# Patient Record
Sex: FEMALE | URBAN - METROPOLITAN AREA
[De-identification: names, ages, dates, MRNs, and addresses within clinical notes are randomized per-mention and may not be internally consistent; named-entity substitution may affect disease eponyms.]

---

## 2017-01-13 ENCOUNTER — INPATIENT (INPATIENT)
Facility: HOSPITAL | Age: 57
LOS: 5 days | Discharge: HOME | DRG: 191 | End: 2017-01-19
Attending: INTERNAL MEDICINE | Admitting: INTERNAL MEDICINE
Payer: COMMERCIAL

## 2017-01-13 VITALS
HEART RATE: 103 BPM | OXYGEN SATURATION: 100 % | WEIGHT: 225.09 LBS | SYSTOLIC BLOOD PRESSURE: 135 MMHG | TEMPERATURE: 99 F | DIASTOLIC BLOOD PRESSURE: 74 MMHG | HEIGHT: 68 IN | RESPIRATION RATE: 18 BRPM

## 2017-01-13 DIAGNOSIS — E11.9 TYPE 2 DIABETES MELLITUS WITHOUT COMPLICATIONS: ICD-10-CM

## 2017-01-13 DIAGNOSIS — J44.1 CHRONIC OBSTRUCTIVE PULMONARY DISEASE WITH (ACUTE) EXACERBATION: ICD-10-CM

## 2017-01-13 DIAGNOSIS — Z98.891 HISTORY OF UTERINE SCAR FROM PREVIOUS SURGERY: Chronic | ICD-10-CM

## 2017-01-13 DIAGNOSIS — Z29.9 ENCOUNTER FOR PROPHYLACTIC MEASURES, UNSPECIFIED: ICD-10-CM

## 2017-01-13 DIAGNOSIS — Z90.49 ACQUIRED ABSENCE OF OTHER SPECIFIED PARTS OF DIGESTIVE TRACT: Chronic | ICD-10-CM

## 2017-01-13 LAB
ANION GAP SERPL CALC-SCNC: 6 MMOL/L — SIGNIFICANT CHANGE UP (ref 5–17)
ANISOCYTOSIS BLD QL: SLIGHT — SIGNIFICANT CHANGE UP
BASOPHILS # BLD AUTO: 0.1 K/UL — SIGNIFICANT CHANGE UP (ref 0–0.2)
BASOPHILS NFR BLD AUTO: 0.3 % — SIGNIFICANT CHANGE UP (ref 0–2)
BASOPHILS NFR BLD AUTO: 1 % — SIGNIFICANT CHANGE UP (ref 0–2)
BUN SERPL-MCNC: 11 MG/DL — SIGNIFICANT CHANGE UP (ref 7–18)
CALCIUM SERPL-MCNC: 9 MG/DL — SIGNIFICANT CHANGE UP (ref 8.4–10.5)
CHLORIDE SERPL-SCNC: 104 MMOL/L — SIGNIFICANT CHANGE UP (ref 96–108)
CO2 SERPL-SCNC: 33 MMOL/L — HIGH (ref 22–31)
CREAT SERPL-MCNC: 0.61 MG/DL — SIGNIFICANT CHANGE UP (ref 0.5–1.3)
EOSINOPHIL # BLD AUTO: 0 K/UL — SIGNIFICANT CHANGE UP (ref 0–0.5)
EOSINOPHIL NFR BLD AUTO: 0 % — SIGNIFICANT CHANGE UP (ref 0–6)
GIANT PLATELETS BLD QL SMEAR: PRESENT — SIGNIFICANT CHANGE UP
GLUCOSE SERPL-MCNC: 200 MG/DL — HIGH (ref 70–99)
HCT VFR BLD CALC: 30.5 % — LOW (ref 34.5–45)
HCT VFR BLD CALC: 32.7 % — LOW (ref 34.5–45)
HGB BLD-MCNC: 9.4 G/DL — LOW (ref 11.5–15.5)
HGB BLD-MCNC: 9.4 G/DL — LOW (ref 11.5–15.5)
LACTATE SERPL-SCNC: 2.9 MMOL/L — HIGH (ref 0.7–2)
LYMPHOCYTES # BLD AUTO: 0.8 K/UL — LOW (ref 1–3.3)
LYMPHOCYTES # BLD AUTO: 4 % — LOW (ref 13–44)
LYMPHOCYTES # BLD AUTO: 8 % — LOW (ref 13–44)
MAGNESIUM SERPL-MCNC: 1.9 MG/DL — SIGNIFICANT CHANGE UP (ref 1.8–2.4)
MANUAL DIF COMMENT BLD-IMP: SIGNIFICANT CHANGE UP
MANUAL DIF COMMENT BLD-IMP: SIGNIFICANT CHANGE UP
MCHC RBC-ENTMCNC: 22 PG — LOW (ref 27–34)
MCHC RBC-ENTMCNC: 22.9 PG — LOW (ref 27–34)
MCHC RBC-ENTMCNC: 28.8 GM/DL — LOW (ref 32–36)
MCHC RBC-ENTMCNC: 30.8 GM/DL — LOW (ref 32–36)
MCV RBC AUTO: 74.4 FL — LOW (ref 80–100)
MCV RBC AUTO: 76.3 FL — LOW (ref 80–100)
METAMYELOCYTES # FLD: 1 % — HIGH (ref 0–0)
MICROCYTES BLD QL: SLIGHT — SIGNIFICANT CHANGE UP
MONOCYTES # BLD AUTO: 0.1 K/UL — SIGNIFICANT CHANGE UP (ref 0–0.9)
MONOCYTES NFR BLD AUTO: 0.4 % — LOW (ref 2–14)
MONOCYTES NFR BLD AUTO: 1 % — LOW (ref 2–14)
NEUTROPHILS # BLD AUTO: 18.4 K/UL — HIGH (ref 1.8–7.4)
NEUTROPHILS NFR BLD AUTO: 89 % — HIGH (ref 43–77)
NEUTROPHILS NFR BLD AUTO: 95.3 % — HIGH (ref 43–77)
PHOSPHATE SERPL-MCNC: 2.3 MG/DL — LOW (ref 2.5–4.5)
PLAT MORPH BLD: NORMAL — SIGNIFICANT CHANGE UP
PLATELET # BLD AUTO: 490 K/UL — HIGH (ref 150–400)
PLATELET # BLD AUTO: 522 K/UL — HIGH (ref 150–400)
POTASSIUM SERPL-MCNC: 4.2 MMOL/L — SIGNIFICANT CHANGE UP (ref 3.5–5.3)
POTASSIUM SERPL-SCNC: 4.2 MMOL/L — SIGNIFICANT CHANGE UP (ref 3.5–5.3)
RBC # BLD: 4.1 M/UL — SIGNIFICANT CHANGE UP (ref 3.8–5.2)
RBC # BLD: 4.29 M/UL — SIGNIFICANT CHANGE UP (ref 3.8–5.2)
RBC # FLD: 19.8 % — HIGH (ref 10.3–14.5)
RBC # FLD: 20.7 % — HIGH (ref 10.3–14.5)
RBC BLD AUTO: ABNORMAL
SODIUM SERPL-SCNC: 143 MMOL/L — SIGNIFICANT CHANGE UP (ref 135–145)
SPHEROCYTES BLD QL SMEAR: SLIGHT — SIGNIFICANT CHANGE UP
WBC # BLD: 18.7 K/UL — HIGH (ref 3.8–10.5)
WBC # BLD: 19.3 K/UL — HIGH (ref 3.8–10.5)
WBC # FLD AUTO: 18.7 K/UL — HIGH (ref 3.8–10.5)
WBC # FLD AUTO: 19.3 K/UL — HIGH (ref 3.8–10.5)

## 2017-01-13 PROCEDURE — 71010: CPT | Mod: 26

## 2017-01-13 PROCEDURE — 99285 EMERGENCY DEPT VISIT HI MDM: CPT | Mod: 25

## 2017-01-13 PROCEDURE — 93970 EXTREMITY STUDY: CPT | Mod: 26

## 2017-01-13 PROCEDURE — 99053 MED SERV 10PM-8AM 24 HR FAC: CPT

## 2017-01-13 RX ORDER — DEXTROSE 50 % IN WATER 50 %
25 SYRINGE (ML) INTRAVENOUS ONCE
Qty: 0 | Refills: 0 | Status: DISCONTINUED | OUTPATIENT
Start: 2017-01-13 | End: 2017-01-19

## 2017-01-13 RX ORDER — SODIUM CHLORIDE 9 MG/ML
1000 INJECTION INTRAMUSCULAR; INTRAVENOUS; SUBCUTANEOUS
Qty: 0 | Refills: 0 | Status: COMPLETED | OUTPATIENT
Start: 2017-01-13 | End: 2017-01-14

## 2017-01-13 RX ORDER — DOCUSATE SODIUM 100 MG
100 CAPSULE ORAL
Qty: 0 | Refills: 0 | Status: DISCONTINUED | OUTPATIENT
Start: 2017-01-13 | End: 2017-01-19

## 2017-01-13 RX ORDER — FUROSEMIDE 40 MG
40 TABLET ORAL DAILY
Qty: 0 | Refills: 0 | Status: DISCONTINUED | OUTPATIENT
Start: 2017-01-13 | End: 2017-01-16

## 2017-01-13 RX ORDER — IPRATROPIUM/ALBUTEROL SULFATE 18-103MCG
3 AEROSOL WITH ADAPTER (GRAM) INHALATION EVERY 6 HOURS
Qty: 0 | Refills: 0 | Status: DISCONTINUED | OUTPATIENT
Start: 2017-01-13 | End: 2017-01-13

## 2017-01-13 RX ORDER — ENOXAPARIN SODIUM 100 MG/ML
40 INJECTION SUBCUTANEOUS DAILY
Qty: 0 | Refills: 0 | Status: DISCONTINUED | OUTPATIENT
Start: 2017-01-13 | End: 2017-01-19

## 2017-01-13 RX ORDER — IPRATROPIUM/ALBUTEROL SULFATE 18-103MCG
3 AEROSOL WITH ADAPTER (GRAM) INHALATION EVERY 4 HOURS
Qty: 0 | Refills: 0 | Status: DISCONTINUED | OUTPATIENT
Start: 2017-01-13 | End: 2017-01-15

## 2017-01-13 RX ORDER — INSULIN LISPRO 100/ML
VIAL (ML) SUBCUTANEOUS
Qty: 0 | Refills: 0 | Status: DISCONTINUED | OUTPATIENT
Start: 2017-01-13 | End: 2017-01-19

## 2017-01-13 RX ORDER — ACETAMINOPHEN 500 MG
650 TABLET ORAL EVERY 8 HOURS
Qty: 0 | Refills: 0 | Status: COMPLETED | OUTPATIENT
Start: 2017-01-13 | End: 2017-01-18

## 2017-01-13 RX ORDER — POLYETHYLENE GLYCOL 3350 17 G/17G
17 POWDER, FOR SOLUTION ORAL DAILY
Qty: 0 | Refills: 0 | Status: COMPLETED | OUTPATIENT
Start: 2017-01-14 | End: 2017-01-16

## 2017-01-13 RX ORDER — ALBUTEROL 90 UG/1
2.5 AEROSOL, METERED ORAL
Qty: 0 | Refills: 0 | Status: COMPLETED | OUTPATIENT
Start: 2017-01-13 | End: 2017-01-13

## 2017-01-13 RX ORDER — IPRATROPIUM BROMIDE 0.2 MG/ML
500 SOLUTION, NON-ORAL INHALATION
Qty: 0 | Refills: 0 | Status: COMPLETED | OUTPATIENT
Start: 2017-01-13 | End: 2017-01-13

## 2017-01-13 RX ORDER — GLUCAGON INJECTION, SOLUTION 0.5 MG/.1ML
1 INJECTION, SOLUTION SUBCUTANEOUS ONCE
Qty: 0 | Refills: 0 | Status: DISCONTINUED | OUTPATIENT
Start: 2017-01-13 | End: 2017-01-19

## 2017-01-13 RX ORDER — SENNA PLUS 8.6 MG/1
2 TABLET ORAL AT BEDTIME
Qty: 0 | Refills: 0 | Status: DISCONTINUED | OUTPATIENT
Start: 2017-01-13 | End: 2017-01-19

## 2017-01-13 RX ORDER — DEXTROSE 50 % IN WATER 50 %
12.5 SYRINGE (ML) INTRAVENOUS ONCE
Qty: 0 | Refills: 0 | Status: DISCONTINUED | OUTPATIENT
Start: 2017-01-13 | End: 2017-01-19

## 2017-01-13 RX ORDER — SODIUM CHLORIDE 9 MG/ML
1000 INJECTION, SOLUTION INTRAVENOUS
Qty: 0 | Refills: 0 | Status: DISCONTINUED | OUTPATIENT
Start: 2017-01-13 | End: 2017-01-19

## 2017-01-13 RX ORDER — ACETAMINOPHEN 500 MG
650 TABLET ORAL ONCE
Qty: 0 | Refills: 0 | Status: DISCONTINUED | OUTPATIENT
Start: 2017-01-13 | End: 2017-01-13

## 2017-01-13 RX ORDER — DEXTROSE 50 % IN WATER 50 %
1 SYRINGE (ML) INTRAVENOUS ONCE
Qty: 0 | Refills: 0 | Status: DISCONTINUED | OUTPATIENT
Start: 2017-01-13 | End: 2017-01-19

## 2017-01-13 RX ADMIN — Medication 500 MICROGRAM(S): at 06:25

## 2017-01-13 RX ADMIN — Medication 500 MICROGRAM(S): at 07:25

## 2017-01-13 RX ADMIN — ALBUTEROL 2.5 MILLIGRAM(S): 90 AEROSOL, METERED ORAL at 07:28

## 2017-01-13 RX ADMIN — Medication 3 MILLILITER(S): at 22:46

## 2017-01-13 RX ADMIN — SODIUM CHLORIDE 100 MILLILITER(S): 9 INJECTION INTRAMUSCULAR; INTRAVENOUS; SUBCUTANEOUS at 19:45

## 2017-01-13 RX ADMIN — Medication 40 MILLIGRAM(S): at 15:21

## 2017-01-13 RX ADMIN — Medication 500 MICROGRAM(S): at 07:23

## 2017-01-13 RX ADMIN — ENOXAPARIN SODIUM 40 MILLIGRAM(S): 100 INJECTION SUBCUTANEOUS at 15:21

## 2017-01-13 RX ADMIN — Medication 100 MILLIGRAM(S): at 17:48

## 2017-01-13 RX ADMIN — Medication 3 MILLILITER(S): at 17:48

## 2017-01-13 RX ADMIN — Medication 125 MILLIGRAM(S): at 07:23

## 2017-01-13 RX ADMIN — ALBUTEROL 2.5 MILLIGRAM(S): 90 AEROSOL, METERED ORAL at 07:23

## 2017-01-13 RX ADMIN — Medication: at 17:48

## 2017-01-13 RX ADMIN — ALBUTEROL 2.5 MILLIGRAM(S): 90 AEROSOL, METERED ORAL at 07:24

## 2017-01-13 RX ADMIN — Medication 650 MILLIGRAM(S): at 23:51

## 2017-01-13 RX ADMIN — Medication 40 MILLIGRAM(S): at 22:45

## 2017-01-13 NOTE — H&P ADULT. - ATTENDING COMMENTS
Patient seen and examined. Patient's history, vitals, labs, imaging studies reviewed. Discussed with resident, agree with note, with edits. In addition, + constipation - give bowel regimen, tap water enema, monitor for bowel movement. Plan of care discussed with patient, and agrees, all questions answered

## 2017-01-13 NOTE — H&P ADULT. - PROBLEM SELECTOR PLAN 1
- secondary to bronchitis   - continue duonebs and steriods  - continue levaquin   - will give humidified oxygen   - patient has ECHO in oct, 2016 with normal EF; no indication for repeat   - f/u rapid viral and flu panel  - iv hydration for lactic acidosis  - will get US duplex for lower extremity swelling

## 2017-01-13 NOTE — ED PROVIDER NOTE - PMH
Asthma    Diabetes    Emphysema    Hemorrhoids    Morbid obesity    Overactive bladder    Urinary tract infection without hematuria, site unspecified

## 2017-01-13 NOTE — ED PROVIDER NOTE - OBJECTIVE STATEMENT
58 y/o F pt w/ PMHx of COPD, DM and asthma presents to ED c/o SOB since yesterday night. Pt states that she takes Lasix and is on at home oxygen for COPD. Pt denies fever, cough, chest pain, or any other complaints. Pt is allergic to multiple drugs as listed.

## 2017-01-13 NOTE — ED PROVIDER NOTE - CONSTITUTIONAL, MLM
normal... Well appearing, obese, awake, alert, oriented to person, place, time/situation and in no apparent distress.

## 2017-01-13 NOTE — H&P ADULT. - RS GEN PE MLT RESP DETAILS PC
good air movement/normal/clear to auscultation bilaterally/breath sounds equal/airway patent/respirations non-labored

## 2017-01-13 NOTE — H&P ADULT. - HISTORY OF PRESENT ILLNESS
Patient is a 56 F from home, lives with  ambulates with walker PMHx of COPD on home oxygen 24 hours a day(never intubated) , PUSHPA (not on CPAP), DM, overactive bladder with recurrent UTI, multiple frequent readmissions for COPD exacerbation came in to the ED with worsening SOB due to severe right leg pain and swelling associated with inability to ambulate.      Patient is always short of breath at baseline but symptoms are worsening. Dyspnea is constant, worsens with ambulation and exertion. She also complains of worsening right leg swelling despite being compliant with her diet and Lasix. No chest pain, fevers , chills or dysuria. No cough, fever, chills, weight loss, diarrhea, nausea/vomiting or abdominal pain. No recent travel or sick contacts. Due the leg pain, she remains in bed most of the bed. Also reports anxiety and frustation. Patient was recently discharged from [facility] after treatment for COPD exacerbation(30 DEC 2016 to Jan 1)  during which time she was discharged on steriod taper and flonase. She was also treated for MDR klebsiella with 3 days of ertapenem.     Socia: lives with . No HHA (hasn't taken shower in 2 years!). Smoked 0.5 PPD for 30 years. Quit 20 years ago. Denies alcohol or illict drugs abuse.   Family: Both parents had a stroke. No major lung disease. Patient is a 56 F from home, lives with  ambulates with walker PMHx of COPD on home oxygen 24 hours a day(never intubated) , PUSHPA (not on CPAP), DM, overactive bladder with recurrent UTI, multiple frequent readmissions for COPD exacerbation came in to the ED with worsening SOB and severe right leg pain and swelling associated with inability to ambulate.      Patient is always short of breath at baseline but symptoms are worsening. Dyspnea is constant, worsens with ambulation and exertion. She also complains of worsening right leg swelling despite reporting compliant with her diet and Lasix. No chest pain, fevers , chills or dysuria. No cough, fever, chills, weight loss, diarrhea, nausea/vomiting or abdominal pain. No recent travel or sick contacts. Due the leg pain, she remains in bed most of the bed. Also reports anxiety and frustation. Patient was recently discharged from [facility] after treatment for COPD exacerbation(30 DEC 2016 to Jan 1)  during which time she was discharged on steriod taper and flonase. She was also treated for MDR klebsiella with 3 days of ertapenem.     Socia: lives with . No HHA (hasn't taken shower in 2 years!). Smoked 0.5 PPD for 30 years. Quit 20 years ago. Denies alcohol or illict drugs abuse.   Family: Both parents had a stroke. No major lung disease. Patient is a 57 F from home, lives with  ambulates with walker PMHx of COPD on home oxygen 24 hours a day(never intubated) , PUSHPA (not on CPAP), DM, overactive bladder with recurrent UTI, multiple frequent readmissions for COPD exacerbation came in to the ED with worsening SOB and severe right leg pain and swelling associated with inability to ambulate.      Patient is always short of breath at baseline but symptoms are worsening. Dyspnea is constant, worsens with ambulation and exertion. She also complains of worsening right leg swelling despite reporting compliant with her diet and Lasix. No chest pain, fevers , chills or dysuria. No cough, fever, chills, weight loss, diarrhea, nausea/vomiting or abdominal pain. No recent travel or sick contacts. Due the leg pain, she remains in bed most of the bed. Also reports anxiety and frustation. Patient was recently discharged from [facility] after treatment for COPD exacerbation(30 DEC 2016 to Jan 1)  during which time she was discharged on steriod taper and flonase. She was also treated for MDR klebsiella with 3 days of ertapenem.     Socia: lives with . No HHA (hasn't taken shower in 2 years!). Smoked 0.5 PPD for 30 years. Quit 20 years ago. Denies alcohol or illict drugs abuse.   Family: Both parents had a stroke. No major lung disease.

## 2017-01-13 NOTE — ED ADULT NURSE NOTE - OBJECTIVE STATEMENT
Pt. is aox3 came to er [facility] with sob/janelle-lower ext pain. pt has difficulty moving/standing. pt is obese. denies cp/nausea. eats gummies constantly. pt was seen and evaluated by md. labs done sent . neb treatment given

## 2017-01-13 NOTE — ED PROVIDER NOTE - MEDICAL DECISION MAKING DETAILS
58 y/o F pt w/ recurring sob since yesterday. Likely COPD exacerbation. Will get labs, consider admission. 58 y/o F pt w/ recurring sob since yesterday. Likely COPD exacerbation. Will get labs, treat consider admission.

## 2017-01-13 NOTE — H&P ADULT. - FAMILY HISTORY
Father  Still living? Unknown  Family history of stroke, Age at diagnosis: Age Unknown     Mother  Still living? Unknown  Family history of stroke, Age at diagnosis: Age Unknown

## 2017-01-13 NOTE — ED PROVIDER NOTE - NS ED MD SCRIBE ATTENDING SCRIBE SECTIONS
VITAL SIGNS( Pullset)/HISTORY OF PRESENT ILLNESS/REVIEW OF SYSTEMS/PAST MEDICAL/SURGICAL/SOCIAL HISTORY/PHYSICAL EXAM/DISPOSITION/HIV

## 2017-01-14 LAB
ANION GAP SERPL CALC-SCNC: 6 MMOL/L — SIGNIFICANT CHANGE UP (ref 5–17)
BASOPHILS # BLD AUTO: 0.2 K/UL — SIGNIFICANT CHANGE UP (ref 0–0.2)
BASOPHILS NFR BLD AUTO: 0.8 % — SIGNIFICANT CHANGE UP (ref 0–2)
BUN SERPL-MCNC: 10 MG/DL — SIGNIFICANT CHANGE UP (ref 7–18)
CALCIUM SERPL-MCNC: 9 MG/DL — SIGNIFICANT CHANGE UP (ref 8.4–10.5)
CHLORIDE SERPL-SCNC: 105 MMOL/L — SIGNIFICANT CHANGE UP (ref 96–108)
CO2 SERPL-SCNC: 34 MMOL/L — HIGH (ref 22–31)
CREAT SERPL-MCNC: 0.55 MG/DL — SIGNIFICANT CHANGE UP (ref 0.5–1.3)
EOSINOPHIL # BLD AUTO: 0 K/UL — SIGNIFICANT CHANGE UP (ref 0–0.5)
EOSINOPHIL NFR BLD AUTO: 0 % — SIGNIFICANT CHANGE UP (ref 0–6)
FLUAV SPEC QL CULT: NEGATIVE — SIGNIFICANT CHANGE UP
FLUBV AG SPEC QL IA: NEGATIVE — SIGNIFICANT CHANGE UP
GLUCOSE SERPL-MCNC: 160 MG/DL — HIGH (ref 70–99)
HCT VFR BLD CALC: 30.7 % — LOW (ref 34.5–45)
HGB BLD-MCNC: 9 G/DL — LOW (ref 11.5–15.5)
LACTATE SERPL-SCNC: 1.7 MMOL/L — SIGNIFICANT CHANGE UP (ref 0.7–2)
LYMPHOCYTES # BLD AUTO: 1.1 K/UL — SIGNIFICANT CHANGE UP (ref 1–3.3)
LYMPHOCYTES # BLD AUTO: 5.5 % — LOW (ref 13–44)
MAGNESIUM SERPL-MCNC: 2 MG/DL — SIGNIFICANT CHANGE UP (ref 1.8–2.4)
MCHC RBC-ENTMCNC: 22.7 PG — LOW (ref 27–34)
MCHC RBC-ENTMCNC: 29.5 GM/DL — LOW (ref 32–36)
MCV RBC AUTO: 77.1 FL — LOW (ref 80–100)
MONOCYTES # BLD AUTO: 0.6 K/UL — SIGNIFICANT CHANGE UP (ref 0–0.9)
MONOCYTES NFR BLD AUTO: 3 % — SIGNIFICANT CHANGE UP (ref 2–14)
NEUTROPHILS # BLD AUTO: 18.8 K/UL — HIGH (ref 1.8–7.4)
NEUTROPHILS NFR BLD AUTO: 90.7 % — HIGH (ref 43–77)
PHOSPHATE SERPL-MCNC: 2.9 MG/DL — SIGNIFICANT CHANGE UP (ref 2.5–4.5)
PLATELET # BLD AUTO: 466 K/UL — HIGH (ref 150–400)
POTASSIUM SERPL-MCNC: 4.6 MMOL/L — SIGNIFICANT CHANGE UP (ref 3.5–5.3)
POTASSIUM SERPL-SCNC: 4.6 MMOL/L — SIGNIFICANT CHANGE UP (ref 3.5–5.3)
RBC # BLD: 3.98 M/UL — SIGNIFICANT CHANGE UP (ref 3.8–5.2)
RBC # FLD: 20.8 % — HIGH (ref 10.3–14.5)
SODIUM SERPL-SCNC: 145 MMOL/L — SIGNIFICANT CHANGE UP (ref 135–145)
WBC # BLD: 20.8 K/UL — HIGH (ref 3.8–10.5)
WBC # FLD AUTO: 20.8 K/UL — HIGH (ref 3.8–10.5)

## 2017-01-14 RX ORDER — FLUTICASONE PROPIONATE 50 MCG
1 SPRAY, SUSPENSION NASAL
Qty: 0 | Refills: 0 | Status: DISCONTINUED | OUTPATIENT
Start: 2017-01-14 | End: 2017-01-15

## 2017-01-14 RX ORDER — OXYMETAZOLINE HYDROCHLORIDE 0.5 MG/ML
2 SPRAY NASAL THREE TIMES A DAY
Qty: 0 | Refills: 0 | Status: COMPLETED | OUTPATIENT
Start: 2017-01-14 | End: 2017-01-15

## 2017-01-14 RX ORDER — PETROLATUM,WHITE
1 JELLY (GRAM) TOPICAL DAILY
Qty: 0 | Refills: 0 | Status: DISCONTINUED | OUTPATIENT
Start: 2017-01-14 | End: 2017-01-19

## 2017-01-14 RX ORDER — IPRATROPIUM/ALBUTEROL SULFATE 18-103MCG
3 AEROSOL WITH ADAPTER (GRAM) INHALATION ONCE
Qty: 0 | Refills: 0 | Status: COMPLETED | OUTPATIENT
Start: 2017-01-14 | End: 2017-01-14

## 2017-01-14 RX ADMIN — Medication 650 MILLIGRAM(S): at 07:06

## 2017-01-14 RX ADMIN — Medication 3 MILLILITER(S): at 03:54

## 2017-01-14 RX ADMIN — Medication 40 MILLIGRAM(S): at 22:39

## 2017-01-14 RX ADMIN — SODIUM CHLORIDE 100 MILLILITER(S): 9 INJECTION INTRAMUSCULAR; INTRAVENOUS; SUBCUTANEOUS at 22:50

## 2017-01-14 RX ADMIN — Medication 40 MILLIGRAM(S): at 07:07

## 2017-01-14 RX ADMIN — Medication 40 MILLIGRAM(S): at 07:20

## 2017-01-14 RX ADMIN — Medication 650 MILLIGRAM(S): at 09:06

## 2017-01-14 RX ADMIN — Medication 3 MILLILITER(S): at 14:18

## 2017-01-14 RX ADMIN — Medication 100 MILLIGRAM(S): at 17:10

## 2017-01-14 RX ADMIN — Medication 650 MILLIGRAM(S): at 15:49

## 2017-01-14 RX ADMIN — Medication 3 MILLILITER(S): at 07:07

## 2017-01-14 RX ADMIN — Medication 3 MILLILITER(S): at 18:38

## 2017-01-14 RX ADMIN — Medication 4: at 12:30

## 2017-01-14 RX ADMIN — Medication 3 MILLILITER(S): at 09:23

## 2017-01-14 RX ADMIN — SENNA PLUS 2 TABLET(S): 8.6 TABLET ORAL at 22:38

## 2017-01-14 RX ADMIN — Medication 650 MILLIGRAM(S): at 22:38

## 2017-01-14 RX ADMIN — ENOXAPARIN SODIUM 40 MILLIGRAM(S): 100 INJECTION SUBCUTANEOUS at 12:03

## 2017-01-14 RX ADMIN — Medication 3 MILLILITER(S): at 19:22

## 2017-01-14 RX ADMIN — Medication 650 MILLIGRAM(S): at 14:18

## 2017-01-14 RX ADMIN — Medication 40 MILLIGRAM(S): at 14:18

## 2017-01-14 RX ADMIN — Medication 2: at 17:10

## 2017-01-14 RX ADMIN — Medication 650 MILLIGRAM(S): at 02:00

## 2017-01-14 RX ADMIN — Medication 3 MILLILITER(S): at 21:12

## 2017-01-14 RX ADMIN — Medication 2: at 09:22

## 2017-01-15 RX ORDER — MORPHINE SULFATE 50 MG/1
2 CAPSULE, EXTENDED RELEASE ORAL ONCE
Qty: 0 | Refills: 0 | Status: DISCONTINUED | OUTPATIENT
Start: 2017-01-15 | End: 2017-01-16

## 2017-01-15 RX ORDER — LACTULOSE 10 G/15ML
15 SOLUTION ORAL ONCE
Qty: 0 | Refills: 0 | Status: COMPLETED | OUTPATIENT
Start: 2017-01-15 | End: 2017-01-15

## 2017-01-15 RX ORDER — IPRATROPIUM/ALBUTEROL SULFATE 18-103MCG
3 AEROSOL WITH ADAPTER (GRAM) INHALATION EVERY 6 HOURS
Qty: 0 | Refills: 0 | Status: DISCONTINUED | OUTPATIENT
Start: 2017-01-15 | End: 2017-01-19

## 2017-01-15 RX ORDER — FLUTICASONE PROPIONATE 50 MCG
1 SPRAY, SUSPENSION NASAL
Qty: 0 | Refills: 0 | Status: DISCONTINUED | OUTPATIENT
Start: 2017-01-15 | End: 2017-01-19

## 2017-01-15 RX ADMIN — Medication 40 MILLIGRAM(S): at 16:32

## 2017-01-15 RX ADMIN — OXYMETAZOLINE HYDROCHLORIDE 2 SPRAY(S): 0.5 SPRAY NASAL at 03:34

## 2017-01-15 RX ADMIN — Medication 650 MILLIGRAM(S): at 11:23

## 2017-01-15 RX ADMIN — Medication 650 MILLIGRAM(S): at 16:45

## 2017-01-15 RX ADMIN — SENNA PLUS 2 TABLET(S): 8.6 TABLET ORAL at 22:42

## 2017-01-15 RX ADMIN — Medication 3 MILLILITER(S): at 06:33

## 2017-01-15 RX ADMIN — Medication 1 APPLICATION(S): at 11:26

## 2017-01-15 RX ADMIN — Medication 40 MILLIGRAM(S): at 06:59

## 2017-01-15 RX ADMIN — Medication 40 MILLIGRAM(S): at 22:42

## 2017-01-15 RX ADMIN — ENOXAPARIN SODIUM 40 MILLIGRAM(S): 100 INJECTION SUBCUTANEOUS at 11:25

## 2017-01-15 RX ADMIN — Medication 100 MILLIGRAM(S): at 18:53

## 2017-01-15 RX ADMIN — POLYETHYLENE GLYCOL 3350 17 GRAM(S): 17 POWDER, FOR SOLUTION ORAL at 11:26

## 2017-01-15 RX ADMIN — Medication 650 MILLIGRAM(S): at 06:58

## 2017-01-15 RX ADMIN — Medication 3 MILLILITER(S): at 02:25

## 2017-01-15 RX ADMIN — Medication 2: at 18:52

## 2017-01-15 RX ADMIN — Medication 1 SPRAY(S): at 06:58

## 2017-01-15 RX ADMIN — Medication 3 MILLILITER(S): at 09:11

## 2017-01-15 RX ADMIN — Medication 1: at 11:25

## 2017-01-15 RX ADMIN — Medication 650 MILLIGRAM(S): at 08:00

## 2017-01-15 RX ADMIN — Medication 3 MILLILITER(S): at 14:51

## 2017-01-15 RX ADMIN — Medication 650 MILLIGRAM(S): at 23:40

## 2017-01-15 RX ADMIN — Medication 1: at 08:48

## 2017-01-15 RX ADMIN — Medication 1 SPRAY(S): at 18:54

## 2017-01-15 RX ADMIN — Medication 3 MILLILITER(S): at 20:11

## 2017-01-15 RX ADMIN — Medication 650 MILLIGRAM(S): at 22:43

## 2017-01-15 RX ADMIN — Medication 100 MILLIGRAM(S): at 06:58

## 2017-01-16 LAB
ALBUMIN SERPL ELPH-MCNC: 2.5 G/DL — LOW (ref 3.5–5)
ALP SERPL-CCNC: 80 U/L — SIGNIFICANT CHANGE UP (ref 40–120)
ALT FLD-CCNC: 67 U/L DA — HIGH (ref 10–60)
ANION GAP SERPL CALC-SCNC: 4 MMOL/L — LOW (ref 5–17)
AST SERPL-CCNC: 10 U/L — SIGNIFICANT CHANGE UP (ref 10–40)
BASOPHILS # BLD AUTO: 0.5 K/UL — HIGH (ref 0–0.2)
BASOPHILS NFR BLD AUTO: 2.6 % — HIGH (ref 0–2)
BILIRUB SERPL-MCNC: 0.1 MG/DL — LOW (ref 0.2–1.2)
BUN SERPL-MCNC: 21 MG/DL — HIGH (ref 7–18)
CALCIUM SERPL-MCNC: 9.3 MG/DL — SIGNIFICANT CHANGE UP (ref 8.4–10.5)
CHLORIDE SERPL-SCNC: 102 MMOL/L — SIGNIFICANT CHANGE UP (ref 96–108)
CO2 SERPL-SCNC: 37 MMOL/L — HIGH (ref 22–31)
CREAT SERPL-MCNC: 0.67 MG/DL — SIGNIFICANT CHANGE UP (ref 0.5–1.3)
EOSINOPHIL # BLD AUTO: 0 K/UL — SIGNIFICANT CHANGE UP (ref 0–0.5)
EOSINOPHIL NFR BLD AUTO: 0.2 % — SIGNIFICANT CHANGE UP (ref 0–6)
GLUCOSE SERPL-MCNC: 168 MG/DL — HIGH (ref 70–99)
HCT VFR BLD CALC: 31.4 % — LOW (ref 34.5–45)
HGB BLD-MCNC: 9.1 G/DL — LOW (ref 11.5–15.5)
LYMPHOCYTES # BLD AUTO: 1.3 K/UL — SIGNIFICANT CHANGE UP (ref 1–3.3)
LYMPHOCYTES # BLD AUTO: 6.5 % — LOW (ref 13–44)
MCHC RBC-ENTMCNC: 22.4 PG — LOW (ref 27–34)
MCHC RBC-ENTMCNC: 29 GM/DL — LOW (ref 32–36)
MCV RBC AUTO: 77.5 FL — LOW (ref 80–100)
MONOCYTES # BLD AUTO: 1.2 K/UL — HIGH (ref 0–0.9)
MONOCYTES NFR BLD AUTO: 6 % — SIGNIFICANT CHANGE UP (ref 2–14)
NEUTROPHILS # BLD AUTO: 16.3 K/UL — HIGH (ref 1.8–7.4)
NEUTROPHILS NFR BLD AUTO: 84.6 % — HIGH (ref 43–77)
PLATELET # BLD AUTO: 490 K/UL — HIGH (ref 150–400)
POTASSIUM SERPL-MCNC: 5.3 MMOL/L — SIGNIFICANT CHANGE UP (ref 3.5–5.3)
POTASSIUM SERPL-SCNC: 5.3 MMOL/L — SIGNIFICANT CHANGE UP (ref 3.5–5.3)
PROT SERPL-MCNC: 6.8 G/DL — SIGNIFICANT CHANGE UP (ref 6–8.3)
RBC # BLD: 4.05 M/UL — SIGNIFICANT CHANGE UP (ref 3.8–5.2)
RBC # FLD: 19.9 % — HIGH (ref 10.3–14.5)
SODIUM SERPL-SCNC: 143 MMOL/L — SIGNIFICANT CHANGE UP (ref 135–145)
WBC # BLD: 19.3 K/UL — HIGH (ref 3.8–10.5)
WBC # FLD AUTO: 19.3 K/UL — HIGH (ref 3.8–10.5)

## 2017-01-16 RX ORDER — MONTELUKAST 4 MG/1
10 TABLET, CHEWABLE ORAL AT BEDTIME
Qty: 0 | Refills: 0 | Status: DISCONTINUED | OUTPATIENT
Start: 2017-01-16 | End: 2017-01-19

## 2017-01-16 RX ORDER — FUROSEMIDE 40 MG
40 TABLET ORAL DAILY
Qty: 0 | Refills: 0 | Status: DISCONTINUED | OUTPATIENT
Start: 2017-01-16 | End: 2017-01-18

## 2017-01-16 RX ORDER — TIOTROPIUM BROMIDE 18 UG/1
1 CAPSULE ORAL; RESPIRATORY (INHALATION) DAILY
Qty: 0 | Refills: 0 | Status: DISCONTINUED | OUTPATIENT
Start: 2017-01-16 | End: 2017-01-19

## 2017-01-16 RX ADMIN — Medication 100 MILLIGRAM(S): at 18:11

## 2017-01-16 RX ADMIN — Medication 1: at 18:10

## 2017-01-16 RX ADMIN — Medication 3 MILLILITER(S): at 14:14

## 2017-01-16 RX ADMIN — Medication 650 MILLIGRAM(S): at 21:44

## 2017-01-16 RX ADMIN — Medication 650 MILLIGRAM(S): at 12:22

## 2017-01-16 RX ADMIN — Medication 1 SPRAY(S): at 18:02

## 2017-01-16 RX ADMIN — ENOXAPARIN SODIUM 40 MILLIGRAM(S): 100 INJECTION SUBCUTANEOUS at 12:19

## 2017-01-16 RX ADMIN — Medication 100 MILLIGRAM(S): at 05:58

## 2017-01-16 RX ADMIN — Medication 40 MILLIGRAM(S): at 21:15

## 2017-01-16 RX ADMIN — MORPHINE SULFATE 2 MILLIGRAM(S): 50 CAPSULE, EXTENDED RELEASE ORAL at 06:15

## 2017-01-16 RX ADMIN — Medication 1 APPLICATION(S): at 12:22

## 2017-01-16 RX ADMIN — MORPHINE SULFATE 2 MILLIGRAM(S): 50 CAPSULE, EXTENDED RELEASE ORAL at 05:56

## 2017-01-16 RX ADMIN — Medication 1: at 12:20

## 2017-01-16 RX ADMIN — Medication 3 MILLILITER(S): at 20:28

## 2017-01-16 RX ADMIN — SENNA PLUS 2 TABLET(S): 8.6 TABLET ORAL at 21:14

## 2017-01-16 RX ADMIN — Medication 3 MILLILITER(S): at 09:06

## 2017-01-16 RX ADMIN — Medication 40 MILLIGRAM(S): at 12:20

## 2017-01-16 RX ADMIN — Medication 650 MILLIGRAM(S): at 21:14

## 2017-01-16 RX ADMIN — Medication 40 MILLIGRAM(S): at 07:16

## 2017-01-16 RX ADMIN — Medication 3 MILLILITER(S): at 02:52

## 2017-01-16 RX ADMIN — Medication 40 MILLIGRAM(S): at 18:10

## 2017-01-16 RX ADMIN — Medication 650 MILLIGRAM(S): at 13:00

## 2017-01-16 RX ADMIN — Medication 1 SPRAY(S): at 08:28

## 2017-01-16 RX ADMIN — MONTELUKAST 10 MILLIGRAM(S): 4 TABLET, CHEWABLE ORAL at 21:14

## 2017-01-16 RX ADMIN — TIOTROPIUM BROMIDE 1 CAPSULE(S): 18 CAPSULE ORAL; RESPIRATORY (INHALATION) at 21:19

## 2017-01-16 RX ADMIN — POLYETHYLENE GLYCOL 3350 17 GRAM(S): 17 POWDER, FOR SOLUTION ORAL at 12:19

## 2017-01-16 NOTE — PHYSICAL THERAPY INITIAL EVALUATION ADULT - ACTIVE RANGE OF MOTION EXAMINATION, REHAB EVAL
bilateral  lower extremity Active ROM was WFL (within functional limits)/large abdomen makes moving LE to full range difficult/bilateral upper extremity Active ROM was WFL (within functional limits)

## 2017-01-17 LAB
BASE EXCESS BLDA CALC-SCNC: 9.1 MMOL/L — HIGH (ref -2–2)
BLOOD GAS COMMENTS ARTERIAL: SIGNIFICANT CHANGE UP
HCO3 BLDA-SCNC: 35 MMOL/L — HIGH (ref 23–27)
HOROWITZ INDEX BLDA+IHG-RTO: SIGNIFICANT CHANGE UP
PCO2 BLDA: 55 MMHG — HIGH (ref 32–46)
PH BLDA: 7.42 — SIGNIFICANT CHANGE UP (ref 7.35–7.45)
PO2 BLDA: 64 MMHG — LOW (ref 74–108)
SAO2 % BLDA: 91 % — LOW (ref 92–96)

## 2017-01-17 RX ORDER — BUDESONIDE AND FORMOTEROL FUMARATE DIHYDRATE 160; 4.5 UG/1; UG/1
1 AEROSOL RESPIRATORY (INHALATION)
Qty: 0 | Refills: 0 | Status: DISCONTINUED | OUTPATIENT
Start: 2017-01-17 | End: 2017-01-19

## 2017-01-17 RX ORDER — SODIUM CHLORIDE 0.65 %
1 AEROSOL, SPRAY (ML) NASAL
Qty: 0 | Refills: 0 | Status: DISCONTINUED | OUTPATIENT
Start: 2017-01-17 | End: 2017-01-19

## 2017-01-17 RX ADMIN — Medication 650 MILLIGRAM(S): at 07:54

## 2017-01-17 RX ADMIN — Medication 1 ENEMA: at 15:39

## 2017-01-17 RX ADMIN — Medication 100 MILLIGRAM(S): at 17:52

## 2017-01-17 RX ADMIN — Medication 100 MILLIGRAM(S): at 06:16

## 2017-01-17 RX ADMIN — Medication 1: at 12:15

## 2017-01-17 RX ADMIN — Medication 3 MILLILITER(S): at 02:28

## 2017-01-17 RX ADMIN — Medication 40 MILLIGRAM(S): at 08:02

## 2017-01-17 RX ADMIN — Medication 650 MILLIGRAM(S): at 15:16

## 2017-01-17 RX ADMIN — Medication 1 SPRAY(S): at 17:53

## 2017-01-17 RX ADMIN — Medication 650 MILLIGRAM(S): at 06:16

## 2017-01-17 RX ADMIN — Medication 3 MILLILITER(S): at 14:15

## 2017-01-17 RX ADMIN — Medication 40 MILLIGRAM(S): at 06:16

## 2017-01-17 RX ADMIN — ENOXAPARIN SODIUM 40 MILLIGRAM(S): 100 INJECTION SUBCUTANEOUS at 12:16

## 2017-01-17 RX ADMIN — Medication 3 MILLILITER(S): at 08:53

## 2017-01-17 RX ADMIN — Medication 40 MILLIGRAM(S): at 17:52

## 2017-01-17 RX ADMIN — Medication 1 APPLICATION(S): at 17:57

## 2017-01-17 RX ADMIN — MONTELUKAST 10 MILLIGRAM(S): 4 TABLET, CHEWABLE ORAL at 21:37

## 2017-01-17 RX ADMIN — Medication 1 SPRAY(S): at 06:16

## 2017-01-17 RX ADMIN — SENNA PLUS 2 TABLET(S): 8.6 TABLET ORAL at 21:37

## 2017-01-17 RX ADMIN — Medication 650 MILLIGRAM(S): at 14:25

## 2017-01-17 RX ADMIN — Medication 3 MILLILITER(S): at 20:37

## 2017-01-17 RX ADMIN — TIOTROPIUM BROMIDE 1 CAPSULE(S): 18 CAPSULE ORAL; RESPIRATORY (INHALATION) at 12:15

## 2017-01-17 RX ADMIN — BUDESONIDE AND FORMOTEROL FUMARATE DIHYDRATE 1 PUFF(S): 160; 4.5 AEROSOL RESPIRATORY (INHALATION) at 21:36

## 2017-01-17 RX ADMIN — Medication 1 SPRAY(S): at 17:54

## 2017-01-18 ENCOUNTER — TRANSCRIPTION ENCOUNTER (OUTPATIENT)
Age: 57
End: 2017-01-18

## 2017-01-18 LAB
ANION GAP SERPL CALC-SCNC: 5 MMOL/L — SIGNIFICANT CHANGE UP (ref 5–17)
BUN SERPL-MCNC: 24 MG/DL — HIGH (ref 7–18)
CALCIUM SERPL-MCNC: 9.5 MG/DL — SIGNIFICANT CHANGE UP (ref 8.4–10.5)
CHLORIDE SERPL-SCNC: 100 MMOL/L — SIGNIFICANT CHANGE UP (ref 96–108)
CO2 SERPL-SCNC: 38 MMOL/L — HIGH (ref 22–31)
CREAT SERPL-MCNC: 0.76 MG/DL — SIGNIFICANT CHANGE UP (ref 0.5–1.3)
CULTURE RESULTS: SIGNIFICANT CHANGE UP
CULTURE RESULTS: SIGNIFICANT CHANGE UP
GLUCOSE SERPL-MCNC: 157 MG/DL — HIGH (ref 70–99)
HCT VFR BLD CALC: 39.8 % — SIGNIFICANT CHANGE UP (ref 34.5–45)
HGB BLD-MCNC: 11.3 G/DL — LOW (ref 11.5–15.5)
IGE SERPL-ACNC: 27 IU/ML — SIGNIFICANT CHANGE UP (ref 0–100)
MCHC RBC-ENTMCNC: 21.9 PG — LOW (ref 27–34)
MCHC RBC-ENTMCNC: 28.3 GM/DL — LOW (ref 32–36)
MCV RBC AUTO: 77.3 FL — LOW (ref 80–100)
PLATELET # BLD AUTO: 641 K/UL — HIGH (ref 150–400)
POTASSIUM SERPL-MCNC: 5.5 MMOL/L — HIGH (ref 3.5–5.3)
POTASSIUM SERPL-SCNC: 5.5 MMOL/L — HIGH (ref 3.5–5.3)
RBC # BLD: 5.14 M/UL — SIGNIFICANT CHANGE UP (ref 3.8–5.2)
RBC # FLD: 19.8 % — HIGH (ref 10.3–14.5)
SODIUM SERPL-SCNC: 143 MMOL/L — SIGNIFICANT CHANGE UP (ref 135–145)
SPECIMEN SOURCE: SIGNIFICANT CHANGE UP
SPECIMEN SOURCE: SIGNIFICANT CHANGE UP
WBC # BLD: 19.9 K/UL — HIGH (ref 3.8–10.5)
WBC # FLD AUTO: 19.9 K/UL — HIGH (ref 3.8–10.5)

## 2017-01-18 RX ORDER — SODIUM POLYSTYRENE SULFONATE 4.1 MEQ/G
30 POWDER, FOR SUSPENSION ORAL ONCE
Qty: 0 | Refills: 0 | Status: DISCONTINUED | OUTPATIENT
Start: 2017-01-18 | End: 2017-01-18

## 2017-01-18 RX ORDER — SODIUM POLYSTYRENE SULFONATE 4.1 MEQ/G
30 POWDER, FOR SUSPENSION ORAL ONCE
Qty: 0 | Refills: 0 | Status: COMPLETED | OUTPATIENT
Start: 2017-01-18 | End: 2017-01-18

## 2017-01-18 RX ORDER — LACTULOSE 10 G/15ML
20 SOLUTION ORAL ONCE
Qty: 0 | Refills: 0 | Status: COMPLETED | OUTPATIENT
Start: 2017-01-18 | End: 2017-01-18

## 2017-01-18 RX ORDER — INSULIN LISPRO 100/ML
3 VIAL (ML) SUBCUTANEOUS ONCE
Qty: 0 | Refills: 0 | Status: COMPLETED | OUTPATIENT
Start: 2017-01-18 | End: 2017-01-18

## 2017-01-18 RX ADMIN — Medication 3 MILLILITER(S): at 09:55

## 2017-01-18 RX ADMIN — Medication 650 MILLIGRAM(S): at 14:39

## 2017-01-18 RX ADMIN — MONTELUKAST 10 MILLIGRAM(S): 4 TABLET, CHEWABLE ORAL at 22:35

## 2017-01-18 RX ADMIN — BUDESONIDE AND FORMOTEROL FUMARATE DIHYDRATE 1 PUFF(S): 160; 4.5 AEROSOL RESPIRATORY (INHALATION) at 14:39

## 2017-01-18 RX ADMIN — Medication 1 SPRAY(S): at 17:42

## 2017-01-18 RX ADMIN — SENNA PLUS 2 TABLET(S): 8.6 TABLET ORAL at 22:35

## 2017-01-18 RX ADMIN — Medication 40 MILLIGRAM(S): at 06:15

## 2017-01-18 RX ADMIN — TIOTROPIUM BROMIDE 1 CAPSULE(S): 18 CAPSULE ORAL; RESPIRATORY (INHALATION) at 11:45

## 2017-01-18 RX ADMIN — OXYMETAZOLINE HYDROCHLORIDE 2 SPRAY(S): 0.5 SPRAY NASAL at 11:56

## 2017-01-18 RX ADMIN — Medication 1 SPRAY(S): at 17:43

## 2017-01-18 RX ADMIN — Medication 1 SPRAY(S): at 06:13

## 2017-01-18 RX ADMIN — Medication 650 MILLIGRAM(S): at 01:00

## 2017-01-18 RX ADMIN — Medication 650 MILLIGRAM(S): at 17:18

## 2017-01-18 RX ADMIN — BUDESONIDE AND FORMOTEROL FUMARATE DIHYDRATE 1 PUFF(S): 160; 4.5 AEROSOL RESPIRATORY (INHALATION) at 22:36

## 2017-01-18 RX ADMIN — Medication 650 MILLIGRAM(S): at 23:30

## 2017-01-18 RX ADMIN — Medication 1: at 14:41

## 2017-01-18 RX ADMIN — SODIUM POLYSTYRENE SULFONATE 30 GRAM(S): 4.1 POWDER, FOR SUSPENSION ORAL at 11:44

## 2017-01-18 RX ADMIN — Medication 3 MILLILITER(S): at 15:56

## 2017-01-18 RX ADMIN — LACTULOSE 20 GRAM(S): 10 SOLUTION ORAL at 06:13

## 2017-01-18 RX ADMIN — Medication 1 SPRAY(S): at 06:14

## 2017-01-18 RX ADMIN — Medication 650 MILLIGRAM(S): at 07:28

## 2017-01-18 RX ADMIN — Medication 3 UNIT(S): at 00:53

## 2017-01-18 RX ADMIN — Medication 100 MILLIGRAM(S): at 17:43

## 2017-01-18 RX ADMIN — Medication 1 APPLICATION(S): at 14:41

## 2017-01-18 RX ADMIN — Medication 3 MILLILITER(S): at 03:44

## 2017-01-18 RX ADMIN — Medication 650 MILLIGRAM(S): at 06:15

## 2017-01-18 RX ADMIN — Medication 3 MILLILITER(S): at 21:58

## 2017-01-18 RX ADMIN — Medication 650 MILLIGRAM(S): at 00:55

## 2017-01-18 RX ADMIN — Medication 100 MILLIGRAM(S): at 06:14

## 2017-01-18 RX ADMIN — ENOXAPARIN SODIUM 40 MILLIGRAM(S): 100 INJECTION SUBCUTANEOUS at 11:44

## 2017-01-18 RX ADMIN — Medication 650 MILLIGRAM(S): at 22:35

## 2017-01-18 NOTE — DISCHARGE NOTE ADULT - CARE PROVIDERS DIRECT ADDRESSES
[email or web address],DirectAddress_[email or web address],DirectAddress_Unknown

## 2017-01-18 NOTE — DISCHARGE NOTE ADULT - CARE PLAN
Principal Discharge DX:	COPD exacerbation  Goal:	resolution  Instructions for follow-up, activity and diet:	Please followup with PCP  Secondary Diagnosis:	Diabetes  Goal:	HgbA1C<7  Instructions for follow-up, activity and diet:	Followup with PCP  Secondary Diagnosis:	Morbid obesity  Goal:	weight loss  Instructions for follow-up, activity and diet:	Please followup with PCP Principal Discharge DX:	COPD exacerbation  Goal:	resolution  Instructions for follow-up, activity and diet:	Please followup with Dr. Felicia Cole, PMD, as well as pulmonologist within five days.  Secondary Diagnosis:	Diabetes  Goal:	HgbA1C<7  Instructions for follow-up, activity and diet:	Followup with PCP  Secondary Diagnosis:	Morbid obesity  Goal:	weight loss  Instructions for follow-up, activity and diet:	Please followup with PCP  Secondary Diagnosis:	PUSHPA (obstructive sleep apnea)  Goal:	appropriate management  Instructions for follow-up, activity and diet:	Please followup with outpatient sleep study.

## 2017-01-18 NOTE — DISCHARGE NOTE ADULT - PATIENT PORTAL LINK FT
“You can access the FollowHealth Patient Portal, offered by [facility], by registering with the following website: [email or web address]”

## 2017-01-18 NOTE — DISCHARGE NOTE ADULT - HOSPITAL COURSE
Patient is a 56 F from home, lives with  ambulates with walker PMHx of COPD on home oxygen 24 hours a day (never intubated) , PUSHPA (not on CPAP), DM, overactive bladder with recurrent UTI, multiple frequent readmissions for COPD exacerbation came in to the ED with worsening SOB due to severe right leg pain and swelling associated with inability to ambulate.      Pt admitted with COPD exacerbation.      COPD exacerbation, secondary to bronchitis   - continued mira Monet, pulmonary consulted.    - continued levaquin   - Oxygen   - patient has ECHO in oct, 2016 with normal EF; no indication for repeat   - f/u rapid viral and flu panel  negative.    Leukocytosis    secondary to steroids       -Lactic Acidosis    Lactate 2.9 and given IV hydration.  Lactate returned to 1.7.    Hyperkalemia    Patient given Kayexelate for K level of 5.5.  Followup BMP am.      r/o DVT    US duplex for lower extremity swelling negative    Diabetes.      Pt started on HSS   and home medication of metformin held.     Prophylactic measure.     sc lovenox.     PT eval recommended home with assistance and PT. Patient is a 56 F from home, lives with  ambulates with walker PMHx of COPD on home oxygen 24 hours a day (never intubated) , PUSHPA (not on CPAP), DM, overactive bladder with recurrent UTI, multiple frequent readmissions for COPD exacerbation came in to the ED with worsening SOB   Pt admitted with COPD exacerbation.      COPD exacerbation,   - Treated with inhaled bronchodilator, IV steroid during the hospitalization .   - Completed course of antibiotics with levaquine  - Dr. Monet, pulmonary consulted.   - Recurrent hospitalization due to poor compliance with medication and f/u. Pt need outpatient sleep study and pulmonology f/u which she was advised on multiple times in previous hospitalization and during this hospitalization . But continues to refuse sleep study.      Leukocytosis  secondary to steroids      -Lactic Acidosis  Lactate 2.9 and given IV hydration. Lactate returned to 1.7.     -r/o DVT  US duplex for lower extremity swelling negative    Patient is a 57 F from home, lives with  ambulates with walker PMHx of COPD on home oxygen 24 hours a day (never intubated) , PUSHPA (not on CPAP), DM, overactive bladder with recurrent UTI, multiple frequent readmissions for COPD exacerbation came in to the ED with worsening SOB   Pt admitted with COPD exacerbation.      COPD exacerbation,   - Treated with inhaled bronchodilator, IV steroid during the hospitalization .   - Completed course of antibiotics with levaquine  - Dr. Monet, pulmonary consulted.   - Recurrent hospitalization due to poor compliance with medication and f/u. Pt need outpatient sleep study and pulmonology f/u which she was advised on multiple times in previous hospitalization and during this hospitalization . But continues to refuse sleep study.      Leukocytosis  secondary to steroids      -Lactic Acidosis  Lactate 2.9 and given IV hydration. Lactate returned to 1.7.     -r/o DVT  US duplex for lower extremity swelling negative

## 2017-01-18 NOTE — DISCHARGE NOTE ADULT - MEDICATION SUMMARY - MEDICATIONS TO TAKE
I will START or STAY ON the medications listed below when I get home from the hospital:    predniSONE 10 mg oral tablet  -- Take 4 tabs daily X 3 days; 3 tabs daily X 3 days; 2 tabs daily X 3 days, 1 tab daily X 3 days, and then stop.  -- It is very important that you take or use this exactly as directed.  Do not skip doses or discontinue unless directed by your doctor.  Obtain medical advice before taking any non-prescription drugs as some may affect the action of this medication.  Take with food or milk.    -- Indication: For COPD exacerbation    aspirin 81 mg oral tablet, chewable  -- 1 tab(s) by mouth once a day  -- Indication: For CAD    metFORMIN 500 mg oral tablet  -- 1 tab(s) by mouth 2 times a day (with meals)  -- Indication: For DM    insulin lispro 100 units/mL subcutaneous solution  -- 4 unit(s) subcutaneous 3 times a day (before meals)  -- Indication: For DM    albuterol CFC free 90 mcg/inh inhalation aerosol  -- 2 puff(s) inhaled every 6 hours, As needed, Respiratory Distress  -- Indication: For COPD exacerbation    tiotropium 18 mcg inhalation capsule  -- 1 cap(s) inhaled once a day  -- Indication: For COPD exacerbation    Symbicort 160 mcg-4.5 mcg/inh inhalation aerosol  -- 2 puff(s) inhaled 2 times a day  -- Indication: For COPD exacerbation    furosemide 20 mg oral tablet  -- 1 tab(s) by mouth once a day  -- Indication: For Diuretic    bisacodyl 5 mg oral delayed release tablet  -- 1 tab(s) by mouth every 12 hours, As needed, Constipation  -- Indication: For bowel regimen    docusate sodium 100 mg oral capsule  -- 1 cap(s) by mouth 2 times a day  -- Indication: For bowel regimen    Flonase 50 mcg/inh nasal spray  -- 1 spray(s) into nose 2 times a day  -- Indication: For allergies

## 2017-01-18 NOTE — DISCHARGE NOTE ADULT - ADDITIONAL INSTRUCTIONS
Please followup with PCP in five days Please followup with BALAJI Allan within five days.    Please followup with Pulmonologist in five days.  Please have outpatient sleep study in five days.

## 2017-01-18 NOTE — DISCHARGE NOTE ADULT - PLAN OF CARE
resolution Please followup with PCP HgbA1C<7 Followup with PCP weight loss appropriate management Please followup with outpatient sleep study. Please followup with Dr. Felicia Cole, PMALTON, as well as pulmonologist within five days.

## 2017-01-18 NOTE — DISCHARGE NOTE ADULT - CARE PROVIDER_API CALL
Mitul Barajas (MARGA), Internal Medicine  [address] [address] [place] [address]  Phone: [phone]  Fax: [phone] Mitul Barajas (MARGA), Internal Medicine  [address] [address] [place] [address]  Phone: [phone]  Fax: [phone]    Felicia Cole), Family Medicine  [address] [place] [address]  Phone: [phone]  Fax: [phone]

## 2017-01-19 VITALS
OXYGEN SATURATION: 97 % | SYSTOLIC BLOOD PRESSURE: 164 MMHG | HEART RATE: 109 BPM | TEMPERATURE: 98 F | RESPIRATION RATE: 22 BRPM | DIASTOLIC BLOOD PRESSURE: 99 MMHG

## 2017-01-19 LAB
ANION GAP SERPL CALC-SCNC: 8 MMOL/L — SIGNIFICANT CHANGE UP (ref 5–17)
BASOPHILS # BLD AUTO: 0.2 K/UL — SIGNIFICANT CHANGE UP (ref 0–0.2)
BASOPHILS NFR BLD AUTO: 1.2 % — SIGNIFICANT CHANGE UP (ref 0–2)
BUN SERPL-MCNC: 21 MG/DL — HIGH (ref 7–18)
CALCIUM SERPL-MCNC: 9 MG/DL — SIGNIFICANT CHANGE UP (ref 8.4–10.5)
CHLORIDE SERPL-SCNC: 100 MMOL/L — SIGNIFICANT CHANGE UP (ref 96–108)
CO2 SERPL-SCNC: 36 MMOL/L — HIGH (ref 22–31)
CREAT SERPL-MCNC: 0.61 MG/DL — SIGNIFICANT CHANGE UP (ref 0.5–1.3)
EOSINOPHIL # BLD AUTO: 0.3 K/UL — SIGNIFICANT CHANGE UP (ref 0–0.5)
EOSINOPHIL NFR BLD AUTO: 1.6 % — SIGNIFICANT CHANGE UP (ref 0–6)
GLUCOSE SERPL-MCNC: 143 MG/DL — HIGH (ref 70–99)
HCT VFR BLD CALC: 36.4 % — SIGNIFICANT CHANGE UP (ref 34.5–45)
HGB BLD-MCNC: 10.9 G/DL — LOW (ref 11.5–15.5)
LYMPHOCYTES # BLD AUTO: 23.4 % — SIGNIFICANT CHANGE UP (ref 13–44)
LYMPHOCYTES # BLD AUTO: 3.9 K/UL — HIGH (ref 1–3.3)
MCHC RBC-ENTMCNC: 23.2 PG — LOW (ref 27–34)
MCHC RBC-ENTMCNC: 29.9 GM/DL — LOW (ref 32–36)
MCV RBC AUTO: 77.8 FL — LOW (ref 80–100)
MONOCYTES # BLD AUTO: 0.7 K/UL — SIGNIFICANT CHANGE UP (ref 0–0.9)
MONOCYTES NFR BLD AUTO: 4 % — SIGNIFICANT CHANGE UP (ref 2–14)
NEUTROPHILS # BLD AUTO: 11.6 K/UL — HIGH (ref 1.8–7.4)
NEUTROPHILS NFR BLD AUTO: 69.8 % — SIGNIFICANT CHANGE UP (ref 43–77)
PLATELET # BLD AUTO: 489 K/UL — HIGH (ref 150–400)
POTASSIUM SERPL-MCNC: 3.3 MMOL/L — LOW (ref 3.5–5.3)
POTASSIUM SERPL-SCNC: 3.3 MMOL/L — LOW (ref 3.5–5.3)
RBC # BLD: 4.68 M/UL — SIGNIFICANT CHANGE UP (ref 3.8–5.2)
RBC # FLD: 20 % — HIGH (ref 10.3–14.5)
SODIUM SERPL-SCNC: 144 MMOL/L — SIGNIFICANT CHANGE UP (ref 135–145)
WBC # BLD: 16.7 K/UL — HIGH (ref 3.8–10.5)
WBC # FLD AUTO: 16.7 K/UL — HIGH (ref 3.8–10.5)

## 2017-01-19 PROCEDURE — 99285 EMERGENCY DEPT VISIT HI MDM: CPT | Mod: 25

## 2017-01-19 PROCEDURE — 85027 COMPLETE CBC AUTOMATED: CPT

## 2017-01-19 PROCEDURE — 84100 ASSAY OF PHOSPHORUS: CPT

## 2017-01-19 PROCEDURE — 96374 THER/PROPH/DIAG INJ IV PUSH: CPT

## 2017-01-19 PROCEDURE — 87633 RESP VIRUS 12-25 TARGETS: CPT

## 2017-01-19 PROCEDURE — 71045 X-RAY EXAM CHEST 1 VIEW: CPT

## 2017-01-19 PROCEDURE — 87400 INFLUENZA A/B EACH AG IA: CPT

## 2017-01-19 PROCEDURE — 87040 BLOOD CULTURE FOR BACTERIA: CPT

## 2017-01-19 PROCEDURE — 93005 ELECTROCARDIOGRAM TRACING: CPT

## 2017-01-19 PROCEDURE — 87486 CHLMYD PNEUM DNA AMP PROBE: CPT

## 2017-01-19 PROCEDURE — 82785 ASSAY OF IGE: CPT

## 2017-01-19 PROCEDURE — 83735 ASSAY OF MAGNESIUM: CPT

## 2017-01-19 PROCEDURE — 97162 PT EVAL MOD COMPLEX 30 MIN: CPT

## 2017-01-19 PROCEDURE — 87798 DETECT AGENT NOS DNA AMP: CPT

## 2017-01-19 PROCEDURE — 94760 N-INVAS EAR/PLS OXIMETRY 1: CPT

## 2017-01-19 PROCEDURE — 80053 COMPREHEN METABOLIC PANEL: CPT

## 2017-01-19 PROCEDURE — 80048 BASIC METABOLIC PNL TOTAL CA: CPT

## 2017-01-19 PROCEDURE — 94640 AIRWAY INHALATION TREATMENT: CPT

## 2017-01-19 PROCEDURE — 87581 M.PNEUMON DNA AMP PROBE: CPT

## 2017-01-19 PROCEDURE — 93970 EXTREMITY STUDY: CPT

## 2017-01-19 PROCEDURE — 83605 ASSAY OF LACTIC ACID: CPT

## 2017-01-19 PROCEDURE — 82803 BLOOD GASES ANY COMBINATION: CPT

## 2017-01-19 RX ORDER — POTASSIUM CHLORIDE 20 MEQ
40 PACKET (EA) ORAL ONCE
Qty: 0 | Refills: 0 | Status: COMPLETED | OUTPATIENT
Start: 2017-01-19 | End: 2017-01-19

## 2017-01-19 RX ORDER — TIOTROPIUM BROMIDE 18 UG/1
1 CAPSULE ORAL; RESPIRATORY (INHALATION)
Qty: 1 | Refills: 0 | OUTPATIENT
Start: 2017-01-19 | End: 2017-02-18

## 2017-01-19 RX ORDER — BUDESONIDE AND FORMOTEROL FUMARATE DIHYDRATE 160; 4.5 UG/1; UG/1
2 AEROSOL RESPIRATORY (INHALATION)
Qty: 1 | Refills: 0 | OUTPATIENT
Start: 2017-01-19 | End: 2017-02-18

## 2017-01-19 RX ADMIN — Medication 1 SPRAY(S): at 06:44

## 2017-01-19 RX ADMIN — Medication 1 SPRAY(S): at 17:19

## 2017-01-19 RX ADMIN — Medication 1: at 16:50

## 2017-01-19 RX ADMIN — Medication 1 SPRAY(S): at 06:45

## 2017-01-19 RX ADMIN — Medication 100 MILLIGRAM(S): at 17:20

## 2017-01-19 RX ADMIN — ENOXAPARIN SODIUM 40 MILLIGRAM(S): 100 INJECTION SUBCUTANEOUS at 11:07

## 2017-01-19 RX ADMIN — Medication 40 MILLIGRAM(S): at 06:46

## 2017-01-19 RX ADMIN — BUDESONIDE AND FORMOTEROL FUMARATE DIHYDRATE 1 PUFF(S): 160; 4.5 AEROSOL RESPIRATORY (INHALATION) at 12:19

## 2017-01-19 RX ADMIN — Medication 3 MILLILITER(S): at 15:03

## 2017-01-19 RX ADMIN — Medication 1 APPLICATION(S): at 11:10

## 2017-01-19 RX ADMIN — TIOTROPIUM BROMIDE 1 CAPSULE(S): 18 CAPSULE ORAL; RESPIRATORY (INHALATION) at 11:10

## 2017-01-19 RX ADMIN — Medication 40 MILLIEQUIVALENT(S): at 11:07

## 2017-01-19 RX ADMIN — Medication 100 MILLIGRAM(S): at 06:45

## 2017-01-19 RX ADMIN — Medication 3 MILLILITER(S): at 09:22

## 2017-01-19 RX ADMIN — Medication 3 MILLILITER(S): at 04:26

## 2017-01-19 RX ADMIN — Medication 1: at 12:19

## 2017-01-24 DIAGNOSIS — Z99.81 DEPENDENCE ON SUPPLEMENTAL OXYGEN: ICD-10-CM

## 2017-01-24 DIAGNOSIS — E66.9 OBESITY, UNSPECIFIED: ICD-10-CM

## 2017-01-24 DIAGNOSIS — J44.0 CHRONIC OBSTRUCTIVE PULMONARY DISEASE WITH ACUTE LOWER RESPIRATORY INFECTION: ICD-10-CM

## 2017-01-24 DIAGNOSIS — J44.1 CHRONIC OBSTRUCTIVE PULMONARY DISEASE WITH (ACUTE) EXACERBATION: ICD-10-CM

## 2017-01-24 DIAGNOSIS — Z90.49 ACQUIRED ABSENCE OF OTHER SPECIFIED PARTS OF DIGESTIVE TRACT: ICD-10-CM

## 2017-01-24 DIAGNOSIS — G47.33 OBSTRUCTIVE SLEEP APNEA (ADULT) (PEDIATRIC): ICD-10-CM

## 2017-01-24 DIAGNOSIS — Z91.018 ALLERGY TO OTHER FOODS: ICD-10-CM

## 2017-01-24 DIAGNOSIS — Z88.0 ALLERGY STATUS TO PENICILLIN: ICD-10-CM

## 2017-01-24 DIAGNOSIS — Z91.013 ALLERGY TO SEAFOOD: ICD-10-CM

## 2017-01-24 DIAGNOSIS — R60.9 EDEMA, UNSPECIFIED: ICD-10-CM

## 2017-01-24 DIAGNOSIS — Z87.440 PERSONAL HISTORY OF URINARY (TRACT) INFECTIONS: ICD-10-CM

## 2017-01-24 DIAGNOSIS — D64.9 ANEMIA, UNSPECIFIED: ICD-10-CM

## 2017-01-24 DIAGNOSIS — E87.2 ACIDOSIS: ICD-10-CM

## 2017-01-24 DIAGNOSIS — E11.9 TYPE 2 DIABETES MELLITUS WITHOUT COMPLICATIONS: ICD-10-CM

## 2017-01-24 DIAGNOSIS — E87.5 HYPERKALEMIA: ICD-10-CM

## 2017-01-24 DIAGNOSIS — D72.829 ELEVATED WHITE BLOOD CELL COUNT, UNSPECIFIED: ICD-10-CM

## 2017-01-24 DIAGNOSIS — N32.81 OVERACTIVE BLADDER: ICD-10-CM

## 2017-01-24 DIAGNOSIS — J20.9 ACUTE BRONCHITIS, UNSPECIFIED: ICD-10-CM

## 2017-01-24 DIAGNOSIS — K59.00 CONSTIPATION, UNSPECIFIED: ICD-10-CM

## 2017-01-24 DIAGNOSIS — Z91.010 ALLERGY TO PEANUTS: ICD-10-CM

## 2017-01-24 DIAGNOSIS — Z87.891 PERSONAL HISTORY OF NICOTINE DEPENDENCE: ICD-10-CM

## 2017-01-24 DIAGNOSIS — J45.909 UNSPECIFIED ASTHMA, UNCOMPLICATED: ICD-10-CM

## 2017-05-11 ENCOUNTER — INPATIENT (INPATIENT)
Facility: HOSPITAL | Age: 57
LOS: 12 days | Discharge: NURSING, REHAB OR LONG-TERM CARE | DRG: 871 | End: 2017-05-24
Attending: HOSPITALIST | Admitting: HOSPITALIST
Payer: COMMERCIAL

## 2017-05-11 VITALS
WEIGHT: 240.08 LBS | HEIGHT: 62 IN | RESPIRATION RATE: 22 BRPM | TEMPERATURE: 103 F | OXYGEN SATURATION: 97 % | DIASTOLIC BLOOD PRESSURE: 92 MMHG | SYSTOLIC BLOOD PRESSURE: 160 MMHG | HEART RATE: 112 BPM

## 2017-05-11 DIAGNOSIS — Z98.891 HISTORY OF UTERINE SCAR FROM PREVIOUS SURGERY: Chronic | ICD-10-CM

## 2017-05-11 DIAGNOSIS — Z90.49 ACQUIRED ABSENCE OF OTHER SPECIFIED PARTS OF DIGESTIVE TRACT: Chronic | ICD-10-CM

## 2017-05-11 LAB
ALBUMIN SERPL ELPH-MCNC: 1.4 G/DL — LOW (ref 3.5–5)
ALP SERPL-CCNC: 88 U/L — SIGNIFICANT CHANGE UP (ref 40–120)
ALT FLD-CCNC: 14 U/L DA — SIGNIFICANT CHANGE UP (ref 10–60)
ANION GAP SERPL CALC-SCNC: 8 MMOL/L — SIGNIFICANT CHANGE UP (ref 5–17)
ANISOCYTOSIS BLD QL: SIGNIFICANT CHANGE UP
APTT BLD: 43.4 SEC — HIGH (ref 27.5–37.4)
AST SERPL-CCNC: 16 U/L — SIGNIFICANT CHANGE UP (ref 10–40)
BASOPHILS # BLD AUTO: 0.3 K/UL — HIGH (ref 0–0.2)
BASOPHILS NFR BLD AUTO: 1.7 % — SIGNIFICANT CHANGE UP (ref 0–2)
BILIRUB SERPL-MCNC: 0.1 MG/DL — LOW (ref 0.2–1.2)
BUN SERPL-MCNC: 29 MG/DL — HIGH (ref 7–18)
CALCIUM SERPL-MCNC: 8.6 MG/DL — SIGNIFICANT CHANGE UP (ref 8.4–10.5)
CHLORIDE SERPL-SCNC: 106 MMOL/L — SIGNIFICANT CHANGE UP (ref 96–108)
CO2 SERPL-SCNC: 30 MMOL/L — SIGNIFICANT CHANGE UP (ref 22–31)
CREAT SERPL-MCNC: 1.37 MG/DL — HIGH (ref 0.5–1.3)
EOSINOPHIL # BLD AUTO: 0.5 K/UL — SIGNIFICANT CHANGE UP (ref 0–0.5)
EOSINOPHIL NFR BLD AUTO: 3 % — SIGNIFICANT CHANGE UP (ref 0–6)
GLUCOSE SERPL-MCNC: 130 MG/DL — HIGH (ref 70–99)
HCT VFR BLD CALC: 26.6 % — LOW (ref 34.5–45)
HGB BLD-MCNC: 7.9 G/DL — LOW (ref 11.5–15.5)
HYPOCHROMIA BLD QL: SLIGHT — SIGNIFICANT CHANGE UP
INR BLD: 2.02 RATIO — HIGH (ref 0.88–1.16)
LACTATE SERPL-SCNC: 1.6 MMOL/L — SIGNIFICANT CHANGE UP (ref 0.7–2)
LYMPHOCYTES # BLD AUTO: 1.6 K/UL — SIGNIFICANT CHANGE UP (ref 1–3.3)
LYMPHOCYTES # BLD AUTO: 9.8 % — LOW (ref 13–44)
MCHC RBC-ENTMCNC: 22.8 PG — LOW (ref 27–34)
MCHC RBC-ENTMCNC: 29.7 GM/DL — LOW (ref 32–36)
MCV RBC AUTO: 76.9 FL — LOW (ref 80–100)
MICROCYTES BLD QL: SLIGHT — SIGNIFICANT CHANGE UP
MONOCYTES # BLD AUTO: 0.6 K/UL — SIGNIFICANT CHANGE UP (ref 0–0.9)
MONOCYTES NFR BLD AUTO: 4 % — SIGNIFICANT CHANGE UP (ref 2–14)
NEUTROPHILS # BLD AUTO: 12.9 K/UL — HIGH (ref 1.8–7.4)
NEUTROPHILS NFR BLD AUTO: 81.4 % — HIGH (ref 43–77)
PLAT MORPH BLD: NORMAL — SIGNIFICANT CHANGE UP
PLATELET # BLD AUTO: 701 K/UL — HIGH (ref 150–400)
POIKILOCYTOSIS BLD QL AUTO: SLIGHT — SIGNIFICANT CHANGE UP
POLYCHROMASIA BLD QL SMEAR: SLIGHT — SIGNIFICANT CHANGE UP
POTASSIUM SERPL-MCNC: 4.5 MMOL/L — SIGNIFICANT CHANGE UP (ref 3.5–5.3)
POTASSIUM SERPL-SCNC: 4.5 MMOL/L — SIGNIFICANT CHANGE UP (ref 3.5–5.3)
PROT SERPL-MCNC: 7.2 G/DL — SIGNIFICANT CHANGE UP (ref 6–8.3)
PROTHROM AB SERPL-ACNC: 22.3 SEC — HIGH (ref 9.8–12.7)
RBC # BLD: 3.46 M/UL — LOW (ref 3.8–5.2)
RBC # FLD: 22.1 % — HIGH (ref 10.3–14.5)
RBC BLD AUTO: ABNORMAL
SODIUM SERPL-SCNC: 144 MMOL/L — SIGNIFICANT CHANGE UP (ref 135–145)
SPHEROCYTES BLD QL SMEAR: SLIGHT — SIGNIFICANT CHANGE UP
WBC # BLD: 15.9 K/UL — HIGH (ref 3.8–10.5)
WBC # FLD AUTO: 15.9 K/UL — HIGH (ref 3.8–10.5)

## 2017-05-11 PROCEDURE — 71010: CPT | Mod: 26

## 2017-05-11 RX ORDER — SODIUM CHLORIDE 9 MG/ML
500 INJECTION INTRAMUSCULAR; INTRAVENOUS; SUBCUTANEOUS
Qty: 0 | Refills: 0 | Status: COMPLETED | OUTPATIENT
Start: 2017-05-11 | End: 2017-05-12

## 2017-05-11 RX ORDER — ACETAMINOPHEN 500 MG
975 TABLET ORAL ONCE
Qty: 0 | Refills: 0 | Status: COMPLETED | OUTPATIENT
Start: 2017-05-11 | End: 2017-05-11

## 2017-05-11 RX ORDER — SODIUM CHLORIDE 9 MG/ML
3 INJECTION INTRAMUSCULAR; INTRAVENOUS; SUBCUTANEOUS ONCE
Qty: 0 | Refills: 0 | Status: COMPLETED | OUTPATIENT
Start: 2017-05-11 | End: 2017-05-11

## 2017-05-11 RX ADMIN — SODIUM CHLORIDE 2000 MILLILITER(S): 9 INJECTION INTRAMUSCULAR; INTRAVENOUS; SUBCUTANEOUS at 23:45

## 2017-05-11 RX ADMIN — SODIUM CHLORIDE 2000 MILLILITER(S): 9 INJECTION INTRAMUSCULAR; INTRAVENOUS; SUBCUTANEOUS at 23:25

## 2017-05-11 RX ADMIN — SODIUM CHLORIDE 2000 MILLILITER(S): 9 INJECTION INTRAMUSCULAR; INTRAVENOUS; SUBCUTANEOUS at 23:10

## 2017-05-11 RX ADMIN — SODIUM CHLORIDE 2000 MILLILITER(S): 9 INJECTION INTRAMUSCULAR; INTRAVENOUS; SUBCUTANEOUS at 23:55

## 2017-05-11 RX ADMIN — SODIUM CHLORIDE 3 MILLILITER(S): 9 INJECTION INTRAMUSCULAR; INTRAVENOUS; SUBCUTANEOUS at 23:43

## 2017-05-11 RX ADMIN — SODIUM CHLORIDE 2000 MILLILITER(S): 9 INJECTION INTRAMUSCULAR; INTRAVENOUS; SUBCUTANEOUS at 23:43

## 2017-05-11 NOTE — ED PROVIDER NOTE - OBJECTIVE STATEMENT
56 y/o F pt w/ PMHx of Morbid Obesity, DM, Asthma, and Emphysema presents to ED c/o subjective fever, dizziness and weakness since yesterday. Pt also notes a productive cough (white sputum) since yesterday and diarrhea x4 days; pt has had abd pain for the past month as well. Pt denies congestion, chest pain, palpitations, SOB, nausea, vomiting, or any other complaints. Pt is allergic to multiple drugs as listed.

## 2017-05-11 NOTE — ED PROVIDER NOTE - CARE PLAN
Principal Discharge DX:	Fever, unspecified fever cause Principal Discharge DX:	Fever, unspecified fever cause  Secondary Diagnosis:	Diarrhea, unspecified type

## 2017-05-11 NOTE — ED PROVIDER NOTE - MEDICAL DECISION MAKING DETAILS
58 y/o F pt w/ fever, tachycardia and diffuse abd tenderness. Possible PNA/URI versus abd pathology. Will do CXR, labs, CT abd, reassess.

## 2017-05-11 NOTE — ED PROVIDER NOTE - NS ED MD SCRIBE ATTENDING SCRIBE SECTIONS
HIV/DISPOSITION/VITAL SIGNS( Pullset)/PAST MEDICAL/SURGICAL/SOCIAL HISTORY/REVIEW OF SYSTEMS/PHYSICAL EXAM/HISTORY OF PRESENT ILLNESS

## 2017-05-12 DIAGNOSIS — Z29.9 ENCOUNTER FOR PROPHYLACTIC MEASURES, UNSPECIFIED: ICD-10-CM

## 2017-05-12 DIAGNOSIS — B35.9 DERMATOPHYTOSIS, UNSPECIFIED: ICD-10-CM

## 2017-05-12 DIAGNOSIS — I26.99 OTHER PULMONARY EMBOLISM WITHOUT ACUTE COR PULMONALE: ICD-10-CM

## 2017-05-12 DIAGNOSIS — I50.9 HEART FAILURE, UNSPECIFIED: ICD-10-CM

## 2017-05-12 DIAGNOSIS — D64.9 ANEMIA, UNSPECIFIED: ICD-10-CM

## 2017-05-12 DIAGNOSIS — R33.9 RETENTION OF URINE, UNSPECIFIED: ICD-10-CM

## 2017-05-12 DIAGNOSIS — J44.9 CHRONIC OBSTRUCTIVE PULMONARY DISEASE, UNSPECIFIED: ICD-10-CM

## 2017-05-12 DIAGNOSIS — R50.9 FEVER, UNSPECIFIED: ICD-10-CM

## 2017-05-12 DIAGNOSIS — N13.30 UNSPECIFIED HYDRONEPHROSIS: ICD-10-CM

## 2017-05-12 DIAGNOSIS — R19.7 DIARRHEA, UNSPECIFIED: ICD-10-CM

## 2017-05-12 DIAGNOSIS — G47.33 OBSTRUCTIVE SLEEP APNEA (ADULT) (PEDIATRIC): ICD-10-CM

## 2017-05-12 DIAGNOSIS — E11.9 TYPE 2 DIABETES MELLITUS WITHOUT COMPLICATIONS: ICD-10-CM

## 2017-05-12 DIAGNOSIS — N17.9 ACUTE KIDNEY FAILURE, UNSPECIFIED: ICD-10-CM

## 2017-05-12 DIAGNOSIS — N13.4 HYDROURETER: ICD-10-CM

## 2017-05-12 DIAGNOSIS — A41.9 SEPSIS, UNSPECIFIED ORGANISM: ICD-10-CM

## 2017-05-12 DIAGNOSIS — M79.669 PAIN IN UNSPECIFIED LOWER LEG: ICD-10-CM

## 2017-05-12 DIAGNOSIS — N39.0 URINARY TRACT INFECTION, SITE NOT SPECIFIED: ICD-10-CM

## 2017-05-12 LAB
24R-OH-CALCIDIOL SERPL-MCNC: 28.1 NG/ML — LOW (ref 30–100)
ALBUMIN SERPL ELPH-MCNC: 1.5 G/DL — LOW (ref 3.5–5)
ALP SERPL-CCNC: 99 U/L — SIGNIFICANT CHANGE UP (ref 40–120)
ALT FLD-CCNC: 15 U/L DA — SIGNIFICANT CHANGE UP (ref 10–60)
ANION GAP SERPL CALC-SCNC: 6 MMOL/L — SIGNIFICANT CHANGE UP (ref 5–17)
APPEARANCE UR: CLEAR — SIGNIFICANT CHANGE UP
AST SERPL-CCNC: 17 U/L — SIGNIFICANT CHANGE UP (ref 10–40)
BACTERIA # UR AUTO: ABNORMAL /HPF
BASOPHILS # BLD AUTO: 0.2 K/UL — SIGNIFICANT CHANGE UP (ref 0–0.2)
BASOPHILS NFR BLD AUTO: 1.2 % — SIGNIFICANT CHANGE UP (ref 0–2)
BILIRUB SERPL-MCNC: 0.2 MG/DL — SIGNIFICANT CHANGE UP (ref 0.2–1.2)
BILIRUB UR-MCNC: NEGATIVE — SIGNIFICANT CHANGE UP
BUN SERPL-MCNC: 25 MG/DL — HIGH (ref 7–18)
CALCIUM SERPL-MCNC: 9.2 MG/DL — SIGNIFICANT CHANGE UP (ref 8.4–10.5)
CHLORIDE SERPL-SCNC: 106 MMOL/L — SIGNIFICANT CHANGE UP (ref 96–108)
CHOLEST SERPL-MCNC: 98 MG/DL — SIGNIFICANT CHANGE UP (ref 10–199)
CO2 SERPL-SCNC: 32 MMOL/L — HIGH (ref 22–31)
COLOR SPEC: YELLOW — SIGNIFICANT CHANGE UP
CREAT ?TM UR-MCNC: 18 MG/DL — SIGNIFICANT CHANGE UP
CREAT SERPL-MCNC: 1.23 MG/DL — SIGNIFICANT CHANGE UP (ref 0.5–1.3)
DIFF PNL FLD: ABNORMAL
EOSINOPHIL # BLD AUTO: 0.7 K/UL — HIGH (ref 0–0.5)
EOSINOPHIL NFR BLD AUTO: 4.5 % — SIGNIFICANT CHANGE UP (ref 0–6)
EPI CELLS # UR: ABNORMAL (ref 0–10)
FERRITIN SERPL-MCNC: 312 NG/ML — HIGH (ref 15–150)
FOLATE SERPL-MCNC: 10.4 NG/ML — SIGNIFICANT CHANGE UP (ref 4.8–24.2)
GLUCOSE SERPL-MCNC: 141 MG/DL — HIGH (ref 70–99)
GLUCOSE UR QL: NEGATIVE — SIGNIFICANT CHANGE UP
HBA1C BLD-MCNC: 5.9 % — HIGH (ref 4–5.6)
HCT VFR BLD CALC: 26.4 % — LOW (ref 34.5–45)
HDLC SERPL-MCNC: 31 MG/DL — LOW (ref 40–125)
HGB BLD-MCNC: 7.7 G/DL — LOW (ref 11.5–15.5)
INR BLD: 1.53 RATIO — HIGH (ref 0.88–1.16)
IRON SATN MFR SERPL: 10 % — LOW (ref 15–50)
IRON SATN MFR SERPL: 14 UG/DL — LOW (ref 40–150)
KETONES UR-MCNC: NEGATIVE — SIGNIFICANT CHANGE UP
LEGIONELLA AG UR QL: NEGATIVE — SIGNIFICANT CHANGE UP
LEUKOCYTE ESTERASE UR-ACNC: ABNORMAL
LIPID PNL WITH DIRECT LDL SERPL: 43 MG/DL — SIGNIFICANT CHANGE UP
LYMPHOCYTES # BLD AUTO: 16.4 % — SIGNIFICANT CHANGE UP (ref 13–44)
LYMPHOCYTES # BLD AUTO: 2.6 K/UL — SIGNIFICANT CHANGE UP (ref 1–3.3)
MAGNESIUM SERPL-MCNC: 2 MG/DL — SIGNIFICANT CHANGE UP (ref 1.6–2.6)
MCHC RBC-ENTMCNC: 23.1 PG — LOW (ref 27–34)
MCHC RBC-ENTMCNC: 29.4 GM/DL — LOW (ref 32–36)
MCV RBC AUTO: 78.5 FL — LOW (ref 80–100)
MONOCYTES # BLD AUTO: 0.6 K/UL — SIGNIFICANT CHANGE UP (ref 0–0.9)
MONOCYTES NFR BLD AUTO: 3.8 % — SIGNIFICANT CHANGE UP (ref 2–14)
NEUTROPHILS # BLD AUTO: 11.9 K/UL — HIGH (ref 1.8–7.4)
NEUTROPHILS NFR BLD AUTO: 74.2 % — SIGNIFICANT CHANGE UP (ref 43–77)
NITRITE UR-MCNC: NEGATIVE — SIGNIFICANT CHANGE UP
PH UR: 6.5 — SIGNIFICANT CHANGE UP (ref 5–8)
PHOSPHATE SERPL-MCNC: 3.4 MG/DL — SIGNIFICANT CHANGE UP (ref 2.5–4.5)
PLATELET # BLD AUTO: 691 K/UL — HIGH (ref 150–400)
POTASSIUM SERPL-MCNC: 4.2 MMOL/L — SIGNIFICANT CHANGE UP (ref 3.5–5.3)
POTASSIUM SERPL-SCNC: 4.2 MMOL/L — SIGNIFICANT CHANGE UP (ref 3.5–5.3)
PROT SERPL-MCNC: 7.7 G/DL — SIGNIFICANT CHANGE UP (ref 6–8.3)
PROT UR-MCNC: 15
PROTHROM AB SERPL-ACNC: 16.8 SEC — HIGH (ref 9.8–12.7)
RAPID RVP RESULT: SIGNIFICANT CHANGE UP
RBC # BLD: 3.36 M/UL — LOW (ref 3.8–5.2)
RBC # FLD: 22.2 % — HIGH (ref 10.3–14.5)
RBC CASTS # UR COMP ASSIST: ABNORMAL /HPF (ref 0–2)
SODIUM SERPL-SCNC: 144 MMOL/L — SIGNIFICANT CHANGE UP (ref 135–145)
SODIUM UR-SCNC: 20 MMOL/L — LOW (ref 40–220)
SP GR SPEC: 1 — LOW (ref 1.01–1.02)
TIBC SERPL-MCNC: 135 UG/DL — LOW (ref 250–450)
TOTAL CHOLESTEROL/HDL RATIO MEASUREMENT: 3.2 RATIO — LOW (ref 3.3–7.1)
TRIGL SERPL-MCNC: 122 MG/DL — SIGNIFICANT CHANGE UP (ref 10–149)
TSH SERPL-MCNC: 0.86 UU/ML — SIGNIFICANT CHANGE UP (ref 0.34–4.82)
UIBC SERPL-MCNC: 121 UG/DL — SIGNIFICANT CHANGE UP (ref 110–370)
UROBILINOGEN FLD QL: NEGATIVE — SIGNIFICANT CHANGE UP
VIT B12 SERPL-MCNC: 431 PG/ML — SIGNIFICANT CHANGE UP (ref 243–894)
WBC # BLD: 16 K/UL — HIGH (ref 3.8–10.5)
WBC # FLD AUTO: 16 K/UL — HIGH (ref 3.8–10.5)
WBC UR QL: ABNORMAL /HPF (ref 0–5)

## 2017-05-12 PROCEDURE — 74176 CT ABD & PELVIS W/O CONTRAST: CPT | Mod: 26

## 2017-05-12 PROCEDURE — 99285 EMERGENCY DEPT VISIT HI MDM: CPT | Mod: 25

## 2017-05-12 PROCEDURE — 99223 1ST HOSP IP/OBS HIGH 75: CPT | Mod: GC

## 2017-05-12 RX ORDER — RIVAROXABAN 15 MG-20MG
15 KIT ORAL
Qty: 0 | Refills: 0 | Status: DISCONTINUED | OUTPATIENT
Start: 2017-05-12 | End: 2017-05-12

## 2017-05-12 RX ORDER — SODIUM CHLORIDE 9 MG/ML
1000 INJECTION INTRAMUSCULAR; INTRAVENOUS; SUBCUTANEOUS
Qty: 0 | Refills: 0 | Status: DISCONTINUED | OUTPATIENT
Start: 2017-05-12 | End: 2017-05-14

## 2017-05-12 RX ORDER — TAMSULOSIN HYDROCHLORIDE 0.4 MG/1
0.4 CAPSULE ORAL AT BEDTIME
Qty: 0 | Refills: 0 | Status: DISCONTINUED | OUTPATIENT
Start: 2017-05-12 | End: 2017-05-12

## 2017-05-12 RX ORDER — VANCOMYCIN HCL 1 G
1000 VIAL (EA) INTRAVENOUS EVERY 12 HOURS
Qty: 0 | Refills: 0 | Status: DISCONTINUED | OUTPATIENT
Start: 2017-05-12 | End: 2017-05-14

## 2017-05-12 RX ORDER — RIVAROXABAN 15 MG-20MG
15 KIT ORAL
Qty: 0 | Refills: 0 | Status: DISCONTINUED | OUTPATIENT
Start: 2017-05-12 | End: 2017-05-24

## 2017-05-12 RX ORDER — DEXTROSE 50 % IN WATER 50 %
12.5 SYRINGE (ML) INTRAVENOUS ONCE
Qty: 0 | Refills: 0 | Status: DISCONTINUED | OUTPATIENT
Start: 2017-05-12 | End: 2017-05-24

## 2017-05-12 RX ORDER — IPRATROPIUM/ALBUTEROL SULFATE 18-103MCG
3 AEROSOL WITH ADAPTER (GRAM) INHALATION EVERY 6 HOURS
Qty: 0 | Refills: 0 | Status: DISCONTINUED | OUTPATIENT
Start: 2017-05-12 | End: 2017-05-24

## 2017-05-12 RX ORDER — PANTOPRAZOLE SODIUM 20 MG/1
40 TABLET, DELAYED RELEASE ORAL
Qty: 0 | Refills: 0 | Status: DISCONTINUED | OUTPATIENT
Start: 2017-05-12 | End: 2017-05-24

## 2017-05-12 RX ORDER — ASPIRIN/CALCIUM CARB/MAGNESIUM 324 MG
81 TABLET ORAL DAILY
Qty: 0 | Refills: 0 | Status: DISCONTINUED | OUTPATIENT
Start: 2017-05-12 | End: 2017-05-14

## 2017-05-12 RX ORDER — GABAPENTIN 400 MG/1
100 CAPSULE ORAL THREE TIMES A DAY
Qty: 0 | Refills: 0 | Status: DISCONTINUED | OUTPATIENT
Start: 2017-05-12 | End: 2017-05-24

## 2017-05-12 RX ORDER — SODIUM CHLORIDE 9 MG/ML
1000 INJECTION, SOLUTION INTRAVENOUS
Qty: 0 | Refills: 0 | Status: DISCONTINUED | OUTPATIENT
Start: 2017-05-12 | End: 2017-05-24

## 2017-05-12 RX ORDER — SIMVASTATIN 20 MG/1
40 TABLET, FILM COATED ORAL AT BEDTIME
Qty: 0 | Refills: 0 | Status: DISCONTINUED | OUTPATIENT
Start: 2017-05-12 | End: 2017-05-24

## 2017-05-12 RX ORDER — DEXTROSE 50 % IN WATER 50 %
25 SYRINGE (ML) INTRAVENOUS ONCE
Qty: 0 | Refills: 0 | Status: DISCONTINUED | OUTPATIENT
Start: 2017-05-12 | End: 2017-05-24

## 2017-05-12 RX ORDER — METOPROLOL TARTRATE 50 MG
25 TABLET ORAL
Qty: 0 | Refills: 0 | Status: DISCONTINUED | OUTPATIENT
Start: 2017-05-12 | End: 2017-05-24

## 2017-05-12 RX ORDER — NYSTATIN CREAM 100000 [USP'U]/G
1 CREAM TOPICAL
Qty: 0 | Refills: 0 | Status: COMPLETED | OUTPATIENT
Start: 2017-05-12 | End: 2017-05-14

## 2017-05-12 RX ORDER — INSULIN GLARGINE 100 [IU]/ML
25 INJECTION, SOLUTION SUBCUTANEOUS AT BEDTIME
Qty: 0 | Refills: 0 | Status: DISCONTINUED | OUTPATIENT
Start: 2017-05-12 | End: 2017-05-12

## 2017-05-12 RX ORDER — ACETAMINOPHEN 500 MG
650 TABLET ORAL EVERY 6 HOURS
Qty: 0 | Refills: 0 | Status: DISCONTINUED | OUTPATIENT
Start: 2017-05-12 | End: 2017-05-24

## 2017-05-12 RX ORDER — LANOLIN ALCOHOL/MO/W.PET/CERES
3 CREAM (GRAM) TOPICAL AT BEDTIME
Qty: 0 | Refills: 0 | Status: COMPLETED | OUTPATIENT
Start: 2017-05-12 | End: 2017-05-15

## 2017-05-12 RX ORDER — GLUCAGON INJECTION, SOLUTION 0.5 MG/.1ML
1 INJECTION, SOLUTION SUBCUTANEOUS ONCE
Qty: 0 | Refills: 0 | Status: DISCONTINUED | OUTPATIENT
Start: 2017-05-12 | End: 2017-05-24

## 2017-05-12 RX ORDER — DEXTROSE 50 % IN WATER 50 %
1 SYRINGE (ML) INTRAVENOUS ONCE
Qty: 0 | Refills: 0 | Status: DISCONTINUED | OUTPATIENT
Start: 2017-05-12 | End: 2017-05-24

## 2017-05-12 RX ORDER — INSULIN LISPRO 100/ML
VIAL (ML) SUBCUTANEOUS
Qty: 0 | Refills: 0 | Status: DISCONTINUED | OUTPATIENT
Start: 2017-05-12 | End: 2017-05-24

## 2017-05-12 RX ORDER — INSULIN GLARGINE 100 [IU]/ML
10 INJECTION, SOLUTION SUBCUTANEOUS AT BEDTIME
Qty: 0 | Refills: 0 | Status: DISCONTINUED | OUTPATIENT
Start: 2017-05-12 | End: 2017-05-24

## 2017-05-12 RX ORDER — VANCOMYCIN HCL 1 G
1000 VIAL (EA) INTRAVENOUS ONCE
Qty: 0 | Refills: 0 | Status: COMPLETED | OUTPATIENT
Start: 2017-05-12 | End: 2017-05-12

## 2017-05-12 RX ORDER — VANCOMYCIN HCL 1 G
VIAL (EA) INTRAVENOUS
Qty: 0 | Refills: 0 | Status: DISCONTINUED | OUTPATIENT
Start: 2017-05-12 | End: 2017-05-12

## 2017-05-12 RX ORDER — MEROPENEM 1 G/30ML
1000 INJECTION INTRAVENOUS EVERY 8 HOURS
Qty: 0 | Refills: 0 | Status: DISCONTINUED | OUTPATIENT
Start: 2017-05-12 | End: 2017-05-23

## 2017-05-12 RX ADMIN — SIMVASTATIN 40 MILLIGRAM(S): 20 TABLET, FILM COATED ORAL at 22:02

## 2017-05-12 RX ADMIN — Medication 3 MILLILITER(S): at 15:40

## 2017-05-12 RX ADMIN — MEROPENEM 200 MILLIGRAM(S): 1 INJECTION INTRAVENOUS at 07:04

## 2017-05-12 RX ADMIN — NYSTATIN CREAM 1 APPLICATION(S): 100000 CREAM TOPICAL at 23:11

## 2017-05-12 RX ADMIN — GABAPENTIN 100 MILLIGRAM(S): 400 CAPSULE ORAL at 12:49

## 2017-05-12 RX ADMIN — Medication 1 APPLICATION(S): at 12:44

## 2017-05-12 RX ADMIN — Medication 3 MILLILITER(S): at 10:08

## 2017-05-12 RX ADMIN — GABAPENTIN 100 MILLIGRAM(S): 400 CAPSULE ORAL at 07:03

## 2017-05-12 RX ADMIN — Medication 650 MILLIGRAM(S): at 23:50

## 2017-05-12 RX ADMIN — GABAPENTIN 100 MILLIGRAM(S): 400 CAPSULE ORAL at 22:01

## 2017-05-12 RX ADMIN — SODIUM CHLORIDE 100 MILLILITER(S): 9 INJECTION INTRAMUSCULAR; INTRAVENOUS; SUBCUTANEOUS at 22:02

## 2017-05-12 RX ADMIN — PANTOPRAZOLE SODIUM 40 MILLIGRAM(S): 20 TABLET, DELAYED RELEASE ORAL at 07:03

## 2017-05-12 RX ADMIN — RIVAROXABAN 15 MILLIGRAM(S): KIT at 10:00

## 2017-05-12 RX ADMIN — Medication 25 MILLIGRAM(S): at 07:03

## 2017-05-12 RX ADMIN — Medication 250 MILLIGRAM(S): at 18:35

## 2017-05-12 RX ADMIN — Medication 400 MILLIGRAM(S): at 21:40

## 2017-05-12 RX ADMIN — RIVAROXABAN 15 MILLIGRAM(S): KIT at 18:35

## 2017-05-12 RX ADMIN — Medication 25 MILLIGRAM(S): at 18:35

## 2017-05-12 RX ADMIN — INSULIN GLARGINE 10 UNIT(S): 100 INJECTION, SOLUTION SUBCUTANEOUS at 22:02

## 2017-05-12 RX ADMIN — MEROPENEM 200 MILLIGRAM(S): 1 INJECTION INTRAVENOUS at 12:49

## 2017-05-12 RX ADMIN — Medication 650 MILLIGRAM(S): at 23:10

## 2017-05-12 RX ADMIN — MEROPENEM 200 MILLIGRAM(S): 1 INJECTION INTRAVENOUS at 22:02

## 2017-05-12 RX ADMIN — Medication 1: at 12:45

## 2017-05-12 RX ADMIN — Medication 3 MILLILITER(S): at 20:50

## 2017-05-12 RX ADMIN — Medication 975 MILLIGRAM(S): at 01:18

## 2017-05-12 RX ADMIN — Medication 975 MILLIGRAM(S): at 02:02

## 2017-05-12 RX ADMIN — Medication 81 MILLIGRAM(S): at 12:44

## 2017-05-12 RX ADMIN — Medication 3 MILLIGRAM(S): at 22:02

## 2017-05-12 RX ADMIN — Medication 1 APPLICATORFUL: at 12:47

## 2017-05-12 RX ADMIN — Medication 250 MILLIGRAM(S): at 09:23

## 2017-05-12 RX ADMIN — SODIUM CHLORIDE 2000 MILLILITER(S): 9 INJECTION INTRAMUSCULAR; INTRAVENOUS; SUBCUTANEOUS at 01:18

## 2017-05-12 NOTE — H&P ADULT - PROBLEM SELECTOR PLAN 2
Not in acute exacerbation , no wheezing   Continue with bronchodilators   O2 supplementation as needed

## 2017-05-12 NOTE — H&P ADULT - NSHPPHYSICALEXAM_GEN_ALL_CORE
GENERAL: NAD, well-groomed, well-developed  HEAD:  Atraumatic, Normocephalic  EYES: EOMI, PERRLA, conjunctiva and sclera clear  ENMT: No tonsillar erythema, exudates, or enlargement; Moist mucous membranes, Good dentition, No lesions  NECK: Supple, No JVD, Normal thyroid  NERVOUS SYSTEM:  Alert & Oriented X3, Good concentration; Motor Strength 5/5 B/L upper and lower extremities; DTRs 2+ intact and symmetric  CHEST/LUNG: Clear to percussion bilaterally; No rales, rhonchi, wheezing, or rubs  HEART: Regular rate and rhythm; No murmurs, rubs, or gallops  ABDOMEN: Soft, Nontender, Nondistended; Bowel sounds present  EXTREMITIES:  2+ Peripheral Pulses, No clubbing, cyanosis, or edema  LYMPH: No lymphadenopathy noted  SKIN: No rashes or lesions GENERAL: NAD, morbidly obeses , well-groomed, well-developed  HEAD:  Atraumatic, Normocephalic  EYES: EOMI, PERRLA, conjunctiva and sclera clear  ENMT: No tonsillar erythema, exudates, or enlargement; Moist mucous membranes, Good dentition, No lesions  NECK: Supple, No JVD, Normal thyroid, neck has scar at midline   NERVOUS SYSTEM:  Alert & Oriented X3, Good concentration; Motor Strength 5/5 B/L upper and lower extremities; DTRs 2+ intact and symmetric  CHEST/LUNG: Clear to percussion bilaterally; No rales, rhonchi, wheezing, or rubs  HEART: Regular rate and rhythm; No murmurs, rubs, or gallops  ABDOMEN: Soft, tender in lower abdomen, , Nondistended; Bowel sounds present  EXTREMITIES:  2+ Peripheral Pulses, No clubbing, cyanosis, or edema, chronic vascular changes   LYMPH: No lymphadenopathy noted  SKIN: No rashes or lesions  BACK: Sacral, buttock unstageable ulcer GENERAL: NAD, morbidly obese , well-groomed, well-developed  HEAD:  Atraumatic, Normocephalic  EYES: EOMI, PERRLA, conjunctiva and sclera clear  ENMT: No tonsillar erythema, exudates, or enlargement; Moist mucous membranes, Good dentition, No lesions  NECK: Supple, No JVD, Normal thyroid, neck has scar for tracheostomy   NERVOUS SYSTEM:  Alert & Oriented X3, Good concentration; Motor Strength 5/5 B/L upper and lower extremities; DTRs 2+ intact and symmetric  CHEST/LUNG: Unable to appreciate breath sounds due to body habitus; No rales, rhonchi, wheezing, or rubs  HEART: Regular rate and rhythm; No murmurs, rubs, or gallops  ABDOMEN: Soft, tender in lower abdomen, , Nondistended; Bowel sounds present  EXTREMITIES:  2+ Peripheral Pulses, No clubbing, cyanosis, or edema, chronic vascular changes   LYMPH: No lymphadenopathy noted  SKIN: No rashes or lesions  BACK: Sacral, buttock unstageable ulcer

## 2017-05-12 NOTE — H&P ADULT - PROBLEM SELECTOR PLAN 7
Iron deficiency anemia , guaiac negative in April 2017 , given 1 U PRBC   Patient refused guaiac , said she will do it later   F/up on anemia panel , on feso4 at home

## 2017-05-12 NOTE — H&P ADULT - PROBLEM SELECTOR PLAN 4
Provoked PE due to prolonged bedridden > 4 months ( NO DVT)   Diagnosed at [facility] in April , Right lower lung ( reports in Chart)   Last ECHO in April 2017 shows normal EF 55%, moderate biatrial enlargement, mild Mitral and tricuspid regurgitation, mild calcific degenerative valve disease.  Initially on heparin bridged with coumadin , then dc on Xarelto  Currently on 15 mg BID to continue till 5/25/17 , then 20 mg till Nov ( to complete total 6 months) Continue with home dose of Lantus  10 Units , HSS  F/up on HBA1c

## 2017-05-12 NOTE — CONSULT NOTE ADULT - PROBLEM SELECTOR RECOMMENDATION 9
1) f/u urine culture  2)f/u renal sono  3)continue antibiotics per ID  4)Case and plan discussed with Dr. Bowser and agrees  5)urology will follow

## 2017-05-12 NOTE — H&P ADULT - PROBLEM SELECTOR PROBLEM 2
Urinary tract infection without hematuria, site unspecified COPD (chronic obstructive pulmonary disease)

## 2017-05-12 NOTE — H&P ADULT - NSHPLABSRESULTS_GEN_ALL_CORE
7.9    15.9  )-----------( 701      ( 11 May 2017 23:08 )             26.6   05-11    144  |  106  |  29<H>  ----------------------------<  130<H>  4.5   |  30  |  1.37<H>    Ca    8.6      11 May 2017 23:08    TPro  7.2  /  Alb  1.4<L>  /  TBili  0.1<L>  /  DBili  x   /  AST  16  /  ALT  14  /  AlkPhos  88  05-11     Urinalysis Basic - ( 12 May 2017 02:03 )    Color: Yellow / Appearance: Clear / S.005 / pH: x  Gluc: x / Ketone: Negative  / Bili: Negative / Urobili: Negative   Blood: x / Protein: 15 / Nitrite: Negative   Leuk Esterase: Moderate / RBC: 2-5 /HPF / WBC 6-10 /HPF   Sq Epi: x / Non Sq Epi: Few / Bacteria: Few /HPF

## 2017-05-12 NOTE — H&P ADULT - PROBLEM SELECTOR PLAN 8
Likely due to neuropathy   Chronic ischemic vascular changes , pulses barely palpable  KAUSHAL right 1.42,  Left 1.18 ( Normal KAUSHAL)   Continue with gabapentin at home dose of 100 mg Q8

## 2017-05-12 NOTE — H&P ADULT - ASSESSMENT
58 Y/O F, with multiple prior admissions due to COPD exacerbation and UTI, with PMHx of Morbid Obesity, DM ( Insulin), COPD ( home O2 3 L, intubated in Jan 2017), PUSHPA ( not on CPAP, refused sleep study in past), Right kidney staghorn calculous, bilateral hydroureter  and hydronephrosis Right lower lung provoked PE ( xarelto) , UTI ( on meropenum currently) , sacral and right buttock decub ulcer ( unstageable), presents to ED c/o subjective fever, dizziness, diarrhea and weakness since yesterday, sent from NH due to that.     In ED initial vitas T max of 102.9, tachycardiac 112, /92, RR 22, SPO2 97, given 1 L bolus, tylenol, blood and urine cultures sent. labs shows WBC of 15.9, Hb of 7.9 , HCT 26.6, Cr 1.37, INR 2.02, Lactate 1.6, UA 6-10 WBC ( positive), RVP negative. Stool strudies were ordered but according to patient she last had BM on Sunday ( no blood in it) 56 Y/O F, with multiple prior admissions due to COPD exacerbation and UTI, with PMHx of Morbid Obesity, DM ( Insulin), COPD ( home O2 3 L, intubated in Jan 2017), PUSHPA ( not on CPAP, refused sleep study in past), Right kidney staghorn calculous, bilateral hydroureter  and hydronephrosis Right lower lung provoked PE ( xarelto) , UTI ( on meropenum currently) , sacral and right buttock redness ( unstageable), presents to ED c/o subjective fever, dizziness, diarrhea and weakness since yesterday, sent from NH due to that.     In ED initial vitas T max of 102.9, tachycardiac 112, /92, RR 22, SPO2 97, given 1 L bolus, tylenol, blood and urine cultures sent. labs shows WBC of 15.9, Hb of 7.9 , HCT 26.6, Cr 1.37, INR 2.02, Lactate 1.6, UA 6-10 WBC ( positive), RVP negative. Stool strudies were ordered but according to patient she last had BM on Sunday ( no blood in it)

## 2017-05-12 NOTE — H&P ADULT - PROBLEM SELECTOR PLAN 10
Improved VTE score is 6 with 3 month risk of VTE is > 7.2, patient already is on anticoagulation with Xarelto   Continue with Protonix for GI prophylaxis

## 2017-05-12 NOTE — H&P ADULT - PROBLEM SELECTOR PLAN 3
Provoked PE due to prolonged bedridden > 4 months ( NO DVT)   Diagnosed at [facility] in April , Right lower lung ( reports in Chart)   Last ECHO in April 2017 shows normal EF 55%, moderate biatrial enlargement, mild Mitral and tricuspid regurgitation, mild calcific degenerative valve disease.  Initially on heparin bridged with coumadin , then dc on Xarelto  Currently on 15 mg BID to continue till 5/25/17 , then 20 mg till Nov ( to complete total 6 months)

## 2017-05-12 NOTE — H&P ADULT - ATTENDING COMMENTS
patient seen and examined at bedside, agree with above delineated plan of care.  ID consult,  f.u cultures till final report  add vancomycin  PT evaluation  urology evaluation

## 2017-05-12 NOTE — H&P ADULT - PROBLEM SELECTOR PLAN 6
Cr 1.37, BUN/Cr ratio is > 20   Could be due to urinary retention, dehydration   F/up on urine electrolytes

## 2017-05-12 NOTE — H&P ADULT - PROBLEM SELECTOR PLAN 5
patient was on Flomax , was discharged on it, but not listed in NH papers   Monitor the urine output

## 2017-05-12 NOTE — CONSULT NOTE ADULT - PROBLEM SELECTOR RECOMMENDATION 9
1- Blood C&S.  2- UA&CS.  3-Meropenem 1 gm IVPB q 8 hours.  4- Continue vancomycin 1 gm IVPB q 12 hours.  5- Follow Vancomycin peak & trough after third dose.

## 2017-05-12 NOTE — H&P ADULT - PROBLEM SELECTOR PLAN 1
T max of 102, associated with cough, abdominal pain, SOB, diarrhea about 4-5 days ago   Physical exam tachypneic, no wheezing, no ronchi/rales, diffuse abdominal tenderness   Meets SIRs criteria , persistent fever despite the use of antibiotics   Could be due to underlying UTI ( currently on meropenum start 5/8/ 17  to continue for 7 days )   Abdominal pain , diarrhea history , current use of antibiotic, could be due to C diff   Stool studies ordered, but unable to collect as patient said she is not having diarrhea  Will get CT abdomen w/ oral contrast   CXR clear ( official results to follow) , RVP negative   History of sacral decub ulcer ( not infected)   Continue with meropenum for now   F/up on urine and blood cultures   ID evaluation: ------------------  Patient can start the regular diet after having the CT scan T max of 102, associated with cough, abdominal pain, SOB, diarrhea about 4-5 days ago   Physical exam tachypneic, no wheezing, no ronchi/rales, diffuse abdominal tenderness   Meets SIRs criteria , persistent fever despite the use of antibiotics   WBC count of 15, Lactate normal  Could be due to underlying UTI ( currently on meropenum start 5/8/ 17  to continue for 7 days )   Abdominal pain , diarrhea history , current use of antibiotic, could be due to C diff   Stool studies ordered, but unable to collect as patient said she is not having diarrhea  Will get CT abdomen w/ oral contrast   CXR clear ( official results to follow) , RVP negative   History of sacral decub ulcer ( not infected)   Continue with meropenum for now   F/up on urine and blood cultures   ID evaluation: ------------------ T max of 102, associated with cough, abdominal pain, SOB, diarrhea about 4-5 days ago   Physical exam tachypneic, no wheezing, no ronchi/rales, diffuse abdominal tenderness   Meets SIRs criteria , persistent fever despite the use of antibiotics   WBC count of 15, Lactate normal  Could be due to underlying UTI ( currently on meropenum start 5/8/ 17  to continue for 7 days )   Abdominal pain , diarrhea history , current use of antibiotic, could be due to C diff   Stool studies ordered, but unable to collect as patient said she is not having diarrhea  Will get CT abdomen w/ oral contrast   CXR clear ( official results to follow) , RVP negative   History of sacral decub ulcer ( not infected)   Continue with meropenum , will give vancomycin also   F/up on urine and blood cultures   ID evaluation: Dr Wright.   Will give maintenance fluid

## 2017-05-12 NOTE — CONSULT NOTE ADULT - SUBJECTIVE AND OBJECTIVE BOX
HPI:  56 y/o F, from [facility], does not walk as per patient since last ,  PMHx of Morbid Obesity, DM ( Insulin), COPD ( home O2 3 L, intubated in 2017), PUSHPA ( not on CPAP, refused sleep study in past), Right kidney staghorn calculous, bilateral hydroureter  and hydronephrosis Right lower lung provoked PE ( Xarelto , UTI ( on meropenum currently) , sacral and right buttock  redness ( unstageable) presents to ED c/o subjective fever, dizziness and weakness since yesterday. Patient is poor historian. History obtained from NH papers. Acc to patient she has cough with whitish sputum production since months. She also endorses that she has diarrhea about 4 days ago, associated with abdominal pain, that is diffuse , especially in lower abdomen. She does not have diarrhea anymore. She also has SOB.  Denies chest pain, headache, nausea vomiting.  As per NH papers she is being transferred for CHF exacerbation ( patient has no CHF) .. She is currently being treated for Klebsiella UTI with meropenum( on 17 to continue for 7 days, today is D 5)  .    Patient has multiple past admissions to [facility] for COPD exacerbation ( non-compliance , UTIs. She was in past ( ) admitted to [facility] which was complicated , requiring the intubation and then Tracheostomy. also she has Hsu at that time for urinary retention ( flomax started), she voided so hsu removed. . Then she admitted to [facility] on 17 with severe abdominal pain , found to have staghorn calculous in lower right kidney, bladder distention with bilateral hydroureter and hydronephrosis, UTI with E coli ( on flagyll and Meropenum), then she had severe constipation so was given laxatives, then she had diarrhea, initially thought that it was due to C diff, stool sent but cancelled as it was formed stool so likely cause was due to laxative. She also has right lower lung PE , provoked due to prolonged bedridden, , she was discharged with Xarelto ( currently on 15 mg BID to continue till , then total till 6 months. (12 May 2017 04:51)      PAST MEDICAL & SURGICAL HISTORY:  Morbid obesity  Heart failure with preserved ejection fraction  CHF (congestive heart failure)  Diabetes  Overactive bladder  Urinary tract infection without hematuria, site unspecified  Congestive heart failure, unspecified congestive heart failure chronicity, unspecified congestive heart failure type  Asthma  Hemorrhoids  Emphysema  S/P   History of appendectomy  Appendicitis  S/P cholecystectomy      Cheese (Pruritus)  oxycodone (Other (Moderate))  peanuts (Unknown)  penicillin (Rash)  seafood (Hives)  strawberry (Pruritus)  Tomatoes (Other)      ALBUTerol/ipratropium for Nebulization 3milliLiter(s) Nebulizer every 6 hours  insulin lispro (HumaLOG) corrective regimen sliding scale  SubCutaneous three times a day before meals  dextrose 5%. 1000milliLiter(s) IV Continuous <Continuous>  dextrose Gel 1Dose(s) Oral once PRN  dextrose 50% Injectable 12.5Gram(s) IV Push once  dextrose 50% Injectable 25Gram(s) IV Push once  dextrose 50% Injectable 25Gram(s) IV Push once  glucagon  Injectable 1milliGRAM(s) IntraMuscular once PRN  pantoprazole    Tablet 40milliGRAM(s) Oral before breakfast  aspirin  chewable 81milliGRAM(s) Oral daily  gabapentin 100milliGRAM(s) Oral three times a day  simvastatin 40milliGRAM(s) Oral at bedtime  silver sulfADIAZINE 1% Cream 1Application(s) Topical daily  metoprolol 25milliGRAM(s) Oral two times a day  acetaminophen   Tablet 650milliGRAM(s) Oral every 6 hours PRN  guaiFENesin   Syrup  (Sugar-Free) 100milliGRAM(s) Oral every 6 hours PRN  clotrimazole 2% Vaginal Cream 1Applicatorful Vaginal daily  meropenem IVPB 1000milliGRAM(s) IV Intermittent every 8 hours  insulin glargine Injectable (LANTUS) 10Unit(s) SubCutaneous at bedtime  vancomycin  IVPB  IV Intermittent   sodium chloride 0.9%. 1000milliLiter(s) IV Continuous <Continuous>  rivaroxaban 15milliGRAM(s) Oral two times a day      Social Hx:    FAMILY HISTORY:  Family history of stroke  Family history of hypertension  Family history of diabetes mellitus: -        ROS  [  ] UNABLE TO ELICIT    General:  [  ] Fever   [ x ] Malaise    Skin: sacral ulcer    HEENT:  [  ] Sore Throat  [  ] Photophobia  [x  ] None    Chest: [x  ] SOB  [ x ] Cough  [ ] None    Cardiovascular: [  ] CP	 [x ] None    Gastrointestinal: [ x ] Abd pain   [  ] N    [  ] V   [  ] Diarrhea	 [ ] None    Genitourinary: [  ] Polyuria   [x  ] Urgency   [x  ] Dysuria    [  ]  Hematuria   [ ] None	    Musculoskeletal:  [  ] Back Pain	 [x ] General weakness.    Neurological: [  ]Dizziness  [  ]Visual Disturbance  [  ]Headaches   [ x] General weakness.      PHYSICAL EXAM:    Vital Signs Last 24 Hrs  T(C): 36.4, Max: 39.4 ( @ 22:28)  T(F): 97.6, Max: 102.9 ( @ 22:28)  HR: 88 (88 - 119)  BP: 126/63 (122/68 - 160/92)  BP(mean): --  RR: 17 (17 - 24)  SpO2: 100% (97% - 100%)    Constitutional:    HEENT:    Neck:  [ x ] Supple  [  ]Lymphadenopathy  [  ] JVD   [ x ]No JVD  [  ] Masses   [ x ] fugal skin infection on neck crease anteriorly.    CHEST/Respiratory:  [ x ] Rales      [  ] Rhonchi      [  ] Wheezing       [  ] Chest Tenderness  [  ]Clear to auscultation    Cardiovascular:  [ x ]S1 S2  [ x ] Reg  [  ] Irreg   [  ] Murmurs  [  ]Systolic [  ]Diastolic  [x  ]No Murmur    Abdomen:  [ x ]  Bowel Sounds   [  ] Soft           [  ] ABD Distention  [ x ] Tenderness  [  ] Organomegaly                        [  ] Guarding Rigidity  [ x ] No Guarding Rigidity  [  ] Rebound Tenderness [ x ] No Rebound Tenderness    Extremities: [ x ] Edema  [  ] No edema  [  ] Clubbing   [  ] Cyanosis  [ x ] Palpable peripheral pulses                        [ x ] No Tender Calf muscles  [  ] Tender Calf muscles    Neurological:  [ x Alert  [ x ] Awake  [x  ] Oriented  x3                              [  ] Confused    Skin:  [  ] Thrombophlebitis  [x  ] Rashes  neck anteriorly. [  ] Dry  [ x ] Ulcers sacral and buttock.    Ortho:  [  ] Joint Swelling  [  ] Joint erythema [ x ] DJD [  ] Increased temp. to touch      LABS/DIAGNOSTIC TESTS                          7.7    16.0  )-----------( 691      ( 12 May 2017 10:42 )             26.4     WBC Count: 16.0 K/uL ( @ 10:42)  WBC Count: 15.9 K/uL ( @ 23:08)          144  |  106  |  25<H>  ----------------------------<  141<H>  4.2   |  32<H>  |  1.23    Ca    9.2      12 May 2017 10:42  Phos  3.4       Mg     2.0         TPro  7.7  /  Alb  1.5<L>  /  TBili  0.2  /  DBili  x   /  AST  17  /  ALT  15  /  AlkPhos  99        Urinalysis Basic - ( 12 May 2017 02:03 )    Color: Yellow / Appearance: Clear / S.005 / pH: x  Gluc: x / Ketone: Negative  / Bili: Negative / Urobili: Negative   Blood: x / Protein: 15 / Nitrite: Negative   Leuk Esterase: Moderate / RBC: 2-5 /HPF / WBC 6-10 /HPF   Sq Epi: x / Non Sq Epi: Few / Bacteria: Few /HPF        LIVER FUNCTIONS - ( 12 May 2017 10:42 )  Alb: 1.5 g/dL / Pro: 7.7 g/dL / ALK PHOS: 99 U/L / ALT: 15 U/L DA / AST: 17 U/L / GGT: x             PT/INR - ( 12 May 2017 10:42 )   PT: 16.8 sec;   INR: 1.53 ratio         PTT - ( 11 May 2017 23:08 )  PTT:43.4 sec    LACTATE:Lactate, Blood: 1.6 mmol/L ( @ 23:08)        CULTURES:   Culture - Blood (17 @ 09:39)    Specimen Source: .Blood Blood-Peripheral    Culture Results:   No growth at 5 days.    Culture - Blood (17 @ 09:39)    Specimen Source: .Blood Blood-Peripheral    Culture Results:   No growth at 5 days.    Culture - Urine (16 @ 09:17)    Specimen Source: .Urine Clean Catch (Midstream)    Culture Results:   Culture grew 3 or more types of organisms which indicate  collection contamination; consider recollection only if clinically  indicated.  >100,000 CFU/ml Mixed gram negative rods        RADIOLOGY    CXR:    EXAM:  CHEST SINGLE AP OR PA                            PROCEDURE DATE:  2017        INTERPRETATION:  CLINICAL STATEMENT: Chest pain.    TECHNIQUE: AP view of the chest.    COMPARISON: 2017    FINDINGS/  IMPRESSION:  Mild increased interstitial lung markings. No consolidation or   significant pleural effusion.    Heart size cannot be accurately assessed in this projection, but appear   enlarged.      EXAM:  CT ABDOMEN AND PELVIS OC                            PROCEDURE DATE:  2017        INTERPRETATION:  HISTORY:    Fever. Evaluate for colitis..    DATE and TIME of EXAM: 2017 5:51 AM    TECHNIQUE:  Sections were obtained from the diaphragm to the pubic   symphysis without oral or IV contrast.     COMPARISON EXAMINATION:   2012.    FINDINGS:    Limited exam secondary to the patient's large abdominal girth. Portions   of the right hemiabdomen are excluded from this exam.    Linear atelectasis at both lung bases. No evidence of a pleural effusion.    A portion of the liver is excluded from this examination. The visualized   portion of the liver is unremarkable. Status post cholecystectomy.    The spleen is not enlarged and demonstrates no focal abnormality. The   pancreatic contour is unremarkable without evidence of mass, inflammation   or ductal dilatation. The adrenal glands demonstrate normal size and   contour.    Left hydronephrosis and hydroureter to the level of the ureterovesical   junction.    The right kidney demonstrates multiple intrarenal stones without evidence   of obstruction. Right hydroureter is noted to the level of the   ureterovesical junction. The bladder is distended.    No evidence of retroperitoneal or pelvic lymphadenopathy.    No focal abnormality of the bladder wall. The uterus and adnexal regions   demonstrate no abnormality.    Colonic diverticulosis without evidence of diverticulitis. The right   hemicolon is excluded from this examination. The visualized portion of   the colon demonstrates no evidence of colitis.     Degenerative changes in the spine.    IMPRESSION:    Bilateral hydroureter as well as left hydronephrosis. No obstructing   abnormality visualized. Urinary bladder appears distended. Multiple   nonobstructing right intrarenal stones.    No evidence of colitis. Diverticulosis is noted without evidence of   diverticulitis..

## 2017-05-12 NOTE — CONSULT NOTE ADULT - SUBJECTIVE AND OBJECTIVE BOX
57y Female with PMHx of COPD, PE, DM, overactive bladder, obese and bedbound presents from nursing home with fevers and found to be septic of unknown source. She reports being admitted previously to [facility] and being told she has a staghorn calculus. She does not remember which side. Currently she denies difficulty urinating and denies blood on urination. She reports back pain which is not specifically localized to the lower back and could be related to her weight and bedbound status.         pmhx: as above  pshx: none    meds: ALBUTerol/ipratropium for Nebulization 3milliLiter(s) Nebulizer every 6 hours  insulin lispro (HumaLOG) corrective regimen sliding scale  SubCutaneous three times a day before meals  dextrose 5%. 1000milliLiter(s) IV Continuous <Continuous>  dextrose Gel 1Dose(s) Oral once PRN  dextrose 50% Injectable 12.5Gram(s) IV Push once  dextrose 50% Injectable 25Gram(s) IV Push once  dextrose 50% Injectable 25Gram(s) IV Push once  glucagon  Injectable 1milliGRAM(s) IntraMuscular once PRN  pantoprazole    Tablet 40milliGRAM(s) Oral before breakfast  aspirin  chewable 81milliGRAM(s) Oral daily  gabapentin 100milliGRAM(s) Oral three times a day  simvastatin 40milliGRAM(s) Oral at bedtime  silver sulfADIAZINE 1% Cream 1Application(s) Topical daily  metoprolol 25milliGRAM(s) Oral two times a day  acetaminophen   Tablet 650milliGRAM(s) Oral every 6 hours PRN  guaiFENesin   Syrup  (Sugar-Free) 100milliGRAM(s) Oral every 6 hours PRN  clotrimazole 2% Vaginal Cream 1Applicatorful Vaginal daily  meropenem IVPB 1000milliGRAM(s) IV Intermittent every 8 hours  insulin glargine Injectable (LANTUS) 10Unit(s) SubCutaneous at bedtime  sodium chloride 0.9%. 1000milliLiter(s) IV Continuous <Continuous>  rivaroxaban 15milliGRAM(s) Oral two times a day  melatonin 3milliGRAM(s) Oral at bedtime  vancomycin  IVPB 1000milliGRAM(s) IV Intermittent every 12 hours    Allergies:  Cheese (Pruritus)  oxycodone (Other (Moderate))  peanuts (Unknown)  penicillin (Rash)  seafood (Hives)  strawberry (Pruritus)  Tomatoes (Other)    Gen:A&O x3, NAD    T(C): 37.7, Max: 39.4 (05-11 @ 22:28)  HR: 107 (88 - 119)  BP: 108/41 (108/41 - 160/92)  RR: 18 (17 - 24)  SpO2: 100% (97% - 100%)  Wt(kg): --    Adomen: soft, nontender, nondistended, no masses, no guarding, no rebound tenderness, no suprapubic tenderness, difficult to asses CVA tenderness janelle                          7.7    16.0  )-----------( 691      ( 12 May 2017 10:42 )             26.4   05-12    144  |  106  |  25<H>  ----------------------------<  141<H>  4.2   |  32<H>  |  1.23    Ca    9.2      12 May 2017 10:42  Phos  3.4     05-12  Mg     2.0     05-12    TPro  7.7  /  Alb  1.5<L>  /  TBili  0.2  /  DBili  x   /  AST  17  /  ALT  15  /  AlkPhos  99  05-12    CT abdomen and pelvis: Bilateral hydroureter as well as left hydronephrosis. No obstructing   abnormality visualized. Urinary bladder appears distended. Multiple   nonobstructing right intrarenal stones.

## 2017-05-12 NOTE — CONSULT NOTE ADULT - ASSESSMENT
Patient has fever, leukocytosis, abdominal pain, and work up suggest UTI, Hydronephrosis, and Hydroureter.

## 2017-05-13 LAB
ANION GAP SERPL CALC-SCNC: 3 MMOL/L — LOW (ref 5–17)
BASOPHILS # BLD AUTO: 0.1 K/UL — SIGNIFICANT CHANGE UP (ref 0–0.2)
BASOPHILS NFR BLD AUTO: 1.2 % — SIGNIFICANT CHANGE UP (ref 0–2)
BUN SERPL-MCNC: 21 MG/DL — HIGH (ref 7–18)
CALCIUM SERPL-MCNC: 8.7 MG/DL — SIGNIFICANT CHANGE UP (ref 8.4–10.5)
CHLORIDE SERPL-SCNC: 108 MMOL/L — SIGNIFICANT CHANGE UP (ref 96–108)
CO2 SERPL-SCNC: 35 MMOL/L — HIGH (ref 22–31)
CREAT SERPL-MCNC: 0.86 MG/DL — SIGNIFICANT CHANGE UP (ref 0.5–1.3)
CULTURE RESULTS: SIGNIFICANT CHANGE UP
EOSINOPHIL # BLD AUTO: 0.7 K/UL — HIGH (ref 0–0.5)
EOSINOPHIL NFR BLD AUTO: 6.1 % — HIGH (ref 0–6)
GLUCOSE SERPL-MCNC: 134 MG/DL — HIGH (ref 70–99)
HCT VFR BLD CALC: 21.7 % — LOW (ref 34.5–45)
HCT VFR BLD CALC: 22.8 % — LOW (ref 34.5–45)
HGB BLD-MCNC: 6.6 G/DL — CRITICAL LOW (ref 11.5–15.5)
HGB BLD-MCNC: 6.7 G/DL — CRITICAL LOW (ref 11.5–15.5)
LYMPHOCYTES # BLD AUTO: 1.8 K/UL — SIGNIFICANT CHANGE UP (ref 1–3.3)
LYMPHOCYTES # BLD AUTO: 16 % — SIGNIFICANT CHANGE UP (ref 13–44)
MAGNESIUM SERPL-MCNC: 1.7 MG/DL — SIGNIFICANT CHANGE UP (ref 1.6–2.6)
MCHC RBC-ENTMCNC: 22.8 PG — LOW (ref 27–34)
MCHC RBC-ENTMCNC: 23.7 PG — LOW (ref 27–34)
MCHC RBC-ENTMCNC: 28.9 GM/DL — LOW (ref 32–36)
MCHC RBC-ENTMCNC: 30.9 GM/DL — LOW (ref 32–36)
MCV RBC AUTO: 76.9 FL — LOW (ref 80–100)
MCV RBC AUTO: 79.1 FL — LOW (ref 80–100)
MONOCYTES # BLD AUTO: 0.8 K/UL — SIGNIFICANT CHANGE UP (ref 0–0.9)
MONOCYTES NFR BLD AUTO: 6.6 % — SIGNIFICANT CHANGE UP (ref 2–14)
NEUTROPHILS # BLD AUTO: 8.1 K/UL — HIGH (ref 1.8–7.4)
NEUTROPHILS NFR BLD AUTO: 70.1 % — SIGNIFICANT CHANGE UP (ref 43–77)
PHOSPHATE SERPL-MCNC: 4.2 MG/DL — SIGNIFICANT CHANGE UP (ref 2.5–4.5)
PLATELET # BLD AUTO: 597 K/UL — HIGH (ref 150–400)
PLATELET # BLD AUTO: 632 K/UL — HIGH (ref 150–400)
POTASSIUM SERPL-MCNC: 4 MMOL/L — SIGNIFICANT CHANGE UP (ref 3.5–5.3)
POTASSIUM SERPL-SCNC: 4 MMOL/L — SIGNIFICANT CHANGE UP (ref 3.5–5.3)
RBC # BLD: 2.82 M/UL — LOW (ref 3.8–5.2)
RBC # BLD: 2.88 M/UL — LOW (ref 3.8–5.2)
RBC # FLD: 22.2 % — HIGH (ref 10.3–14.5)
RBC # FLD: 22.4 % — HIGH (ref 10.3–14.5)
SODIUM SERPL-SCNC: 146 MMOL/L — HIGH (ref 135–145)
SPECIMEN SOURCE: SIGNIFICANT CHANGE UP
VANCOMYCIN TROUGH SERPL-MCNC: 13.8 UG/ML — SIGNIFICANT CHANGE UP (ref 10–20)
WBC # BLD: 11.5 K/UL — HIGH (ref 3.8–10.5)
WBC # BLD: 12.5 K/UL — HIGH (ref 3.8–10.5)
WBC # FLD AUTO: 11.5 K/UL — HIGH (ref 3.8–10.5)
WBC # FLD AUTO: 12.5 K/UL — HIGH (ref 3.8–10.5)

## 2017-05-13 PROCEDURE — 99233 SBSQ HOSP IP/OBS HIGH 50: CPT

## 2017-05-13 PROCEDURE — 76775 US EXAM ABDO BACK WALL LIM: CPT | Mod: 26

## 2017-05-13 RX ORDER — IBUPROFEN 200 MG
400 TABLET ORAL ONCE
Qty: 0 | Refills: 0 | Status: COMPLETED | OUTPATIENT
Start: 2017-05-13 | End: 2017-05-13

## 2017-05-13 RX ORDER — TRAMADOL HYDROCHLORIDE 50 MG/1
25 TABLET ORAL ONCE
Qty: 0 | Refills: 0 | Status: DISCONTINUED | OUTPATIENT
Start: 2017-05-13 | End: 2017-05-13

## 2017-05-13 RX ADMIN — Medication 2: at 18:23

## 2017-05-13 RX ADMIN — PANTOPRAZOLE SODIUM 40 MILLIGRAM(S): 20 TABLET, DELAYED RELEASE ORAL at 05:56

## 2017-05-13 RX ADMIN — MEROPENEM 200 MILLIGRAM(S): 1 INJECTION INTRAVENOUS at 22:24

## 2017-05-13 RX ADMIN — GABAPENTIN 100 MILLIGRAM(S): 400 CAPSULE ORAL at 05:56

## 2017-05-13 RX ADMIN — Medication 3 MILLILITER(S): at 20:45

## 2017-05-13 RX ADMIN — Medication 3 MILLILITER(S): at 15:08

## 2017-05-13 RX ADMIN — Medication 25 MILLIGRAM(S): at 05:56

## 2017-05-13 RX ADMIN — Medication 400 MILLIGRAM(S): at 04:24

## 2017-05-13 RX ADMIN — NYSTATIN CREAM 1 APPLICATION(S): 100000 CREAM TOPICAL at 18:15

## 2017-05-13 RX ADMIN — Medication 400 MILLIGRAM(S): at 05:15

## 2017-05-13 RX ADMIN — Medication 250 MILLIGRAM(S): at 05:56

## 2017-05-13 RX ADMIN — Medication 250 MILLIGRAM(S): at 18:23

## 2017-05-13 RX ADMIN — RIVAROXABAN 15 MILLIGRAM(S): KIT at 05:56

## 2017-05-13 RX ADMIN — Medication 1 APPLICATORFUL: at 12:40

## 2017-05-13 RX ADMIN — Medication 400 MILLIGRAM(S): at 20:54

## 2017-05-13 RX ADMIN — Medication 3 MILLIGRAM(S): at 22:25

## 2017-05-13 RX ADMIN — INSULIN GLARGINE 10 UNIT(S): 100 INJECTION, SOLUTION SUBCUTANEOUS at 22:24

## 2017-05-13 RX ADMIN — SIMVASTATIN 40 MILLIGRAM(S): 20 TABLET, FILM COATED ORAL at 22:24

## 2017-05-13 RX ADMIN — GABAPENTIN 100 MILLIGRAM(S): 400 CAPSULE ORAL at 22:24

## 2017-05-13 RX ADMIN — GABAPENTIN 100 MILLIGRAM(S): 400 CAPSULE ORAL at 12:47

## 2017-05-13 RX ADMIN — Medication 25 MILLIGRAM(S): at 18:23

## 2017-05-13 RX ADMIN — Medication 650 MILLIGRAM(S): at 13:03

## 2017-05-13 RX ADMIN — NYSTATIN CREAM 1 APPLICATION(S): 100000 CREAM TOPICAL at 05:57

## 2017-05-13 RX ADMIN — Medication 3 MILLILITER(S): at 08:34

## 2017-05-13 RX ADMIN — Medication 3 MILLILITER(S): at 02:44

## 2017-05-13 RX ADMIN — Medication 1 APPLICATION(S): at 12:39

## 2017-05-13 RX ADMIN — Medication 81 MILLIGRAM(S): at 12:46

## 2017-05-13 RX ADMIN — MEROPENEM 200 MILLIGRAM(S): 1 INJECTION INTRAVENOUS at 05:56

## 2017-05-13 RX ADMIN — RIVAROXABAN 15 MILLIGRAM(S): KIT at 18:23

## 2017-05-13 NOTE — DIETITIAN INITIAL EVALUATION ADULT. - FACTORS AFF FOOD INTAKE
Scientologist/ethnic/cultural/personal food preferences persistent constipation/pain/other (specify)/Anglican/ethnic/cultural/personal food preferences/difficulty with food procurement/preparation/persistent diarrhea/Abdominal pain, weakness, SOB, bedbound

## 2017-05-13 NOTE — PROGRESS NOTE ADULT - SUBJECTIVE AND OBJECTIVE BOX
Meds:  meropenem IVPB 1000milliGRAM(s) IV Intermittent every 8 hours  vancomycin  IVPB 1000milliGRAM(s) IV Intermittent every 12 hours    Allergies:  Allergies    Cheese (Pruritus)  oxycodone (Other (Moderate))  peanuts (Unknown)  penicillin (Rash)  seafood (Hives)  strawberry (Pruritus)  Tomatoes (Other)    Intolerances            Meds:  meropenem IVPB 1000milliGRAM(s) IV Intermittent every 8 hours  vancomycin  IVPB 1000milliGRAM(s) IV Intermittent every 12 hours    Allergies:  Allergies    Cheese (Pruritus)  oxycodone (Other (Moderate))  peanuts (Unknown)  penicillin (Rash)  seafood (Hives)  strawberry (Pruritus)  Tomatoes (Other)    Intolerances            Meds:  meropenem IVPB 1000milliGRAM(s) IV Intermittent every 8 hours  vancomycin  IVPB 1000milliGRAM(s) IV Intermittent every 12 hours    Allergies:  Allergies    Cheese (Pruritus)  oxycodone (Other (Moderate))  peanuts (Unknown)  penicillin (Rash)  seafood (Hives)  strawberry (Pruritus)  Tomatoes (Other)    Intolerances        ROS  [  ] UNABLE TO ELICIT    General:  [  ] Fever   [ x ] Malaise    Skin: sacral ulcer    HEENT:  [  ] Sore Throat  [  ] Photophobia  [x  ] None    Chest: [x  ] SOB  [ x ] Cough  [ ] None    Cardiovascular: [  ] CP	 [x ] None    Gastrointestinal: [ x ] Abd pain   [  ] N    [  ] V   [  ] Diarrhea	 [ ] None    Genitourinary: [  ] Polyuria   [x  ] Urgency   [x  ] Dysuria    [  ]  Hematuria   [ ] None	    Musculoskeletal:  [  ] Back Pain	 [x ] General weakness.    Neurological: [  ]Dizziness  [  ]Visual Disturbance  [  ]Headaches   [ x] General weakness.    PHYSICAL EXAM:    Vital Signs Last 24 Hrs  T(C): 36.9, Max: 37.9 (05-12 @ 23:51)  T(F): 98.5, Max: 100.3 (05-12 @ 23:51)  HR: 86 (86 - 113)  BP: 121/55 (106/58 - 121/55)  BP(mean): --  RR: 19 (18 - 20)  SpO2: 97% (97% - 100%)    HEENT:    Neck:  [ x ] Supple  [  ]Lymphadenopathy  [  ] JVD   [ x ]No JVD  [  ] Masses   [ x ] fugal skin infection on neck crease anteriorly.    CHEST/Respiratory:  [ x ] Rales      [  ] Rhonchi      [  ] Wheezing       [  ] Chest Tenderness  [  ]Clear to auscultation    Cardiovascular:  [ x ]S1 S2  [ x ] Reg  [  ] Irreg   [  ] Murmurs  [  ]Systolic [  ]Diastolic  [x  ]No Murmur    Abdomen:  [ x ]  Bowel Sounds   [  ] Soft           [  ] ABD Distention  [ x ] Tenderness  [  ] Organomegaly                        [  ] Guarding Rigidity  [ x ] No Guarding Rigidity  [  ] Rebound Tenderness [ x ] No Rebound Tenderness    Extremities: [ x ] Edema  [  ] No edema  [  ] Clubbing   [  ] Cyanosis  [ x ] Palpable peripheral pulses                        [ x ] No Tender Calf muscles  [  ] Tender Calf muscles    Neurological:  [ x Alert  [ x ] Awake  [x  ] Oriented  x3                              [  ] Confused    Skin:  [  ] Thrombophlebitis  [x  ] Rashes  neck anteriorly. [  ] Dry  [ x ] Ulcers sacral and buttock.    Ortho:  [  ] Joint Swelling  [  ] Joint erythema [ x ] DJD     Luna cath in place.    LABS/DIAGNOSTIC TESTS                          6.6    12.5  )-----------( 632      ( 13 May 2017 09:43 )             22.8         05-13    146<H>  |  108  |  21<H>  ----------------------------<  134<H>  4.0   |  35<H>  |  0.86    Ca    8.7      13 May 2017 06:04  Phos  4.2     05-13  Mg     1.7     05-13    TPro  7.7  /  Alb  1.5<L>  /  TBili  0.2  /  DBili  x   /  AST  17  /  ALT  15  /  AlkPhos  99  05-12      LIVER FUNCTIONS - ( 12 May 2017 10:42 )  Alb: 1.5 g/dL / Pro: 7.7 g/dL / ALK PHOS: 99 U/L / ALT: 15 U/L DA / AST: 17 U/L / GGT: x               CULTURES:   Culture - Blood (05.12.17 @ 09:33)    Specimen Source: .Blood Blood-Peripheral    Culture Results:   No growth to date.    Culture - Blood (05.12.17 @ 09:33)    Specimen Source: .Blood Blood-Peripheral    Culture Results:   No growth to date.    Culture - Urine (05.12.17 @ 09:27)    Specimen Source: .Urine Catheterized    Culture Results:   <10,000 CFU/ml Normal Urogenital juanita present        RADIOLOGY  CXR: No new studies.    Assessment and Recommendation:   · Assessment		  Patient has no fever todau, but leukocytosis increased, also has  Hydronephrosis, and Hydroureter. but urine c&s suggest for contamination.    Problem/Recommendation - 1:  Problem: Urinary tract infection without hematuria, site unspecified.   Recommendation:   1- Follow Blood C&S till final report.  2- Follow UA&CS.  3-Continue Meropenem 1 gm IVPB q 8 hours.  4- Continue vancomycin 1 gm IVPB q 12 hours.  5- Follow Vancomycin peak & trough after third dose.    Problem/Recommendation - 2:  ·  Problem: Hydronephrosis.    Recommendation:   1- Urology evaluation and management.     Problem/Recommendation - 3:  ·  Problem: Hydroureter.    Recommendation:   1- Urology evaluation and management.     Problem/Recommendation - 4:  ·  Problem: COPD (chronic obstructive pulmonary disease).    Recommendation: 1- IV antibiotics.  2- Bronchodilators.  3-Pulmonary management.     Problem/Recommendation - 5:  ·  Problem: Diarrhea, unspecified type.  Recommendation:   1- Stool for C&S.  2- stool for O&P and C&S.     Problem/Recommendation - 6:  Problem: Heart failure.   Recommendation:   1-Cardiac management.    Problem/Recommendation - 7:  Problem: Diabetes.   Recommendation:   1- Blood sugar monitoring and control.    Problem/Recommendation - 8:  Problem: Dermatophytoses.   Recommendation:   1- Continue Mycolog cream to the affected area of neck anteriorly TID.  2- Keep neck skin dry all the time.

## 2017-05-13 NOTE — PROGRESS NOTE ADULT - PROBLEM SELECTOR PLAN 1
T max of 102, associated with cough, abdominal pain, SOB, diarrhea about 4-5 days   WBC count of 15, Lactate normal  Could be due to underlying UTI ( currently on meropenum start 5/8/ 17  to continue for 7 days )   Will get CT abdomen w/ oral contrast   History of sacral decub ulcer ( not infected)   Continue with meropenum , will give vancomycin also   F/up on urine and blood cultures   ID evaluation: Dr Wright.   Will give maintenance fluid T max of 102, associated with cough, abdominal pain, SOB, diarrhea about 4-5 days   WBC count of 15, Lactate normal  Likely due to underlying UTI ( currently on meropenum start 5/8/ 17  to continue for 7 days )   History of sacral decub ulcer ( not infected)   Continue with meropenum ,  vancomycin also as per ID  F/up on urine and blood cultures; ID OT eval complete. Dr. Wright noted  ID evaluation: Dr Wright.   Will give maintenance fluid

## 2017-05-13 NOTE — PROGRESS NOTE ADULT - SUBJECTIVE AND OBJECTIVE BOX
Patient examined at bedside, obese, no current complaints  Bladder scan was done last night and there was a 1000cc residual so a hsu was placed  Hsu draining a large amount of clear yellow urine  No nausea, no vomiting  Tolerating diet  Tmax 100.3    T(C): 37.1, Max: 37.9 (05-12 @ 23:51)  HR: 100 (88 - 113)  BP: 112/57 (106/58 - 126/63)  RR: 20 (17 - 20)  SpO2: 100% (100% - 100%)  Wt(kg): --    I & Os for current day (as of 05-13 @ 07:31)  =============================================  IN:    Oral Fluid: 1500 ml    sodium chloride 0.9%.: 1200 ml    IV PiggyBack: 700 ml    Total IN: 3400 ml  ---------------------------------------------  OUT:    Indwelling Catheter - Urethral: 4800 ml    Total OUT: 4800 ml  ---------------------------------------------  Total NET: -1400 ml      Physical Exam  General: AAOx3, No acute distress, obese  Abdomen: soft, bilateral tenderness of flanks, nondistended, no rebound tenderness, no guarding, no palpable masses  Back: No CVA tenderness bilaterally  Extremities: non edematous, no calf pain bilaterally

## 2017-05-13 NOTE — DIETITIAN INITIAL EVALUATION ADULT. - ORAL INTAKE PTA
good/NH orders: SAMMI, NCS, low chol/low fat, 1500 Kcal, low phosphorus, low K+, Gluten Free, regular consistency, thin liquid consistency, LPS 15/30 ml 30ml QID

## 2017-05-13 NOTE — PROGRESS NOTE ADULT - PROBLEM SELECTOR PLAN 5
Urology evaluation; Resume Flomax  Follow up Renal Sonogram Urology evaluation; Resume Flomax  Follow up Renal Sonogram  Urology evaluation by Dr. Bowser noted

## 2017-05-13 NOTE — PROGRESS NOTE ADULT - ASSESSMENT
56 y/o female with a UTI, bilateral renal stones & bilateral hydronephrosis likely secondary to urinary retention/incomplete bladder emptying

## 2017-05-13 NOTE — PROGRESS NOTE ADULT - SUBJECTIVE AND OBJECTIVE BOX
Patient is a 57y old  Female who presents with a chief complaint of Sent from NH due to fever, SOB, cough, abdominal pain, diarrhea (12 May 2017 04:51)      INTERVAL HPI/OVERNIGHT EVENTS:    MEDICATIONS  (STANDING):  ALBUTerol/ipratropium for Nebulization 3milliLiter(s) Nebulizer every 6 hours  insulin lispro (HumaLOG) corrective regimen sliding scale  SubCutaneous three times a day before meals  pantoprazole    Tablet 40milliGRAM(s) Oral before breakfast  aspirin  chewable 81milliGRAM(s) Oral daily  gabapentin 100milliGRAM(s) Oral three times a day  simvastatin 40milliGRAM(s) Oral at bedtime  silver sulfADIAZINE 1% Cream 1Application(s) Topical daily  metoprolol 25milliGRAM(s) Oral two times a day  clotrimazole 2% Vaginal Cream 1Applicatorful Vaginal daily  meropenem IVPB 1000milliGRAM(s) IV Intermittent every 8 hours  insulin glargine Injectable (LANTUS) 10Unit(s) SubCutaneous at bedtime  sodium chloride 0.9%. 1000milliLiter(s) IV Continuous <Continuous>  rivaroxaban 15milliGRAM(s) Oral two times a day  melatonin 3milliGRAM(s) Oral at bedtime  vancomycin  IVPB 1000milliGRAM(s) IV Intermittent every 12 hours  nystatin Powder 1Application(s) Topical two times a day    MEDICATIONS  (PRN):  dextrose Gel 1Dose(s) Oral once PRN Blood Glucose LESS THAN 70 milliGRAM(s)/deciLiter  glucagon  Injectable 1milliGRAM(s) IntraMuscular once PRN Glucose <70 milliGRAM(s)/deciLiter  acetaminophen   Tablet 650milliGRAM(s) Oral every 6 hours PRN For Temp greater than 38 C (100.4 F)  guaiFENesin   Syrup  (Sugar-Free) 100milliGRAM(s) Oral every 6 hours PRN Cough  acetaminophen   Tablet. 650milliGRAM(s) Oral every 6 hours PRN Mild Pain (1 - 3)      Allergies    Cheese (Pruritus)  oxycodone (Other (Moderate))  peanuts (Unknown)  penicillin (Rash)  seafood (Hives)  strawberry (Pruritus)  Tomatoes (Other)        REVIEW OF SYSTEMS: Morbid Obese+, SOB+,       Vital Signs Last 24 Hrs  T(C): 36.8, Max: 37.9 ( @ 23:51)  T(F): 98.3, Max: 100.3 ( @ 23:51)  HR: 104 (86 - 113)  BP: 118/59 (106/58 - 121/55)  BP(mean): --  RR: 20 (19 - 20)  SpO2: 100% (97% - 100%)    PHYSICAL EXAM:  GENERAL: Morbidly obese, mild distress   HEAD:  Atraumatic, Normocephalic  EYES: EOMI, PERRLA, conjunctiva and sclera clear  NECK: Supple, No JVD, Normal thyroid  NERVOUS SYSTEM:  Alert & Oriented X3, No gross focal deficits  CHEST/LUNG: Clear to percussion bilaterally; No rales, rhonchi, wheezing, or rubs; Decreased breath sounds both bases.   HEART: Regular rate and rhythm; No murmurs, rubs, or gallops  ABDOMEN: Soft,Obese, Nondistended; Bowel sounds present  EXTREMITIES:  Mild edema both legs      LABS:                        6.6    12.5  )-----------( 632      ( 13 May 2017 09:43 )             22.8     05-13    146<H>  |  108  |  21<H>  ----------------------------<  134<H>  4.0   |  35<H>  |  0.86    Ca    8.7      13 May 2017 06:04  Phos  4.2     -  Mg     1.7     -    TPro  7.7  /  Alb  1.5<L>  /  TBili  0.2  /  DBili  x   /  AST  17  /  ALT  15  /  AlkPhos  99  05-12    PT/INR - ( 12 May 2017 10:42 )   PT: 16.8 sec;   INR: 1.53 ratio         PTT - ( 11 May 2017 23:08 )  PTT:43.4 sec  Urinalysis Basic - ( 12 May 2017 02:03 )    Color: Yellow / Appearance: Clear / S.005 / pH: x  Gluc: x / Ketone: Negative  / Bili: Negative / Urobili: Negative   Blood: x / Protein: 15 / Nitrite: Negative   Leuk Esterase: Moderate / RBC: 2-5 /HPF / WBC 6-10 /HPF   Sq Epi: x / Non Sq Epi: Few / Bacteria: Few /HPF      CAPILLARY BLOOD GLUCOSE  204 (13 May 2017 17:06)  141 (13 May 2017 11:47)  119 (13 May 2017 08:27)  111 (12 May 2017 21:05)      RADIOLOGY & ADDITIONAL TESTS:    Imaging Personally Reviewed:  [ ] YES  [ ] NO    Consultant(s) Notes Reviewed:  [ ] YES  [ ] NO    Care Discussed with Consultants/Other Providers [ ] YES  [ ] NO    Plan of Care discussed with Housestaff [ ]YES [ ] NO

## 2017-05-13 NOTE — DIETITIAN INITIAL EVALUATION ADULT. - OTHER INFO
Nutrition consult requested for BMI >40. Patient with multiple admission to [facility] & from [facility]. Visited pt with  at bedside, report appetite good at NH but had ongoing diarrhea with abdominal pain, stated  Lbs >6 months & lost wt. unable to give amounts, in house no reports of nausea / vomiting or diarrhea, in house consuming 50% of meals & tolerating, obtained food preferences, declined nutrition education, received enough at previous admission. Skin intact.

## 2017-05-13 NOTE — CONSULT NOTE ADULT - SUBJECTIVE AND OBJECTIVE BOX
Time of visit:    CHIEF COMPLAINT: Patient is a 57y old  Female who presents with a chief complaint of Sent from NH due to fever, SOB, cough, abdominal pain, diarrhea (12 May 2017 04:51)      HPI:  56 y/o F, from [facility], does not walk as per patient since last ,  PMHx of Morbid Obesity, DM ( Insulin), COPD ( home O2 3 L, intubated in 2017), PUSHPA ( not on CPAP, refused sleep study in past), Right kidney staghorn calculous, bilateral hydroureter  and hydronephrosis Right lower lung provoked PE ( Xarelto , UTI ( on meropenum currently) , sacral and right buttock  redness ( unstageable) presents to ED c/o subjective fever, dizziness and weakness since yesterday. Patient is poor historian. History obtained from NH papers. Acc to patient she has cough with whitish sputum production since months. She also endorses that she has diarrhea about 4 days ago, associated with abdominal pain, that is diffuse , especially in lower abdomen. She does not have diarrhea anymore. She also has SOB.  Denies chest pain, headache, nausea vomiting.  As per NH papers she is being transferred for CHF exacerbation ( patient has no CHF) .. She is currently being treated for Klebsiella UTI with meropenum( on 17 to continue for 7 days, today is D 5)  .    Patient has multiple past admissions to [facility] for COPD exacerbation ( non-compliance , UTIs. She was in past ( ) admitted to [facility] which was complicated , requiring the intubation and then Tracheostomy. also she has Hsu at that time for urinary retention ( flomax started), she voided so hsu removed. . Then she admitted to [facility] on 17 with severe abdominal pain , found to have staghorn calculous in lower right kidney, bladder distention with bilateral hydroureter and hydronephrosis, UTI with E coli ( on flagyll and Meropenum), then she had severe constipation so was given laxatives, then she had diarrhea, initially thought that it was due to C diff, stool sent but cancelled as it was formed stool so likely cause was due to laxative. She also has right lower lung PE , provoked due to prolonged bedridden, , she was discharged with Xarelto ( currently on 15 mg BID to continue till , then total till 6 months. (12 May 2017 04:51)   Patient seen and examined.     PAST MEDICAL & SURGICAL HISTORY:  Morbid obesity  Heart failure with preserved ejection fraction  CHF (congestive heart failure)  Diabetes  Overactive bladder  Urinary tract infection without hematuria, site unspecified  Congestive heart failure, unspecified congestive heart failure chronicity, unspecified congestive heart failure type  Asthma  Hemorrhoids  Congestive heart failure  Emphysema  S/P   History of appendectomy  Appendicitis  S/P cholecystectomy      Allergies    Cheese (Pruritus)  oxycodone (Other (Moderate))  peanuts (Unknown)  penicillin (Rash)  seafood (Hives)  strawberry (Pruritus)  Tomatoes (Other)    Intolerances        MEDICATIONS  (STANDING):  ALBUTerol/ipratropium for Nebulization 3milliLiter(s) Nebulizer every 6 hours  insulin lispro (HumaLOG) corrective regimen sliding scale  SubCutaneous three times a day before meals  dextrose 5%. 1000milliLiter(s) IV Continuous <Continuous>  dextrose 50% Injectable 12.5Gram(s) IV Push once  dextrose 50% Injectable 25Gram(s) IV Push once  dextrose 50% Injectable 25Gram(s) IV Push once  pantoprazole    Tablet 40milliGRAM(s) Oral before breakfast  aspirin  chewable 81milliGRAM(s) Oral daily  gabapentin 100milliGRAM(s) Oral three times a day  simvastatin 40milliGRAM(s) Oral at bedtime  silver sulfADIAZINE 1% Cream 1Application(s) Topical daily  metoprolol 25milliGRAM(s) Oral two times a day  clotrimazole 2% Vaginal Cream 1Applicatorful Vaginal daily  meropenem IVPB 1000milliGRAM(s) IV Intermittent every 8 hours  insulin glargine Injectable (LANTUS) 10Unit(s) SubCutaneous at bedtime  sodium chloride 0.9%. 1000milliLiter(s) IV Continuous <Continuous>  rivaroxaban 15milliGRAM(s) Oral two times a day  melatonin 3milliGRAM(s) Oral at bedtime  vancomycin  IVPB 1000milliGRAM(s) IV Intermittent every 12 hours  nystatin Powder 1Application(s) Topical two times a day      MEDICATIONS  (PRN):  dextrose Gel 1Dose(s) Oral once PRN Blood Glucose LESS THAN 70 milliGRAM(s)/deciLiter  glucagon  Injectable 1milliGRAM(s) IntraMuscular once PRN Glucose <70 milliGRAM(s)/deciLiter  acetaminophen   Tablet 650milliGRAM(s) Oral every 6 hours PRN For Temp greater than 38 C (100.4 F)  guaiFENesin   Syrup  (Sugar-Free) 100milliGRAM(s) Oral every 6 hours PRN Cough  acetaminophen   Tablet. 650milliGRAM(s) Oral every 6 hours PRN Mild Pain (1 - 3)   Medications up to date at time of exam.    Medications up to date at time of exam.    FAMILY HISTORY:  Family history of stroke  Family history of hypertension  Family history of diabetes mellitus: -      SOCIAL HISTORY  Smoking History: [   ] smoking/smoke exposure, [   ] former smoker  Living Condition: [   ] apartment, [   ] private house  Work History:   Travel History: denies recent travel  Illicit Substance Use: denies  Alcohol Use: denies    REVIEW OF SYSTEMS:    CONSTITUTIONAL:  denies fevers, chills, sweats, weight loss    HEENT:  denies diplopia or blurred vision, sore throat or runny nose.    CARDIOVASCULAR:  denies pressure, squeezing, tightness, or heaviness about the chest; no palpitations.    RESPIRATORY:  denies SOB, cough, DUVAL, wheezing.    GASTROINTESTINAL:  denies abdominal pain, nausea, vomiting or diarrhea.    GENITOURINARY: denies dysuria, frequency or urgency.    NEUROLOGIC:  denies numbness, tingling, seizures or weakness.    PSYCHIATRIC:  denies disorder of thought or mood.    MSK: denies swelling, redness      PHYSICAL EXAMINATION:    GENERAL: The patient is a well-developed, well-nourished, in no apparent distress.     Vital Signs Last 24 Hrs  T(C): 36.9, Max: 37.9 (05-12 @ 23:51)  T(F): 98.5, Max: 100.3 (05-12 @ 23:51)  HR: 86 (86 - 113)  BP: 121/55 (106/58 - 121/55)  BP(mean): --  RR: 19 (18 - 20)  SpO2: 97% (97% - 100%)   (if applicable)    Chest Tube (if applicable)    HEENT: Head is normocephalic and atraumatic. Extraocular muscles are intact. Mucous membranes are moist.     NECK: Supple, no palpable adenopathy.    LUNGS: Clear to auscultation, no wheezing, rales, or rhonchi.    HEART: Regular rate and rhythm without murmur.    ABDOMEN: Soft, nontender, and nondistended.  No hepatosplenomegaly is noted.    EXTREMITIES: Without any cyanosis, clubbing, rash, lesions or edema.    NEUROLOGIC: Awake, alert, oriented.     SKIN: Warm, dry, good turgor.      LABS:                        6.6    12.5  )-----------( 632      ( 13 May 2017 09:43 )             22.8     05-13    146<H>  |  108  |  21<H>  ----------------------------<  134<H>  4.0   |  35<H>  |  0.86    Ca    8.7      13 May 2017 06:04  Phos  4.2     05-  Mg     1.7     -    TPro  7.7  /  Alb  1.5<L>  /  TBili  0.2  /  DBili  x   /  AST  17  /  ALT  15  /  AlkPhos  99  05-12    PT/INR - ( 12 May 2017 10:42 )   PT: 16.8 sec;   INR: 1.53 ratio         PTT - ( 11 May 2017 23:08 )  PTT:43.4 sec  Urinalysis Basic - ( 12 May 2017 02:03 )    Color: Yellow / Appearance: Clear / S.005 / pH: x  Gluc: x / Ketone: Negative  / Bili: Negative / Urobili: Negative   Blood: x / Protein: 15 / Nitrite: Negative   Leuk Esterase: Moderate / RBC: 2-5 /HPF / WBC 6-10 /HPF   Sq Epi: x / Non Sq Epi: Few / Bacteria: Few /HPF                      MICROBIOLOGY: (if applicable)    RADIOLOGY & ADDITIONAL STUDIES:  EKG:   CXR:see report  ECHO:    IMPRESSION: 57y Female PAST MEDICAL & SURGICAL HISTORY:  Morbid obesity  Heart failure with preserved ejection fraction  CHF (congestive heart failure)  PE  Diabetes  Overactive bladder  Urinary tract infection without hematuria, site unspecified  Congestive heart failure, unspecified congestive heart failure chronicity, unspecified congestive heart failure type  Asthma  Hemorrhoids  Congestive heart failure  Emphysema  S/P   History of appendectomy  Appendicitis  S/P cholecystectomy   p/w SOB due to acute on chronic hypoxic respiratory failure due to COPD and CHF.     RECOMMENDATIONS:  -continue bronchodilators  -wound care  -anticoagulation.... may life long due to limited mobitliy  -OOb to chair  -will need out patient sleep study for sleep apnea Time of visit:    CHIEF COMPLAINT: Patient is a 57y old  Female who presents with a chief complaint of Sent from NH due to fever, SOB, cough, abdominal pain, diarrhea (12 May 2017 04:51)      HPI:  56 y/o F, from [facility], does not walk as per patient since last ,  PMHx of Morbid Obesity, DM ( Insulin), COPD ( home O2 3 L, intubated in 2017), PUSHPA ( not on CPAP, refused sleep study in past), Right kidney staghorn calculous, bilateral hydroureter  and hydronephrosis Right lower lung provoked PE ( Xarelto , UTI ( on meropenum currently) , sacral and right buttock  redness ( unstageable) presents to ED c/o subjective fever, dizziness and weakness since yesterday. Patient is poor historian. History obtained from NH papers. Acc to patient she has cough with whitish sputum production since months. She also endorses that she has diarrhea about 4 days ago, associated with abdominal pain, that is diffuse , especially in lower abdomen. She does not have diarrhea anymore. She also has SOB.  Denies chest pain, headache, nausea vomiting.  As per NH papers she is being transferred for CHF exacerbation ( patient has no CHF) .. She is currently being treated for Klebsiella UTI with meropenum( on 17 to continue for 7 days, today is D 5)  .    Patient has multiple past admissions to [facility] for COPD exacerbation ( non-compliance , UTIs. She was in past ( ) admitted to [facility] which was complicated , requiring the intubation and then Tracheostomy. also she has Hsu at that time for urinary retention ( flomax started), she voided so hsu removed. . Then she admitted to [facility] on 17 with severe abdominal pain , found to have staghorn calculous in lower right kidney, bladder distention with bilateral hydroureter and hydronephrosis, UTI with E coli ( on flagyll and Meropenum), then she had severe constipation so was given laxatives, then she had diarrhea, initially thought that it was due to C diff, stool sent but cancelled as it was formed stool so likely cause was due to laxative. She also has right lower lung PE , provoked due to prolonged bedridden, , she was discharged with Xarelto ( currently on 15 mg BID to continue till , then total till 6 months. (12 May 2017 04:51)   Patient seen and examined.     PAST MEDICAL & SURGICAL HISTORY:  Morbid obesity  Heart failure with preserved ejection fraction  CHF (congestive heart failure)  Diabetes  Overactive bladder  Urinary tract infection without hematuria, site unspecified  Congestive heart failure, unspecified congestive heart failure chronicity, unspecified congestive heart failure type  Asthma  Hemorrhoids  Congestive heart failure  Emphysema  S/P   History of appendectomy  Appendicitis  S/P cholecystectomy      Allergies    Cheese (Pruritus)  oxycodone (Other (Moderate))  peanuts (Unknown)  penicillin (Rash)  seafood (Hives)  strawberry (Pruritus)  Tomatoes (Other)    Intolerances        MEDICATIONS  (STANDING):  ALBUTerol/ipratropium for Nebulization 3milliLiter(s) Nebulizer every 6 hours  insulin lispro (HumaLOG) corrective regimen sliding scale  SubCutaneous three times a day before meals  dextrose 5%. 1000milliLiter(s) IV Continuous <Continuous>  dextrose 50% Injectable 12.5Gram(s) IV Push once  dextrose 50% Injectable 25Gram(s) IV Push once  dextrose 50% Injectable 25Gram(s) IV Push once  pantoprazole    Tablet 40milliGRAM(s) Oral before breakfast  aspirin  chewable 81milliGRAM(s) Oral daily  gabapentin 100milliGRAM(s) Oral three times a day  simvastatin 40milliGRAM(s) Oral at bedtime  silver sulfADIAZINE 1% Cream 1Application(s) Topical daily  metoprolol 25milliGRAM(s) Oral two times a day  clotrimazole 2% Vaginal Cream 1Applicatorful Vaginal daily  meropenem IVPB 1000milliGRAM(s) IV Intermittent every 8 hours  insulin glargine Injectable (LANTUS) 10Unit(s) SubCutaneous at bedtime  sodium chloride 0.9%. 1000milliLiter(s) IV Continuous <Continuous>  rivaroxaban 15milliGRAM(s) Oral two times a day  melatonin 3milliGRAM(s) Oral at bedtime  vancomycin  IVPB 1000milliGRAM(s) IV Intermittent every 12 hours  nystatin Powder 1Application(s) Topical two times a day      MEDICATIONS  (PRN):  dextrose Gel 1Dose(s) Oral once PRN Blood Glucose LESS THAN 70 milliGRAM(s)/deciLiter  glucagon  Injectable 1milliGRAM(s) IntraMuscular once PRN Glucose <70 milliGRAM(s)/deciLiter  acetaminophen   Tablet 650milliGRAM(s) Oral every 6 hours PRN For Temp greater than 38 C (100.4 F)  guaiFENesin   Syrup  (Sugar-Free) 100milliGRAM(s) Oral every 6 hours PRN Cough  acetaminophen   Tablet. 650milliGRAM(s) Oral every 6 hours PRN Mild Pain (1 - 3)   Medications up to date at time of exam.    Medications up to date at time of exam.    FAMILY HISTORY:  Family history of stroke  Family history of hypertension  Family history of diabetes mellitus: -      SOCIAL HISTORY  Smoking History: [   ] smoking/smoke exposure, [   ] former smoker  Living Condition: [   ] apartment, [   ] private house  Work History:   Travel History: denies recent travel  Illicit Substance Use: denies  Alcohol Use: denies    REVIEW OF SYSTEMS:    CONSTITUTIONAL:  denies fevers, chills, sweats, weight loss    HEENT:  denies diplopia or blurred vision, sore throat or runny nose.    CARDIOVASCULAR:  denies pressure, squeezing, tightness, or heaviness about the chest; no palpitations.    RESPIRATORY:  denies SOB, cough, DUVAL, wheezing.    GASTROINTESTINAL:  denies abdominal pain, nausea, vomiting or diarrhea.    GENITOURINARY: denies dysuria, frequency or urgency.    NEUROLOGIC:  denies numbness, tingling, seizures or weakness.    PSYCHIATRIC:  denies disorder of thought or mood.    MSK: denies swelling, redness      PHYSICAL EXAMINATION:    GENERAL: The patient is a well-developed, well-nourished, in no apparent distress.     Vital Signs Last 24 Hrs  T(C): 36.9, Max: 37.9 (05-12 @ 23:51)  T(F): 98.5, Max: 100.3 (05-12 @ 23:51)  HR: 86 (86 - 113)  BP: 121/55 (106/58 - 121/55)  BP(mean): --  RR: 19 (18 - 20)  SpO2: 97% (97% - 100%)   (if applicable)    Chest Tube (if applicable)    HEENT: Head is normocephalic and atraumatic. Extraocular muscles are intact. Mucous membranes are moist.     NECK: Supple, no palpable adenopathy.    LUNGS: Clear to auscultation, no wheezing, rales, or rhonchi.    HEART: Regular rate and rhythm without murmur.    ABDOMEN: Soft, nontender, and nondistended.  No hepatosplenomegaly is noted.    EXTREMITIES: Without any cyanosis, clubbing, rash, lesions or edema.    NEUROLOGIC: Awake, alert, oriented.     SKIN: Warm, dry, good turgor.      LABS:                        6.6    12.5  )-----------( 632      ( 13 May 2017 09:43 )             22.8     05-13    146<H>  |  108  |  21<H>  ----------------------------<  134<H>  4.0   |  35<H>  |  0.86    Ca    8.7      13 May 2017 06:04  Phos  4.2     05-  Mg     1.7     -    TPro  7.7  /  Alb  1.5<L>  /  TBili  0.2  /  DBili  x   /  AST  17  /  ALT  15  /  AlkPhos  99  05-12    PT/INR - ( 12 May 2017 10:42 )   PT: 16.8 sec;   INR: 1.53 ratio         PTT - ( 11 May 2017 23:08 )  PTT:43.4 sec  Urinalysis Basic - ( 12 May 2017 02:03 )    Color: Yellow / Appearance: Clear / S.005 / pH: x  Gluc: x / Ketone: Negative  / Bili: Negative / Urobili: Negative   Blood: x / Protein: 15 / Nitrite: Negative   Leuk Esterase: Moderate / RBC: 2-5 /HPF / WBC 6-10 /HPF   Sq Epi: x / Non Sq Epi: Few / Bacteria: Few /HPF                      MICROBIOLOGY: (if applicable)    RADIOLOGY & ADDITIONAL STUDIES:  EKG:   CXR:see report  ECHO:    IMPRESSION: 57y Female PAST MEDICAL & SURGICAL HISTORY:  Morbid obesity  Heart failure with preserved ejection fraction  CHF (congestive heart failure)  PE  Diabetes  Overactive bladder  Urinary tract infection without hematuria, site unspecified  Congestive heart failure, unspecified congestive heart failure chronicity, unspecified congestive heart failure type  Asthma  Hemorrhoids  Congestive heart failure  Emphysema  S/P   History of appendectomy  Appendicitis  S/P cholecystectomy   p/w SOB due to acute on chronic hypoxic respiratory failure due to COPD and CHF.     RECOMMENDATIONS:  -continue bronchodilators  -wound care  -anticoagulation.... may life long due to limited mobitliy  -OOb to chair  -will need out patient sleep study for sleep apnea. she refused to use PAP in hosp

## 2017-05-13 NOTE — PROGRESS NOTE ADULT - ASSESSMENT
56 Y/O F, with multiple prior admissions due to COPD exacerbation and UTI, with PMHx of Morbid Obesity, DM ( Insulin), COPD ( home O2 3 L, intubated in Jan 2017), PUSHPA ( not on CPAP, refused sleep study in past), Right kidney staghorn calculous, bilateral hydroureter  and hydronephrosis Right lower lung provoked PE ( xarelto) , UTI ( on meropenum currently) , sacral and right buttock redness ( unstageable), presents to ED c/o subjective fever, dizziness, diarrhea and weakness since yesterday, sent from NH due to that.     In ED initial vitas T max of 102.9, tachycardiac 112, /92, RR 22, SPO2 97, given 1 L bolus, tylenol, blood and urine cultures sent. labs shows WBC of 15.9, Hb of 7.9 , HCT 26.6, Cr 1.37, INR 2.02, Lactate 1.6, UA 6-10 WBC ( positive), RVP negative. Stool strudies were ordered but according to patient she last had BM on Sunday ( no blood in it)

## 2017-05-14 DIAGNOSIS — E87.3 ALKALOSIS: ICD-10-CM

## 2017-05-14 LAB
ALLERGY+IMMUNOLOGY DIAG STUDY NOTE: SIGNIFICANT CHANGE UP
ANION GAP SERPL CALC-SCNC: 3 MMOL/L — LOW (ref 5–17)
BASOPHILS # BLD AUTO: 0.1 K/UL — SIGNIFICANT CHANGE UP (ref 0–0.2)
BASOPHILS NFR BLD AUTO: 0.8 % — SIGNIFICANT CHANGE UP (ref 0–2)
BUN SERPL-MCNC: 14 MG/DL — SIGNIFICANT CHANGE UP (ref 7–18)
CALCIUM SERPL-MCNC: 9.1 MG/DL — SIGNIFICANT CHANGE UP (ref 8.4–10.5)
CHLORIDE SERPL-SCNC: 107 MMOL/L — SIGNIFICANT CHANGE UP (ref 96–108)
CO2 SERPL-SCNC: 35 MMOL/L — HIGH (ref 22–31)
CREAT SERPL-MCNC: 0.58 MG/DL — SIGNIFICANT CHANGE UP (ref 0.5–1.3)
EOSINOPHIL # BLD AUTO: 1 K/UL — HIGH (ref 0–0.5)
EOSINOPHIL NFR BLD AUTO: 7.5 % — HIGH (ref 0–6)
GLUCOSE SERPL-MCNC: 81 MG/DL — SIGNIFICANT CHANGE UP (ref 70–99)
HCT VFR BLD CALC: 23.8 % — LOW (ref 34.5–45)
HGB BLD-MCNC: 7.2 G/DL — LOW (ref 11.5–15.5)
LYMPHOCYTES # BLD AUTO: 15.9 % — SIGNIFICANT CHANGE UP (ref 13–44)
LYMPHOCYTES # BLD AUTO: 2.1 K/UL — SIGNIFICANT CHANGE UP (ref 1–3.3)
MAGNESIUM SERPL-MCNC: 1.7 MG/DL — SIGNIFICANT CHANGE UP (ref 1.6–2.6)
MCHC RBC-ENTMCNC: 23.8 PG — LOW (ref 27–34)
MCHC RBC-ENTMCNC: 30.2 GM/DL — LOW (ref 32–36)
MCV RBC AUTO: 78.5 FL — LOW (ref 80–100)
MONOCYTES # BLD AUTO: 0.4 K/UL — SIGNIFICANT CHANGE UP (ref 0–0.9)
MONOCYTES NFR BLD AUTO: 2.9 % — SIGNIFICANT CHANGE UP (ref 2–14)
NEUTROPHILS # BLD AUTO: 9.6 K/UL — HIGH (ref 1.8–7.4)
NEUTROPHILS NFR BLD AUTO: 72.8 % — SIGNIFICANT CHANGE UP (ref 43–77)
OB PNL STL: POSITIVE
PHOSPHATE SERPL-MCNC: 3.9 MG/DL — SIGNIFICANT CHANGE UP (ref 2.5–4.5)
PLATELET # BLD AUTO: 624 K/UL — HIGH (ref 150–400)
POTASSIUM SERPL-MCNC: 4 MMOL/L — SIGNIFICANT CHANGE UP (ref 3.5–5.3)
POTASSIUM SERPL-SCNC: 4 MMOL/L — SIGNIFICANT CHANGE UP (ref 3.5–5.3)
RBC # BLD: 3.04 M/UL — LOW (ref 3.8–5.2)
RBC # FLD: 21.2 % — HIGH (ref 10.3–14.5)
SODIUM SERPL-SCNC: 145 MMOL/L — SIGNIFICANT CHANGE UP (ref 135–145)
WBC # BLD: 13.2 K/UL — HIGH (ref 3.8–10.5)
WBC # FLD AUTO: 13.2 K/UL — HIGH (ref 3.8–10.5)

## 2017-05-14 PROCEDURE — 99232 SBSQ HOSP IP/OBS MODERATE 35: CPT | Mod: GC

## 2017-05-14 RX ORDER — CHOLECALCIFEROL (VITAMIN D3) 125 MCG
1000 CAPSULE ORAL DAILY
Qty: 0 | Refills: 0 | Status: DISCONTINUED | OUTPATIENT
Start: 2017-05-14 | End: 2017-05-24

## 2017-05-14 RX ORDER — VANCOMYCIN HCL 1 G
1250 VIAL (EA) INTRAVENOUS EVERY 12 HOURS
Qty: 0 | Refills: 0 | Status: DISCONTINUED | OUTPATIENT
Start: 2017-05-14 | End: 2017-05-20

## 2017-05-14 RX ADMIN — Medication 166.67 MILLIGRAM(S): at 17:51

## 2017-05-14 RX ADMIN — INSULIN GLARGINE 10 UNIT(S): 100 INJECTION, SOLUTION SUBCUTANEOUS at 21:20

## 2017-05-14 RX ADMIN — Medication 650 MILLIGRAM(S): at 03:31

## 2017-05-14 RX ADMIN — MEROPENEM 200 MILLIGRAM(S): 1 INJECTION INTRAVENOUS at 14:25

## 2017-05-14 RX ADMIN — GABAPENTIN 100 MILLIGRAM(S): 400 CAPSULE ORAL at 05:45

## 2017-05-14 RX ADMIN — SIMVASTATIN 40 MILLIGRAM(S): 20 TABLET, FILM COATED ORAL at 21:21

## 2017-05-14 RX ADMIN — Medication 250 MILLIGRAM(S): at 05:45

## 2017-05-14 RX ADMIN — Medication 81 MILLIGRAM(S): at 14:25

## 2017-05-14 RX ADMIN — MEROPENEM 200 MILLIGRAM(S): 1 INJECTION INTRAVENOUS at 05:45

## 2017-05-14 RX ADMIN — PANTOPRAZOLE SODIUM 40 MILLIGRAM(S): 20 TABLET, DELAYED RELEASE ORAL at 05:45

## 2017-05-14 RX ADMIN — Medication 650 MILLIGRAM(S): at 21:21

## 2017-05-14 RX ADMIN — RIVAROXABAN 15 MILLIGRAM(S): KIT at 17:50

## 2017-05-14 RX ADMIN — Medication 25 MILLIGRAM(S): at 17:50

## 2017-05-14 RX ADMIN — RIVAROXABAN 15 MILLIGRAM(S): KIT at 05:45

## 2017-05-14 RX ADMIN — Medication 1: at 17:57

## 2017-05-14 RX ADMIN — Medication 650 MILLIGRAM(S): at 22:25

## 2017-05-14 RX ADMIN — Medication 650 MILLIGRAM(S): at 04:15

## 2017-05-14 RX ADMIN — Medication 25 MILLIGRAM(S): at 05:45

## 2017-05-14 RX ADMIN — Medication 3 MILLILITER(S): at 02:21

## 2017-05-14 RX ADMIN — NYSTATIN CREAM 1 APPLICATION(S): 100000 CREAM TOPICAL at 05:46

## 2017-05-14 RX ADMIN — GABAPENTIN 100 MILLIGRAM(S): 400 CAPSULE ORAL at 21:20

## 2017-05-14 RX ADMIN — Medication 3 MILLILITER(S): at 14:58

## 2017-05-14 RX ADMIN — Medication 2: at 14:23

## 2017-05-14 RX ADMIN — NYSTATIN CREAM 1 APPLICATION(S): 100000 CREAM TOPICAL at 17:58

## 2017-05-14 RX ADMIN — Medication 1000 UNIT(S): at 17:50

## 2017-05-14 RX ADMIN — Medication 3 MILLILITER(S): at 08:59

## 2017-05-14 RX ADMIN — Medication 1 APPLICATORFUL: at 14:26

## 2017-05-14 RX ADMIN — MEROPENEM 200 MILLIGRAM(S): 1 INJECTION INTRAVENOUS at 21:19

## 2017-05-14 RX ADMIN — Medication 1 APPLICATION(S): at 14:29

## 2017-05-14 RX ADMIN — Medication 3 MILLILITER(S): at 20:16

## 2017-05-14 RX ADMIN — GABAPENTIN 100 MILLIGRAM(S): 400 CAPSULE ORAL at 14:25

## 2017-05-14 NOTE — PROGRESS NOTE ADULT - SUBJECTIVE AND OBJECTIVE BOX
Time of Visit:  Patient seen and examined. she is awake , no sob. no cp    MEDICATIONS  (STANDING):  ALBUTerol/ipratropium for Nebulization 3milliLiter(s) Nebulizer every 6 hours  insulin lispro (HumaLOG) corrective regimen sliding scale  SubCutaneous three times a day before meals  dextrose 5%. 1000milliLiter(s) IV Continuous <Continuous>  dextrose 50% Injectable 12.5Gram(s) IV Push once  dextrose 50% Injectable 25Gram(s) IV Push once  dextrose 50% Injectable 25Gram(s) IV Push once  pantoprazole    Tablet 40milliGRAM(s) Oral before breakfast  aspirin  chewable 81milliGRAM(s) Oral daily  gabapentin 100milliGRAM(s) Oral three times a day  simvastatin 40milliGRAM(s) Oral at bedtime  silver sulfADIAZINE 1% Cream 1Application(s) Topical daily  metoprolol 25milliGRAM(s) Oral two times a day  clotrimazole 2% Vaginal Cream 1Applicatorful Vaginal daily  meropenem IVPB 1000milliGRAM(s) IV Intermittent every 8 hours  insulin glargine Injectable (LANTUS) 10Unit(s) SubCutaneous at bedtime  sodium chloride 0.9%. 1000milliLiter(s) IV Continuous <Continuous>  rivaroxaban 15milliGRAM(s) Oral two times a day  melatonin 3milliGRAM(s) Oral at bedtime  nystatin Powder 1Application(s) Topical two times a day  cholecalciferol 1000Unit(s) Oral daily  vancomycin  IVPB 1250milliGRAM(s) IV Intermittent every 12 hours      MEDICATIONS  (PRN):  dextrose Gel 1Dose(s) Oral once PRN Blood Glucose LESS THAN 70 milliGRAM(s)/deciLiter  glucagon  Injectable 1milliGRAM(s) IntraMuscular once PRN Glucose <70 milliGRAM(s)/deciLiter  acetaminophen   Tablet 650milliGRAM(s) Oral every 6 hours PRN For Temp greater than 38 C (100.4 F)  guaiFENesin   Syrup  (Sugar-Free) 100milliGRAM(s) Oral every 6 hours PRN Cough  acetaminophen   Tablet. 650milliGRAM(s) Oral every 6 hours PRN Mild Pain (1 - 3)       Medications up to date at time of exam.      PHYSICAL EXAMINATION:  Patient has no new complaints.  GENERAL: The patient is a well-developed, well-nourished, in no apparent distress.     Vital Signs Last 24 Hrs  T(C): 36.2, Max: 37.7 ( @ 23:00)  T(F): 97.2, Max: 99.8 ( @ 23:00)  HR: 85 (85 - 104)  BP: 122/78 (118/59 - 126/62)  BP(mean): --  RR: 20 (20 - 20)  SpO2: 100% (100% - 100%)   (if applicable)    Chest Tube (if applicable)    HEENT: Head is normocephalic and atraumatic. Extraocular muscles are intact. Mucous membranes are moist.     NECK: Supple, no palpable adenopathy.    LUNGS: Clear to auscultation, no wheezing, rales, or rhonchi.    HEART: Regular rate and rhythm without murmur.    ABDOMEN: Soft, nontender, and nondistended.  No hepatosplenomegaly is noted.    EXTREMITIES: Without any cyanosis, clubbing, rash, lesions or edema.    NEUROLOGIC: Awake, alert, oriented.     SKIN: Warm, dry, good turgor.      LABS:                        7.2    13.2  )-----------( 624      ( 14 May 2017 07:31 )             23.8         145  |  107  |  14  ----------------------------<  81  4.0   |  35<H>  |  0.58    Ca    9.1      14 May 2017 07:31  Phos  3.9       Mg     1.7                               MICROBIOLOGY: (if applicable)    RADIOLOGY & ADDITIONAL STUDIES:  EKG:   CXR:  ECHO:    IMPRESSION: 57y Female PAST MEDICAL & SURGICAL HISTORY:  Morbid obesity  Heart failure with preserved ejection fraction  CHF (congestive heart failure)  PE  Diabetes  Overactive bladder  Urinary tract infection without hematuria, site unspecified  Congestive heart failure, unspecified congestive heart failure chronicity, unspecified congestive heart failure type  Asthma  Hemorrhoids  Congestive heart failure  Emphysema  S/P   History of appendectomy  Appendicitis  S/P cholecystectomy   p/w SOB due to acute on chronic hypoxic respiratory failure due to COPD and CHF. CT abd/pelvis show renal stones and hydronehrosis      RECOMMENDATIONS:  -continue bronchodilators  -wound care  -anticoagulation.... may xin life long due to limited mobility   -consider urology evaluation  -OOb to chair  -will need out patient sleep study for sleep apnea. she refused to use PAP in hosp Time of Visit:  Patient seen and examined. she is awake , no sob. no cp    MEDICATIONS  (STANDING):  ALBUTerol/ipratropium for Nebulization 3milliLiter(s) Nebulizer every 6 hours  insulin lispro (HumaLOG) corrective regimen sliding scale  SubCutaneous three times a day before meals  dextrose 5%. 1000milliLiter(s) IV Continuous <Continuous>  dextrose 50% Injectable 12.5Gram(s) IV Push once  dextrose 50% Injectable 25Gram(s) IV Push once  dextrose 50% Injectable 25Gram(s) IV Push once  pantoprazole    Tablet 40milliGRAM(s) Oral before breakfast  aspirin  chewable 81milliGRAM(s) Oral daily  gabapentin 100milliGRAM(s) Oral three times a day  simvastatin 40milliGRAM(s) Oral at bedtime  silver sulfADIAZINE 1% Cream 1Application(s) Topical daily  metoprolol 25milliGRAM(s) Oral two times a day  clotrimazole 2% Vaginal Cream 1Applicatorful Vaginal daily  meropenem IVPB 1000milliGRAM(s) IV Intermittent every 8 hours  insulin glargine Injectable (LANTUS) 10Unit(s) SubCutaneous at bedtime  sodium chloride 0.9%. 1000milliLiter(s) IV Continuous <Continuous>  rivaroxaban 15milliGRAM(s) Oral two times a day  melatonin 3milliGRAM(s) Oral at bedtime  nystatin Powder 1Application(s) Topical two times a day  cholecalciferol 1000Unit(s) Oral daily  vancomycin  IVPB 1250milliGRAM(s) IV Intermittent every 12 hours      MEDICATIONS  (PRN):  dextrose Gel 1Dose(s) Oral once PRN Blood Glucose LESS THAN 70 milliGRAM(s)/deciLiter  glucagon  Injectable 1milliGRAM(s) IntraMuscular once PRN Glucose <70 milliGRAM(s)/deciLiter  acetaminophen   Tablet 650milliGRAM(s) Oral every 6 hours PRN For Temp greater than 38 C (100.4 F)  guaiFENesin   Syrup  (Sugar-Free) 100milliGRAM(s) Oral every 6 hours PRN Cough  acetaminophen   Tablet. 650milliGRAM(s) Oral every 6 hours PRN Mild Pain (1 - 3)       Medications up to date at time of exam.      PHYSICAL EXAMINATION:  Patient has no new complaints.  GENERAL: The patient is a well-developed, well-nourished, in no apparent distress.     Vital Signs Last 24 Hrs  T(C): 36.2, Max: 37.7 ( @ 23:00)  T(F): 97.2, Max: 99.8 ( @ 23:00)  HR: 85 (85 - 104)  BP: 122/78 (118/59 - 126/62)  BP(mean): --  RR: 20 (20 - 20)  SpO2: 100% (100% - 100%)   (if applicable)    Chest Tube (if applicable)    HEENT: Head is normocephalic and atraumatic. Extraocular muscles are intact. Mucous membranes are moist.     NECK: Supple, no palpable adenopathy.    LUNGS: Clear to auscultation, no wheezing, rales, or rhonchi.    HEART: Regular rate and rhythm without murmur.    ABDOMEN: Soft, nontender, and nondistended.  No hepatosplenomegaly is noted.    EXTREMITIES: Without any cyanosis, clubbing, rash, lesions or edema.    NEUROLOGIC: Awake, alert, oriented.     SKIN: Warm, dry, good turgor.      LABS:                        7.2    13.2  )-----------( 624      ( 14 May 2017 07:31 )             23.8         145  |  107  |  14  ----------------------------<  81  4.0   |  35<H>  |  0.58    Ca    9.1      14 May 2017 07:31  Phos  3.9       Mg     1.7                               MICROBIOLOGY: (if applicable)    RADIOLOGY & ADDITIONAL STUDIES:  EKG:   CXR:  ECHO:    IMPRESSION: 57y Female PAST MEDICAL & SURGICAL HISTORY:  Morbid obesity  Heart failure with preserved ejection fraction  CHF (congestive heart failure)  PE  Diabetes  Overactive bladder  Urinary tract infection without hematuria, site unspecified  Congestive heart failure, unspecified congestive heart failure chronicity, unspecified congestive heart failure type  Asthma  Hemorrhoids  Congestive heart failure  Emphysema  S/P   History of appendectomy  Appendicitis  S/P cholecystectomy   p/w SOB due to acute on chronic hypoxic respiratory failure due to COPD and CHF. CT abd/pelvis show renal stones and hydronehrosis. Stool is positive for occult blood, anemic, s/p PRBC transfusion. in view of patient morbid obesity  and limited mobility she will need anticoagulation for PE.       RECOMMENDATIONS:  -continue bronchodilators  -wound care  -anticoagulation.... may xin life long due to limited mobility   -consider urology evaluation  -GI evaluation  -OOb to chair  -will need out patient sleep study for sleep apnea. she refused to use PAP in hosp

## 2017-05-14 NOTE — PROGRESS NOTE ADULT - PROBLEM SELECTOR PLAN 5
Provoked PE due to prolonged bedridden > 4 months ( NO DVT) .Diagnosed at [facility] in April , Right lower lung ( reports in Chart) .Last ECHO in April 2017 shows normal EF 55%, moderate biatrial enlargement, mild Mitral and tricuspid regurgitation, mild calcific degenerative valve disease.  -C/w Xarelto 15mg BID  (switched to 20mg QD on 5/25/17)

## 2017-05-14 NOTE — PROGRESS NOTE ADULT - PROBLEM SELECTOR PLAN 9
Not on CPAP at home, Bicarb 35. Likely from contraction, though given IVF recently.   -Will continue to monitor and given further fluids as needed.

## 2017-05-14 NOTE — PROGRESS NOTE ADULT - PROBLEM SELECTOR PLAN 2
With history of R staghorn calculus and CT here "Bilateral hydroureter as well as left hydronephrosis. No obstructing abnormality visualized. Urinary bladder appears distended. Multiple non-obstructing right intrarenal stones."  -Hsu catheter in place - hematuria  -Renal ultrasound only shows R renal calculus without hydronephrosis... hydronephrosis likely resolving s/p hsu catheter placement  -F/up urology (Dr. Tavarez) With history of R staghorn calculus and CT here "Bilateral hydroureter as well as left hydronephrosis. No obstructing abnormality visualized. Urinary bladder appears distended. Multiple non-obstructing right intrarenal stones."  -Hsu catheter in place - hematuria  -Renal ultrasound only shows R renal calculus without hydronephrosis... hydronephrosis likely resolving s/p hsu catheter placement  -F/up urology (Dr. Rayo)

## 2017-05-14 NOTE — PROGRESS NOTE ADULT - SUBJECTIVE AND OBJECTIVE BOX
Patient is a 57y old  Female who presents with a chief complaint of Sent from NH due to fever, SOB, cough, abdominal pain, diarrhea (12 May 2017 04:51)    --OVERNIGHT EVENTS / INTERVAL HPI / SUBJECTIVE--  Patient without any new particular complaints. Continues to endorse baseline breathing and abdominal pain. Denies having black or bloody BMs. Denies fevers, chills, n/v, chest pain.     light-moderate hematuria noted in hsu catheter.  --OBJECTIVE--  VITALS:  Vital Signs Last 24 Hrs  T(C): 36.2, Max: 37.7 (05-13 @ 23:00)  T(F): 97.2, Max: 99.8 (05-13 @ 23:00)  HR: 85 (85 - 104)  BP: 122/78 (118/59 - 126/62)  BP(mean): --  RR: 20 (20 - 20)  SpO2: 100% (100% - 100%)    PHYSICAL EXAM:  General: morbidly obese, bedridden  Neurology: A&Ox3  Respiratory: CTA B/L, no wheezes, no rhonchi, no rales  CV: RRR, S1S2, no murmur  Abdominal: Soft, obese abdomen +mild TTP diffusely, ND no palpable mass, bowel sounds positive  MSK: No edema      LABS:                        7.2    12.5>13.2  )-----------( 624      ( 14 May 2017 07:31 )             23.8     05-14    145  |  107  |  14  ----------------------------<  81  4.0   |  35<H>  |  0.58    Ca    9.1      14 May 2017 07:31  Phos  3.9     05-14  Mg     1.7     05-14      05-12 Chol 98 LDL 43 HDL 31<L> Trig 122    IMAGING:    MEDICATIONS:  MEDICATIONS  (STANDING):  ALBUTerol/ipratropium for Nebulization 3milliLiter(s) Nebulizer every 6 hours  insulin lispro (HumaLOG) corrective regimen sliding scale  SubCutaneous three times a day before meals  dextrose 5%. 1000milliLiter(s) IV Continuous <Continuous>  dextrose 50% Injectable 12.5Gram(s) IV Push once  dextrose 50% Injectable 25Gram(s) IV Push once  dextrose 50% Injectable 25Gram(s) IV Push once  pantoprazole    Tablet 40milliGRAM(s) Oral before breakfast  gabapentin 100milliGRAM(s) Oral three times a day  simvastatin 40milliGRAM(s) Oral at bedtime  silver sulfADIAZINE 1% Cream 1Application(s) Topical daily  metoprolol 25milliGRAM(s) Oral two times a day  clotrimazole 2% Vaginal Cream 1Applicatorful Vaginal daily  meropenem IVPB 1000milliGRAM(s) IV Intermittent every 8 hours  insulin glargine Injectable (LANTUS) 10Unit(s) SubCutaneous at bedtime  sodium chloride 0.9%. 1000milliLiter(s) IV Continuous <Continuous>  rivaroxaban 15milliGRAM(s) Oral two times a day  melatonin 3milliGRAM(s) Oral at bedtime  nystatin Powder 1Application(s) Topical two times a day  cholecalciferol 1000Unit(s) Oral daily  vancomycin  IVPB 1250milliGRAM(s) IV Intermittent every 12 hours    MEDICATIONS  (PRN):  dextrose Gel 1Dose(s) Oral once PRN Blood Glucose LESS THAN 70 milliGRAM(s)/deciLiter  glucagon  Injectable 1milliGRAM(s) IntraMuscular once PRN Glucose <70 milliGRAM(s)/deciLiter  acetaminophen   Tablet 650milliGRAM(s) Oral every 6 hours PRN For Temp greater than 38 C (100.4 F)  guaiFENesin   Syrup  (Sugar-Free) 100milliGRAM(s) Oral every 6 hours PRN Cough  acetaminophen   Tablet. 650milliGRAM(s) Oral every 6 hours PRN Mild Pain (1 - 3)

## 2017-05-14 NOTE — PROGRESS NOTE ADULT - SUBJECTIVE AND OBJECTIVE BOX
Meds:  meropenem IVPB 1000milliGRAM(s) IV Intermittent every 8 hours  vancomycin  IVPB 1250milliGRAM(s) IV Intermittent every 12 hours    Allergies:  Allergies    Cheese (Pruritus)  oxycodone (Other (Moderate))  peanuts (Unknown)  penicillin (Rash)  seafood (Hives)  strawberry (Pruritus)  Tomatoes (Other)    Intolerances    ROS  [  ] UNABLE TO ELICIT    General:  [  ] Fever   [ x ] Malaise    Skin: sacral ulcer    HEENT:  [  ] Sore Throat  [  ] Photophobia  [x  ] None    Chest: [x  ] SOB  [ x ] Cough  [ ] None    Cardiovascular: [  ] CP	 [x ] None    Gastrointestinal: [ x ] Abd pain   [  ] N    [  ] V   [  ] Diarrhea	 [ ] None    Genitourinary: [  ] Polyuria   [x  ] Urgency   [x  ] Dysuria    [  ]  Hematuria   [ ] None	    Musculoskeletal:  [  ] Back Pain	 [x ] General weakness.    Neurological: [  ]Dizziness  [  ]Visual Disturbance  [  ]Headaches   [ x] Ge          PHYSICAL EXAM:    Vital Signs Last 24 Hrs  T(C): 36.2, Max: 37.7 (05-13 @ 23:00)  T(F): 97.2, Max: 99.8 (05-13 @ 23:00)  HR: 85 (85 - 104)  BP: 122/78 (118/59 - 126/62)  BP(mean): --  RR: 20 (20 - 20)  SpO2: 100% (100% - 100%)    HEENT:    Neck:  [ x ] Supple  [  ]Lymphadenopathy  [  ] JVD   [ x ]No JVD  [  ] Masses   [ x ] fugal skin infection on neck crease anteriorly.    CHEST/Respiratory:  [ x ] Rales      [  ] Rhonchi      [  ] Wheezing       [  ] Chest Tenderness  [  ]Clear to auscultation    Cardiovascular:  [ x ]S1 S2  [ x ] Reg  [  ] Irreg   [  ] Murmurs  [  ]Systolic [  ]Diastolic  [x  ]No Murmur    Abdomen:  [ x ]  Bowel Sounds   [  ] Soft           [  ] ABD Distention  [ x ] Tenderness  [  ] Organomegaly                        [  ] Guarding Rigidity  [ x ] No Guarding Rigidity  [  ] Rebound Tenderness [ x ] No Rebound Tenderness    Extremities: [ x ] Edema  [  ] No edema  [  ] Clubbing   [  ] Cyanosis  [ x ] Palpable peripheral pulses                        [ x ] No Tender Calf muscles  [  ] Tender Calf muscles    Neurological:  [ x Alert  [ x ] Awake  [x  ] Oriented  x3                              [  ] Confused    Skin:  [  ] Thrombophlebitis  [x  ] Rashes  neck anteriorly. [  ] Dry  [ x ] Ulcers sacral and buttock.    Ortho:  [  ] Joint Swelling  [  ] Joint erythema [ x ] DJD     Luna cath in place.  LABS/DIAGNOSTIC TESTS                          7.2    13.2  )-----------( 624      ( 14 May 2017 07:31 )             23.8         05-14    145  |  107  |  14  ----------------------------<  81  4.0   |  35<H>  |  0.58    Ca    9.1      14 May 2017 07:31  Phos  3.9     05-14  Mg     1.7     05-14              CULTURES: Culture - Blood (05.12.17 @ 09:33)    Specimen Source: .Blood Blood-Peripheral    Culture Results:   No growth to date.    Culture - Blood (05.12.17 @ 09:33)    Specimen Source: .Blood Blood-Peripheral    Culture Results:   No growth to date.    Culture - Urine (05.12.17 @ 09:27)    Specimen Source: .Urine Catheterized    Culture Results:   <10,000 CFU/ml Normal Urogenital juanita present          RADIOLOGY    EXAM:  US KIDNEY(S)                            PROCEDURE DATE:  05/13/2017        INTERPRETATION:  Renal sonogram :    CLINICAL HISTORY: Kidney stones    Multiple sonographic images of both kidneys were obtained in transverse   and longitudinal planes.     Both kidneys are normal in size, shape and echogenicity.  Multiple   nonobstructing calculi are seen in the right kidney with the largest   measuring approximately 8 mm in diameter.    The right kidney measures 10.6 cm in length.  The left kidney measures   12.3 cm in length.  No renal masses  or evidence of hydronephrosis are   identified.  No perinephric fluid collections are seen.    IMPRESSION:    Right renal calculi. No hydronephrosis.    Thank you for the courtesy of this referral.          Assessment and Recommendation:   		  Patient has no fever today, but leukocytosis increased, also has  Hydronephrosis, and Hydroureter and R renal stones.   +ve urine c&s suggest for contamination.    Problem/Recommendation - 1:  Problem: Urinary tract infection without hematuria, site unspecified.   Recommendation:   1- Follow Blood C&S till final report.  2- Follow UA&CS.  3-Continue Meropenem 1 gm IVPB q 8 hours.  4- Continue vancomycin 1.25 gm IVPB q 12 hours.  5- Follow Vancomycin trough tomorrow.  6- CBC & BMP tomorrow.  Problem/Recommendation - 2:  ·  Problem: Hydronephrosis.    Recommendation:   1- Urology management.     Problem/Recommendation - 3:  ·  Problem: Hydroureter.    Recommendation:   1- Urology management.     Problem/Recommendation - 4:  ·  Problem: COPD (chronic obstructive pulmonary disease).    Recommendation:   1- IV antibiotics.  2- Bronchodilators.  3-Pulmonary management.     Problem/Recommendation - 5:  ·  Problem: Diarrhea, unspecified type.    Recommendation:   1- Stool for C&S.  2- stool for O&P and C&S.     Problem/Recommendation - 6:  Problem: Heart failure.   Recommendation:   1-Cardiac management.    Problem/Recommendation - 7:  Problem: Diabetes.   Recommendation:   1- Blood sugar monitoring and control.    Problem/Recommendation - 8:  Problem: Dermatophytoses.   Recommendation:   1- Continue Mycolog cream to the affected area of neck anteriorly TID.  2- Keep neck skin dry all the time.

## 2017-05-14 NOTE — PROGRESS NOTE ADULT - PROBLEM SELECTOR PLAN 1
UA +ve. UCx and BCx -ve so far. However, was on treatment with meropenem at NH. Presumptively Sepsis 2/2 UTI.  -F/up ID (Dr. Wright)  -C/w IV Meropenem  -IV Vancomycin increased to 1.25gm, f/up Vanc trough 05/15 5pm.  -Continued leukocytosis ~15, afebrile

## 2017-05-14 NOTE — PROGRESS NOTE ADULT - ASSESSMENT
57F with multiple prior admissions due to COPD exacerbation and UTI, with PMHx Morbid Obesity, DM ( Insulin), COPD ( home O2 3 L, intubated in Jan 2017), PUSHPA ( not on CPAP, refused sleep study in past), Right kidney staghorn calculous, bilateral hydroureter  and hydronephrosis, Right lower lung provoked PE ( xarelto) , UTI ( on meropenum currently) , sacral and right buttock redness ( unstageable), presents to ED c/o subjective fever, dizziness, diarrhea and weakness since yesterday, sent from NH due to that.  In ED initial vitas T max of 102.9, tachycardiac 112, /92, RR 22, SPO2 97, given 1 L bolus, labs shows WBC of 15.9, Hb of 7.9 , HCT 26.6, Cr 1.37, INR 2.02, Lactate 1.6, UA 6-10 WBC ( positive).    Admitted for sepsis 2/2 UTI likely complicated given R Staghorn calculus. c/b downtrending Hgb.

## 2017-05-15 DIAGNOSIS — A41.9 SEPSIS, UNSPECIFIED ORGANISM: ICD-10-CM

## 2017-05-15 DIAGNOSIS — J44.9 CHRONIC OBSTRUCTIVE PULMONARY DISEASE, UNSPECIFIED: ICD-10-CM

## 2017-05-15 DIAGNOSIS — E66.01 MORBID (SEVERE) OBESITY DUE TO EXCESS CALORIES: ICD-10-CM

## 2017-05-15 LAB
ANION GAP SERPL CALC-SCNC: 3 MMOL/L — LOW (ref 5–17)
ANION GAP SERPL CALC-SCNC: 3 MMOL/L — LOW (ref 5–17)
BASE EXCESS BLDA CALC-SCNC: 10.3 MMOL/L — HIGH (ref -2–2)
BASOPHILS # BLD AUTO: 0.1 K/UL — SIGNIFICANT CHANGE UP (ref 0–0.2)
BASOPHILS NFR BLD AUTO: 0.8 % — SIGNIFICANT CHANGE UP (ref 0–2)
BLOOD GAS COMMENTS ARTERIAL: SIGNIFICANT CHANGE UP
BUN SERPL-MCNC: 11 MG/DL — SIGNIFICANT CHANGE UP (ref 7–18)
BUN SERPL-MCNC: 11 MG/DL — SIGNIFICANT CHANGE UP (ref 7–18)
CALCIUM SERPL-MCNC: 8.8 MG/DL — SIGNIFICANT CHANGE UP (ref 8.4–10.5)
CALCIUM SERPL-MCNC: 8.9 MG/DL — SIGNIFICANT CHANGE UP (ref 8.4–10.5)
CHLORIDE SERPL-SCNC: 104 MMOL/L — SIGNIFICANT CHANGE UP (ref 96–108)
CHLORIDE SERPL-SCNC: 106 MMOL/L — SIGNIFICANT CHANGE UP (ref 96–108)
CO2 SERPL-SCNC: 35 MMOL/L — HIGH (ref 22–31)
CO2 SERPL-SCNC: 37 MMOL/L — HIGH (ref 22–31)
CREAT SERPL-MCNC: 0.57 MG/DL — SIGNIFICANT CHANGE UP (ref 0.5–1.3)
CREAT SERPL-MCNC: 0.6 MG/DL — SIGNIFICANT CHANGE UP (ref 0.5–1.3)
EOSINOPHIL # BLD AUTO: 0.6 K/UL — HIGH (ref 0–0.5)
EOSINOPHIL NFR BLD AUTO: 6 % — SIGNIFICANT CHANGE UP (ref 0–6)
GLUCOSE SERPL-MCNC: 111 MG/DL — HIGH (ref 70–99)
GLUCOSE SERPL-MCNC: 97 MG/DL — SIGNIFICANT CHANGE UP (ref 70–99)
HCO3 BLDA-SCNC: 36 MMOL/L — HIGH (ref 23–27)
HCT VFR BLD CALC: 30.3 % — LOW (ref 34.5–45)
HGB BLD-MCNC: 9.3 G/DL — LOW (ref 11.5–15.5)
HOROWITZ INDEX BLDA+IHG-RTO: 32 — SIGNIFICANT CHANGE UP
LYMPHOCYTES # BLD AUTO: 1.9 K/UL — SIGNIFICANT CHANGE UP (ref 1–3.3)
LYMPHOCYTES # BLD AUTO: 17.5 % — SIGNIFICANT CHANGE UP (ref 13–44)
MAGNESIUM SERPL-MCNC: 1.4 MG/DL — LOW (ref 1.6–2.6)
MAGNESIUM SERPL-MCNC: 2 MG/DL — SIGNIFICANT CHANGE UP (ref 1.6–2.6)
MCHC RBC-ENTMCNC: 24.3 PG — LOW (ref 27–34)
MCHC RBC-ENTMCNC: 30.5 GM/DL — LOW (ref 32–36)
MCV RBC AUTO: 79.8 FL — LOW (ref 80–100)
MONOCYTES # BLD AUTO: 0.3 K/UL — SIGNIFICANT CHANGE UP (ref 0–0.9)
MONOCYTES NFR BLD AUTO: 3 % — SIGNIFICANT CHANGE UP (ref 2–14)
NEUTROPHILS # BLD AUTO: 7.9 K/UL — HIGH (ref 1.8–7.4)
NEUTROPHILS NFR BLD AUTO: 72.8 % — SIGNIFICANT CHANGE UP (ref 43–77)
PCO2 BLDA: 57 MMHG — HIGH (ref 32–46)
PH BLDA: 7.42 — SIGNIFICANT CHANGE UP (ref 7.35–7.45)
PHOSPHATE SERPL-MCNC: 2.9 MG/DL — SIGNIFICANT CHANGE UP (ref 2.5–4.5)
PHOSPHATE SERPL-MCNC: 3.5 MG/DL — SIGNIFICANT CHANGE UP (ref 2.5–4.5)
PLATELET # BLD AUTO: 677 K/UL — HIGH (ref 150–400)
PO2 BLDA: 82 MMHG — SIGNIFICANT CHANGE UP (ref 74–108)
POTASSIUM SERPL-MCNC: 4.1 MMOL/L — SIGNIFICANT CHANGE UP (ref 3.5–5.3)
POTASSIUM SERPL-MCNC: 4.3 MMOL/L — SIGNIFICANT CHANGE UP (ref 3.5–5.3)
POTASSIUM SERPL-SCNC: 4.1 MMOL/L — SIGNIFICANT CHANGE UP (ref 3.5–5.3)
POTASSIUM SERPL-SCNC: 4.3 MMOL/L — SIGNIFICANT CHANGE UP (ref 3.5–5.3)
RBC # BLD: 3.8 M/UL — SIGNIFICANT CHANGE UP (ref 3.8–5.2)
RBC # FLD: 20.7 % — HIGH (ref 10.3–14.5)
SAO2 % BLDA: 96 % — SIGNIFICANT CHANGE UP (ref 92–96)
SODIUM SERPL-SCNC: 144 MMOL/L — SIGNIFICANT CHANGE UP (ref 135–145)
SODIUM SERPL-SCNC: 144 MMOL/L — SIGNIFICANT CHANGE UP (ref 135–145)
VANCOMYCIN TROUGH SERPL-MCNC: 15.3 UG/ML — SIGNIFICANT CHANGE UP (ref 10–20)
WBC # BLD: 10.8 K/UL — HIGH (ref 3.8–10.5)
WBC # FLD AUTO: 10.8 K/UL — HIGH (ref 3.8–10.5)

## 2017-05-15 PROCEDURE — 99233 SBSQ HOSP IP/OBS HIGH 50: CPT | Mod: GC

## 2017-05-15 PROCEDURE — 71010: CPT | Mod: 26

## 2017-05-15 RX ORDER — LANOLIN ALCOHOL/MO/W.PET/CERES
3 CREAM (GRAM) TOPICAL ONCE
Qty: 0 | Refills: 0 | Status: COMPLETED | OUTPATIENT
Start: 2017-05-15 | End: 2017-05-15

## 2017-05-15 RX ORDER — IPRATROPIUM/ALBUTEROL SULFATE 18-103MCG
3 AEROSOL WITH ADAPTER (GRAM) INHALATION ONCE
Qty: 0 | Refills: 0 | Status: COMPLETED | OUTPATIENT
Start: 2017-05-15 | End: 2017-05-15

## 2017-05-15 RX ORDER — IBUPROFEN 200 MG
400 TABLET ORAL ONCE
Qty: 0 | Refills: 0 | Status: COMPLETED | OUTPATIENT
Start: 2017-05-15 | End: 2017-05-15

## 2017-05-15 RX ORDER — ACETAMINOPHEN 500 MG
650 TABLET ORAL ONCE
Qty: 0 | Refills: 0 | Status: COMPLETED | OUTPATIENT
Start: 2017-05-15 | End: 2017-05-15

## 2017-05-15 RX ORDER — MAGNESIUM SULFATE 500 MG/ML
2 VIAL (ML) INJECTION ONCE
Qty: 0 | Refills: 0 | Status: COMPLETED | OUTPATIENT
Start: 2017-05-15 | End: 2017-05-15

## 2017-05-15 RX ADMIN — Medication 3 MILLILITER(S): at 08:30

## 2017-05-15 RX ADMIN — Medication 166.67 MILLIGRAM(S): at 19:09

## 2017-05-15 RX ADMIN — GABAPENTIN 100 MILLIGRAM(S): 400 CAPSULE ORAL at 21:30

## 2017-05-15 RX ADMIN — Medication 3 MILLILITER(S): at 02:33

## 2017-05-15 RX ADMIN — PANTOPRAZOLE SODIUM 40 MILLIGRAM(S): 20 TABLET, DELAYED RELEASE ORAL at 05:36

## 2017-05-15 RX ADMIN — Medication 166.67 MILLIGRAM(S): at 06:44

## 2017-05-15 RX ADMIN — MEROPENEM 200 MILLIGRAM(S): 1 INJECTION INTRAVENOUS at 05:36

## 2017-05-15 RX ADMIN — Medication 3 MILLIGRAM(S): at 00:53

## 2017-05-15 RX ADMIN — Medication 650 MILLIGRAM(S): at 03:40

## 2017-05-15 RX ADMIN — SIMVASTATIN 40 MILLIGRAM(S): 20 TABLET, FILM COATED ORAL at 21:30

## 2017-05-15 RX ADMIN — RIVAROXABAN 15 MILLIGRAM(S): KIT at 17:57

## 2017-05-15 RX ADMIN — Medication 650 MILLIGRAM(S): at 14:30

## 2017-05-15 RX ADMIN — Medication 3 MILLILITER(S): at 14:48

## 2017-05-15 RX ADMIN — Medication 1: at 13:21

## 2017-05-15 RX ADMIN — MEROPENEM 200 MILLIGRAM(S): 1 INJECTION INTRAVENOUS at 17:56

## 2017-05-15 RX ADMIN — RIVAROXABAN 15 MILLIGRAM(S): KIT at 05:36

## 2017-05-15 RX ADMIN — MEROPENEM 200 MILLIGRAM(S): 1 INJECTION INTRAVENOUS at 21:46

## 2017-05-15 RX ADMIN — Medication 25 MILLIGRAM(S): at 17:57

## 2017-05-15 RX ADMIN — GABAPENTIN 100 MILLIGRAM(S): 400 CAPSULE ORAL at 13:20

## 2017-05-15 RX ADMIN — Medication 50 GRAM(S): at 09:27

## 2017-05-15 RX ADMIN — Medication 1 APPLICATION(S): at 13:27

## 2017-05-15 RX ADMIN — Medication 3 MILLILITER(S): at 20:17

## 2017-05-15 RX ADMIN — Medication 650 MILLIGRAM(S): at 02:42

## 2017-05-15 RX ADMIN — Medication 25 MILLIGRAM(S): at 05:36

## 2017-05-15 RX ADMIN — Medication 650 MILLIGRAM(S): at 13:25

## 2017-05-15 RX ADMIN — INSULIN GLARGINE 10 UNIT(S): 100 INJECTION, SOLUTION SUBCUTANEOUS at 21:30

## 2017-05-15 RX ADMIN — GABAPENTIN 100 MILLIGRAM(S): 400 CAPSULE ORAL at 05:36

## 2017-05-15 RX ADMIN — Medication 1000 UNIT(S): at 13:20

## 2017-05-15 NOTE — PROGRESS NOTE ADULT - SUBJECTIVE AND OBJECTIVE BOX
Patient is a 57y old  Female who presents with a chief complaint of Sent from NH due to fever, SOB, cough, abdominal pain, diarrhea (12 May 2017 04:51)    --OVERNIGHT EVENTS / INTERVAL HPI / SUBJECTIVE--  Patient feeling shorter of breath today. Denies fevers, chills, n/v, chest pain.    --OBJECTIVE--  VITALS:  Vital Signs Last 24 Hrs  T(C): 36.8, Max: 37.5 (05-15 @ 00:21)  T(F): 98.3, Max: 99.5 (05-15 @ 00:21)  HR: 124 (90 - 124)  BP: 127/102 (125/60 - 152/65)  BP(mean): --  RR: 20 (19 - 20)  SpO2: 100% (97% - 100%)    PHYSICAL EXAM:  General: morbidly obese  Neurology: A&Ox3  Respiratory: CTA B/L, mild/faint expiratory wheezes, no rhonchi, no rales  CV: RRR, S1S2, no murmur  Abdominal: Soft, +TTP, ND no palpable mass, bowel sounds positive  MSK: No edema  : + hsu cath with continued hematuria    LABS:                        9.3    10.8  )-----------( 677      ( 15 May 2017 06:01 )             30.3     05-15    144  |  104  |  11  ----------------------------<  111<H>  4.1   |  37<H>  |  0.57    Ca    8.8      15 May 2017 15:33  Phos  2.9     05-15  Mg     2.0<1.4     05-15      05-12 Chol 98 LDL 43 HDL 31<L> Trig 122  Blood Gas Profile - Arterial (05.15.17 @ 15:29)    pH, Arterial: 7.42    pCO2, Arterial: 57 mmHg    pO2, Arterial: 82 mmHg    HCO3, Arterial: 36 mmol/L    Base Excess, Arterial: 10.3 mmol/L    Oxygen Saturation, Arterial: 96 %    FIO2, Arterial: 32.0    Blood Gas Comments Arterial: 3L N/C    IMAGING:    MEDICATIONS:  MEDICATIONS  (STANDING):  ALBUTerol/ipratropium for Nebulization 3milliLiter(s) Nebulizer every 6 hours  insulin lispro (HumaLOG) corrective regimen sliding scale  SubCutaneous three times a day before meals  dextrose 5%. 1000milliLiter(s) IV Continuous <Continuous>  dextrose 50% Injectable 12.5Gram(s) IV Push once  dextrose 50% Injectable 25Gram(s) IV Push once  dextrose 50% Injectable 25Gram(s) IV Push once  pantoprazole    Tablet 40milliGRAM(s) Oral before breakfast  gabapentin 100milliGRAM(s) Oral three times a day  simvastatin 40milliGRAM(s) Oral at bedtime  silver sulfADIAZINE 1% Cream 1Application(s) Topical daily  metoprolol 25milliGRAM(s) Oral two times a day  meropenem IVPB 1000milliGRAM(s) IV Intermittent every 8 hours  insulin glargine Injectable (LANTUS) 10Unit(s) SubCutaneous at bedtime  rivaroxaban 15milliGRAM(s) Oral two times a day  cholecalciferol 1000Unit(s) Oral daily  vancomycin  IVPB 1250milliGRAM(s) IV Intermittent every 12 hours    MEDICATIONS  (PRN):  dextrose Gel 1Dose(s) Oral once PRN Blood Glucose LESS THAN 70 milliGRAM(s)/deciLiter  glucagon  Injectable 1milliGRAM(s) IntraMuscular once PRN Glucose <70 milliGRAM(s)/deciLiter  acetaminophen   Tablet 650milliGRAM(s) Oral every 6 hours PRN For Temp greater than 38 C (100.4 F)  guaiFENesin   Syrup  (Sugar-Free) 100milliGRAM(s) Oral every 6 hours PRN Cough  acetaminophen   Tablet. 650milliGRAM(s) Oral every 6 hours PRN Mild Pain (1 - 3)

## 2017-05-15 NOTE — PROGRESS NOTE ADULT - PROBLEM SELECTOR PLAN 1
UA +ve. UCx and BCx -ve so far. However, was on treatment with meropenem at NH. Presumptively Sepsis 2/2 UTI.  -F/up ID (Dr. Wright)  -C/w IV Meropenem  -IV Vancomycin increased to 1.25gm  -Leukocytosis improving, afebrile  -F/up Urology re mgmt of kidney stones

## 2017-05-15 NOTE — PROGRESS NOTE ADULT - SUBJECTIVE AND OBJECTIVE BOX
Patient is a 57y old  Female who presents with a chief complaint of sepsis, high fevers, not responding to Meropenem.    INTERVAL HPI/ OVERNIGHT EVENTS:  patient is seen and examined at bedside, feels ok except for some worsening of underlying SOB, is lying flat in bed, denies any chest pain or discomfort.  was evaluated over weekend by Urology, considering cystoscopy but patient is on Xarelto for acute PE, recent diagnosis last month.      Allergies  Cheese (Pruritus)  oxycodone (Other (Moderate))  peanuts (Unknown)  penicillin (Rash)  seafood (Hives)  strawberry (Pruritus)  Tomatoes (Other)    REVIEW OF SYSTEMS:  CONSTITUTIONAL: No fever, weight loss, or fatigue  EYES: No eye pain, visual disturbances, or discharge  RESPIRATORY: No cough, wheezing, chills or hemoptysis; No shortness of breath  CARDIOVASCULAR: No chest pain, palpitations, dizziness, or leg swelling  GASTROINTESTINAL: No abdominal or epigastric pain. No nausea, vomiting, or hematemesis; No diarrhea or constipation. No melena or hematochezia.  GENITOURINARY: No dysuria, frequency, hematuria, or incontinence  NEUROLOGICAL: No headaches, memory loss, loss of strength, numbness, or tremors  MUSCULOSKELETAL: No joint pain or swelling; No muscle, back, or extremity pain  PSYCHIATRIC: No depression, anxiety, mood swings, or difficulty sleeping  HEME/LYMPH: No easy bruising, or bleeding gums  ALLERGY AND IMMUNOLOGIC: No hives or eczema    Medications:  ALBUTerol/ipratropium for Nebulization 3milliLiter(s) Nebulizer every 6 hours  insulin lispro (HumaLOG) corrective regimen sliding scale  SubCutaneous three times a day before meals  dextrose 5%. 1000milliLiter(s) IV Continuous <Continuous>  dextrose Gel 1Dose(s) Oral once PRN Blood Glucose LESS THAN 70 milliGRAM(s)/deciLiter  dextrose 50% Injectable 12.5Gram(s) IV Push once  dextrose 50% Injectable 25Gram(s) IV Push once  dextrose 50% Injectable 25Gram(s) IV Push once  glucagon  Injectable 1milliGRAM(s) IntraMuscular once PRN Glucose <70 milliGRAM(s)/deciLiter  pantoprazole    Tablet 40milliGRAM(s) Oral before breakfast  gabapentin 100milliGRAM(s) Oral three times a day  simvastatin 40milliGRAM(s) Oral at bedtime  silver sulfADIAZINE 1% Cream 1Application(s) Topical daily  metoprolol 25milliGRAM(s) Oral two times a day  acetaminophen   Tablet 650milliGRAM(s) Oral every 6 hours PRN For Temp greater than 38 C (100.4 F)  guaiFENesin   Syrup  (Sugar-Free) 100milliGRAM(s) Oral every 6 hours PRN Cough  meropenem IVPB 1000milliGRAM(s) IV Intermittent every 8 hours  insulin glargine Injectable (LANTUS) 10Unit(s) SubCutaneous at bedtime  rivaroxaban 15milliGRAM(s) Oral two times a day  acetaminophen   Tablet. 650milliGRAM(s) Oral every 6 hours PRN Mild Pain (1 - 3)  cholecalciferol 1000Unit(s) Oral daily  vancomycin  IVPB 1250milliGRAM(s) IV Intermittent every 12 hours      PHYSICAL EXAM:  Vital Signs Last 24 Hrs  T(C): 36.8, Max: 37.5 (05-15 @ 00:21)  T(F): 98.3, Max: 99.5 (05-15 @ 00:21)  HR: 106 (90 - 107)  BP: 125/84 (125/60 - 152/65)  BP(mean): --  RR: 20 (19 - 20)  SpO2: 100% (97% - 100%)    GENERAL: NAD, well-groomed, well-developed  HEAD:  Atraumatic, Normocephalic  EYES: EOMI, PERRLA, conjunctiva and sclera clear  ENMT: No tonsillar erythema, exudates, or enlargement; Moist mucous membranes, Good dentition, No lesions  NECK: Supple, No JVD, Normal thyroid  NERVOUS SYSTEM:  Alert & Oriented X3, Good concentration; Motor Strength 5/5 B/L upper and lower extremities; DTRs 2+ intact and symmetric  CHEST/LUNG: Clear to percussion bilaterally; No rales, rhonchi, wheezing, or rubs  HEART: Regular rate and rhythm; No murmurs, rubs, or gallops  ABDOMEN: Soft, Nontender, Nondistended; Bowel sounds present  EXTREMITIES:  2+ Peripheral Pulses, No clubbing, cyanosis, or edema  SKIN: No rashes or lesions    LABS:                        9.3    10.8  )-----------( 677      ( 15 May 2017 06:01 )             30.3     05-15    144  |  104  |  11  ----------------------------<  111<H>  4.1   |  37<H>  |  0.57    Ca    8.8      15 May 2017 15:33  Phos  2.9     05-15  Mg     2.0     05-15    Culture & Sensitivity:   Culture blood Results:   No growth to date. (05.12.17 @ 09:33)  Culture - Urine (05.12.17 @ 09:27)    Specimen Source: .Urine Catheterized    Culture Results:   <10,000 CFU/ml Normal Urogenital juanita present    CAPILLARY BLOOD GLUCOSE  120 (15 May 2017 15:55)  154 (15 May 2017 12:04)  134 (15 May 2017 07:30)  134 (14 May 2017 20:39)    I & Os for 24h ending 05-15 @ 07:00  =============================================  IN: 0 ml / OUT: 4100 ml / NET: -4100 ml    I & Os for current day (as of 05-15 @ 18:09)  =============================================  IN: 0 ml / OUT: 2400 ml / NET: -2400 ml      RADIOLOGY & ADDITIONAL TESTS:      Consultant(s) Notes Reviewed:  [x ] YES  [ ] NO    Care Discussed with Consultants/Other Providers [ x YES  [ ] NO

## 2017-05-15 NOTE — PROGRESS NOTE ADULT - ASSESSMENT
Patient is a 57y old  Female who presents with a chief complaint of Sent from NH due to fever, SOB, cough, abdominal pain, diarrhea (12 May 2017 04:51)    Fever

## 2017-05-15 NOTE — PROGRESS NOTE ADULT - PROBLEM SELECTOR PLAN 1
likely secondary to Bladder dysfunction, resolved with Hsu.  upon hsu placement: 1000 ml of urine drained.

## 2017-05-15 NOTE — PROGRESS NOTE ADULT - ASSESSMENT
57F with multiple prior admissions due to COPD exacerbation and UTI, with PMHx Morbid Obesity, DM ( Insulin), COPD ( home O2 3 L, intubated in Jan 2017), PUSHPA ( not on CPAP, refused sleep study in past), Right kidney staghorn calculous, bilateral hydroureter  and hydronephrosis, Right lower lung provoked PE ( xarelto) , UTI ( on meropenum currently) , sacral and right buttock redness ( unstageable), presents to ED c/o subjective fever, dizziness, diarrhea and weakness since yesterday, sent from NH due to that.  In ED initial vitas T max of 102.9, tachycardiac 112, /92, RR 22, SPO2 97, given 1 L bolus, labs shows WBC of 15.9, Hb of 7.9 , HCT 26.6, Cr 1.37, INR 2.02, Lactate 1.6, UA 6-10 WBC ( positive).    Admitted for sepsis 2/2 UTI likely complicated given R Staghorn calculus.

## 2017-05-15 NOTE — PROGRESS NOTE ADULT - PROBLEM SELECTOR PLAN 5
Provoked PE due to prolonged bedridden > 4 months ( NO DVT) .Diagnosed at [facility] in April , Right lower lung ( reports in Chart) .Last ECHO in April 2017 shows normal EF 55%, moderate biatrial enlargement, mild Mitral and tricuspid regurgitation, mild calcific degenerative valve disease.  -C/w Xarelto 15mg BID  (switched to 20mg QD on 5/25/17)

## 2017-05-15 NOTE — PROGRESS NOTE ADULT - PROBLEM SELECTOR PLAN 2
contineu with broad spectrum antibiotics,  Urine culture is negative but likely under effect of meropenem

## 2017-05-15 NOTE — PROGRESS NOTE ADULT - SUBJECTIVE AND OBJECTIVE BOX
Meds:  meropenem IVPB 1000milliGRAM(s) IV Intermittent every 8 hours  vancomycin  IVPB 1250milliGRAM(s) IV Intermittent every 12 hours    Allergies:  Allergies    Cheese (Pruritus)  oxycodone (Other (Moderate))  peanuts (Unknown)  penicillin (Rash)  seafood (Hives)  strawberry (Pruritus)  Tomatoes (Other)    Intolerances    ROS  [  ] UNABLE TO ELICIT    General:  [  ] Fever   [ x ] Malaise    Skin: sacral ulcer    HEENT:  [  ] Sore Throat  [  ] Photophobia  [x  ] None    Chest: [x  ] SOB  [ x ] Cough  [ ] None    Cardiovascular: [  ] CP	 [x ] None    Gastrointestinal: [ x ] Abd pain   [  ] N    [  ] V   [  ] Diarrhea	 [ ] None    Genitourinary: [  ] Polyuria   [x  ] Urgency   [x  ] Dysuria    [  ]  Hematuria   [ ] None	    Musculoskeletal:  [  ] Back Pain	 [x ] General weakness.    Neurological: [  ]Dizziness  [  ]Visual Disturbance  [  ]Headaches   [ x] Ge          PHYSICAL EXAM:    Vital Signs Last 24 Hrs  T(C): 36.5, Max: 37.5 (05-15 @ 00:21)  T(F): 97.7, Max: 99.5 (05-15 @ 00:21)  HR: 90 (90 - 118)  BP: 126/69 (125/60 - 141/64)  BP(mean): --  RR: 19 (19 - 20)  SpO2: 100% (97% - 100%)        HEENT:    Neck:  [ x ] Supple  [  ]Lymphadenopathy  [  ] JVD   [ x ]No JVD  [  ] Masses   [ x ] fugal skin infection on neck crease anteriorly.    CHEST/Respiratory:  [ x ] Rales      [  ] Rhonchi      [  ] Wheezing       [  ] Chest Tenderness  [  ]Clear to auscultation    Cardiovascular:  [ x ]S1 S2  [ x ] Reg  [  ] Irreg   [  ] Murmurs  [  ]Systolic [  ]Diastolic  [x  ]No Murmur    Abdomen:  [ x ]  Bowel Sounds   [  ] Soft           [  ] ABD Distention  [ x ] Tenderness  [  ] Organomegaly                        [  ] Guarding Rigidity  [ x ] No Guarding Rigidity  [  ] Rebound Tenderness [ x ] No Rebound Tenderness    Extremities: [ x ] Edema  [  ] No edema  [  ] Clubbing   [  ] Cyanosis  [ x ] Palpable peripheral pulses                        [ x ] No Tender Calf muscles  [  ] Tender Calf muscles    Neurological:  [ x Alert  [ x ] Awake  [x  ] Oriented  x3                              [  ] Confused    Skin:  [  ] Thrombophlebitis  [x  ] Rashes  neck anteriorly. [  ] Dry  [ x ] Ulcers sacral and buttock.    Ortho:  [  ] Joint Swelling  [  ] Joint erythema [ x ] DJD     Luna cath in place.  LABS/DIAGNOSTIC TESTS                                     9.3    10.8  )-----------( 677      ( 15 May 2017 06:01 )             30.3   05-15    144  |  106  |  11  ----------------------------<  97  4.3   |  35<H>  |  0.60    Ca    8.9      15 May 2017 06:01  Phos  3.5     05-15  Mg     1.4     05-15          CULTURES: Culture - Blood (05.12.17 @ 09:33)    Specimen Source: .Blood Blood-Peripheral    Culture Results:   No growth to date.    Culture - Blood (05.12.17 @ 09:33)    Specimen Source: .Blood Blood-Peripheral    Culture Results:   No growth to date.    Culture - Urine (05.12.17 @ 09:27)    Specimen Source: .Urine Catheterized    Culture Results:   <10,000 CFU/ml Normal Urogenital juanita present          RADIOLOGY    EXAM:  US KIDNEY(S)                            PROCEDURE DATE:  05/13/2017        INTERPRETATION:  Renal sonogram :    CLINICAL HISTORY: Kidney stones    Multiple sonographic images of both kidneys were obtained in transverse   and longitudinal planes.     Both kidneys are normal in size, shape and echogenicity.  Multiple   nonobstructing calculi are seen in the right kidney with the largest   measuring approximately 8 mm in diameter.    The right kidney measures 10.6 cm in length.  The left kidney measures   12.3 cm in length.  No renal masses  or evidence of hydronephrosis are   identified.  No perinephric fluid collections are seen.    IMPRESSION:    Right renal calculi. No hydronephrosis.    Thank you for the courtesy of this referral.          Assessment and Recommendation:   		  Patient has no fever today, and leukocytosis is iproving, also has  Hydronephrosis, and Hydroureter and R renal stones.   +ve urine c&s suggest for contamination.    Problem/Recommendation - 1:  Problem: Urinary tract infection without hematuria, site unspecified.   Recommendation:   1- Follow Blood C&S till final report.  2- Follow WBC closely.  3-Continue Meropenem 1 gm IVPB q 8 hours.  4- Continue vancomycin 1.25 gm IVPB q 12 hours.  5- Follow Vancomycin trough tomorrow.  6- CBC & BMP tomorrow.  Problem/Recommendation - 2:  ·  Problem: Hydronephrosis.    Recommendation:   1- Urology management.     Problem/Recommendation - 3:  ·  Problem: Hydroureter.    Recommendation:   1- Urology management.     Problem/Recommendation - 4:  ·  Problem: COPD (chronic obstructive pulmonary disease).    Recommendation:   1- IV antibiotics.  2- Bronchodilators.  3-Pulmonary management.     Problem/Recommendation - 5:  ·  Problem: Diarrhea, unspecified type.    Recommendation:   1- Stool for C&S if recure.  2- stool for O&P and C&S if recure. .     Problem/Recommendation - 6:  Problem: Heart failure.   Recommendation:   1-Cardiac management.  2-Monitor renal functions.    Problem/Recommendation - 7:  Problem: Diabetes.   Recommendation:   1- Blood sugar monitoring and control.    Problem/Recommendation - 8:  Problem: Dermatophytoses (improving).   Recommendation:   1- Continue Mycolog cream to the affected area of neck anteriorly TID.  2- Keep neck skin dry all the time.

## 2017-05-15 NOTE — PROGRESS NOTE ADULT - PROBLEM SELECTOR PLAN 2
With history of R staghorn calculus and CT here "Bilateral hydroureter as well as left hydronephrosis. No obstructing abnormality visualized. Urinary bladder appears distended. Multiple non-obstructing right intrarenal stones."  -Hsu catheter in place - hematuria  -Renal ultrasound only shows R renal calculus without hydronephrosis... hydronephrosis likely resolving s/p hsu catheter placement  -F/up urology (Dr. Rayo): possible ureteroscopy

## 2017-05-15 NOTE — PROGRESS NOTE ADULT - SUBJECTIVE AND OBJECTIVE BOX
Time of Visit:0945  Patient seen and examined.     MEDICATIONS  (STANDING):  ALBUTerol/ipratropium for Nebulization 3milliLiter(s) Nebulizer every 6 hours  insulin lispro (HumaLOG) corrective regimen sliding scale  SubCutaneous three times a day before meals  dextrose 5%. 1000milliLiter(s) IV Continuous <Continuous>  dextrose 50% Injectable 12.5Gram(s) IV Push once  dextrose 50% Injectable 25Gram(s) IV Push once  dextrose 50% Injectable 25Gram(s) IV Push once  pantoprazole    Tablet 40milliGRAM(s) Oral before breakfast  gabapentin 100milliGRAM(s) Oral three times a day  simvastatin 40milliGRAM(s) Oral at bedtime  silver sulfADIAZINE 1% Cream 1Application(s) Topical daily  metoprolol 25milliGRAM(s) Oral two times a day  meropenem IVPB 1000milliGRAM(s) IV Intermittent every 8 hours  insulin glargine Injectable (LANTUS) 10Unit(s) SubCutaneous at bedtime  rivaroxaban 15milliGRAM(s) Oral two times a day  cholecalciferol 1000Unit(s) Oral daily  vancomycin  IVPB 1250milliGRAM(s) IV Intermittent every 12 hours      MEDICATIONS  (PRN):  dextrose Gel 1Dose(s) Oral once PRN Blood Glucose LESS THAN 70 milliGRAM(s)/deciLiter  glucagon  Injectable 1milliGRAM(s) IntraMuscular once PRN Glucose <70 milliGRAM(s)/deciLiter  acetaminophen   Tablet 650milliGRAM(s) Oral every 6 hours PRN For Temp greater than 38 C (100.4 F)  guaiFENesin   Syrup  (Sugar-Free) 100milliGRAM(s) Oral every 6 hours PRN Cough  acetaminophen   Tablet. 650milliGRAM(s) Oral every 6 hours PRN Mild Pain (1 - 3)     Medications up to date at time of exam.    PHYSICAL EXAMINATION:  Patient has no new complaints.  GENERAL: The patient is a well-developed, well-nourished, in no apparent distress.     Vital Signs Last 24 Hrs  T(C): 36.5, Max: 37.5 (05-15 @ 00:21)  T(F): 97.7, Max: 99.5 (05-15 @ 00:21)  HR: 90 (90 - 118)  BP: 126/69 (125/60 - 141/64)  BP(mean): --  RR: 19 (19 - 20)  SpO2: 100% (97% - 100%)   (if applicable)    Chest Tube (if applicable)    HEENT: Head is normocephalic and atraumatic. Mucous membranes are moist.     NECK: Supple, no palpable adenopathy.    LUNGS: Clear to auscultation, no wheezing, rales, or rhonchi, decreased breath sounds at bases    HEART: Regular rate and rhythm without murmur.    ABDOMEN: Soft, nontender, and nondistended.      EXTREMITIES: Without any cyanosis, clubbing, rash, lesions or edema.    NEUROLOGIC: Awake, alert, oriented.     SKIN: Warm, dry, good turgor.    LABS:                        9.3    10.8  )-----------( 677      ( 15 May 2017 06:01 )             30.3     05-15    144  |  106  |  11  ----------------------------<  97  4.3   |  35<H>  |  0.60    Ca    8.9      15 May 2017 06:01  Phos  3.5     05-15  Mg     1.4     05-15        MICROBIOLOGY: (if applicable)    RADIOLOGY & ADDITIONAL STUDIES:  EKG: Mild increased interstitial lung markings. No consolidation or   significant pleural effusion.  CXR:  ECHO:    IMPRESSION: 57y Female PAST MEDICAL & SURGICAL HISTORY:  Morbid obesity  Heart failure with preserved ejection fraction  CHF (congestive heart failure)  Diabetes  Overactive bladder  Urinary tract infection without hematuria, site unspecified  Congestive heart failure, unspecified congestive heart failure chronicity, unspecified congestive heart failure type  Asthma  Hemorrhoids  Congestive heart failure  Emphysema  S/P   History of appendectomy  Appendicitis  S/P cholecystectomy    p/w SOB due to acute on chronic hypoxic respiratory failure due to COPD and CHF. CT abd/pelvis show renal stones and hydronehrosis. Stool is positive for occult blood, anemic, s/p PRBC transfusion. in view of patient morbid obesity  and limited mobility she will need anticoagulation for PE.       RECOMMENDATIONS:  - continue bronchodilators  - wound care  - anticoagulation.... may xin life long due to limited mobility   - consider urology evaluation  - GI evaluation  - OOb to chair  - will need out patient sleep study for sleep apnea, patient states she has been told she needed CPAP machine, but has refused it. Time of Visit:0945  Patient seen and examined.     MEDICATIONS  (STANDING):  ALBUTerol/ipratropium for Nebulization 3milliLiter(s) Nebulizer every 6 hours  insulin lispro (HumaLOG) corrective regimen sliding scale  SubCutaneous three times a day before meals  dextrose 5%. 1000milliLiter(s) IV Continuous <Continuous>  dextrose 50% Injectable 12.5Gram(s) IV Push once  dextrose 50% Injectable 25Gram(s) IV Push once  dextrose 50% Injectable 25Gram(s) IV Push once  pantoprazole    Tablet 40milliGRAM(s) Oral before breakfast  gabapentin 100milliGRAM(s) Oral three times a day  simvastatin 40milliGRAM(s) Oral at bedtime  silver sulfADIAZINE 1% Cream 1Application(s) Topical daily  metoprolol 25milliGRAM(s) Oral two times a day  meropenem IVPB 1000milliGRAM(s) IV Intermittent every 8 hours  insulin glargine Injectable (LANTUS) 10Unit(s) SubCutaneous at bedtime  rivaroxaban 15milliGRAM(s) Oral two times a day  cholecalciferol 1000Unit(s) Oral daily  vancomycin  IVPB 1250milliGRAM(s) IV Intermittent every 12 hours      MEDICATIONS  (PRN):  dextrose Gel 1Dose(s) Oral once PRN Blood Glucose LESS THAN 70 milliGRAM(s)/deciLiter  glucagon  Injectable 1milliGRAM(s) IntraMuscular once PRN Glucose <70 milliGRAM(s)/deciLiter  acetaminophen   Tablet 650milliGRAM(s) Oral every 6 hours PRN For Temp greater than 38 C (100.4 F)  guaiFENesin   Syrup  (Sugar-Free) 100milliGRAM(s) Oral every 6 hours PRN Cough  acetaminophen   Tablet. 650milliGRAM(s) Oral every 6 hours PRN Mild Pain (1 - 3)     Medications up to date at time of exam.    PHYSICAL EXAMINATION:  Patient has no new complaints.  GENERAL: The patient is a well-developed, well-nourished, in no apparent distress.     Vital Signs Last 24 Hrs  T(C): 36.5, Max: 37.5 (05-15 @ 00:21)  T(F): 97.7, Max: 99.5 (05-15 @ 00:21)  HR: 90 (90 - 118)  BP: 126/69 (125/60 - 141/64)  BP(mean): --  RR: 19 (19 - 20)  SpO2: 100% (97% - 100%)   (if applicable)    Chest Tube (if applicable)    HEENT: Head is normocephalic and atraumatic. Mucous membranes are moist.     NECK: Supple, no palpable adenopathy.    LUNGS: Clear to auscultation, no wheezing, rales, or rhonchi, decreased breath sounds at bases    HEART: Regular rate and rhythm without murmur.    ABDOMEN: Soft, nontender, and nondistended.      EXTREMITIES: Without any cyanosis, clubbing, rash, lesions or edema.    NEUROLOGIC: Awake, alert, oriented.     SKIN: Warm, dry, good turgor.    LABS:                        9.3    10.8  )-----------( 677      ( 15 May 2017 06:01 )             30.3     05-15    144  |  106  |  11  ----------------------------<  97  4.3   |  35<H>  |  0.60    Ca    8.9      15 May 2017 06:01  Phos  3.5     05-15  Mg     1.4     05-15        MICROBIOLOGY: (if applicable)    RADIOLOGY & ADDITIONAL STUDIES:  EKG: Mild increased interstitial lung markings. No consolidation or   significant pleural effusion.  CXR:  ECHO:    IMPRESSION: 57y Female PAST MEDICAL & SURGICAL HISTORY:  Morbid obesity  Heart failure with preserved ejection fraction  CHF (congestive heart failure)  Diabetes  Overactive bladder  Urinary tract infection without hematuria, site unspecified  Congestive heart failure, unspecified congestive heart failure chronicity, unspecified congestive heart failure type  Asthma  Hemorrhoids  Congestive heart failure  Emphysema  S/P   History of appendectomy  Appendicitis  S/P cholecystectomy    p/w SOB due to acute on chronic hypoxic respiratory failure due to COPD and CHF. CT abd/pelvis show renal stones and hydronehrosis. Stool is positive for occult blood, anemic, s/p PRBC transfusion. in view of patient morbid obesity  and limited mobility she will need anticoagulation for PE.       RECOMMENDATIONS:  - continue bronchodilators  - wound care  - anticoagulation.... may xin life long due to limited mobility   - consider urology evaluation  - GI evaluation  - OOb to chair  - will need out patient sleep study for sleep apnea, patient states she has been told she needed CPAP machine, but has refused it.   Agree with above assessment and plan as transcribed.

## 2017-05-16 DIAGNOSIS — J96.12 CHRONIC RESPIRATORY FAILURE WITH HYPERCAPNIA: ICD-10-CM

## 2017-05-16 DIAGNOSIS — E11.43 TYPE 2 DIABETES MELLITUS WITH DIABETIC AUTONOMIC (POLY)NEUROPATHY: ICD-10-CM

## 2017-05-16 DIAGNOSIS — E46 UNSPECIFIED PROTEIN-CALORIE MALNUTRITION: ICD-10-CM

## 2017-05-16 DIAGNOSIS — I26.99 OTHER PULMONARY EMBOLISM WITHOUT ACUTE COR PULMONALE: ICD-10-CM

## 2017-05-16 LAB
ANION GAP SERPL CALC-SCNC: 5 MMOL/L — SIGNIFICANT CHANGE UP (ref 5–17)
BUN SERPL-MCNC: 11 MG/DL — SIGNIFICANT CHANGE UP (ref 7–18)
CALCIUM SERPL-MCNC: 9.2 MG/DL — SIGNIFICANT CHANGE UP (ref 8.4–10.5)
CHLORIDE SERPL-SCNC: 101 MMOL/L — SIGNIFICANT CHANGE UP (ref 96–108)
CK MB BLD-MCNC: <9.1 % — HIGH (ref 0–3.5)
CK MB CFR SERPL CALC: <1 NG/ML — SIGNIFICANT CHANGE UP (ref 0–3.6)
CK SERPL-CCNC: 11 U/L — LOW (ref 21–215)
CO2 SERPL-SCNC: 36 MMOL/L — HIGH (ref 22–31)
CREAT SERPL-MCNC: 0.94 MG/DL — SIGNIFICANT CHANGE UP (ref 0.5–1.3)
GLUCOSE SERPL-MCNC: 101 MG/DL — HIGH (ref 70–99)
HCT VFR BLD CALC: 30.5 % — LOW (ref 34.5–45)
HGB BLD-MCNC: 9.2 G/DL — LOW (ref 11.5–15.5)
MAGNESIUM SERPL-MCNC: 1.6 MG/DL — SIGNIFICANT CHANGE UP (ref 1.6–2.6)
MCHC RBC-ENTMCNC: 23.9 PG — LOW (ref 27–34)
MCHC RBC-ENTMCNC: 30.3 GM/DL — LOW (ref 32–36)
MCV RBC AUTO: 78.8 FL — LOW (ref 80–100)
NT-PROBNP SERPL-SCNC: 528 PG/ML — HIGH (ref 0–125)
PHOSPHATE SERPL-MCNC: 3.2 MG/DL — SIGNIFICANT CHANGE UP (ref 2.5–4.5)
PLATELET # BLD AUTO: 544 K/UL — HIGH (ref 150–400)
POTASSIUM SERPL-MCNC: 4 MMOL/L — SIGNIFICANT CHANGE UP (ref 3.5–5.3)
POTASSIUM SERPL-SCNC: 4 MMOL/L — SIGNIFICANT CHANGE UP (ref 3.5–5.3)
RBC # BLD: 3.86 M/UL — SIGNIFICANT CHANGE UP (ref 3.8–5.2)
RBC # FLD: 20.7 % — HIGH (ref 10.3–14.5)
SODIUM SERPL-SCNC: 142 MMOL/L — SIGNIFICANT CHANGE UP (ref 135–145)
TROPONIN I SERPL-MCNC: <0.015 NG/ML — SIGNIFICANT CHANGE UP (ref 0–0.04)
VANCOMYCIN TROUGH SERPL-MCNC: 19.9 UG/ML — SIGNIFICANT CHANGE UP (ref 10–20)
WBC # BLD: 15 K/UL — HIGH (ref 3.8–10.5)
WBC # FLD AUTO: 15 K/UL — HIGH (ref 3.8–10.5)

## 2017-05-16 PROCEDURE — 99233 SBSQ HOSP IP/OBS HIGH 50: CPT | Mod: GC

## 2017-05-16 RX ORDER — SODIUM CHLORIDE 0.65 %
1 AEROSOL, SPRAY (ML) NASAL THREE TIMES A DAY
Qty: 0 | Refills: 0 | Status: DISCONTINUED | OUTPATIENT
Start: 2017-05-16 | End: 2017-05-24

## 2017-05-16 RX ORDER — TAMSULOSIN HYDROCHLORIDE 0.4 MG/1
0.4 CAPSULE ORAL AT BEDTIME
Qty: 0 | Refills: 0 | Status: DISCONTINUED | OUTPATIENT
Start: 2017-05-16 | End: 2017-05-24

## 2017-05-16 RX ORDER — FUROSEMIDE 40 MG
20 TABLET ORAL ONCE
Qty: 0 | Refills: 0 | Status: COMPLETED | OUTPATIENT
Start: 2017-05-16 | End: 2017-05-16

## 2017-05-16 RX ORDER — KETOROLAC TROMETHAMINE 30 MG/ML
30 SYRINGE (ML) INJECTION ONCE
Qty: 0 | Refills: 0 | Status: DISCONTINUED | OUTPATIENT
Start: 2017-05-16 | End: 2017-05-16

## 2017-05-16 RX ORDER — LANOLIN ALCOHOL/MO/W.PET/CERES
3 CREAM (GRAM) TOPICAL AT BEDTIME
Qty: 0 | Refills: 0 | Status: COMPLETED | OUTPATIENT
Start: 2017-05-16 | End: 2017-05-18

## 2017-05-16 RX ORDER — ACETAZOLAMIDE 250 MG/1
250 TABLET ORAL EVERY 12 HOURS
Qty: 0 | Refills: 0 | Status: COMPLETED | OUTPATIENT
Start: 2017-05-16 | End: 2017-05-18

## 2017-05-16 RX ADMIN — Medication 650 MILLIGRAM(S): at 10:50

## 2017-05-16 RX ADMIN — Medication 30 MILLIGRAM(S): at 21:19

## 2017-05-16 RX ADMIN — MEROPENEM 200 MILLIGRAM(S): 1 INJECTION INTRAVENOUS at 13:31

## 2017-05-16 RX ADMIN — Medication 3 MILLILITER(S): at 09:00

## 2017-05-16 RX ADMIN — SIMVASTATIN 40 MILLIGRAM(S): 20 TABLET, FILM COATED ORAL at 21:27

## 2017-05-16 RX ADMIN — Medication 400 MILLIGRAM(S): at 00:52

## 2017-05-16 RX ADMIN — Medication 25 MILLIGRAM(S): at 06:08

## 2017-05-16 RX ADMIN — ACETAZOLAMIDE 250 MILLIGRAM(S): 250 TABLET ORAL at 17:36

## 2017-05-16 RX ADMIN — Medication 400 MILLIGRAM(S): at 00:24

## 2017-05-16 RX ADMIN — TAMSULOSIN HYDROCHLORIDE 0.4 MILLIGRAM(S): 0.4 CAPSULE ORAL at 21:27

## 2017-05-16 RX ADMIN — Medication 166.67 MILLIGRAM(S): at 18:02

## 2017-05-16 RX ADMIN — Medication 650 MILLIGRAM(S): at 17:35

## 2017-05-16 RX ADMIN — INSULIN GLARGINE 10 UNIT(S): 100 INJECTION, SOLUTION SUBCUTANEOUS at 21:26

## 2017-05-16 RX ADMIN — Medication 650 MILLIGRAM(S): at 09:42

## 2017-05-16 RX ADMIN — GABAPENTIN 100 MILLIGRAM(S): 400 CAPSULE ORAL at 06:08

## 2017-05-16 RX ADMIN — Medication 30 MILLIGRAM(S): at 21:34

## 2017-05-16 RX ADMIN — Medication 25 MILLIGRAM(S): at 17:36

## 2017-05-16 RX ADMIN — PANTOPRAZOLE SODIUM 40 MILLIGRAM(S): 20 TABLET, DELAYED RELEASE ORAL at 06:08

## 2017-05-16 RX ADMIN — RIVAROXABAN 15 MILLIGRAM(S): KIT at 17:36

## 2017-05-16 RX ADMIN — Medication 166.67 MILLIGRAM(S): at 06:08

## 2017-05-16 RX ADMIN — Medication 1000 UNIT(S): at 11:59

## 2017-05-16 RX ADMIN — MEROPENEM 200 MILLIGRAM(S): 1 INJECTION INTRAVENOUS at 06:08

## 2017-05-16 RX ADMIN — Medication 3 MILLILITER(S): at 14:24

## 2017-05-16 RX ADMIN — GABAPENTIN 100 MILLIGRAM(S): 400 CAPSULE ORAL at 21:27

## 2017-05-16 RX ADMIN — Medication 20 MILLIGRAM(S): at 09:40

## 2017-05-16 RX ADMIN — GABAPENTIN 100 MILLIGRAM(S): 400 CAPSULE ORAL at 13:31

## 2017-05-16 RX ADMIN — Medication 1 APPLICATION(S): at 11:59

## 2017-05-16 RX ADMIN — Medication 3 MILLILITER(S): at 02:07

## 2017-05-16 RX ADMIN — Medication 3 MILLIGRAM(S): at 00:25

## 2017-05-16 RX ADMIN — MEROPENEM 200 MILLIGRAM(S): 1 INJECTION INTRAVENOUS at 21:27

## 2017-05-16 RX ADMIN — RIVAROXABAN 15 MILLIGRAM(S): KIT at 06:08

## 2017-05-16 RX ADMIN — Medication 1 SPRAY(S): at 13:31

## 2017-05-16 RX ADMIN — Medication 3 MILLILITER(S): at 20:36

## 2017-05-16 NOTE — PROGRESS NOTE ADULT - SUBJECTIVE AND OBJECTIVE BOX
Time of Visit:  Patient seen and examined.     MEDICATIONS  (STANDING):  ALBUTerol/ipratropium for Nebulization 3milliLiter(s) Nebulizer every 6 hours  insulin lispro (HumaLOG) corrective regimen sliding scale  SubCutaneous three times a day before meals  dextrose 5%. 1000milliLiter(s) IV Continuous <Continuous>  dextrose 50% Injectable 12.5Gram(s) IV Push once  dextrose 50% Injectable 25Gram(s) IV Push once  dextrose 50% Injectable 25Gram(s) IV Push once  pantoprazole    Tablet 40milliGRAM(s) Oral before breakfast  gabapentin 100milliGRAM(s) Oral three times a day  simvastatin 40milliGRAM(s) Oral at bedtime  silver sulfADIAZINE 1% Cream 1Application(s) Topical daily  metoprolol 25milliGRAM(s) Oral two times a day  meropenem IVPB 1000milliGRAM(s) IV Intermittent every 8 hours  insulin glargine Injectable (LANTUS) 10Unit(s) SubCutaneous at bedtime  rivaroxaban 15milliGRAM(s) Oral two times a day  cholecalciferol 1000Unit(s) Oral daily  vancomycin  IVPB 1250milliGRAM(s) IV Intermittent every 12 hours  acetazolamide    Tablet 250milliGRAM(s) Oral every 12 hours      MEDICATIONS  (PRN):  dextrose Gel 1Dose(s) Oral once PRN Blood Glucose LESS THAN 70 milliGRAM(s)/deciLiter  glucagon  Injectable 1milliGRAM(s) IntraMuscular once PRN Glucose <70 milliGRAM(s)/deciLiter  acetaminophen   Tablet 650milliGRAM(s) Oral every 6 hours PRN For Temp greater than 38 C (100.4 F)  guaiFENesin   Syrup  (Sugar-Free) 100milliGRAM(s) Oral every 6 hours PRN Cough  acetaminophen   Tablet. 650milliGRAM(s) Oral every 6 hours PRN Mild Pain (1 - 3)  sodium chloride 0.65% Nasal 1Spray(s) Both Nostrils three times a day PRN Nasal Congestion       Medications up to date at time of exam.      PHYSICAL EXAMINATION:  Patient has no new complaints.  GENERAL: The patient is a well-developed, well-nourished, in no apparent distress.     Vital Signs Last 24 Hrs  T(C): 37.3, Max: 37.9 (05-15 @ 23:45)  T(F): 99.2, Max: 100.3 (05-15 @ 23:45)  HR: 113 (102 - 124)  BP: 140/71 (125/84 - 140/73)  BP(mean): --  RR: 20 (20 - 20)  SpO2: 96% (96% - 100%)   (if applicable)    Chest Tube (if applicable)    HEENT: Head is normocephalic and atraumatic. Extraocular muscles are intact. Mucous membranes are moist.     NECK: Supple, no palpable adenopathy.    LUNGS: Clear to auscultation, no wheezing, rales, or rhonchi.    HEART: Regular rate and rhythm without murmur.    ABDOMEN: Soft, nontender, and nondistended.  No hepatosplenomegaly is noted.    EXTREMITIES: Without any cyanosis, clubbing, rash, lesions or edema.    NEUROLOGIC: Awake, alert, oriented.     SKIN: Warm, dry, good turgor.      LABS:                        9.3    10.8  )-----------( 677      ( 15 May 2017 06:01 )             30.3     05-15    144  |  104  |  11  ----------------------------<  111<H>  4.1   |  37<H>  |  0.57    Ca    8.8      15 May 2017 15:33  Phos  2.9     05-15  Mg     2.0     05-15          ABG - ( 15 May 2017 15:29 )  pH: 7.42  /  pCO2: 57    /  pO2: 82    / HCO3: 36    / Base Excess: 10.3  /  SaO2: 96                CARDIAC MARKERS ( 16 May 2017 00:36 )  <0.015 ng/mL / x     / 11 U/L / x     / <1.0 ng/mL        Serum Pro-Brain Natriuretic Peptide: 528 pg/mL ( @ 00:36)          MICROBIOLOGY: (if applicable)    RADIOLOGY & ADDITIONAL STUDIES:  EKG:   CXR:reviewed. no change from prior study  ECHO:    IMPRESSION: 57y Female PAST MEDICAL & SURGICAL HISTORY:  Morbid obesity  Heart failure with preserved ejection fraction  CHF (congestive heart failure)  Diabetes  Overactive bladder  Urinary tract infection without hematuria, site unspecified  Congestive heart failure, unspecified congestive heart failure chronicity, unspecified congestive heart failure type  Asthma  Hemorrhoids  Congestive heart failure  Emphysema  S/P   History of appendectomy  Appendicitis  S/P cholecystectomy   p/w p/w SOB due to acute on chronic hypoxic respiratory failure due to COPD and CHF. CT abd/pelvis show renal stones and hydronehrosis. Stool is positive for occult blood, anemic, s/p PRBC transfusion. in view of patient morbid obesity  and limited mobility she will need anticoagulation for PE. pat had spisoed of SOB yesterday, cxr 5/15 no acte change. pat stated sob improved with pulmonary treatments. Pat may have cystoscopy.       RECOMMENDATIONS:  - continue bronchodilators  - wound care  - anticoagulation.... may xin life long due to limited mobility   -if pat will have cystoscopy, change anticoagulation to heparin drip and adjust to PTT  - consider urology evaluation  - GI evaluation  - OOb to chair  - will need out patient sleep study for sleep apnea, patient states she has been told she needed CPAP machine, but has refused it.

## 2017-05-16 NOTE — PHYSICAL THERAPY INITIAL EVALUATION ADULT - ADDITIONAL COMMENTS
Pt reports the last time she ambulated was in January 2017 before first hospitalization and rehab course. Pt was ambulating with rolling walker within the home and navigated outside of the home with a motorized chair. Pt uses supplemental O2 at home, reports owning commode, manual wheelchair, shower bar, and shower chair.

## 2017-05-16 NOTE — PHYSICAL THERAPY INITIAL EVALUATION ADULT - PLANNED THERAPY INTERVENTIONS, PT EVAL
balance training/bed mobility training/postural re-education/strengthening/gait training/transfer training/neuromuscular re-education

## 2017-05-16 NOTE — PROGRESS NOTE ADULT - SUBJECTIVE AND OBJECTIVE BOX
Meds:  meropenem IVPB 1000milliGRAM(s) IV Intermittent every 8 hours  vancomycin  IVPB 1250milliGRAM(s) IV Intermittent every 12 hours    Allergies:  Allergies    Cheese (Pruritus)  oxycodone (Other (Moderate))  peanuts (Unknown)  penicillin (Rash)  seafood (Hives)  strawberry (Pruritus)  Tomatoes (Other)    Intolerances    ROS  [  ] UNABLE TO ELICIT    General:  [  ] Fever   [ x ] Malaise    Skin: sacral ulcer    HEENT:  [  ] Sore Throat  [  ] Photophobia  [x  ] None    Chest: [x  ] SOB  [ x ] Cough  [ ] None    Cardiovascular: [  ] CP	 [x ] None    Gastrointestinal: [ x ] Abd pain   [  ] N    [  ] V   [  ] Diarrhea	 [ ] None    Genitourinary: [  ] Polyuria   [x  ] Urgency   [x  ] Dysuria    [  ]  Hematuria   [ ] None	    Musculoskeletal:  [  ] Back Pain	 [x ] General weakness.    Neurological: [  ]Dizziness  [  ]Visual Disturbance  [  ]Headaches   [ x] Ge          PHYSICAL EXAM:    Vital Signs Last 24 Hrs  T(C): 37.3, Max: 37.9 (05-15 @ 23:45)  T(F): 99.2, Max: 100.3 (05-15 @ 23:45)  HR: 113 (102 - 124)  BP: 140/71 (125/84 - 140/73)  BP(mean): --  RR: 20 (20 - 20)  SpO2: 96% (96% - 100%)      HEENT:    Neck:  [ x ] Supple  [  ]Lymphadenopathy  [  ] JVD   [ x ]No JVD  [  ] Masses   [ x ] fugal skin infection on neck crease anteriorly.    CHEST/Respiratory:  [ x ] Rales      [  ] Rhonchi      [  ] Wheezing       [  ] Chest Tenderness  [  ]Clear to auscultation    Cardiovascular:  [ x ]S1 S2  [ x ] Reg  [  ] Irreg   [  ] Murmurs  [  ]Systolic [  ]Diastolic  [x  ]No Murmur    Abdomen:  [ x ]  Bowel Sounds   [  ] Soft           [  ] ABD Distention  [ x ] Tenderness  [  ] Organomegaly                        [  ] Guarding Rigidity  [ x ] No Guarding Rigidity  [  ] Rebound Tenderness [ x ] No Rebound Tenderness    Extremities: [ x ] Edema  [  ] No edema  [  ] Clubbing   [  ] Cyanosis  [ x ] Palpable peripheral pulses                        [ x ] No Tender Calf muscles  [  ] Tender Calf muscles    Neurological:  [ x Alert  [ x ] Awake  [x  ] Oriented  x3                              [  ] Confused    Skin:  [  ] Thrombophlebitis  [x  ] Rashes  neck anteriorly. [  ] Dry  [ x ] Ulcers sacral and buttock.    Ortho:  [  ] Joint Swelling  [  ] Joint erythema [ x ] DJD     Luna cath in place.  LABS/DIAGNOSTIC TESTS                                     9.3    10.8  )-----------( 677      ( 15 May 2017 06:01 )             30.3   05-15    05-15    144  |  104  |  11  ----------------------------<  111<H>  4.1   |  37<H>  |  0.57    Ca    8.8      15 May 2017 15:33  Phos  2.9     05-15  Mg     2.0     05-15          CULTURES: Culture - Blood (05.12.17 @ 09:33)    Specimen Source: .Blood Blood-Peripheral    Culture Results:   No growth to date.    Culture - Blood (05.12.17 @ 09:33)    Specimen Source: .Blood Blood-Peripheral    Culture Results:   No growth to date.    Culture - Urine (05.12.17 @ 09:27)    Specimen Source: .Urine Catheterized    Culture Results:   <10,000 CFU/ml Normal Urogenital juanita present    Vancomycin Level, Trough: 15.3: Vancomycin trough levels should be rapidly reached and maintained at  15-20 ug/ml for life threatening MRSA  infections such as sepsis, endocarditis, osteomyelitis and pneumonia. A  first trough level should be drawn  before the 3rd or 4th dose.  Risk15.3:  of renal toxicity is increased for levels >15 ug/ml, in patients on  other nephrotoxic drugs, who are  hemodynamically unstable, have unstable renal function, or are on  Vancomycin therapy for >14 days. Renal function with  creatinine levels should b15.3: e monitored for those patients. ug/mL (05.15.17 @ 06:01)          RADIOLOGY    EXAM:  CHEST-PORTABLE STAT                            PROCEDURE DATE:  05/15/2017        INTERPRETATION:  CLINICAL INFORMATION: Tachypnea.    A single portable view of the chest was obtained.     Comparison: Chest x-ray 5/11/2017.     The mediastinal cardiac silhouette is unremarkable.    New increased vascular markings bilaterally may represent pulmonary edema.    No acute osseous finding.     IMPRESSION:    Findings as above.              Assessment and Recommendation:   		  Patient has no fever today, and leukocytosis was improving but no CBC today.    Problem/Recommendation - 1:  Problem: Urinary tract infection without hematuria, site unspecified.   Recommendation:   1- Repeat  Blood C&S for fever 100 or more.  2- Follow WBC closely.  3- Continue Meropenem 1 gm IVPB q 8 hours.  4- Continue vancomycin 1.25 gm IVPB q 12 hours.  5- Follow Vancomycin trough in 3-4 days (biw) while patient is on vancomycin.  6- CBC & BMP tomorrow.  Problem/Recommendation - 2:  ·  Problem: Hydronephrosis.    Recommendation:   1- Urology management.     Problem/Recommendation - 3:  ·  Problem: Hydroureter.    Recommendation:   1- Urology management.     Problem/Recommendation - 4:  ·  Problem: COPD (chronic obstructive pulmonary disease).    Recommendation:   1- IV antibiotics.  2- Bronchodilators.  3-Pulmonary management.   4- O2 as needed.  Problem/Recommendation - 5:  ·  Problem: Diarrhea, unspecified type.    Recommendation:   1- Stool for C&S if recure.  2- stool for O&P and C&S if recure. .     Problem/Recommendation - 6:  Problem: Heart failure.   Recommendation:   1-Cardiac management.  2-Monitor renal functions.    Problem/Recommendation - 7:  Problem: Diabetes.   Recommendation:   1- Blood sugar monitoring and control.  2-Accuichecks with coverage.  3- 1800 nico ADA diet.  4-Follow HB A1C.     Problem/Recommendation - 8:  Problem: Dermatophytoses (improving).   Recommendation:   1- Continue Mycolog cream to the affected area of neck anteriorly TID.  2- Keep neck skin dry all the time.

## 2017-05-16 NOTE — PROGRESS NOTE ADULT - SUBJECTIVE AND OBJECTIVE BOX
Patient is a 57y old  Female who presents with a chief complaint of sepsis, not responding to Meropenem, Sent from NH due to fever, SOB, cough, abdominal pain, diarrhea (12 May 2017 04:51), no diarrhea here in hospital, CT revealed bilateral hydroureter and left hydronephrosis, no obstructing calculi appreciated.      INTERVAL HPI/ OVERNIGHT EVENTS:  yesterday she complained of some SOB, which was resolved after nebulizer treatment, today she feels ok, sitting in chair.  she had a low grade fever ( Tmax 37.9), now down to normal.    Allergies  Cheese (Pruritus)  oxycodone (Other (Moderate))  peanuts (Unknown)  penicillin (Rash)  seafood (Hives)  strawberry (Pruritus)  Tomatoes (Other)    REVIEW OF SYSTEMS:  CONSTITUTIONAL: No fever, weight loss, or fatigue  EYES: No eye pain, visual disturbances, or discharge  RESPIRATORY: No cough, wheezing, chills or hemoptysis; No shortness of breath  CARDIOVASCULAR: No chest pain, palpitations, dizziness, or leg swelling  GASTROINTESTINAL: No abdominal or epigastric pain. No nausea, vomiting, or hematemesis; No diarrhea or constipation. No melena or hematochezia.  GENITOURINARY: No dysuria, frequency, hematuria, or incontinence  NEUROLOGICAL: No headaches, memory loss, loss of strength, numbness, or tremors  MUSCULOSKELETAL: No joint pain or swelling; No muscle, back, or extremity pain  PSYCHIATRIC: No depression, anxiety, mood swings, or difficulty sleeping  HEME/LYMPH: No easy bruising, or bleeding gums  ALLERGY AND IMMUNOLOGIC: No hives or eczema    Medications:  ALBUTerol/ipratropium for Nebulization 3milliLiter(s) Nebulizer every 6 hours  insulin lispro (HumaLOG) corrective regimen sliding scale  SubCutaneous three times a day before meals  dextrose 5%. 1000milliLiter(s) IV Continuous <Continuous>  dextrose Gel 1Dose(s) Oral once PRN Blood Glucose LESS THAN 70 milliGRAM(s)/deciLiter  dextrose 50% Injectable 12.5Gram(s) IV Push once  dextrose 50% Injectable 25Gram(s) IV Push once  dextrose 50% Injectable 25Gram(s) IV Push once  glucagon  Injectable 1milliGRAM(s) IntraMuscular once PRN Glucose <70 milliGRAM(s)/deciLiter  pantoprazole    Tablet 40milliGRAM(s) Oral before breakfast  gabapentin 100milliGRAM(s) Oral three times a day  simvastatin 40milliGRAM(s) Oral at bedtime  silver sulfADIAZINE 1% Cream 1Application(s) Topical daily  metoprolol 25milliGRAM(s) Oral two times a day  acetaminophen   Tablet 650milliGRAM(s) Oral every 6 hours PRN For Temp greater than 38 C (100.4 F)  guaiFENesin   Syrup  (Sugar-Free) 100milliGRAM(s) Oral every 6 hours PRN Cough  meropenem IVPB 1000milliGRAM(s) IV Intermittent every 8 hours  insulin glargine Injectable (LANTUS) 10Unit(s) SubCutaneous at bedtime  rivaroxaban 15milliGRAM(s) Oral two times a day  acetaminophen   Tablet. 650milliGRAM(s) Oral every 6 hours PRN Mild Pain (1 - 3)  cholecalciferol 1000Unit(s) Oral daily  vancomycin  IVPB 1250milliGRAM(s) IV Intermittent every 12 hours  acetazolamide    Tablet 250milliGRAM(s) Oral every 12 hours  sodium chloride 0.65% Nasal 1Spray(s) Both Nostrils three times a day PRN Nasal Congestion      PHYSICAL EXAM:  Vital Signs Last 24 Hrs  T(C): 37.3, Max: 37.9 (05-15 @ 23:45)  T(F): 99.2, Max: 100.3 (05-15 @ 23:45)  HR: 113 (102 - 124)  BP: 140/71 (125/84 - 140/73)  BP(mean): --  RR: 20 (20 - 20)  SpO2: 96% (96% - 100%)    GENERAL: NAD, morbidly obese.  HEAD:  Atraumatic, Normocephalic  EYES: EOMI, PERRLA, conjunctiva and sclera clear  ENMT: No tonsillar erythema, exudates, or enlargement; Moist mucous membranes, Good dentition, No lesions  NECK: Supple, No JVD, Normal thyroid  NERVOUS SYSTEM:  Alert & Oriented X3, Good concentration; Motor Strength 5/5 B/L upper and lower extremities; DTRs 2+ intact and symmetric  CHEST/LUNG: few scattered basal rales  HEART: Regular rate and rhythm; No murmurs, rubs, or gallops  ABDOMEN: Soft, Nontender, Nondistended; Bowel sounds present  EXTREMITIES:  2+ Peripheral Pulses, No clubbing, cyanosis, or edema  SKIN: No rashes or lesions    LABS:                        9.3    10.8  )-----------( 677      ( 15 May 2017 06:01 )             30.3     05-15    144  |  104  |  11  ----------------------------<  111<H>  4.1   |  37<H>  |  0.57    Ca    8.8      15 May 2017 15:33  Phos  2.9     05-15  Mg     2.0     05-15    CARDIAC MARKERS ( 16 May 2017 00:36 )  <0.015 ng/mL / x     / 11 U/L / x     / <1.0 ng/mL    Culture & Sensitivity: all cultures are negative.    CAPILLARY BLOOD GLUCOSE  135 (16 May 2017 11:18)  128 (16 May 2017 08:30)  120 (15 May 2017 15:55)      I & Os for current day (as of 05-16 @ 15:40)  =============================================  IN: 0 ml / OUT: 5700 ml / NET: -5700 ml      RADIOLOGY & ADDITIONAL TESTS:      Consultant(s) Notes Reviewed:  [x ] YES  [ ] NO    Care Discussed with Consultants/Other Providers [x ] YES  [ ] NO

## 2017-05-16 NOTE — PHYSICAL THERAPY INITIAL EVALUATION ADULT - IMPAIRMENTS FOUND, PT EVAL
gait, locomotion, and balance/aerobic capacity/endurance/ventilation and respiration/gas exchange/muscle strength/posture

## 2017-05-16 NOTE — PROGRESS NOTE ADULT - PROBLEM SELECTOR PLAN 1
patient has low grade fever last night Tmax: 37.9  F.U ID  all cultures are negative to date ( patient was on Meropenem while at nursing home)  vanco trough therapeutic

## 2017-05-16 NOTE — PHYSICAL THERAPY INITIAL EVALUATION ADULT - LIVES WITH, PROFILE
in apartment with no stairs to enter. Pt has had multiple hospitalizations since January. Pt came from [facility] for this current hospitilization.

## 2017-05-16 NOTE — PHYSICAL THERAPY INITIAL EVALUATION ADULT - CRITERIA FOR SKILLED THERAPEUTIC INTERVENTIONS
predicted duration of therapy intervention/impairments found/functional limitations in following categories/risk reduction/prevention/therapy frequency/anticipated discharge recommendation/rehab potential

## 2017-05-16 NOTE — PROGRESS NOTE ADULT - ASSESSMENT
Patient is a 57y old  Female who presents with a chief complaint of sepsis, was Sent from NH due to fever, SOB, cough, abdominal pain, diarrhea (12 May 2017 04:51)

## 2017-05-16 NOTE — CHART NOTE - NSCHARTNOTEFT_GEN_A_CORE
Pt with no further urologic intervention    Recommend f/u with Dr. Bowser as an outpatient with hsu catheter in 1 week.  Pt will need urodyanmic studies as an outpatient.    Will sign off. Reconsult as needed.

## 2017-05-16 NOTE — PROGRESS NOTE ADULT - SUBJECTIVE AND OBJECTIVE BOX
Patient is a 57y old  Female who presents with a chief complaint of Sent from NH due to fever, SOB, cough, abdominal pain, diarrhea (12 May 2017 04:51)      -=OVERNIGHT EVENTS / INTERVAL HPI / SUBJECTIVE=-  T 100.3 overnight. HR to 120s.  Patient reports continued issue with her bed being uncomfortable / painful. Continues to report an increase in dyspnea though improved compared to prior. Denies fevers, chills, n/v, CP. Says she had a bad night last night.    -=OBJECTIVE=-       --VITALS--  Vital Signs Last 24 Hrs  T(C): 37.3, Max: 37.9 (05-15 @ 23:45)  T(F): 99.2, Max: 100.3 (05-15 @ 23:45)  HR: 113 (102 - 124)  BP: 140/71 (127/102 - 140/73)  BP(mean): --  RR: 20 (20 - 20)  SpO2: 96% (96% - 98%)         --PHYSICAL EXAM--  General: morbidly obese, laying in bed  Neurology: A&Ox3  Respiratory: difficult to auscultate, +/- mild expiratory wheeze  CV: RRR, S1S2, no murmur  Abdominal: Soft, TTP mild diffusely, ND no palpable mass, bowel sounds positive  MSK: No edema         --LABS--    Patient refused labs today.         --IMAGING--    EXAM:  CHEST-PORTABLE STAT                            PROCEDURE DATE:  05/15/2017        INTERPRETATION:  CLINICAL INFORMATION: Tachypnea.    A single portable view of the chest was obtained.     Comparison: Chest x-ray 5/11/2017.     The mediastinal cardiac silhouette is unremarkable.    New increased vascular markings bilaterally may represent pulmonary edema.    No acute osseous finding.     IMPRESSION:    Findings as above.         --MEDICATIONS--  MEDICATIONS  (STANDING):  ALBUTerol/ipratropium for Nebulization 3milliLiter(s) Nebulizer every 6 hours  insulin lispro (HumaLOG) corrective regimen sliding scale  SubCutaneous three times a day before meals  dextrose 5%. 1000milliLiter(s) IV Continuous <Continuous>  dextrose 50% Injectable 12.5Gram(s) IV Push once  dextrose 50% Injectable 25Gram(s) IV Push once  dextrose 50% Injectable 25Gram(s) IV Push once  pantoprazole    Tablet 40milliGRAM(s) Oral before breakfast  gabapentin 100milliGRAM(s) Oral three times a day  simvastatin 40milliGRAM(s) Oral at bedtime  silver sulfADIAZINE 1% Cream 1Application(s) Topical daily  metoprolol 25milliGRAM(s) Oral two times a day  meropenem IVPB 1000milliGRAM(s) IV Intermittent every 8 hours  insulin glargine Injectable (LANTUS) 10Unit(s) SubCutaneous at bedtime  rivaroxaban 15milliGRAM(s) Oral two times a day  cholecalciferol 1000Unit(s) Oral daily  vancomycin  IVPB 1250milliGRAM(s) IV Intermittent every 12 hours  acetazolamide    Tablet 250milliGRAM(s) Oral every 12 hours  tamsulosin 0.4milliGRAM(s) Oral at bedtime    MEDICATIONS  (PRN):  dextrose Gel 1Dose(s) Oral once PRN Blood Glucose LESS THAN 70 milliGRAM(s)/deciLiter  glucagon  Injectable 1milliGRAM(s) IntraMuscular once PRN Glucose <70 milliGRAM(s)/deciLiter  acetaminophen   Tablet 650milliGRAM(s) Oral every 6 hours PRN For Temp greater than 38 C (100.4 F)  guaiFENesin   Syrup  (Sugar-Free) 100milliGRAM(s) Oral every 6 hours PRN Cough  acetaminophen   Tablet. 650milliGRAM(s) Oral every 6 hours PRN Mild Pain (1 - 3)  sodium chloride 0.65% Nasal 1Spray(s) Both Nostrils three times a day PRN Nasal Congestion

## 2017-05-16 NOTE — PROGRESS NOTE ADULT - ASSESSMENT
57F PMHx Morbid Obesity, DM, COPD, PUSHPA, Provoked RLL PE,  R kidney staghorn calculus, Hydronephrosis / Hydroureter, unstageable R sacral ulcer, UTI presnted with subjective fever, dizziness, diarrhea and weakness at NH while on Meropenem for UTI. Admitted for sepsis 2/2 UTI likely complicated given R Staghorn calculus. Initial CHRISTINA resolved s/p IVF.    #Sepsis 2/2 UTI, complicated given hx of R Staghorn Calculus. UA +ve. UCx and BCx -ve so far. However, was on treatment with meropenem at NH. Presumptively Sepsis 2/2 UTI. ED T max of 102.9, tachycardiac 112, WBC of 15.9, Lactate 1.6, UA 6-10 WBC ( positive).  -WBC improving s/p IV Meropenem and IV Vancomycin, though had low grad temp over night.  -UCx -ve, BCx -ve, however was on treatment in NH.  -C/w IV Meropenem  -C/w IV Vancomycin, vtrough therapeutic, follow-up next 05/16 5pm  -F/up ID (Dr. Wright)  -C/w IV Meropenem  -F/up Urology re mgmt of kidney stones.     #R Staghorn Calculus / Urinary Retention: Hx of R Staghorn last admission at [place]. CT here "Bilateral hydroureter as well as left hydronephrosis. No obstructing abnormality visualized. Urinary bladder appears distended. Multiple non-obstructing right intrarenal stones."  Renal ultrasound only shows R renal calculus (did not indicate staghorn) without hydronephrosis... hydronephrosis likely resolving s/p hsu catheter placement. Hsu catheter placed because urinary retention.  -F/up urology (Dr. Rayo): plan for outpatient follow-up with hsu catheter in place and urologic studies  -Starting Flomax    #Anemia:Hx Iron deficiency anemia , guaiac negative in April 2017 , given 1 U PRBC at that time. Here, with H/H downtrend to 6.6. s/p 1UPRBC with incomplete improvement to 7.2.  + hematuria noted. + FOBT +ve though no gross blood (melena or bright red blood) on rectal exam.  - Hgb stable x1 day s/p 2U PRBC  - Patient refused labs today, will attempt check in PM  - Will continue Xarelto as needed for PE. ASA DC'd.     #Acute Provoked RLL PE: Patient has been bedridden. Diagnosed at [facility] in April.  -TTE (April 2017, [place]) shows normal EF 55%, moderate biatrial enlargement, mild Mitral and tricuspid regurgitation, mild calcific degenerative valve disease.  -C/w Xarelto 15mg BID  (switched to 20mg QD on 5/25/17).     #Pulmonary Edema: Seen on recent CXR, likely accounts for increased dyspnea.  -Lasix PO 20mg x 1 + starting Acetazolamide given metabolic alkalosis    #Metabolic Alkalosis: Likely chronic compensation for CO2 retention, however likely over compesnated  -Starting Acetazolamide    #COPD + Likely underling PUSHPA  -C/w NC O2 supplement (O2 dependent at baseline)  -C/w duonebs.  -ABG indicates patient is retaining CO2    #Diabetes: c/w home dose of Lantus  10 Units and SSI.     #PPx: on full dose AC.

## 2017-05-16 NOTE — PHYSICAL THERAPY INITIAL EVALUATION ADULT - GENERAL OBSERVATIONS, REHAB EVAL
Consult received, chart reviewed. Patient received supine in bed, NAD, + L IV, +hsu, +3 L O2 via NC. Patient agreed to EVALUATION from Physical Therapist.

## 2017-05-17 LAB
ALBUMIN SERPL ELPH-MCNC: 1.9 G/DL — LOW (ref 3.5–5)
ALP SERPL-CCNC: 123 U/L — HIGH (ref 40–120)
ALT FLD-CCNC: 30 U/L DA — SIGNIFICANT CHANGE UP (ref 10–60)
ANION GAP SERPL CALC-SCNC: 6 MMOL/L — SIGNIFICANT CHANGE UP (ref 5–17)
APPEARANCE UR: ABNORMAL
AST SERPL-CCNC: 16 U/L — SIGNIFICANT CHANGE UP (ref 10–40)
BILIRUB SERPL-MCNC: 0.2 MG/DL — SIGNIFICANT CHANGE UP (ref 0.2–1.2)
BILIRUB UR-MCNC: NEGATIVE — SIGNIFICANT CHANGE UP
BUN SERPL-MCNC: 13 MG/DL — SIGNIFICANT CHANGE UP (ref 7–18)
CALCIUM SERPL-MCNC: 9.4 MG/DL — SIGNIFICANT CHANGE UP (ref 8.4–10.5)
CHLORIDE SERPL-SCNC: 102 MMOL/L — SIGNIFICANT CHANGE UP (ref 96–108)
CO2 SERPL-SCNC: 31 MMOL/L — SIGNIFICANT CHANGE UP (ref 22–31)
COLOR SPEC: ABNORMAL
CREAT SERPL-MCNC: 0.61 MG/DL — SIGNIFICANT CHANGE UP (ref 0.5–1.3)
CULTURE RESULTS: SIGNIFICANT CHANGE UP
CULTURE RESULTS: SIGNIFICANT CHANGE UP
DIFF PNL FLD: ABNORMAL
GLUCOSE SERPL-MCNC: 96 MG/DL — SIGNIFICANT CHANGE UP (ref 70–99)
GLUCOSE UR QL: NEGATIVE — SIGNIFICANT CHANGE UP
HCT VFR BLD CALC: 31.6 % — LOW (ref 34.5–45)
HGB BLD-MCNC: 9.3 G/DL — LOW (ref 11.5–15.5)
KETONES UR-MCNC: NEGATIVE — SIGNIFICANT CHANGE UP
LEUKOCYTE ESTERASE UR-ACNC: ABNORMAL
MAGNESIUM SERPL-MCNC: 1.7 MG/DL — SIGNIFICANT CHANGE UP (ref 1.6–2.6)
MCHC RBC-ENTMCNC: 23.7 PG — LOW (ref 27–34)
MCHC RBC-ENTMCNC: 29.4 GM/DL — LOW (ref 32–36)
MCV RBC AUTO: 80.4 FL — SIGNIFICANT CHANGE UP (ref 80–100)
NITRITE UR-MCNC: NEGATIVE — SIGNIFICANT CHANGE UP
PH UR: 8 — SIGNIFICANT CHANGE UP (ref 5–8)
PHOSPHATE SERPL-MCNC: 3.7 MG/DL — SIGNIFICANT CHANGE UP (ref 2.5–4.5)
PLATELET # BLD AUTO: 722 K/UL — HIGH (ref 150–400)
POTASSIUM SERPL-MCNC: 3.9 MMOL/L — SIGNIFICANT CHANGE UP (ref 3.5–5.3)
POTASSIUM SERPL-SCNC: 3.9 MMOL/L — SIGNIFICANT CHANGE UP (ref 3.5–5.3)
PROT SERPL-MCNC: 7.9 G/DL — SIGNIFICANT CHANGE UP (ref 6–8.3)
PROT UR-MCNC: 30 MG/DL
RBC # BLD: 3.93 M/UL — SIGNIFICANT CHANGE UP (ref 3.8–5.2)
RBC # FLD: 21.1 % — HIGH (ref 10.3–14.5)
SODIUM SERPL-SCNC: 139 MMOL/L — SIGNIFICANT CHANGE UP (ref 135–145)
SP GR SPEC: 1.01 — SIGNIFICANT CHANGE UP (ref 1.01–1.02)
SPECIMEN SOURCE: SIGNIFICANT CHANGE UP
SPECIMEN SOURCE: SIGNIFICANT CHANGE UP
UROBILINOGEN FLD QL: NEGATIVE — SIGNIFICANT CHANGE UP
VANCOMYCIN TROUGH SERPL-MCNC: 11.4 UG/ML — SIGNIFICANT CHANGE UP (ref 10–20)
VANCOMYCIN TROUGH SERPL-MCNC: 21.1 UG/ML — HIGH (ref 10–20)
WBC # BLD: 18 K/UL — HIGH (ref 3.8–10.5)
WBC # FLD AUTO: 18 K/UL — HIGH (ref 3.8–10.5)

## 2017-05-17 PROCEDURE — 99233 SBSQ HOSP IP/OBS HIGH 50: CPT | Mod: GC

## 2017-05-17 RX ORDER — KETOROLAC TROMETHAMINE 30 MG/ML
30 SYRINGE (ML) INJECTION ONCE
Qty: 0 | Refills: 0 | Status: DISCONTINUED | OUTPATIENT
Start: 2017-05-17 | End: 2017-05-17

## 2017-05-17 RX ORDER — NYSTATIN CREAM 100000 [USP'U]/G
1 CREAM TOPICAL
Qty: 0 | Refills: 0 | Status: DISCONTINUED | OUTPATIENT
Start: 2017-05-17 | End: 2017-05-24

## 2017-05-17 RX ORDER — KETOROLAC TROMETHAMINE 30 MG/ML
15 SYRINGE (ML) INJECTION ONCE
Qty: 0 | Refills: 0 | Status: DISCONTINUED | OUTPATIENT
Start: 2017-05-17 | End: 2017-05-17

## 2017-05-17 RX ADMIN — Medication 3 MILLILITER(S): at 09:06

## 2017-05-17 RX ADMIN — Medication 1: at 17:16

## 2017-05-17 RX ADMIN — Medication 3 MILLILITER(S): at 20:42

## 2017-05-17 RX ADMIN — GABAPENTIN 100 MILLIGRAM(S): 400 CAPSULE ORAL at 06:47

## 2017-05-17 RX ADMIN — Medication 3 MILLILITER(S): at 15:22

## 2017-05-17 RX ADMIN — Medication 3 MILLIGRAM(S): at 01:30

## 2017-05-17 RX ADMIN — NYSTATIN CREAM 1 APPLICATION(S): 100000 CREAM TOPICAL at 17:16

## 2017-05-17 RX ADMIN — GABAPENTIN 100 MILLIGRAM(S): 400 CAPSULE ORAL at 14:21

## 2017-05-17 RX ADMIN — MEROPENEM 200 MILLIGRAM(S): 1 INJECTION INTRAVENOUS at 22:12

## 2017-05-17 RX ADMIN — Medication 3 MILLILITER(S): at 02:11

## 2017-05-17 RX ADMIN — Medication 1 APPLICATION(S): at 11:45

## 2017-05-17 RX ADMIN — Medication 25 MILLIGRAM(S): at 17:15

## 2017-05-17 RX ADMIN — SIMVASTATIN 40 MILLIGRAM(S): 20 TABLET, FILM COATED ORAL at 22:12

## 2017-05-17 RX ADMIN — Medication 650 MILLIGRAM(S): at 06:48

## 2017-05-17 RX ADMIN — NYSTATIN CREAM 1 APPLICATION(S): 100000 CREAM TOPICAL at 11:45

## 2017-05-17 RX ADMIN — Medication 650 MILLIGRAM(S): at 18:00

## 2017-05-17 RX ADMIN — RIVAROXABAN 15 MILLIGRAM(S): KIT at 06:47

## 2017-05-17 RX ADMIN — Medication 15 MILLIGRAM(S): at 20:27

## 2017-05-17 RX ADMIN — MEROPENEM 200 MILLIGRAM(S): 1 INJECTION INTRAVENOUS at 14:21

## 2017-05-17 RX ADMIN — Medication 650 MILLIGRAM(S): at 07:19

## 2017-05-17 RX ADMIN — Medication 25 MILLIGRAM(S): at 06:47

## 2017-05-17 RX ADMIN — Medication 1000 UNIT(S): at 11:45

## 2017-05-17 RX ADMIN — ACETAZOLAMIDE 250 MILLIGRAM(S): 250 TABLET ORAL at 17:15

## 2017-05-17 RX ADMIN — Medication 650 MILLIGRAM(S): at 17:00

## 2017-05-17 RX ADMIN — RIVAROXABAN 15 MILLIGRAM(S): KIT at 17:16

## 2017-05-17 RX ADMIN — INSULIN GLARGINE 10 UNIT(S): 100 INJECTION, SOLUTION SUBCUTANEOUS at 22:12

## 2017-05-17 RX ADMIN — Medication 15 MILLIGRAM(S): at 20:43

## 2017-05-17 RX ADMIN — Medication 3 MILLIGRAM(S): at 23:51

## 2017-05-17 RX ADMIN — PANTOPRAZOLE SODIUM 40 MILLIGRAM(S): 20 TABLET, DELAYED RELEASE ORAL at 06:48

## 2017-05-17 RX ADMIN — GABAPENTIN 100 MILLIGRAM(S): 400 CAPSULE ORAL at 22:12

## 2017-05-17 RX ADMIN — MEROPENEM 200 MILLIGRAM(S): 1 INJECTION INTRAVENOUS at 06:49

## 2017-05-17 RX ADMIN — TAMSULOSIN HYDROCHLORIDE 0.4 MILLIGRAM(S): 0.4 CAPSULE ORAL at 22:12

## 2017-05-17 RX ADMIN — Medication 166.67 MILLIGRAM(S): at 20:27

## 2017-05-17 RX ADMIN — Medication 30 MILLIGRAM(S): at 23:51

## 2017-05-17 RX ADMIN — ACETAZOLAMIDE 250 MILLIGRAM(S): 250 TABLET ORAL at 06:47

## 2017-05-17 NOTE — PROGRESS NOTE ADULT - SUBJECTIVE AND OBJECTIVE BOX
Time of Visit: 1110  Patient seen and examined.     MEDICATIONS  (STANDING):  ALBUTerol/ipratropium for Nebulization 3milliLiter(s) Nebulizer every 6 hours  insulin lispro (HumaLOG) corrective regimen sliding scale  SubCutaneous three times a day before meals  dextrose 5%. 1000milliLiter(s) IV Continuous <Continuous>  dextrose 50% Injectable 12.5Gram(s) IV Push once  dextrose 50% Injectable 25Gram(s) IV Push once  dextrose 50% Injectable 25Gram(s) IV Push once  pantoprazole    Tablet 40milliGRAM(s) Oral before breakfast  gabapentin 100milliGRAM(s) Oral three times a day  simvastatin 40milliGRAM(s) Oral at bedtime  silver sulfADIAZINE 1% Cream 1Application(s) Topical daily  metoprolol 25milliGRAM(s) Oral two times a day  meropenem IVPB 1000milliGRAM(s) IV Intermittent every 8 hours  insulin glargine Injectable (LANTUS) 10Unit(s) SubCutaneous at bedtime  rivaroxaban 15milliGRAM(s) Oral two times a day  cholecalciferol 1000Unit(s) Oral daily  vancomycin  IVPB 1250milliGRAM(s) IV Intermittent every 12 hours  acetazolamide    Tablet 250milliGRAM(s) Oral every 12 hours  tamsulosin 0.4milliGRAM(s) Oral at bedtime  melatonin 3milliGRAM(s) Oral at bedtime  nystatin Powder 1Application(s) Topical two times a day      MEDICATIONS  (PRN):  dextrose Gel 1Dose(s) Oral once PRN Blood Glucose LESS THAN 70 milliGRAM(s)/deciLiter  glucagon  Injectable 1milliGRAM(s) IntraMuscular once PRN Glucose <70 milliGRAM(s)/deciLiter  acetaminophen   Tablet 650milliGRAM(s) Oral every 6 hours PRN For Temp greater than 38 C (100.4 F)  guaiFENesin   Syrup  (Sugar-Free) 100milliGRAM(s) Oral every 6 hours PRN Cough  acetaminophen   Tablet. 650milliGRAM(s) Oral every 6 hours PRN Mild Pain (1 - 3)  sodium chloride 0.65% Nasal 1Spray(s) Both Nostrils three times a day PRN Nasal Congestion     Medications up to date at time of exam.    PHYSICAL EXAMINATION:  Patient has no new complaints.  GENERAL: The patient is a well-developed, well-nourished, in no apparent distress.     Vital Signs Last 24 Hrs  T(C): 37, Max: 37.2 ( @ 23:53)  T(F): 98.6, Max: 99 ( @ 23:53)  HR: 103 (95 - 113)  BP: 111/54 (111/54 - 140/71)  BP(mean): --  RR: 16 (16 - 20)  SpO2: 99% (96% - 100%)   (if applicable)    Chest Tube (if applicable)    HEENT: Head is normocephalic and atraumatic. Mucous membranes are moist.     NECK: Supple, no palpable adenopathy.    LUNGS: Clear to auscultation, no wheezing, rales, or rhonchi, + decreased breath sounds at bases     HEART: Regular rate and rhythm without murmur.    ABDOMEN: Soft, nontender, and nondistended.      EXTREMITIES: Without any cyanosis, clubbing, rash, lesions or + B/L LE edema.    NEUROLOGIC: Awake, alert, oriented.     SKIN: Warm, dry, good turgor.    LABS:                        9.2    15.0  )-----------( 544      ( 16 May 2017 16:57 )             30.5     -    142  |  101  |  11  ----------------------------<  101<H>  4.0   |  36<H>  |  0.94    Ca    9.2      16 May 2017 16:57  Phos  3.2       Mg     1.6             Urinalysis Basic - ( 17 May 2017 12:13 )    Color: Kathy / Appearance: Slightly Turbid / S.015 / pH: x  Gluc: x / Ketone: Negative  / Bili: Negative / Urobili: Negative   Blood: x / Protein: 30 mg/dL / Nitrite: Negative   Leuk Esterase: Moderate / RBC: x / WBC x   Sq Epi: x / Non Sq Epi: x / Bacteria: x      ABG - ( 15 May 2017 15:29 )  pH: 7.42  /  pCO2: 57    /  pO2: 82    / HCO3: 36    / Base Excess: 10.3  /  SaO2: 96                CARDIAC MARKERS ( 16 May 2017 00:36 )  <0.015 ng/mL / x     / 11 U/L / x     / <1.0 ng/mL        Serum Pro-Brain Natriuretic Peptide: 528 pg/mL ( @ 00:36)          MICROBIOLOGY: (if applicable)    RADIOLOGY & ADDITIONAL STUDIES:  EKG:   CXR:  ECHO:    IMPRESSION: 57y Female PAST MEDICAL & SURGICAL HISTORY:  Morbid obesity  Heart failure with preserved ejection fraction  CHF (congestive heart failure)  Diabetes  Overactive bladder  Urinary tract infection without hematuria, site unspecified  Congestive heart failure, unspecified congestive heart failure chronicity, unspecified congestive heart failure type  Asthma  Hemorrhoids  Congestive heart failure  Emphysema  S/P   History of appendectomy  Appendicitis  S/P cholecystectomy     p/w SOB due to acute on chronic hypoxic respiratory failure due to COPD and CHF, most likely PUSHPA patient has been told she needed PAP in past but refused.     CT abd/pelvis show renal stones and hydronephrosis. Stool is positive for occult blood, anemic, s/p PRBC transfusion. in view of patient morbid obesity  and limited mobility she will need lifelong anticoagulation for PE. No acute changes on CXR    + hsu draining, + hematuria    RECOMMENDATIONS:  Obesity hypoventilation syndrome vs Sleep apnea vs underlying COPD.    - con't with bronchodilators, o2 supplement as needed.    - abx as per ID recommendation   - if pt will have cystoscopy, recommend d/c eliquis and put on heparin gtt Time of Visit: 1110  Patient seen and examined.     MEDICATIONS  (STANDING):  ALBUTerol/ipratropium for Nebulization 3milliLiter(s) Nebulizer every 6 hours  insulin lispro (HumaLOG) corrective regimen sliding scale  SubCutaneous three times a day before meals  dextrose 5%. 1000milliLiter(s) IV Continuous <Continuous>  dextrose 50% Injectable 12.5Gram(s) IV Push once  dextrose 50% Injectable 25Gram(s) IV Push once  dextrose 50% Injectable 25Gram(s) IV Push once  pantoprazole    Tablet 40milliGRAM(s) Oral before breakfast  gabapentin 100milliGRAM(s) Oral three times a day  simvastatin 40milliGRAM(s) Oral at bedtime  silver sulfADIAZINE 1% Cream 1Application(s) Topical daily  metoprolol 25milliGRAM(s) Oral two times a day  meropenem IVPB 1000milliGRAM(s) IV Intermittent every 8 hours  insulin glargine Injectable (LANTUS) 10Unit(s) SubCutaneous at bedtime  rivaroxaban 15milliGRAM(s) Oral two times a day  cholecalciferol 1000Unit(s) Oral daily  vancomycin  IVPB 1250milliGRAM(s) IV Intermittent every 12 hours  acetazolamide    Tablet 250milliGRAM(s) Oral every 12 hours  tamsulosin 0.4milliGRAM(s) Oral at bedtime  melatonin 3milliGRAM(s) Oral at bedtime  nystatin Powder 1Application(s) Topical two times a day      MEDICATIONS  (PRN):  dextrose Gel 1Dose(s) Oral once PRN Blood Glucose LESS THAN 70 milliGRAM(s)/deciLiter  glucagon  Injectable 1milliGRAM(s) IntraMuscular once PRN Glucose <70 milliGRAM(s)/deciLiter  acetaminophen   Tablet 650milliGRAM(s) Oral every 6 hours PRN For Temp greater than 38 C (100.4 F)  guaiFENesin   Syrup  (Sugar-Free) 100milliGRAM(s) Oral every 6 hours PRN Cough  acetaminophen   Tablet. 650milliGRAM(s) Oral every 6 hours PRN Mild Pain (1 - 3)  sodium chloride 0.65% Nasal 1Spray(s) Both Nostrils three times a day PRN Nasal Congestion     Medications up to date at time of exam.    PHYSICAL EXAMINATION:  Patient has no new complaints.  GENERAL: The patient is a well-developed, well-nourished, in no apparent distress.     Vital Signs Last 24 Hrs  T(C): 37, Max: 37.2 ( @ 23:53)  T(F): 98.6, Max: 99 ( @ 23:53)  HR: 103 (95 - 113)  BP: 111/54 (111/54 - 140/71)  BP(mean): --  RR: 16 (16 - 20)  SpO2: 99% (96% - 100%)   (if applicable)    Chest Tube (if applicable)    HEENT: Head is normocephalic and atraumatic. Mucous membranes are moist.     NECK: Supple, no palpable adenopathy.    LUNGS: Clear to auscultation, no wheezing, rales, or rhonchi, + decreased breath sounds at bases     HEART: Regular rate and rhythm without murmur.    ABDOMEN: Soft, nontender, and nondistended.      EXTREMITIES: Without any cyanosis, clubbing, rash, lesions or + B/L LE edema.    NEUROLOGIC: Awake, alert, oriented.     SKIN: Warm, dry, good turgor.    LABS:                        9.2    15.0  )-----------( 544      ( 16 May 2017 16:57 )             30.5     -    142  |  101  |  11  ----------------------------<  101<H>  4.0   |  36<H>  |  0.94    Ca    9.2      16 May 2017 16:57  Phos  3.2       Mg     1.6             Urinalysis Basic - ( 17 May 2017 12:13 )    Color: Kathy / Appearance: Slightly Turbid / S.015 / pH: x  Gluc: x / Ketone: Negative  / Bili: Negative / Urobili: Negative   Blood: x / Protein: 30 mg/dL / Nitrite: Negative   Leuk Esterase: Moderate / RBC: x / WBC x   Sq Epi: x / Non Sq Epi: x / Bacteria: x      ABG - ( 15 May 2017 15:29 )  pH: 7.42  /  pCO2: 57    /  pO2: 82    / HCO3: 36    / Base Excess: 10.3  /  SaO2: 96                CARDIAC MARKERS ( 16 May 2017 00:36 )  <0.015 ng/mL / x     / 11 U/L / x     / <1.0 ng/mL        Serum Pro-Brain Natriuretic Peptide: 528 pg/mL ( @ 00:36)          MICROBIOLOGY: (if applicable)    RADIOLOGY & ADDITIONAL STUDIES:  EKG:   CXR:  ECHO:    IMPRESSION: 57y Female PAST MEDICAL & SURGICAL HISTORY:  Morbid obesity  Heart failure with preserved ejection fraction  CHF (congestive heart failure)  Diabetes  Overactive bladder  Urinary tract infection without hematuria, site unspecified  Congestive heart failure, unspecified congestive heart failure chronicity, unspecified congestive heart failure type  Asthma  Hemorrhoids  Congestive heart failure  Emphysema  S/P   History of appendectomy  Appendicitis  S/P cholecystectomy     p/w SOB due to acute on chronic hypoxic respiratory failure due to COPD and CHF, most likely PUSHPA patient has been told she needed PAP in past but refused.     CT abd/pelvis show renal stones and hydronephrosis. Stool is positive for occult blood, anemic, s/p PRBC transfusion. in view of patient morbid obesity  and limited mobility she will need lifelong anticoagulation for PE. No acute changes on CXR    + hsu draining, + hematuria    RECOMMENDATIONS:  Obesity hypoventilation syndrome vs Sleep apnea vs underlying COPD.    - con't with bronchodilators, o2 supplement as needed.    - abx as per ID recommendation   - if pt will have cystoscopy, recommend d/c xarelto and put on heparin gtt   - out patient sleep study Time of Visit: 1110  Patient seen and examined.     MEDICATIONS  (STANDING):  ALBUTerol/ipratropium for Nebulization 3milliLiter(s) Nebulizer every 6 hours  insulin lispro (HumaLOG) corrective regimen sliding scale  SubCutaneous three times a day before meals  dextrose 5%. 1000milliLiter(s) IV Continuous <Continuous>  dextrose 50% Injectable 12.5Gram(s) IV Push once  dextrose 50% Injectable 25Gram(s) IV Push once  dextrose 50% Injectable 25Gram(s) IV Push once  pantoprazole    Tablet 40milliGRAM(s) Oral before breakfast  gabapentin 100milliGRAM(s) Oral three times a day  simvastatin 40milliGRAM(s) Oral at bedtime  silver sulfADIAZINE 1% Cream 1Application(s) Topical daily  metoprolol 25milliGRAM(s) Oral two times a day  meropenem IVPB 1000milliGRAM(s) IV Intermittent every 8 hours  insulin glargine Injectable (LANTUS) 10Unit(s) SubCutaneous at bedtime  rivaroxaban 15milliGRAM(s) Oral two times a day  cholecalciferol 1000Unit(s) Oral daily  vancomycin  IVPB 1250milliGRAM(s) IV Intermittent every 12 hours  acetazolamide    Tablet 250milliGRAM(s) Oral every 12 hours  tamsulosin 0.4milliGRAM(s) Oral at bedtime  melatonin 3milliGRAM(s) Oral at bedtime  nystatin Powder 1Application(s) Topical two times a day      MEDICATIONS  (PRN):  dextrose Gel 1Dose(s) Oral once PRN Blood Glucose LESS THAN 70 milliGRAM(s)/deciLiter  glucagon  Injectable 1milliGRAM(s) IntraMuscular once PRN Glucose <70 milliGRAM(s)/deciLiter  acetaminophen   Tablet 650milliGRAM(s) Oral every 6 hours PRN For Temp greater than 38 C (100.4 F)  guaiFENesin   Syrup  (Sugar-Free) 100milliGRAM(s) Oral every 6 hours PRN Cough  acetaminophen   Tablet. 650milliGRAM(s) Oral every 6 hours PRN Mild Pain (1 - 3)  sodium chloride 0.65% Nasal 1Spray(s) Both Nostrils three times a day PRN Nasal Congestion     Medications up to date at time of exam.    PHYSICAL EXAMINATION:  Patient has no new complaints.  GENERAL: The patient is a well-developed, well-nourished, in no apparent distress.     Vital Signs Last 24 Hrs  T(C): 37, Max: 37.2 ( @ 23:53)  T(F): 98.6, Max: 99 ( @ 23:53)  HR: 103 (95 - 113)  BP: 111/54 (111/54 - 140/71)  BP(mean): --  RR: 16 (16 - 20)  SpO2: 99% (96% - 100%)   (if applicable)    Chest Tube (if applicable)    HEENT: Head is normocephalic and atraumatic. Mucous membranes are moist.     NECK: Supple, no palpable adenopathy.    LUNGS: Clear to auscultation, no wheezing, rales, or rhonchi, + decreased breath sounds at bases     HEART: Regular rate and rhythm without murmur.    ABDOMEN: Soft, nontender, and nondistended.      EXTREMITIES: Without any cyanosis, clubbing, rash, lesions or + B/L LE edema.    NEUROLOGIC: Awake, alert, oriented.     SKIN: Warm, dry, good turgor.    LABS:                        9.2    15.0  )-----------( 544      ( 16 May 2017 16:57 )             30.5     -    142  |  101  |  11  ----------------------------<  101<H>  4.0   |  36<H>  |  0.94    Ca    9.2      16 May 2017 16:57  Phos  3.2       Mg     1.6             Urinalysis Basic - ( 17 May 2017 12:13 )    Color: Kathy / Appearance: Slightly Turbid / S.015 / pH: x  Gluc: x / Ketone: Negative  / Bili: Negative / Urobili: Negative   Blood: x / Protein: 30 mg/dL / Nitrite: Negative   Leuk Esterase: Moderate / RBC: x / WBC x   Sq Epi: x / Non Sq Epi: x / Bacteria: x      ABG - ( 15 May 2017 15:29 )  pH: 7.42  /  pCO2: 57    /  pO2: 82    / HCO3: 36    / Base Excess: 10.3  /  SaO2: 96                CARDIAC MARKERS ( 16 May 2017 00:36 )  <0.015 ng/mL / x     / 11 U/L / x     / <1.0 ng/mL        Serum Pro-Brain Natriuretic Peptide: 528 pg/mL ( @ 00:36)          MICROBIOLOGY: (if applicable)    RADIOLOGY & ADDITIONAL STUDIES:  EKG:   CXR:  ECHO:    IMPRESSION: 57y Female PAST MEDICAL & SURGICAL HISTORY:  Morbid obesity  Heart failure with preserved ejection fraction  CHF (congestive heart failure)  Diabetes  Overactive bladder  Urinary tract infection without hematuria, site unspecified  Congestive heart failure, unspecified congestive heart failure chronicity, unspecified congestive heart failure type  Asthma  Hemorrhoids  Congestive heart failure  Emphysema  S/P   History of appendectomy  Appendicitis  S/P cholecystectomy     p/w SOB due to acute on chronic hypoxic respiratory failure due to COPD and CHF, most likely PUSHPA patient has been told she needed PAP in past but refused.     CT abd/pelvis show renal stones and hydronephrosis. Stool is positive for occult blood, anemic, s/p PRBC transfusion. in view of patient morbid obesity  and limited mobility she will need lifelong anticoagulation for PE. No acute changes on CXR    + hsu draining, + hematuria    RECOMMENDATIONS:  Obesity hypoventilation syndrome vs Sleep apnea vs underlying COPD.    - con't with bronchodilators, o2 supplement as needed.    - abx as per ID recommendation   - if pt will have cystoscopy, recommend d/c xarelto and put on heparin gtt   - out patient sleep study  Agree with above assessment and plan as transcribed.

## 2017-05-17 NOTE — PROGRESS NOTE ADULT - SUBJECTIVE AND OBJECTIVE BOX
Patient is a 57y old  Female who presents with a chief complaint of Sent from NH due to fever, SOB, cough, abdominal pain, diarrhea (12 May 2017 04:51)    INTERVAL HPI/OVERNIGHT EVENTS:  patient was seen and examined at bedside, denies active complaints, she was advised about healthy food, however she seemed to be angry, and asked not to speak about that.  she was encourage to move her extremities as her functional status is very poor and needs lifting machine from bed to chair.  Also, was told that soon will be clearing her to go for Rehab however she became angry saying that she wants to stay in hospital as long as  possible for Physical therapy purpose. Patient was told she will need to go for Rehab to continue her whole conditioning process, she seemed to be angry again upon this discussion and became unpleasant to me.     no overnight fever, no labs from today as of yet,   repeat U/A is positive for pyuria, hsu removed today, patient did not pass any urine yet.      Allergies  Cheese (Pruritus)  oxycodone (Other (Moderate))  peanuts (Unknown)  penicillin (Rash)  seafood (Hives)  strawberry (Pruritus)  Tomatoes (Other)    REVIEW OF SYSTEMS:  CONSTITUTIONAL: No fever, weight loss, or fatigue  EYES: No eye pain, visual disturbances, or discharge  RESPIRATORY: No cough, wheezing, chills or hemoptysis; No shortness of breath  CARDIOVASCULAR: No chest pain, palpitations, dizziness, or leg swelling  GASTROINTESTINAL: No abdominal or epigastric pain. No nausea, vomiting, or hematemesis; No diarrhea or constipation. No melena or hematochezia.  GENITOURINARY: No dysuria, frequency, hematuria, or incontinence  NEUROLOGICAL: No headaches, memory loss, loss of strength, numbness, or tremors  MUSCULOSKELETAL: No joint pain or swelling; No muscle, back, or extremity pain  PSYCHIATRIC: No depression, anxiety, mood swings, or difficulty sleeping  HEME/LYMPH: No easy bruising, or bleeding gums  ALLERGY AND IMMUNOLOGIC: No hives or eczema    Medications:  ALBUTerol/ipratropium for Nebulization 3milliLiter(s) Nebulizer every 6 hours  insulin lispro (HumaLOG) corrective regimen sliding scale  SubCutaneous three times a day before meals  dextrose 5%. 1000milliLiter(s) IV Continuous <Continuous>  dextrose Gel 1Dose(s) Oral once PRN Blood Glucose LESS THAN 70 milliGRAM(s)/deciLiter  dextrose 50% Injectable 12.5Gram(s) IV Push once  dextrose 50% Injectable 25Gram(s) IV Push once  dextrose 50% Injectable 25Gram(s) IV Push once  glucagon  Injectable 1milliGRAM(s) IntraMuscular once PRN Glucose <70 milliGRAM(s)/deciLiter  pantoprazole    Tablet 40milliGRAM(s) Oral before breakfast  gabapentin 100milliGRAM(s) Oral three times a day  simvastatin 40milliGRAM(s) Oral at bedtime  silver sulfADIAZINE 1% Cream 1Application(s) Topical daily  metoprolol 25milliGRAM(s) Oral two times a day  acetaminophen   Tablet 650milliGRAM(s) Oral every 6 hours PRN For Temp greater than 38 C (100.4 F)  guaiFENesin   Syrup  (Sugar-Free) 100milliGRAM(s) Oral every 6 hours PRN Cough  meropenem IVPB 1000milliGRAM(s) IV Intermittent every 8 hours  insulin glargine Injectable (LANTUS) 10Unit(s) SubCutaneous at bedtime  rivaroxaban 15milliGRAM(s) Oral two times a day  acetaminophen   Tablet. 650milliGRAM(s) Oral every 6 hours PRN Mild Pain (1 - 3)  cholecalciferol 1000Unit(s) Oral daily  vancomycin  IVPB 1250milliGRAM(s) IV Intermittent every 12 hours  acetazolamide    Tablet 250milliGRAM(s) Oral every 12 hours  sodium chloride 0.65% Nasal 1Spray(s) Both Nostrils three times a day PRN Nasal Congestion  tamsulosin 0.4milliGRAM(s) Oral at bedtime  melatonin 3milliGRAM(s) Oral at bedtime  nystatin Powder 1Application(s) Topical two times a day      PHYSICAL EXAM:  Vital Signs Last 24 Hrs  T(C): 37, Max: 37.2 (05-16 @ 23:53)  T(F): 98.6, Max: 99 (05-16 @ 23:53)  HR: 120 (95 - 120)  BP: 120/63 (111/54 - 129/64)  BP(mean): --  RR: 16 (16 - 20)  SpO2: 99% (99% - 100%)    GENERAL: NAD, morbidly obese  HEAD:  Atraumatic, Normocephalic  EYES: EOMI, PERRLA, conjunctiva and sclera clear  ENMT: No tonsillar erythema, exudates, or enlargement; Moist mucous membranes, Good dentition, No lesions  NECK: Supple, No JVD, Normal thyroid  NERVOUS SYSTEM:  Alert & Oriented X3, Good concentration; Motor Strength 5/5 B/L upper and lower extremities; DTRs 2+ intact and symmetric  CHEST/LUNG: bilateral scattered rhonchi.  HEART: Regular rate and rhythm; No murmurs, rubs, or gallops  ABDOMEN: Soft, Nontender, Nondistended; Bowel sounds present  EXTREMITIES:  2+ Peripheral Pulses, No clubbing, cyanosis, or edema  SKIN: No rashes or lesions    LABS:                        9.2    15.0  )-----------( 544      ( 16 May 2017 16:57 )             30.5     05-16    142  |  101  |  11  ----------------------------<  101<H>  4.0   |  36<H>  |  0.94    Ca    9.2      16 May 2017 16:57  Phos  3.2       Mg     1.6     16            CARDIAC MARKERS ( 16 May 2017 00:36 )  <0.015 ng/mL / x     / 11 U/L / x     / <1.0 ng/mL      Urinalysis Basic - ( 17 May 2017 12:13 )    Color: Kathy / Appearance: Slightly Turbid / S.015 / pH: x  Gluc: x / Ketone: Negative  / Bili: Negative / Urobili: Negative   Blood: x / Protein: 30 mg/dL / Nitrite: Negative   Leuk Esterase: Moderate / RBC: >50 /HPF / WBC >50 /HPF   Sq Epi: x / Non Sq Epi: Moderate / Bacteria: Many /HPF      Culture & Sensitivity:   CAPILLARY BLOOD GLUCOSE  123 (17 May 2017 11:30)  118 (17 May 2017 08:23)  137 (16 May 2017 21:27)  132 (16 May 2017 16:31)    I & Os for 24h ending  @ 07:00  =============================================  IN: 0 ml / OUT: 3600 ml / NET: -3600 ml    I & Os for current day (as of  @ 15:37)  =============================================  IN: 0 ml / OUT: 1750 ml / NET: -1750 ml      RADIOLOGY & ADDITIONAL TESTS:      Consultant(s) Notes Reviewed:  [ ] YES  [ ] NO    Care Discussed with Consultants/Other Providers [ ] YES  [ ] NO

## 2017-05-17 NOTE — PROGRESS NOTE ADULT - SUBJECTIVE AND OBJECTIVE BOX
Patient is a 57y old  Female who presents with a chief complaint of Sent from NH due to fever, SOB, cough, abdominal pain, diarrhea (12 May 2017 04:51)      -=OVERNIGHT EVENTS / INTERVAL HPI / SUBJECTIVE=-  Patient reports having discomfort/pain with the hsu catheter. Feels her breathing continues to be below baseline. Denies fevers, chills, n/v, CP.     -=OBJECTIVE=-       --VITALS--  Vital Signs Last 24 Hrs  T(C): 36.8, Max: 37.2 (05-16 @ 23:53)  T(F): 98.2, Max: 99 (05-16 @ 23:53)  HR: 121 (95 - 121)  BP: 99/64 (99/64 - 129/64)  BP(mean): --  RR: 16 (16 - 20)  SpO2: 100% (99% - 100%)         --PHYSICAL EXAM--  General: obese, laying in bed  Neurology: A&Ox3  Respiratory: CTA B/L, no wheezes, no rhonchi, no rales  CV: RRR, S1S2, no murmur  Abdominal: Soft, mild TTP diffusely, ND no palpable mass, bowel sounds positive  MSK: No edema         --LABS--  Vanc Trough 5am ~21.1  Remaining labs pending from today.               9.2    15.0  )-----------( 544      ( 16 May 2017 16:57 )             30.5     05-16    142  |  101  |  11  ----------------------------<  101<H>  4.0   |  36<H>  |  0.94    Ca    9.2      16 May 2017 16:57  Phos  3.2     05-16  Mg     1.6     05-16 05-12 Chol 98 LDL 43 HDL 31<L> Trig 122         --IMAGING--         --MEDICATIONS--  MEDICATIONS  (STANDING):  ALBUTerol/ipratropium for Nebulization 3milliLiter(s) Nebulizer every 6 hours  insulin lispro (HumaLOG) corrective regimen sliding scale  SubCutaneous three times a day before meals  dextrose 5%. 1000milliLiter(s) IV Continuous <Continuous>  dextrose 50% Injectable 12.5Gram(s) IV Push once  dextrose 50% Injectable 25Gram(s) IV Push once  dextrose 50% Injectable 25Gram(s) IV Push once  pantoprazole    Tablet 40milliGRAM(s) Oral before breakfast  gabapentin 100milliGRAM(s) Oral three times a day  simvastatin 40milliGRAM(s) Oral at bedtime  silver sulfADIAZINE 1% Cream 1Application(s) Topical daily  metoprolol 25milliGRAM(s) Oral two times a day  meropenem IVPB 1000milliGRAM(s) IV Intermittent every 8 hours  insulin glargine Injectable (LANTUS) 10Unit(s) SubCutaneous at bedtime  rivaroxaban 15milliGRAM(s) Oral two times a day  cholecalciferol 1000Unit(s) Oral daily  vancomycin  IVPB 1250milliGRAM(s) IV Intermittent every 12 hours  acetazolamide    Tablet 250milliGRAM(s) Oral every 12 hours  tamsulosin 0.4milliGRAM(s) Oral at bedtime  melatonin 3milliGRAM(s) Oral at bedtime  nystatin Powder 1Application(s) Topical two times a day    MEDICATIONS  (PRN):  dextrose Gel 1Dose(s) Oral once PRN Blood Glucose LESS THAN 70 milliGRAM(s)/deciLiter  glucagon  Injectable 1milliGRAM(s) IntraMuscular once PRN Glucose <70 milliGRAM(s)/deciLiter  acetaminophen   Tablet 650milliGRAM(s) Oral every 6 hours PRN For Temp greater than 38 C (100.4 F)  guaiFENesin   Syrup  (Sugar-Free) 100milliGRAM(s) Oral every 6 hours PRN Cough  acetaminophen   Tablet. 650milliGRAM(s) Oral every 6 hours PRN Mild Pain (1 - 3)  sodium chloride 0.65% Nasal 1Spray(s) Both Nostrils three times a day PRN Nasal Congestion

## 2017-05-17 NOTE — PROGRESS NOTE ADULT - PROBLEM SELECTOR PLAN 1
U/A today mayelin poe ID.  all cultures are negative to date (patient was on Meropenem while at nursing home)  vanco trough therapeutic

## 2017-05-17 NOTE — PROGRESS NOTE ADULT - SUBJECTIVE AND OBJECTIVE BOX
Meds:  meropenem IVPB 1000milliGRAM(s) IV Intermittent every 8 hours  vancomycin  IVPB 1250milliGRAM(s) IV Intermittent every 12 hours    Allergies:  Allergies    Cheese (Pruritus)  oxycodone (Other (Moderate))  peanuts (Unknown)  penicillin (Rash)  seafood (Hives)  strawberry (Pruritus)  Tomatoes (Other)    Intolerances    ROS  [  ] UNABLE TO ELICIT    General:  [  ] Fever   [ x ] Malaise    Skin: sacral ulcer    HEENT:  [  ] Sore Throat  [  ] Photophobia  [x  ] None    Chest: [x  ] SOB  [ x ] Cough  [ ] None    Cardiovascular: [  ] CP	 [x ] None    Gastrointestinal: [ x ] Abd pain   [  ] N    [  ] V   [  ] Diarrhea	 [ ] None    Genitourinary: [  ] Polyuria   [x  ] Urgency   [x  ] Dysuria    [  ]  Hematuria   [ ] None	    Musculoskeletal:  [  ] Back Pain	 [x ] General weakness.    Neurological: [  ]Dizziness  [  ]Visual Disturbance  [  ]Headaches   [ x] General weakness.          PHYSICAL EXAM:    Vital Signs Last 24 Hrs  T(C): 37, Max: 37.2 (05-16 @ 23:53)  T(F): 98.6, Max: 99 (05-16 @ 23:53)  HR: 103 (95 - 113)  BP: 111/54 (111/54 - 140/71)  BP(mean): --  RR: 16 (16 - 20)  SpO2: 99% (96% - 100%)    HEENT:    Neck:  [ x ] Supple  [  ]Lymphadenopathy  [  ] JVD   [ x ]No JVD  [  ] Masses   [ x ] fugal skin infection on neck crease anteriorly.    CHEST/Respiratory:  [ x ] Rales      [  ] Rhonchi      [  ] Wheezing       [  ] Chest Tenderness  [  ]Clear to auscultation    Cardiovascular:  [ x ]S1 S2  [ x ] Reg  [  ] Irreg   [  ] Murmurs  [  ]Systolic [  ]Diastolic  [x  ]No Murmur    Abdomen:  [ x ]  Bowel Sounds   [  ] Soft           [  ] ABD Distention  [ x ] Tenderness  [  ] Organomegaly                        [  ] Guarding Rigidity  [ x ] No Guarding Rigidity  [  ] Rebound Tenderness [ x ] No Rebound Tenderness    Extremities: [ x ] Edema  [  ] No edema  [  ] Clubbing   [  ] Cyanosis  [ x ] Palpable peripheral pulses                        [ x ] No Tender Calf muscles  [  ] Tender Calf muscles    Neurological:  [ x Alert  [ x ] Awake  [x  ] Oriented  x3                              [  ] Confused    Skin:  [  ] Thrombophlebitis  [x  ] Improving rashes  neck anteriorly. [  ] Dry  [ x ] Ulcers sacral and buttock.    Ortho:  [  ] Joint Swelling  [  ] Joint erythema [ x ] DJD     Luna cath in place.  LABS/DIAGNOSTIC TESTS                                                       9.2    15.0  )-----------( 544      ( 16 May 2017 16:57 )             30.5   -    142  |  101  |  11  ----------------------------<  101<H>  4.0   |  36<H>  |  0.94    Ca    9.2      16 May 2017 16:57  Phos  3.2       Mg     1.6         Urinalysis Basic - ( 17 May 2017 12:13 )    Color: Kathy / Appearance: Slightly Turbid / S.015 / pH: x  Gluc: x / Ketone: Negative  / Bili: Negative / Urobili: Negative   Blood: x / Protein: 30 mg/dL / Nitrite: Negative   Leuk Esterase: Moderate / RBC: >50 /HPF / WBC >50 /HPF   Sq Epi: x / Non Sq Epi: Moderate / Bacteria: Many /HPF            CULTURES: Culture - Blood (17 @ 09:33)    Specimen Source: .Blood Blood-Peripheral    Culture Results:   No growth to date.    Culture - Blood (17 @ 09:33)    Specimen Source: .Blood Blood-Peripheral    Culture Results:   No growth to date.    Culture - Urine (17 @ 09:27)    Specimen Source: .Urine Catheterized    Culture Results:   <10,000 CFU/ml Normal Urogenital juanita present    Vancomycin Level, Trough: 21.1          RADIOLOGY    EXAM:  CHEST-PORTABLE STAT                            PROCEDURE DATE:  05/15/2017        INTERPRETATION:  CLINICAL INFORMATION: Tachypnea.    A single portable view of the chest was obtained.     Comparison: Chest x-ray 2017.     The mediastinal cardiac silhouette is unremarkable.    New increased vascular markings bilaterally may represent pulmonary edema.    No acute osseous finding.     IMPRESSION:    Findings as above.              Assessment and Recommendation:   		  Patient has no fever today, and leukocytosis is increased today.    Problem/Recommendation - 1:  Problem: Urinary tract infection without hematuria, site unspecified.   Recommendation:   1- Repeat  Blood C&S for fever 100 or more.  2- Follow WBC closely.  3- Continue Meropenem 1 gm IVPB q 8 hours x 4 more days to finish 14 days total days of Meropenem.  4- Continue vancomycin 1.25 gm IVPB q 12 hours x 4 more days.  5- Follow Vancomycin trough in 3-4 days (biw) while patient is on vancomycin.  6- CBC & BMP tomorrow.  7- Obtain urine C&S as u/a suggest for UTI.  Problem/Recommendation - 2:  ·  Problem: Hydronephrosis.    Recommendation:   1- Urology management.   2- Improved.    Problem/Recommendation - 3:  ·  Problem: Hydroureter.    Recommendation:   1- Urology management.     Problem/Recommendation - 4:  ·  Problem: COPD (chronic obstructive pulmonary disease).    Recommendation:   1- IV antibiotics.  2- Bronchodilators.  3-Pulmonary management.   4- O2 as needed.  Problem/Recommendation - 5:  ·  Problem: Diarrhea, unspecified type.    Recommendation:   1- Stool for C&S if recure.  2- stool for O&P and C&S if recure. .     Problem/Recommendation - 6:  Problem: Heart failure.   Recommendation:   1-Cardiac management.  2-Monitor renal functions.    Problem/Recommendation - 7:  Problem: Diabetes.   Recommendation:   1- Blood sugar monitoring and control.  2- Accuichecks with coverage.  3- 1800 nico ADA diet.  4-Follow HB A1C.     Problem/Recommendation - 8:  Problem: Dermatophytoses (improving).   Recommendation:   1- Continue Mycolog cream to the affected area of neck anteriorly TID.  2- Keep neck skin dry all the time.  3- Clinically improving.

## 2017-05-17 NOTE — CHART NOTE - NSCHARTNOTEFT_GEN_A_CORE
Vancomycin trough 11.4. Resuming vancomycin at present dose, since the prior dose was held. F/u repeat vancomycin trough as appropriate.

## 2017-05-17 NOTE — PROGRESS NOTE ADULT - ASSESSMENT
57F PMHx Morbid Obesity, DM, COPD, PUSHPA, Provoked RLL PE,  R kidney staghorn calculus, Hydronephrosis / Hydroureter, unstageable R sacral ulcer, UTI presnted with subjective fever, dizziness, diarrhea and weakness at NH while on Meropenem for UTI. Admitted for sepsis 2/2 UTI likely complicated given R Staghorn calculus.     #Sepsis 2/2 UTI, complicated given hx of R Staghorn Calculus. UA +ve. UCx and BCx -ve so far. However, was on treatment with meropenem at NH. Presumptively Sepsis 2/2 UTI. ED T max of 102.9, tachycardiac 112, WBC of 15.9, Lactate 1.6, UA 6-10 WBC ( positive). // -UCx -ve, BCx -ve, however was on treatment in NH.  -In past 48 hours had low grade temp and WBC increase to 15 after improving. Repeat UA +ve and possibly turbid, however this was from hsu catheter which continues to have hematuria.  ->Will consider repeating UA tomorrow off hsu, if warranted clinically.  -F/up CBC this PM  -C/w IV Meropenem 1gm Q8H and IV Vancomycin 1.25gm Q12H for 4 more days  -AM dose of vancomycin held today 2/2 supratherapeutic trough, f/up repeat today PM.  -ID (Dr. Wright)  -Given Staghorn calculus is no redemonstrated, suspect potential nidus of infection is resolving.     #R Staghorn Calculus / Urinary Retention: Hx of R Staghorn last admission at [facility]. CT here "Bilateral hydroureter as well as left hydronephrosis. No obstructing abnormality visualized. Urinary bladder appears distended. Multiple non-obstructing right intrarenal stones."  Renal ultrasound only shows R renal calculus (did not indicate staghorn) without hydronephrosis... hydronephrosis likely resolving s/p hsu catheter placement. Hsu catheter placed because urinary retention.  -Hsu removed today, monitor for voiding  -F/up urology (Dr. Rayo): outpatient urodynamic studies  -Flomax started    #Anemia: Hx Iron deficiency anemia , guaiac negative in April 2017 , given 1 U PRBC at that time. Here, with H/H downtrend to 6.6. s/p 1UPRBC with incomplete improvement to 7.2.  + hematuria noted. + FOBT +ve though no gross blood (melena or bright red blood) on rectal exam. Additional unit given.  - Hgb stable x2 day s/p 2U PRBC  - F/up GI Eval (Dr. Jamison)    #Acute Provoked RLL PE: Patient has been bedridden. Diagnosed at [facility] in April.  -TTE (April 2017, [facility]) shows normal EF 55%, moderate biatrial enlargement, mild Mitral and tricuspid regurgitation, mild calcific degenerative valve disease.  -C/w Xarelto 15mg BID  (switch to 20mg QD on 5/25/17), will likely need indefinitely given immobility    #Pulmonary Edema: Seen on recent CXR, likely accounts for increased dyspnea.  -s/p Lasix PO 20mg x 1 + starting Acetazolamide given metabolic alkalosis  -Patient diuresing, so will follow clinically    #Metabolic Alkalosis: Likely chronic compensation for CO2 retention, however likely over compensated  -C/w Acetazolamide, f/up Bicarb on BMP    #COPD + Likely underling PUSHPA  -C/w NC O2 supplement (O2 dependent at baseline)  -C/w duonebs.  -ABG indicates patient is retaining CO2    #Diabetes: c/w home dose of Lantus  10 Units and SSI.     #Initial CHRISTINA resolved s/p IVF.#    #PPx: on full dose AC.

## 2017-05-18 DIAGNOSIS — D64.89 OTHER SPECIFIED ANEMIAS: ICD-10-CM

## 2017-05-18 DIAGNOSIS — R19.5 OTHER FECAL ABNORMALITIES: ICD-10-CM

## 2017-05-18 DIAGNOSIS — J96.12 CHRONIC RESPIRATORY FAILURE WITH HYPERCAPNIA: ICD-10-CM

## 2017-05-18 DIAGNOSIS — N31.9 NEUROMUSCULAR DYSFUNCTION OF BLADDER, UNSPECIFIED: ICD-10-CM

## 2017-05-18 DIAGNOSIS — D72.829 ELEVATED WHITE BLOOD CELL COUNT, UNSPECIFIED: ICD-10-CM

## 2017-05-18 LAB
APPEARANCE UR: ABNORMAL
BILIRUB UR-MCNC: NEGATIVE — SIGNIFICANT CHANGE UP
COLOR SPEC: ABNORMAL
DIFF PNL FLD: ABNORMAL
GLUCOSE UR QL: NEGATIVE — SIGNIFICANT CHANGE UP
KETONES UR-MCNC: NEGATIVE — SIGNIFICANT CHANGE UP
LEUKOCYTE ESTERASE UR-ACNC: ABNORMAL
NITRITE UR-MCNC: NEGATIVE — SIGNIFICANT CHANGE UP
PH UR: 6.5 — SIGNIFICANT CHANGE UP (ref 5–8)
PROT UR-MCNC: 100
SP GR SPEC: 1.01 — SIGNIFICANT CHANGE UP (ref 1.01–1.02)
UROBILINOGEN FLD QL: 1

## 2017-05-18 PROCEDURE — 99233 SBSQ HOSP IP/OBS HIGH 50: CPT | Mod: GC

## 2017-05-18 PROCEDURE — 99222 1ST HOSP IP/OBS MODERATE 55: CPT

## 2017-05-18 PROCEDURE — 71010: CPT | Mod: 26

## 2017-05-18 RX ORDER — IBUPROFEN 200 MG
400 TABLET ORAL ONCE
Qty: 0 | Refills: 0 | Status: COMPLETED | OUTPATIENT
Start: 2017-05-18 | End: 2017-05-18

## 2017-05-18 RX ORDER — LANOLIN ALCOHOL/MO/W.PET/CERES
3 CREAM (GRAM) TOPICAL ONCE
Qty: 0 | Refills: 0 | Status: DISCONTINUED | OUTPATIENT
Start: 2017-05-18 | End: 2017-05-24

## 2017-05-18 RX ADMIN — Medication 3 MILLILITER(S): at 21:14

## 2017-05-18 RX ADMIN — Medication 3 MILLILITER(S): at 02:46

## 2017-05-18 RX ADMIN — TAMSULOSIN HYDROCHLORIDE 0.4 MILLIGRAM(S): 0.4 CAPSULE ORAL at 21:47

## 2017-05-18 RX ADMIN — ACETAZOLAMIDE 250 MILLIGRAM(S): 250 TABLET ORAL at 06:03

## 2017-05-18 RX ADMIN — Medication 650 MILLIGRAM(S): at 21:50

## 2017-05-18 RX ADMIN — GABAPENTIN 100 MILLIGRAM(S): 400 CAPSULE ORAL at 21:47

## 2017-05-18 RX ADMIN — Medication 3 MILLIGRAM(S): at 21:47

## 2017-05-18 RX ADMIN — NYSTATIN CREAM 1 APPLICATION(S): 100000 CREAM TOPICAL at 06:03

## 2017-05-18 RX ADMIN — Medication 650 MILLIGRAM(S): at 10:50

## 2017-05-18 RX ADMIN — Medication 3 MILLILITER(S): at 09:42

## 2017-05-18 RX ADMIN — Medication 400 MILLIGRAM(S): at 18:14

## 2017-05-18 RX ADMIN — Medication 650 MILLIGRAM(S): at 09:50

## 2017-05-18 RX ADMIN — RIVAROXABAN 15 MILLIGRAM(S): KIT at 06:03

## 2017-05-18 RX ADMIN — PANTOPRAZOLE SODIUM 40 MILLIGRAM(S): 20 TABLET, DELAYED RELEASE ORAL at 06:02

## 2017-05-18 RX ADMIN — Medication 3 MILLILITER(S): at 15:02

## 2017-05-18 RX ADMIN — Medication 166.67 MILLIGRAM(S): at 18:05

## 2017-05-18 RX ADMIN — Medication 650 MILLIGRAM(S): at 23:13

## 2017-05-18 RX ADMIN — Medication 166.67 MILLIGRAM(S): at 06:04

## 2017-05-18 RX ADMIN — Medication 25 MILLIGRAM(S): at 18:05

## 2017-05-18 RX ADMIN — RIVAROXABAN 15 MILLIGRAM(S): KIT at 18:05

## 2017-05-18 RX ADMIN — Medication 1 APPLICATION(S): at 12:32

## 2017-05-18 RX ADMIN — Medication 400 MILLIGRAM(S): at 19:18

## 2017-05-18 RX ADMIN — Medication 30 MILLIGRAM(S): at 00:07

## 2017-05-18 RX ADMIN — MEROPENEM 200 MILLIGRAM(S): 1 INJECTION INTRAVENOUS at 14:56

## 2017-05-18 RX ADMIN — GABAPENTIN 100 MILLIGRAM(S): 400 CAPSULE ORAL at 06:03

## 2017-05-18 RX ADMIN — Medication 1000 UNIT(S): at 12:32

## 2017-05-18 RX ADMIN — MEROPENEM 200 MILLIGRAM(S): 1 INJECTION INTRAVENOUS at 06:03

## 2017-05-18 RX ADMIN — MEROPENEM 200 MILLIGRAM(S): 1 INJECTION INTRAVENOUS at 21:47

## 2017-05-18 RX ADMIN — Medication 25 MILLIGRAM(S): at 06:03

## 2017-05-18 RX ADMIN — Medication 650 MILLIGRAM(S): at 07:55

## 2017-05-18 RX ADMIN — Medication 1: at 12:32

## 2017-05-18 RX ADMIN — SIMVASTATIN 40 MILLIGRAM(S): 20 TABLET, FILM COATED ORAL at 21:47

## 2017-05-18 RX ADMIN — GABAPENTIN 100 MILLIGRAM(S): 400 CAPSULE ORAL at 14:55

## 2017-05-18 RX ADMIN — NYSTATIN CREAM 1 APPLICATION(S): 100000 CREAM TOPICAL at 18:04

## 2017-05-18 RX ADMIN — INSULIN GLARGINE 10 UNIT(S): 100 INJECTION, SOLUTION SUBCUTANEOUS at 21:47

## 2017-05-18 NOTE — PROGRESS NOTE ADULT - PROBLEM SELECTOR PLAN 5
nutritional evaluation  suspect [place].  counselling given yesterday but patient became angry, today seen with many juices and fast food on her tray.

## 2017-05-18 NOTE — PROGRESS NOTE ADULT - SUBJECTIVE AND OBJECTIVE BOX
Patient is a 57y old  Female who presents with fever, SOB, cough, abdominal pain, diarrhea (12 May 2017 04:51), admitted with sepsis, recently diagnosed with acute PE, on Xarelto.      INTERVAL HPI/ OVERNIGHT EVENTS:  Discussed with patient in term of goals of care, she said before wants to be DNI, told her will give her a chance to think   patient repeat cbc yesterday night revealing worsening leukocytosis, she denies any fever, chills.  she has chronic cough, says is producing more phlegm than usual. Luna was reinserted yesterday for urine retention.  has been seen by GI for iron deficiency anemia and guaiac positive stool, awaiting recommendations    Allergies  Cheese (Pruritus)  oxycodone (Other (Moderate))  peanuts (Unknown)  penicillin (Rash)  seafood (Hives)  strawberry (Pruritus)  Tomatoes (Other)    REVIEW OF SYSTEMS:  CONSTITUTIONAL: No fever, weight loss, or fatigue  EYES: No eye pain, visual disturbances, or discharge  RESPIRATORY: No cough, wheezing, chills or hemoptysis; No shortness of breath  CARDIOVASCULAR: No chest pain, palpitations, dizziness, or leg swelling  GASTROINTESTINAL: No abdominal or epigastric pain. No nausea, vomiting, or hematemesis; No diarrhea or constipation. No melena or hematochezia.  GENITOURINARY: No dysuria, frequency, hematuria, or incontinence  NEUROLOGICAL: No headaches, memory loss, loss of strength, numbness, or tremors  MUSCULOSKELETAL: No joint pain or swelling; No muscle, back, or extremity pain  PSYCHIATRIC: No depression, anxiety, mood swings, or difficulty sleeping  HEME/LYMPH: No easy bruising, or bleeding gums  ALLERGY AND IMMUNOLOGIC: No hives or eczema    Medications:  ALBUTerol/ipratropium for Nebulization 3milliLiter(s) Nebulizer every 6 hours  insulin lispro (HumaLOG) corrective regimen sliding scale  SubCutaneous three times a day before meals  dextrose 5%. 1000milliLiter(s) IV Continuous <Continuous>  dextrose Gel 1Dose(s) Oral once PRN Blood Glucose LESS THAN 70 milliGRAM(s)/deciLiter  dextrose 50% Injectable 12.5Gram(s) IV Push once  dextrose 50% Injectable 25Gram(s) IV Push once  dextrose 50% Injectable 25Gram(s) IV Push once  glucagon  Injectable 1milliGRAM(s) IntraMuscular once PRN Glucose <70 milliGRAM(s)/deciLiter  pantoprazole    Tablet 40milliGRAM(s) Oral before breakfast  gabapentin 100milliGRAM(s) Oral three times a day  simvastatin 40milliGRAM(s) Oral at bedtime  silver sulfADIAZINE 1% Cream 1Application(s) Topical daily  metoprolol 25milliGRAM(s) Oral two times a day  acetaminophen   Tablet 650milliGRAM(s) Oral every 6 hours PRN For Temp greater than 38 C (100.4 F)  guaiFENesin   Syrup  (Sugar-Free) 100milliGRAM(s) Oral every 6 hours PRN Cough  meropenem IVPB 1000milliGRAM(s) IV Intermittent every 8 hours  insulin glargine Injectable (LANTUS) 10Unit(s) SubCutaneous at bedtime  rivaroxaban 15milliGRAM(s) Oral two times a day  acetaminophen   Tablet. 650milliGRAM(s) Oral every 6 hours PRN Mild Pain (1 - 3)  cholecalciferol 1000Unit(s) Oral daily  vancomycin  IVPB 1250milliGRAM(s) IV Intermittent every 12 hours  sodium chloride 0.65% Nasal 1Spray(s) Both Nostrils three times a day PRN Nasal Congestion  tamsulosin 0.4milliGRAM(s) Oral at bedtime  melatonin 3milliGRAM(s) Oral at bedtime  nystatin Powder 1Application(s) Topical two times a day      PHYSICAL EXAM:  Vital Signs Last 24 Hrs  T(C): 37, Max: 37 (05-18 @ 08:24)  T(F): 98.6, Max: 98.6 (05-18 @ 08:24)  HR: 96 (92 - 128)  BP: 106/60 (99/64 - 135/58)  BP(mean): --  RR: 16 (16 - 18)  SpO2: 98% (98% - 100%)    GENERAL: NAD, morbidly obese  HEAD:  Atraumatic, Normocephalic  EYES: EOMI, PERRLA, conjunctiva and sclera clear  ENMT: No tonsillar erythema, exudates, or enlargement; Moist mucous membranes, Good dentition, No lesions  NECK: Supple, No JVD, Normal thyroid  NERVOUS SYSTEM:  Alert & Oriented X3, Good concentration; Motor Strength 5/5 B/L upper and lower extremities; DTRs 2+ intact and symmetric  CHEST/LUNG:  bilateral scattered rhonchi  HEART: Regular rate and rhythm; No murmurs, rubs, or gallops  ABDOMEN: Soft, Nontender, Nondistended; Bowel sounds present  EXTREMITIES:  2+ Peripheral Pulses, No clubbing, cyanosis, or edema  SKIN: No rashes or lesions    LABS:                        9.3    18.0  )-----------( 722      ( 17 May 2017 19:12 )             31.6         139  |  102  |  13  ----------------------------<  96  3.9   |  31  |  0.61    Ca    9.4      17 May 2017 19:12  Phos  3.7       Mg     1.7         TPro  7.9  /  Alb  1.9<L>  /  T Bili  0.2  /  D Bili  x   /  AST  16  /  ALT  30  /  Alk Phos  123<H>      LIVER FUNCTIONS - ( 17 May 2017 19:12 )  Alb: 1.9 g/dL / Pro: 7.9 g/dL / ALK PHOS: 123 U/L / ALT: 30 U/L DA / AST: 16 U/L / GGT: x           Urinalysis Basic - ( 17 May 2017 12:13 )    Color: Kathy / Appearance: Slightly Turbid / S.015 / pH: x  Gluc: x / Ketone: Negative  / Bili: Negative / Urobili: Negative   Blood: x / Protein: 30 mg/dL / Nitrite: Negative   Leuk Esterase: Moderate / RBC: >50 /HPF / WBC >50 /HPF   Sq Epi: x / Non Sq Epi: Moderate / Bacteria: Many /HPF      Culture & Sensitivity:   CAPILLARY BLOOD GLUCOSE  154 (18 May 2017 11:34)  128 (18 May 2017 08:48)  125 (17 May 2017 21:01)  184 (17 May 2017 16:10)    I & Os for 24h ending  @ 07:00  =============================================  IN: 0 ml / OUT: 2950 ml / NET: -2950 ml    I & Os for current day (as of  @ 13:24)  =============================================  IN: 0 ml / OUT: 900 ml / NET: -900 ml      Consultant(s) Notes Reviewed:  [ x] YES  [ ] NO    Care Discussed with Consultants/Other Providers [x ] YES  [ ] NO

## 2017-05-18 NOTE — PROGRESS NOTE ADULT - SUBJECTIVE AND OBJECTIVE BOX
Meds:  meropenem IVPB 1000milliGRAM(s) IV Intermittent every 8 hours  vancomycin  IVPB 1000milliGRAM(s) IV Intermittent every 12 hours    Allergies:  Allergies    Cheese (Pruritus)  oxycodone (Other (Moderate))  peanuts (Unknown)  penicillin (Rash)  seafood (Hives)  strawberry (Pruritus)  Tomatoes (Other)    Intolerances    ROS  [  ] UNABLE TO ELICIT    General:  [  ] Fever   [ x ] Malaise    Skin: sacral ulcer    HEENT:  [  ] Sore Throat  [  ] Photophobia  [x  ] None    Chest: [x  ] SOB  [ x ] Cough  [ ] None    Cardiovascular: [  ] CP	 [x ] None    Gastrointestinal: [ x ] Abd pain   [  ] N    [  ] V   [  ] Diarrhea	 [ ] None    Genitourinary: [  ] Polyuria   [x  ] Urgency   [x  ] Dysuria    [  ]  Hematuria   [ ] None	    Musculoskeletal:  [  ] Back Pain	 [x ] General weakness.    Neurological: [  ]Dizziness  [  ]Visual Disturbance  [  ]Headaches   [ x] General weakness.          PHYSICAL EXAM:    Vital Signs Last 24 Hrs  T(C): 37.2, Max: 37.2 (05-18 @ 15:28)  T(F): 98.9, Max: 98.9 (05-18 @ 15:28)  HR: 98 (92 - 128)  BP: 113/60 (106/60 - 135/58)  BP(mean): --  RR: 16 (16 - 18)  SpO2: 100% (98% - 100%)  HEENT:    Neck:  [ x ] Supple  [  ]Lymphadenopathy  [  ] JVD   [ x ]No JVD  [  ] Masses   [ x ] fugal skin infection on neck crease anteriorly.    CHEST/Respiratory:  [ x ] Rales      [  ] Rhonchi      [  ] Wheezing       [  ] Chest Tenderness  [  ]Clear to auscultation    Cardiovascular:  [ x ]S1 S2  [ x ] Reg  [  ] Irreg   [  ] Murmurs  [  ]Systolic [  ]Diastolic  [x  ]No Murmur    Abdomen:  [ x ]  Bowel Sounds   [  ] Soft           [  ] ABD Distention  [ x ] Tenderness  [  ] Organomegaly                        [  ] Guarding Rigidity  [ x ] No Guarding Rigidity  [  ] Rebound Tenderness [ x ] No Rebound Tenderness    Extremities: [ x ] Edema  [  ] No edema  [  ] Clubbing   [  ] Cyanosis  [ x ] Palpable peripheral pulses                        [ x ] No Tender Calf muscles  [  ] Tender Calf muscles    Neurological:  [ x Alert  [ x ] Awake  [x  ] Oriented  x3                              [  ] Confused    Skin:  [  ] Thrombophlebitis  [x  ] Improving rashes  neck anteriorly. [  ] Dry  [ x ] Ulcers sacral and buttock.    Ortho:  [  ] Joint Swelling  [  ] Joint erythema [ x ] DJD     Luna cath in place.  LABS/DIAGNOSTIC TESTS                                                       9.2                          9.3    18.0  )-----------( 722      ( 17 May 2017 19:12 )             31.6       139  |  102  |  13  ----------------------------<  96  3.9   |  31  |  0.61    Ca    9.4      17 May 2017 19:12  Phos  3.7     -  Mg     1.7         TPro  7.9  /  Alb  1.9<L>  /  TBili  0.2  /  DBili  x   /  AST  16  /  ALT  30  /  AlkPhos  123<H>      Urinalysis Basic - ( 17 May 2017 12:13 )    Color: Kathy / Appearance: Slightly Turbid / S.015 / pH: x  Gluc: x / Ketone: Negative  / Bili: Negative / Urobili: Negative   Blood: x / Protein: 30 mg/dL / Nitrite: Negative   Leuk Esterase: Moderate / RBC: >50 /HPF / WBC >50 /HPF   Sq Epi: x / Non Sq Epi: Moderate / Bacteria: Many /HPF            CULTURES: Culture - Blood (17 @ 09:33)    Specimen Source: .Blood Blood-Peripheral    Culture Results:   No growth to date.    Culture - Blood (17 @ 09:33)    Specimen Source: .Blood Blood-Peripheral    Culture Results:   No growth to date.      139  |  102  |  13  ----------------------------<  96  3.9   |  31  |  0.61    Ca    9.4      17 May 2017 19:12  Phos  3.7     05-  Mg     1.7         TPro  7.9  /  Alb  1.9<L>  /  TBili  0.2  /  DBili  x   /  AST  16  /  ALT  30  /  AlkPhos  123<H>      Culture - Urine (17 @ 09:27)    Specimen Source: .Urine Catheterized    Culture Results:   <10,000 CFU/ml Normal Urogenital juanita present    Vancomycin Level, Trough (17 @ 19:12)    Vancomycin Level, Trough: 11.4:         RADIOLOGY  EXAM:  CHEST-PORTABLE URGENT                            PROCEDURE DATE:  2017        INTERPRETATION:  AP semisupine chest on May 18 at 1:42 PM. Patient has   urinary infection, and leukocytosis, mucous production, and multiple   medical comorbidities.    Heart is magnified by technique.    On May 15 there was a mild central congestive picture. Present film shows   this has largely resolved. There is slight atelectasis off the left lower   hilum at this time.    IMPRESSION: As above.            Assessment and Recommendation:   		  Patient has no fever today, but leukocytosis continued to increased today.    Problem/Recommendation - 1:  Problem: Urinary tract infection without hematuria, site unspecified.   repeat UA suggest for UTI.  Recommendation:   1- Repeat  Blood C&S and urine c&s.   2- Follow WBC closely.  3- Continue Meropenem 1 gm IVPB q 8 hours x 3 more days to finish 14 days total days of Meropenem.  4- Continue vancomycin 1.25 gm IVPB q 12 hours x 4 more days.  5- Follow Vancomycin trough in 3-4 days (biw) while patient is on vancomycin follow.  6- CBC & BMP tomorrow.    Problem/Recommendation - 2:  ·  Problem: Hydronephrosis.    Recommendation:   1- Urology management.   2- Improved.    Problem/Recommendation - 3:  ·  Problem: Hydroureter.    Recommendation:   1- Urology management.     Problem/Recommendation - 4:  ·  Problem: COPD (chronic obstructive pulmonary disease).    Recommendation:   1- IV antibiotics.  2- Bronchodilators.  3-Pulmonary management.   4- O2 as needed.  Problem/Recommendation - 5:  ·  Problem: Diarrhea, unspecified type.    Recommendation:   1- Stool for C&S if recure.  2- stool for O&P and C&S if recure. .     Problem/Recommendation - 6:  Problem: Heart failure.   Recommendation:   1-Cardiac management.  2-Monitor renal functions.    Problem/Recommendation - 7:  Problem: Diabetes.   Recommendation:   1- Blood sugar monitoring and control.  2- Accuichecks with coverage.  3- 1800 nico ADA diet.  4-Follow HB A1C.     Problem/Recommendation - 8:  Problem: Dermatophytoses (improving).   Recommendation:   1- Continue Mycolog cream to the affected area of neck anteriorly TID.  2- Keep neck skin dry all the time.  3- Clinically improving.      discussed with patient on the bedside today.

## 2017-05-18 NOTE — CONSULT NOTE ADULT - SUBJECTIVE AND OBJECTIVE BOX
Patient is a 57y old  Female who presents with a chief complaint of Sent from NH due to fever, SOB, cough, abdominal pain, diarrhea (12 May 2017 04:51)    HPI: 57y Female  presents with a chief complaint of         . The patient has a significant past medical history of           .     REVIEW OF SYSTEMS  Constitutional:   No fever, no fatigue, no pallor, no night sweats, no weight loss.  HEENT:   No eye pain, no vision changes, no icterus, no mouth ulcers.  Respiratory:   No shortness of breath, no cough, no respiratory distress.   Cardiovascular:   No chest pain, no palpitations.   Gastrointestinal: No abdominal pain, no nausea, no vomiting , no diahrrea, no constipation, no hematochezia,no melena.  Skin:   No rashes, no jaundice, no eczema.   Musculoskeletal:   No joint pain, no swelling, no myalgia.   Neurologic:   No headache, no seizure, no weakness.   Genitourinary:   No dysuria, no decreased urine output.  Psychiatric:  No depression, no anxiety,   Endocrine:   No thyroid disease, no diabetes.  Heme/Lymphatic:   No anemia, no blood transfusions, no lymph node enlargement, no bleeding, no bruising.  ___________________________________________________________________________________________  Allergies    Cheese (Pruritus)  oxycodone (Other (Moderate))  peanuts (Unknown)  penicillin (Rash)  seafood (Hives)  strawberry (Pruritus)  Tomatoes (Other)    Intolerances      MEDICATIONS  (STANDING):  ALBUTerol/ipratropium for Nebulization 3milliLiter(s) Nebulizer every 6 hours  insulin lispro (HumaLOG) corrective regimen sliding scale  SubCutaneous three times a day before meals  dextrose 5%. 1000milliLiter(s) IV Continuous <Continuous>  dextrose 50% Injectable 12.5Gram(s) IV Push once  dextrose 50% Injectable 25Gram(s) IV Push once  dextrose 50% Injectable 25Gram(s) IV Push once  pantoprazole    Tablet 40milliGRAM(s) Oral before breakfast  gabapentin 100milliGRAM(s) Oral three times a day  simvastatin 40milliGRAM(s) Oral at bedtime  silver sulfADIAZINE 1% Cream 1Application(s) Topical daily  metoprolol 25milliGRAM(s) Oral two times a day  meropenem IVPB 1000milliGRAM(s) IV Intermittent every 8 hours  insulin glargine Injectable (LANTUS) 10Unit(s) SubCutaneous at bedtime  rivaroxaban 15milliGRAM(s) Oral two times a day  cholecalciferol 1000Unit(s) Oral daily  vancomycin  IVPB 1250milliGRAM(s) IV Intermittent every 12 hours  tamsulosin 0.4milliGRAM(s) Oral at bedtime  melatonin 3milliGRAM(s) Oral at bedtime  nystatin Powder 1Application(s) Topical two times a day    MEDICATIONS  (PRN):  dextrose Gel 1Dose(s) Oral once PRN Blood Glucose LESS THAN 70 milliGRAM(s)/deciLiter  glucagon  Injectable 1milliGRAM(s) IntraMuscular once PRN Glucose <70 milliGRAM(s)/deciLiter  acetaminophen   Tablet 650milliGRAM(s) Oral every 6 hours PRN For Temp greater than 38 C (100.4 F)  guaiFENesin   Syrup  (Sugar-Free) 100milliGRAM(s) Oral every 6 hours PRN Cough  acetaminophen   Tablet. 650milliGRAM(s) Oral every 6 hours PRN Mild Pain (1 - 3)  sodium chloride 0.65% Nasal 1Spray(s) Both Nostrils three times a day PRN Nasal Congestion      PAST MEDICAL & SURGICAL HISTORY:  Morbid obesity  Heart failure with preserved ejection fraction  CHF (congestive heart failure)  Diabetes  Overactive bladder  Urinary tract infection without hematuria, site unspecified  Congestive heart failure, unspecified congestive heart failure chronicity, unspecified congestive heart failure type  Asthma  Hemorrhoids  Congestive heart failure  Emphysema  S/P   History of appendectomy  Appendicitis  S/P cholecystectomy    FAMILY HISTORY:  Family history of stroke  Family history of hypertension  Family history of diabetes mellitus: -    Social History: No hsitory of : Tobacco use, IVDA, EToH  ______________________________________________________________________________________    PHYSICAL EXAM    Daily     Daily Weight in k.5 (18 May 2017 06:08)  BMI: 43.9 ( @ 22:28)  Change in Weight:  Vital Signs Last 24 Hrs  T(C): 37, Max: 37 ( @ 08:24)  T(F): 98.6, Max: 98.6 ( @ 08:24)  HR: 96 (92 - 128)  BP: 106/60 (99/64 - 135/58)  BP(mean): --  RR: 16 (16 - 18)  SpO2: 98% (98% - 100%)    General:  Well developed, well nourished, alert and active, no pallor, NAD.  HEENT:    Normal appearance of conjunctiva, ears, nose, lips, oropharynx, and oral mucosa, anicteric.  Neck:  No masses, no asymmetry.  Lymph Nodes:  No lymphadenopathy.   Cardiovascular:  RRR normal S1/S2, no murmur.  Respiratory:  CTA B/L, normal respiratory effort.   Abdominal:   soft, no masses or tenderness, normoactive BS, NT/ND, no HSM.  Extremities:   No clubbing or cyanosis, normal capillary refill, no edema.   Skin:   No rash, jaundice, lesions, eczema.   Musculoskeletal:  No joint swelling, erythema or tenderness.   Neuro: No focal deficits.   Other:   _______________________________________________________________________________________________  Lab Results:                          9.3    18.0  )-----------( 722      ( 17 May 2017 19:12 )             31.6     -    139  |  102  |  13  ----------------------------<  96  3.9   |  31  |  0.61    Ca    9.4      17 May 2017 19:12  Phos  3.7     05-  Mg     1.7     -    TPro  7.9  /  Alb  1.9<L>  /  TBili  0.2  /  DBili  x   /  AST  16  /  ALT  30  /  AlkPhos  123<H>      LIVER FUNCTIONS - ( 17 May 2017 19:12 )  Alb: 1.9 g/dL / Pro: 7.9 g/dL / ALK PHOS: 123 U/L / ALT: 30 U/L DA / AST: 16 U/L / GGT: x                   Stool Results:          RADIOLOGY RESULTS:    SURGICAL PATHOLOGY: Patient is a 57y old  Female who presents with a chief complaint of Sent from NH due to fever, SOB, cough, abdominal pain, diarrhea (12 May 2017 04:51)    HPI: 57y Female  presents with a chief complaint of  SOB and fever.  The patient has a past medical history of obesity, DM, COPD, PE on Xarelto  presented to the ED with fever, and weakness. She has had a prolonged hospital stay and is currently on IV antibiotics Primary team noticed a drop in H/H and GI was consulted. On my interview patient is overall a poor historian but does states that she has not had any episodes of melena and hematochezia she denies nausea or vomiting.  She states that she has been constipated for the past week.      REVIEW OF SYSTEMS  Constitutional:   No fever, no fatigue, no pallor, no night sweats, no weight loss.  Respiratory:    + shortness of breath, no cough, Cardiovascular:   No chest pain, no palpitations.   Gastrointestinal: No abdominal pain, no nausea, no vomiting , no diarrhea no constipation, no hematochezia, no melena.  Skin:   No rashes, no jaundice, no eczema.   Musculoskeletal:   No joint pain, no swelling, no myalgia.   Heme/Lymphatic:   No anemia, no blood transfusions, no lymph node enlargement, no bleeding, no bruising.  ___________________________________________________________________________________________  Allergies    Cheese (Pruritus)  oxycodone (Other (Moderate))  peanuts (Unknown)  penicillin (Rash)  seafood (Hives)  strawberry (Pruritus)  Tomatoes (Other)    Intolerances      MEDICATIONS  (STANDING):  ALBUTerol/ipratropium for Nebulization 3milliLiter(s) Nebulizer every 6 hours  insulin lispro (HumaLOG) corrective regimen sliding scale  SubCutaneous three times a day before meals  dextrose 5%. 1000milliLiter(s) IV Continuous <Continuous>  dextrose 50% Injectable 12.5Gram(s) IV Push once  dextrose 50% Injectable 25Gram(s) IV Push once  dextrose 50% Injectable 25Gram(s) IV Push once  pantoprazole    Tablet 40milliGRAM(s) Oral before breakfast  gabapentin 100milliGRAM(s) Oral three times a day  simvastatin 40milliGRAM(s) Oral at bedtime  silver sulfADIAZINE 1% Cream 1Application(s) Topical daily  metoprolol 25milliGRAM(s) Oral two times a day  meropenem IVPB 1000milliGRAM(s) IV Intermittent every 8 hours  insulin glargine Injectable (LANTUS) 10Unit(s) SubCutaneous at bedtime  rivaroxaban 15milliGRAM(s) Oral two times a day  cholecalciferol 1000Unit(s) Oral daily  vancomycin  IVPB 1250milliGRAM(s) IV Intermittent every 12 hours  tamsulosin 0.4milliGRAM(s) Oral at bedtime  melatonin 3milliGRAM(s) Oral at bedtime  nystatin Powder 1Application(s) Topical two times a day    MEDICATIONS  (PRN):  dextrose Gel 1Dose(s) Oral once PRN Blood Glucose LESS THAN 70 milliGRAM(s)/deciLiter  glucagon  Injectable 1milliGRAM(s) IntraMuscular once PRN Glucose <70 milliGRAM(s)/deciLiter  acetaminophen   Tablet 650milliGRAM(s) Oral every 6 hours PRN For Temp greater than 38 C (100.4 F)  guaiFENesin   Syrup  (Sugar-Free) 100milliGRAM(s) Oral every 6 hours PRN Cough  acetaminophen   Tablet. 650milliGRAM(s) Oral every 6 hours PRN Mild Pain (1 - 3)  sodium chloride 0.65% Nasal 1Spray(s) Both Nostrils three times a day PRN Nasal Congestion      PAST MEDICAL & SURGICAL HISTORY:  Morbid obesity  Heart failure with preserved ejection fraction  CHF (congestive heart failure)  Diabetes  Overactive bladder  Urinary tract infection without hematuria, site unspecified  Congestive heart failure, unspecified congestive heart failure chronicity, unspecified congestive heart failure type  Asthma  Hemorrhoids  Congestive heart failure  Emphysema  S/P   History of appendectomy  Appendicitis  S/P cholecystectomy    FAMILY HISTORY:  Family history of stroke  Family history of hypertension  Family history of diabetes mellitus: -    Social History: No hsitory of : Tobacco use, IVDA, EToH  ______________________________________________________________________________________    PHYSICAL EXAM    Daily     Daily Weight in k.5 (18 May 2017 06:08)  BMI: 43.9 ( @ 22:28)  Change in Weight:  Vital Signs Last 24 Hrs  T(C): 37, Max: 37 ( @ 08:24)  T(F): 98.6, Max: 98.6 ( @ 08:24)  HR: 96 (92 - 128)  BP: 106/60 (99/64 - 135/58)  BP(mean): --  RR: 16 (16 - 18)  SpO2: 98% (98% - 100%)    General:  Obese female on nebulizer treatment   HEENT:    Normal appearance of conjunctiva, ears, nose, lips, oropharynx, and oral mucosa, anicteric.  Neck:  No masses, no asymmetry.   Cardiovascular:  RRR normal S1/S2, no murmur.  Respiratory:  Bl Rhales   Abdominal:   soft, no masses or tenderness, normoactive BS, NT/ND, no HSM.  Extremities:   No clubbing or cyanosis, normal capillary refill, no edema.   Skin:   No rash, jaundice, lesions, eczema.   Musculoskeletal:  No joint swelling, erythema or tenderness.   Neuro: No focal deficits.     _______________________________________________________________________________________________  Lab Results:                          9.3    18.0  )-----------( 722      ( 17 May 2017 19:12 )             31.6     -    139  |  102  |  13  ----------------------------<  96  3.9   |  31  |  0.61    Ca    9.4      17 May 2017 19:12  Phos  3.7     05-  Mg     1.7     05-17    TPro  7.9  /  Alb  1.9<L>  /  TBili  0.2  /  DBili  x   /  AST  16  /  ALT  30  /  AlkPhos  123<H>  -    LIVER FUNCTIONS - ( 17 May 2017 19:12 )  Alb: 1.9 g/dL / Pro: 7.9 g/dL / ALK PHOS: 123 U/L / ALT: 30 U/L DA / AST: 16 U/L / GGT: x

## 2017-05-18 NOTE — PROGRESS NOTE ADULT - ASSESSMENT
57F PMHx Morbid Obesity, DM, COPD, PUSHPA, Provoked RLL PE,  R kidney staghorn calculus, Hydronephrosis / Hydroureter, unstageable R sacral ulcer, UTI presnted with subjective fever, dizziness, diarrhea and weakness at NH while on Meropenem for UTI. Admitted for sepsis 2/2 UTI likely complicated given R Staghorn calculus. c/b persistent leukocytosis despite appropriate abx.     #Sepsis 2/2 UTI, complicated given hx of R Staghorn Calculus. Presumptively Sepsis 2/2 UTI. ED T max of 102.9, tachycardiac 112, WBC of 15.9, Lactate 1.6, UA 6-10 WBC ( positive). // -UCx -ve, BCx -ve, however was on treatment in NH.  -WBC increase to 18.  -Repeat UA +ve once again  -Will obtain repeat BCx and UCx  -C/w IV Meropenem 1gm Q8H and IV Vancomycin 1.25gm Q12H   -ID (Dr. Wright)  -If patient is having diarrhea, will consider CDiff workup     #R Staghorn Calculus / Urinary Retention: Hx of R Staghorn last admission at [place]. CT here "Bilateral hydroureter as well as left hydronephrosis. No obstructing abnormality visualized. Urinary bladder appears distended. Multiple non-obstructing right intrarenal stones."  Renal ultrasound only shows R renal calculus (did not indicate staghorn) without hydronephrosis... hydronephrosis likely resolving s/p hsu catheter placement. Hsu catheter placed because urinary retention.  -Failed trial void, hsu replaced  -F/up urology (Dr. Rayo): outpatient urodynamic studies  -C/w flomax    #Anemia: Hx Iron deficiency anemia , guaiac negative in April 2017 , given 1 U PRBC at that time. Here, with H/H downtrend to 6.6. s/p 1UPRBC with incomplete improvement to 7.2.  + hematuria noted. + FOBT +ve though no gross blood (melena or bright red blood) on rectal exam. Additional unit given.  - Hgb stable s/p 2U PRBC  - F/up GI Eval (Dr. Jamison)    #Acute Provoked RLL PE: Patient has been bedridden. Diagnosed at [facility] in April.  -TTE (April 2017, [place]) shows normal EF 55%, moderate biatrial enlargement, mild Mitral and tricuspid regurgitation, mild calcific degenerative valve disease.  -C/w Xarelto 15mg BID  (switch to 20mg QD on 5/25/17), will likely need indefinitely given immobility    #Pulmonary Edema: Seen on recent CXR, likely accounts for increased dyspnea.  -resolved on repeat CXR  -Patient diuresing, so will follow clinically    #Metabolic Alkalosis: Likely chronic compensation for CO2 retention, however likely over compensated  -C/w Acetazolamide,  -Bicarb improving    #COPD + Likely underling PUSHPA: -ABG indicates patient is retaining CO2  -C/w NC O2 supplement (O2 dependent at baseline)  -C/w duonebs.    #Diabetes: c/w home dose of Lantus  10 Units and SSI.     #Initial CHRISTINA resolved s/p IVF.#    #PPx: on full dose AC.

## 2017-05-18 NOTE — PROGRESS NOTE ADULT - SUBJECTIVE AND OBJECTIVE BOX
Patient is a 57y old  Female who presents with a chief complaint of Sent from NH due to fever, SOB, cough, abdominal pain, diarrhea (12 May 2017 04:51)    -=OVERNIGHT EVENTS / INTERVAL HPI / SUBJECTIVE=-  Patient feels OK. Denies fevers, chills, n/v, chest pain. Feels breathing is back to baseline.    -=OBJECTIVE=-       --VITALS--  Vital Signs Last 24 Hrs  T(C): 37.2, Max: 37.2 (05-18 @ 15:28)  T(F): 98.9, Max: 98.9 (05-18 @ 15:28)  HR: 98 (92 - 118)  BP: 113/60 (106/60 - 113/60)  BP(mean): --  RR: 16 (16 - 18)  SpO2: 100% (98% - 100%)         --PHYSICAL EXAM--  General: obese  Neurology: A&Ox3  Respiratory: CTA B/L, no wheezes, no rhonchi, no rales  CV: RRR, S1S2, no murmur  Abdominal: Soft, mild TTP diffusely, ND no palpable mass, bowel sounds positive  MSK: No edema           --LABS--                        9.3    18.0  )-----------( 722      ( 17 May 2017 19:12 )             31.6     05-17    139  |  102  |  13  ----------------------------<  96  3.9   |  31  |  0.61    Ca    9.4      17 May 2017 19:12  Phos  3.7     05-17  Mg     1.7     05-17    TPro  7.9  /  Alb  1.9<L>  /  TBili  0.2  /  DBili  x   /  AST  16  /  ALT  30  /  AlkPhos  123<H>  05-17 05-12 Chol 98 LDL 43 HDL 31<L> Trig 122         --IMAGING--    EXAM:  CHEST-PORTABLE URGENT                            PROCEDURE DATE:  05/18/2017        INTERPRETATION:  AP semisupine chest on May 18 at 1:42 PM. Patient has   urinary infection, and leukocytosis, mucous production, and multiple   medical comorbidities.    Heart is magnified by technique.    On May 15 there was a mild central congestive picture. Present film shows   this has largely resolved. There is slight atelectasis off the left lower   hilum at this time.    IMPRESSION: As above.         --MEDICATIONS--  MEDICATIONS  (STANDING):  ALBUTerol/ipratropium for Nebulization 3milliLiter(s) Nebulizer every 6 hours  insulin lispro (HumaLOG) corrective regimen sliding scale  SubCutaneous three times a day before meals  dextrose 5%. 1000milliLiter(s) IV Continuous <Continuous>  dextrose 50% Injectable 12.5Gram(s) IV Push once  dextrose 50% Injectable 25Gram(s) IV Push once  dextrose 50% Injectable 25Gram(s) IV Push once  pantoprazole    Tablet 40milliGRAM(s) Oral before breakfast  gabapentin 100milliGRAM(s) Oral three times a day  simvastatin 40milliGRAM(s) Oral at bedtime  silver sulfADIAZINE 1% Cream 1Application(s) Topical daily  metoprolol 25milliGRAM(s) Oral two times a day  meropenem IVPB 1000milliGRAM(s) IV Intermittent every 8 hours  insulin glargine Injectable (LANTUS) 10Unit(s) SubCutaneous at bedtime  rivaroxaban 15milliGRAM(s) Oral two times a day  cholecalciferol 1000Unit(s) Oral daily  vancomycin  IVPB 1250milliGRAM(s) IV Intermittent every 12 hours  tamsulosin 0.4milliGRAM(s) Oral at bedtime  melatonin 3milliGRAM(s) Oral at bedtime  nystatin Powder 1Application(s) Topical two times a day    MEDICATIONS  (PRN):  dextrose Gel 1Dose(s) Oral once PRN Blood Glucose LESS THAN 70 milliGRAM(s)/deciLiter  glucagon  Injectable 1milliGRAM(s) IntraMuscular once PRN Glucose <70 milliGRAM(s)/deciLiter  acetaminophen   Tablet 650milliGRAM(s) Oral every 6 hours PRN For Temp greater than 38 C (100.4 F)  guaiFENesin   Syrup  (Sugar-Free) 100milliGRAM(s) Oral every 6 hours PRN Cough  acetaminophen   Tablet. 650milliGRAM(s) Oral every 6 hours PRN Mild Pain (1 - 3)  sodium chloride 0.65% Nasal 1Spray(s) Both Nostrils three times a day PRN Nasal Congestion

## 2017-05-18 NOTE — PROGRESS NOTE ADULT - ASSESSMENT
Patient is a 57y old  Female who presents with a chief complaint of fever, SOB, cough, abdominal pain, diarrhea (12 May 2017 04:51)  admitted with severe sepsis

## 2017-05-18 NOTE — CONSULT NOTE ADULT - PROBLEM SELECTOR RECOMMENDATION 9
- Continue aggressive medical management   -Monitor CBC and transfuse to a gaol hemoglobin > 8  -Continue Xarelto  -PPI po daily   - No intervention at this time   Will continue to monitor with you

## 2017-05-18 NOTE — CONSULT NOTE ADULT - ASSESSMENT
57 year old female severely debilitated with a history of COPD, obesity and recent PE. On my exam I see no evidence of active GI bleed. The patient's anemia is likely multifactorial including sepsis related and or medication induced.  She is high risk for ANY endoscopic intervention at this time.

## 2017-05-18 NOTE — PROGRESS NOTE ADULT - PROBLEM SELECTOR PLAN 2
urology input appreciated,  likely dysfunction is secondary to autonomic neuropathy secondary to diabetes, patient probably has overflow incontinence secondary to non cecilio bladder  discharge with [facility] and follow up outpatient for urodynamic study.

## 2017-05-19 DIAGNOSIS — K59.09 OTHER CONSTIPATION: ICD-10-CM

## 2017-05-19 LAB
ANION GAP SERPL CALC-SCNC: 9 MMOL/L — SIGNIFICANT CHANGE UP (ref 5–17)
BASOPHILS # BLD AUTO: 0.1 K/UL — SIGNIFICANT CHANGE UP (ref 0–0.2)
BASOPHILS NFR BLD AUTO: 0.7 % — SIGNIFICANT CHANGE UP (ref 0–2)
BUN SERPL-MCNC: 16 MG/DL — SIGNIFICANT CHANGE UP (ref 7–18)
CALCIUM SERPL-MCNC: 9.6 MG/DL — SIGNIFICANT CHANGE UP (ref 8.4–10.5)
CHLORIDE SERPL-SCNC: 104 MMOL/L — SIGNIFICANT CHANGE UP (ref 96–108)
CO2 SERPL-SCNC: 30 MMOL/L — SIGNIFICANT CHANGE UP (ref 22–31)
CREAT SERPL-MCNC: 0.55 MG/DL — SIGNIFICANT CHANGE UP (ref 0.5–1.3)
CULTURE RESULTS: NO GROWTH — SIGNIFICANT CHANGE UP
EOSINOPHIL # BLD AUTO: 0.7 K/UL — HIGH (ref 0–0.5)
EOSINOPHIL NFR BLD AUTO: 5.2 % — SIGNIFICANT CHANGE UP (ref 0–6)
GLUCOSE SERPL-MCNC: 97 MG/DL — SIGNIFICANT CHANGE UP (ref 70–99)
HCT VFR BLD CALC: 29.1 % — LOW (ref 34.5–45)
HGB BLD-MCNC: 8.5 G/DL — LOW (ref 11.5–15.5)
LYMPHOCYTES # BLD AUTO: 1.8 K/UL — SIGNIFICANT CHANGE UP (ref 1–3.3)
LYMPHOCYTES # BLD AUTO: 13.2 % — SIGNIFICANT CHANGE UP (ref 13–44)
MAGNESIUM SERPL-MCNC: 2 MG/DL — SIGNIFICANT CHANGE UP (ref 1.6–2.6)
MCHC RBC-ENTMCNC: 23.9 PG — LOW (ref 27–34)
MCHC RBC-ENTMCNC: 29.3 GM/DL — LOW (ref 32–36)
MCV RBC AUTO: 81.6 FL — SIGNIFICANT CHANGE UP (ref 80–100)
MONOCYTES # BLD AUTO: 0.5 K/UL — SIGNIFICANT CHANGE UP (ref 0–0.9)
MONOCYTES NFR BLD AUTO: 3.5 % — SIGNIFICANT CHANGE UP (ref 2–14)
NEUTROPHILS # BLD AUTO: 10.4 K/UL — HIGH (ref 1.8–7.4)
NEUTROPHILS NFR BLD AUTO: 77.3 % — HIGH (ref 43–77)
PHOSPHATE SERPL-MCNC: 3.9 MG/DL — SIGNIFICANT CHANGE UP (ref 2.5–4.5)
PLATELET # BLD AUTO: 573 K/UL — HIGH (ref 150–400)
POTASSIUM SERPL-MCNC: 3.9 MMOL/L — SIGNIFICANT CHANGE UP (ref 3.5–5.3)
POTASSIUM SERPL-SCNC: 3.9 MMOL/L — SIGNIFICANT CHANGE UP (ref 3.5–5.3)
RBC # BLD: 3.57 M/UL — LOW (ref 3.8–5.2)
RBC # FLD: 21.8 % — HIGH (ref 10.3–14.5)
SODIUM SERPL-SCNC: 143 MMOL/L — SIGNIFICANT CHANGE UP (ref 135–145)
SPECIMEN SOURCE: SIGNIFICANT CHANGE UP
VANCOMYCIN TROUGH SERPL-MCNC: 20.8 UG/ML — HIGH (ref 10–20)
WBC # BLD: 13.5 K/UL — HIGH (ref 3.8–10.5)
WBC # FLD AUTO: 13.5 K/UL — HIGH (ref 3.8–10.5)

## 2017-05-19 PROCEDURE — 99233 SBSQ HOSP IP/OBS HIGH 50: CPT | Mod: GC

## 2017-05-19 RX ORDER — IBUPROFEN 200 MG
400 TABLET ORAL ONCE
Qty: 0 | Refills: 0 | Status: COMPLETED | OUTPATIENT
Start: 2017-05-19 | End: 2017-05-19

## 2017-05-19 RX ORDER — KETOROLAC TROMETHAMINE 30 MG/ML
30 SYRINGE (ML) INJECTION ONCE
Qty: 0 | Refills: 0 | Status: DISCONTINUED | OUTPATIENT
Start: 2017-05-19 | End: 2017-05-20

## 2017-05-19 RX ORDER — LANOLIN ALCOHOL/MO/W.PET/CERES
3 CREAM (GRAM) TOPICAL AT BEDTIME
Qty: 0 | Refills: 0 | Status: COMPLETED | OUTPATIENT
Start: 2017-05-19 | End: 2017-05-22

## 2017-05-19 RX ORDER — IBUPROFEN 200 MG
400 TABLET ORAL ONCE
Qty: 0 | Refills: 0 | Status: DISCONTINUED | OUTPATIENT
Start: 2017-05-19 | End: 2017-05-19

## 2017-05-19 RX ORDER — KETOROLAC TROMETHAMINE 30 MG/ML
15 SYRINGE (ML) INJECTION ONCE
Qty: 0 | Refills: 0 | Status: DISCONTINUED | OUTPATIENT
Start: 2017-05-19 | End: 2017-05-19

## 2017-05-19 RX ADMIN — Medication 650 MILLIGRAM(S): at 22:22

## 2017-05-19 RX ADMIN — Medication 650 MILLIGRAM(S): at 23:50

## 2017-05-19 RX ADMIN — NYSTATIN CREAM 1 APPLICATION(S): 100000 CREAM TOPICAL at 17:21

## 2017-05-19 RX ADMIN — Medication 650 MILLIGRAM(S): at 10:19

## 2017-05-19 RX ADMIN — MEROPENEM 200 MILLIGRAM(S): 1 INJECTION INTRAVENOUS at 21:05

## 2017-05-19 RX ADMIN — INSULIN GLARGINE 10 UNIT(S): 100 INJECTION, SOLUTION SUBCUTANEOUS at 21:06

## 2017-05-19 RX ADMIN — Medication 3 MILLILITER(S): at 02:19

## 2017-05-19 RX ADMIN — Medication 3 MILLILITER(S): at 20:44

## 2017-05-19 RX ADMIN — Medication 15 MILLIGRAM(S): at 05:12

## 2017-05-19 RX ADMIN — Medication 3 MILLILITER(S): at 14:40

## 2017-05-19 RX ADMIN — GABAPENTIN 100 MILLIGRAM(S): 400 CAPSULE ORAL at 14:35

## 2017-05-19 RX ADMIN — Medication 166.67 MILLIGRAM(S): at 17:21

## 2017-05-19 RX ADMIN — PANTOPRAZOLE SODIUM 40 MILLIGRAM(S): 20 TABLET, DELAYED RELEASE ORAL at 05:19

## 2017-05-19 RX ADMIN — Medication 15 MILLIGRAM(S): at 05:50

## 2017-05-19 RX ADMIN — Medication 1000 UNIT(S): at 12:13

## 2017-05-19 RX ADMIN — Medication 25 MILLIGRAM(S): at 05:19

## 2017-05-19 RX ADMIN — MEROPENEM 200 MILLIGRAM(S): 1 INJECTION INTRAVENOUS at 14:34

## 2017-05-19 RX ADMIN — GABAPENTIN 100 MILLIGRAM(S): 400 CAPSULE ORAL at 05:19

## 2017-05-19 RX ADMIN — RIVAROXABAN 15 MILLIGRAM(S): KIT at 17:21

## 2017-05-19 RX ADMIN — Medication 650 MILLIGRAM(S): at 11:53

## 2017-05-19 RX ADMIN — RIVAROXABAN 15 MILLIGRAM(S): KIT at 05:19

## 2017-05-19 RX ADMIN — MEROPENEM 200 MILLIGRAM(S): 1 INJECTION INTRAVENOUS at 05:18

## 2017-05-19 RX ADMIN — TAMSULOSIN HYDROCHLORIDE 0.4 MILLIGRAM(S): 0.4 CAPSULE ORAL at 21:06

## 2017-05-19 RX ADMIN — Medication 1 APPLICATION(S): at 12:15

## 2017-05-19 RX ADMIN — Medication 3 MILLILITER(S): at 08:47

## 2017-05-19 RX ADMIN — GABAPENTIN 100 MILLIGRAM(S): 400 CAPSULE ORAL at 21:06

## 2017-05-19 RX ADMIN — Medication 400 MILLIGRAM(S): at 17:21

## 2017-05-19 RX ADMIN — Medication 400 MILLIGRAM(S): at 17:20

## 2017-05-19 RX ADMIN — SIMVASTATIN 40 MILLIGRAM(S): 20 TABLET, FILM COATED ORAL at 21:06

## 2017-05-19 RX ADMIN — NYSTATIN CREAM 1 APPLICATION(S): 100000 CREAM TOPICAL at 05:18

## 2017-05-19 NOTE — PROGRESS NOTE ADULT - PROBLEM SELECTOR PLAN 1
-Monitor CBC   - Pantoprazole 40 mg daily -Monitor CBC   - Pantoprazole 40 mg daily  -Patient is not a candidate for non-emergent endoscopy at this time (risks outweigh the benefits)   -Will sign off for now. If any further concerns of active bleeding please reconsult.

## 2017-05-19 NOTE — PROGRESS NOTE ADULT - SUBJECTIVE AND OBJECTIVE BOX
HPI:  58 y/o F, from [facility], does not walk as per patient since last ,  PMHx of Morbid Obesity, DM ( Insulin), COPD ( home O2 3 L, intubated in 2017), PUSHPA ( not on CPAP, refused sleep study in past), Right kidney staghorn calculous, bilateral hydroureter  and hydronephrosis Right lower lung provoked PE ( Xarelto , UTI ( on meropenum currently) , sacral and right buttock  redness ( unstageable) presents to ED c/o subjective fever, dizziness and weakness since yesterday. Patient is poor historian. History obtained from NH papers. Acc to patient she has cough with whitish sputum production since months. She also endorses that she has diarrhea about 4 days ago, associated with abdominal pain, that is diffuse , especially in lower abdomen. She does not have diarrhea anymore. She also has SOB.  Denies chest pain, headache, nausea vomiting.  As per NH papers she is being transferred for CHF exacerbation ( patient has no CHF) .. She is currently being treated for Klebsiella UTI with meropenum( on 17 to continue for 7 days, today is D 5)  .    Patient has multiple past admissions to [facility] for COPD exacerbation ( non-compliance , UTIs. She was in past ( ) admitted to [facility] which was complicated , requiring the intubation and then Tracheostomy. also she has Hsu at that time for urinary retention ( flomax started), she voided so hsu removed. . Then she admitted to [facility] on 17 with severe abdominal pain , found to have staghorn calculous in lower right kidney, bladder distention with bilateral hydroureter and hydronephrosis, UTI with E coli ( on flagyll and Meropenum), then she had severe constipation so was given laxatives, then she had diarrhea, initially thought that it was due to C diff, stool sent but cancelled as it was formed stool so likely cause was due to laxative. She also has right lower lung PE , provoked due to prolonged bedridden, , she was discharged with Xarelto ( currently on 15 mg BID to continue till , then total till 6 months. (12 May 2017 04:51)      INTERVAL HPI/OVERNIGHT EVENTS:  stable, no overnight fevers, wbcs trending down,   awaiting results of culture report  GI seen and evaluated, patient to [facility] outpatient for colonoscopy  [facility] urology outpatient    Allergies    Cheese (Pruritus)  oxycodone (Other (Moderate))  peanuts (Unknown)  penicillin (Rash)  seafood (Hives)  strawberry (Pruritus)  Tomatoes (Other)    Intolerances        REVIEW OF SYSTEMS:  CONSTITUTIONAL: No fever, weight loss, or fatigue  EYES: No eye pain, visual disturbances, or discharge  RESPIRATORY: No cough, wheezing, chills or hemoptysis; No shortness of breath  CARDIOVASCULAR: No chest pain, palpitations, dizziness, or leg swelling  GASTROINTESTINAL: No abdominal or epigastric pain. No nausea, vomiting, or hematemesis; No diarrhea or constipation. No melena or hematochezia.  GENITOURINARY: No dysuria, frequency, hematuria, or incontinence  NEUROLOGICAL: No headaches, memory loss, loss of strength, numbness, or tremors  MUSCULOSKELETAL: No joint pain or swelling; No muscle, back, or extremity pain  PSYCHIATRIC: No depression, anxiety, mood swings, or difficulty sleeping  HEME/LYMPH: No easy bruising, or bleeding gums  ALLERGY AND IMMUNOLOGIC: No hives or eczema    Medications:  ALBUTerol/ipratropium for Nebulization 3milliLiter(s) Nebulizer every 6 hours  insulin lispro (HumaLOG) corrective regimen sliding scale  SubCutaneous three times a day before meals  dextrose 5%. 1000milliLiter(s) IV Continuous <Continuous>  dextrose Gel 1Dose(s) Oral once PRN Blood Glucose LESS THAN 70 milliGRAM(s)/deciLiter  dextrose 50% Injectable 12.5Gram(s) IV Push once  dextrose 50% Injectable 25Gram(s) IV Push once  dextrose 50% Injectable 25Gram(s) IV Push once  glucagon  Injectable 1milliGRAM(s) IntraMuscular once PRN Glucose <70 milliGRAM(s)/deciLiter  pantoprazole    Tablet 40milliGRAM(s) Oral before breakfast  gabapentin 100milliGRAM(s) Oral three times a day  simvastatin 40milliGRAM(s) Oral at bedtime  silver sulfADIAZINE 1% Cream 1Application(s) Topical daily  metoprolol 25milliGRAM(s) Oral two times a day  acetaminophen   Tablet 650milliGRAM(s) Oral every 6 hours PRN For Temp greater than 38 C (100.4 F)  guaiFENesin   Syrup  (Sugar-Free) 100milliGRAM(s) Oral every 6 hours PRN Cough  meropenem IVPB 1000milliGRAM(s) IV Intermittent every 8 hours  insulin glargine Injectable (LANTUS) 10Unit(s) SubCutaneous at bedtime  rivaroxaban 15milliGRAM(s) Oral two times a day  acetaminophen   Tablet. 650milliGRAM(s) Oral every 6 hours PRN Mild Pain (1 - 3)  cholecalciferol 1000Unit(s) Oral daily  vancomycin  IVPB 1250milliGRAM(s) IV Intermittent every 12 hours  sodium chloride 0.65% Nasal 1Spray(s) Both Nostrils three times a day PRN Nasal Congestion  tamsulosin 0.4milliGRAM(s) Oral at bedtime  nystatin Powder 1Application(s) Topical two times a day  melatonin 3milliGRAM(s) Oral once PRN Insomnia      PHYSICAL EXAM:  Vital Signs Last 24 Hrs  T(C): 36.8, Max: 36.9 ( @ 00:21)  T(F): 98.3, Max: 98.4 ( @ 00:21)  HR: 105 (86 - 105)  BP: 103/50 (95/50 - 116/65)  BP(mean): --  RR: 19 (18 - 19)  SpO2: 98% (98% - 100%)    GENERAL: NAD  HEAD:  Atraumatic, Normocephalic  EYES: EOMI, PERRLA, conjunctiva and sclera clear  ENT: moist mucous membranes  NECK: Supple, No JVD, Normal thyroid  NERVOUS SYSTEM:  Alert & Oriented X3, Good concentration; Motor Strength 5/5 B/L upper and lower extremities; DTRs 2+ intact and symmetric  CHEST/LUNG: No rales, rhonchi, wheezing, or rubs  HEART: Regular rate and rhythm; No murmurs, rubs, or gallops  ABDOMEN: Soft, Nontender, Nondistended; Bowel sounds present  EXTREMITIES:  2+ Peripheral Pulses, No clubbing, cyanosis, or edema  SKIN: No rashes or lesions    LABS:                        8.5    13.5  )-----------( 573      ( 19 May 2017 06:26 )             29.1     -    143  |  104  |  16  ----------------------------<  97  3.9   |  30  |  0.55    Ca    9.6      19 May 2017 06:26  Phos  3.9       Mg     2.0         TPro  7.9  /  Alb  1.9<L>  /  TBili  0.2  /  DBili  x   /  AST  16  /  ALT  30  /  AlkPhos  123<H>      LIVER FUNCTIONS - ( 17 May 2017 19:12 )  Alb: 1.9 g/dL / Pro: 7.9 g/dL / ALK PHOS: 123 U/L / ALT: 30 U/L DA / AST: 16 U/L / GGT: x                   Urinalysis Basic - ( 18 May 2017 13:35 )    Color: Pink / Appearance: bloody / S.010 / pH: x  Gluc: x / Ketone: Negative  / Bili: Negative / Urobili: 1   Blood: x / Protein: 100 / Nitrite: Negative   Leuk Esterase: Moderate / RBC: >50 /HPF / WBC 26-50 /HPF   Sq Epi: x / Non Sq Epi: Moderate / Bacteria: Moderate /HPF      Culture & Sensitivity:   CAPILLARY BLOOD GLUCOSE  120 (19 May 2017 16:40)  141 (19 May 2017 12:00)  132 (19 May 2017 08:23)  116 (18 May 2017 20:51)    I & Os for 24h ending  @ 07:00  =============================================  IN: 0 ml / OUT: 4150 ml / NET: -4150 ml    I & Os for current day (as of  @ 18:21)  =============================================  IN: 0 ml / OUT: 1250 ml / NET: -1250 ml      RADIOLOGY & ADDITIONAL TESTS:  Radiology testing independently reviewed:     Consultant(s) Notes Reviewed:  [ x] YES  [ ] NO    Care Discussed with Consultants/Other Providers [ x] YES  [ ] NO

## 2017-05-19 NOTE — PROGRESS NOTE ADULT - SUBJECTIVE AND OBJECTIVE BOX
Patient is a 57y old  Female who presents with a chief complaint of Sent from NH due to fever, SOB, cough, abdominal pain, diarrhea (12 May 2017 04:51)      -=OVERNIGHT EVENTS / INTERVAL HPI / SUBJECTIVE=-  Patient seen and examined 05/19 ~9am. Patient feels better around baseline. Denies any fevers or respiratory distress.     -=OBJECTIVE=-       --VITALS--  afebrile  tachy to 105  SBP 100s  RR 18  Satting 100 on NC O2         --PHYSICAL EXAM--  General: obese  Neurology: A&Ox3  Respiratory: CTA B/L, no wheezes, no rhonchi, no rales  CV: RRR, S1S2, no murmur  Abdominal: Soft, diffuse mild ttp, ND no palpable mass, bowel sounds positive  MSK: No edema, + peripheral pulses  Skin: no sacral ulcer noted on buttocks - some bleeding small erosion noted           --LABS--                        8.5    13.5  )-----------( 573      ( 19 May 2017 06:26 )             29.1     05-19    143  |  104  |  16  ----------------------------<  97  3.9   |  30  |  0.55    Ca    9.6      19 May 2017 06:26  Phos  3.9     05-19  Mg     2.0     05-19      05-12 Chol 98 LDL 43 HDL 31<L> Trig 122         --MEDICATIONS--  MEDICATIONS  (STANDING):  ALBUTerol/ipratropium for Nebulization 3milliLiter(s) Nebulizer every 6 hours  insulin lispro (HumaLOG) corrective regimen sliding scale  SubCutaneous three times a day before meals  dextrose 5%. 1000milliLiter(s) IV Continuous <Continuous>  dextrose 50% Injectable 12.5Gram(s) IV Push once  dextrose 50% Injectable 25Gram(s) IV Push once  dextrose 50% Injectable 25Gram(s) IV Push once  pantoprazole    Tablet 40milliGRAM(s) Oral before breakfast  gabapentin 100milliGRAM(s) Oral three times a day  simvastatin 40milliGRAM(s) Oral at bedtime  silver sulfADIAZINE 1% Cream 1Application(s) Topical daily  metoprolol 25milliGRAM(s) Oral two times a day  meropenem IVPB 1000milliGRAM(s) IV Intermittent every 8 hours  insulin glargine Injectable (LANTUS) 10Unit(s) SubCutaneous at bedtime  rivaroxaban 15milliGRAM(s) Oral two times a day  cholecalciferol 1000Unit(s) Oral daily  tamsulosin 0.4milliGRAM(s) Oral at bedtime  nystatin Powder 1Application(s) Topical two times a day  vancomycin  IVPB 1000milliGRAM(s) IV Intermittent every 12 hours    MEDICATIONS  (PRN):  dextrose Gel 1Dose(s) Oral once PRN Blood Glucose LESS THAN 70 milliGRAM(s)/deciLiter  glucagon  Injectable 1milliGRAM(s) IntraMuscular once PRN Glucose <70 milliGRAM(s)/deciLiter  acetaminophen   Tablet 650milliGRAM(s) Oral every 6 hours PRN For Temp greater than 38 C (100.4 F)  guaiFENesin   Syrup  (Sugar-Free) 100milliGRAM(s) Oral every 6 hours PRN Cough  acetaminophen   Tablet. 650milliGRAM(s) Oral every 6 hours PRN Mild Pain (1 - 3)  sodium chloride 0.65% Nasal 1Spray(s) Both Nostrils three times a day PRN Nasal Congestion  melatonin 3milliGRAM(s) Oral once PRN Insomnia  melatonin 3milliGRAM(s) Oral at bedtime PRN Insomnia

## 2017-05-19 NOTE — PROGRESS NOTE ADULT - SUBJECTIVE AND OBJECTIVE BOX
Time of Visit:1030  Patient seen and examined.     MEDICATIONS  (STANDING):  ALBUTerol/ipratropium for Nebulization 3milliLiter(s) Nebulizer every 6 hours  insulin lispro (HumaLOG) corrective regimen sliding scale  SubCutaneous three times a day before meals  dextrose 5%. 1000milliLiter(s) IV Continuous <Continuous>  dextrose 50% Injectable 12.5Gram(s) IV Push once  dextrose 50% Injectable 25Gram(s) IV Push once  dextrose 50% Injectable 25Gram(s) IV Push once  pantoprazole    Tablet 40milliGRAM(s) Oral before breakfast  gabapentin 100milliGRAM(s) Oral three times a day  simvastatin 40milliGRAM(s) Oral at bedtime  silver sulfADIAZINE 1% Cream 1Application(s) Topical daily  metoprolol 25milliGRAM(s) Oral two times a day  meropenem IVPB 1000milliGRAM(s) IV Intermittent every 8 hours  insulin glargine Injectable (LANTUS) 10Unit(s) SubCutaneous at bedtime  rivaroxaban 15milliGRAM(s) Oral two times a day  cholecalciferol 1000Unit(s) Oral daily  vancomycin  IVPB 1250milliGRAM(s) IV Intermittent every 12 hours  tamsulosin 0.4milliGRAM(s) Oral at bedtime  nystatin Powder 1Application(s) Topical two times a day      MEDICATIONS  (PRN):  dextrose Gel 1Dose(s) Oral once PRN Blood Glucose LESS THAN 70 milliGRAM(s)/deciLiter  glucagon  Injectable 1milliGRAM(s) IntraMuscular once PRN Glucose <70 milliGRAM(s)/deciLiter  acetaminophen   Tablet 650milliGRAM(s) Oral every 6 hours PRN For Temp greater than 38 C (100.4 F)  guaiFENesin   Syrup  (Sugar-Free) 100milliGRAM(s) Oral every 6 hours PRN Cough  acetaminophen   Tablet. 650milliGRAM(s) Oral every 6 hours PRN Mild Pain (1 - 3)  sodium chloride 0.65% Nasal 1Spray(s) Both Nostrils three times a day PRN Nasal Congestion  melatonin 3milliGRAM(s) Oral once PRN Insomnia     Medications up to date at time of exam.    PHYSICAL EXAMINATION:  Patient has no new complaints.  GENERAL: The patient is a well-developed, well-nourished, in no apparent distress.     Vital Signs Last 24 Hrs  T(C): 36.9, Max: 37.2 (- @ 15:28)  T(F): 98.4, Max: 98.9 (- @ 15:28)  HR: 86 (86 - 107)  BP: 108/57 (95/50 - 135/63)  BP(mean): --  RR: 18 (16 - 18)  SpO2: 100% (100% - 100%)   (if applicable)    Chest Tube (if applicable)    HEENT: Head is normocephalic and atraumatic. Mucous membranes are moist.     NECK: Supple, no palpable adenopathy.    LUNGS: Clear to auscultation, no wheezing, rales, or rhonchi, + decreased breath sounds at bases    HEART: Regular rate and rhythm without murmur.    ABDOMEN: Soft, nontender, and nondistended.      EXTREMITIES: Without any cyanosis, clubbing, rash, lesions, + B/L LE edema.    NEUROLOGIC: Awake, alert, oriented.     SKIN: Warm, dry, good turgor.    LABS:                        8.5    13.5  )-----------( 573      ( 19 May 2017 06:26 )             29.1     -    143  |  104  |  16  ----------------------------<  97  3.9   |  30  |  0.55    Ca    9.6      19 May 2017 06:26  Phos  3.9     -  Mg     2.0         TPro  7.9  /  Alb  1.9<L>  /  TBili  0.2  /  DBili  x   /  AST  16  /  ALT  30  /  AlkPhos  123<H>  -17      Urinalysis Basic - ( 18 May 2017 13:35 )    Color: Pink / Appearance: bloody / S.010 / pH: x  Gluc: x / Ketone: Negative  / Bili: Negative / Urobili: 1   Blood: x / Protein: 100 / Nitrite: Negative   Leuk Esterase: Moderate / RBC: >50 /HPF / WBC 26-50 /HPF   Sq Epi: x / Non Sq Epi: Moderate / Bacteria: Moderate /HPF      MICROBIOLOGY: (if applicable)    RADIOLOGY & ADDITIONAL STUDIES:  EKG:   CXR:  ECHO:    IMPRESSION: 57y Female PAST MEDICAL & SURGICAL HISTORY:  Morbid obesity  Heart failure with preserved ejection fraction  CHF (congestive heart failure)  Diabetes  Overactive bladder  Urinary tract infection without hematuria, site unspecified  Congestive heart failure, unspecified congestive heart failure chronicity, unspecified congestive heart failure type  Asthma  Hemorrhoids  Congestive heart failure  Emphysema  S/P   History of appendectomy  Appendicitis  S/P cholecystectomy     p/w     SOB due to acute on chronic hypoxic respiratory failure due to COPD and CHF, most likely PUSHPA patient has been told she needed PAP in past but refused.     CT abd/pelvis show renal stones and hydronephrosis. Stool is positive for occult blood, anemic, s/p PRBC transfusion. in view of patient morbid obesity  and limited mobility she will need lifelong anticoagulation for PE.     + hsu draining  RECOMMENDATIONS:     - con't with bronchodilators, o2 supplement as needed.    - con't with antibiotics as per ID recommendations for UTI   - hematuria improved   - DVT and GI prophylaxis.

## 2017-05-19 NOTE — PROGRESS NOTE ADULT - ASSESSMENT
57 year old female presents with dyspnea. No evidence of active Gi bleeding at this time. Anemia likely multifactorial.

## 2017-05-19 NOTE — PROGRESS NOTE ADULT - ASSESSMENT
57F PMHx Morbid Obesity, DM, COPD, PUSHPA, Provoked RLL PE,  R kidney staghorn calculus, Hydronephrosis / Hydroureter, unstageable R sacral ulcer, UTI presnted with subjective fever, dizziness, diarrhea and weakness at NH while on Meropenem for UTI. Admitted for sepsis 2/2 UTI likely complicated given R Staghorn calculus. c/b persistent leukocytosis despite appropriate abx.     #Sepsis 2/2 UTI, complicated given hx of R Staghorn Calculus. Presumptively Sepsis 2/2 UTI. ED T max of 102.9, tachycardiac 112, WBC of 15.9, Lactate 1.6, UA 6-10 WBC ( positive). // -UCx -ve, BCx -ve, however was on treatment in NH.  -WBC improve 13<18, afebrile.  -Repeat UA +ve once again, f/up rpr BCx and UCx  -C/w IV Meropenem 1gm Q8H and IV Vancomycin 1gm Q12H for 3 more days   -ID (Dr. Wright)  -If patient is having diarrhea, will consider CDiff workup --> no diarrhea pe pt    #R Staghorn Calculus / Urinary Retention: Hx of R Staghorn last admission at [facility]. CT here "Bilateral hydroureter as well as left hydronephrosis. No obstructing abnormality visualized. Urinary bladder appears distended. Multiple non-obstructing right intrarenal stones."  Renal ultrasound only shows R renal calculus (did not indicate staghorn) without hydronephrosis... hydronephrosis likely resolving s/p hsu catheter placement. Hsu catheter placed because urinary retention. //  removed but failed trial void, so replaced.  -C/w hsu  -F/up urology (Dr. Rayo): outpatient urodynamic studies  -C/w flomax    #Anemia: Hx Iron deficiency anemia , guaiac negative in April 2017 , given 1 U PRBC at that time. Here, with H/H downtrend to 6.6. s/p 1UPRBC with incomplete improvement to 7.2.  + hematuria noted. + FOBT +ve though no gross blood (melena or bright red blood) on rectal exam. Additional unit given. // Hgb stable s/p 2U PRBC  - F/up GI Eval (Dr. Jamison): outpatient colonoscopy.    #Acute Provoked RLL PE: Patient has been bedridden. Diagnosed at [facility] in April.. TTE (April 2017, [facility]) shows normal EF 55%, moderate biatrial enlargement, mild Mitral and tricuspid regurgitation, mild calcific degenerative valve disease.  -C/w Xarelto 15mg BID  (switch to 20mg QD on 5/25/17), will likely need indefinitely given immobility    #Pulmonary Edema: Seen on recent CXR, likely accounts for increased dyspnea. s/p lasix 20mg PO x1. // resolved on repeat CXR  -Patient diuresing, so will follow clinically    #Metabolic Alkalosis: Likely chronic compensation for CO2 retention, however likely over compensated  -Bicarb improving  -Continue to monitor bicarb  -C/w Acetazolamide,      #COPD + Likely underling PUSHPA: ABG indicates patient is retaining CO2  -C/w NC O2 supplement (O2 dependent at baseline)  -C/w duonebs.    #Diabetes: c/w home dose of Lantus  10 Units and SSI.     #Initial CHRISTINA resolved s/p IVF.#    #PPx: on full dose AC.

## 2017-05-19 NOTE — PROGRESS NOTE ADULT - PROBLEM SELECTOR PLAN 2
urology input appreciated,  likely dysfunction is secondary to autonomic neuropathy secondary to diabetes, patient probably has overflow incontinence secondary to non cecilio bladder  discharge with Ulna and follow up outpatient for urodynamic study.

## 2017-05-19 NOTE — PROGRESS NOTE ADULT - ASSESSMENT
HPI:  56 y/o F, from [facility], does not walk as per patient since last Jan,  PMHx of Morbid Obesity, DM ( Insulin), COPD ( home O2 3 L, intubated in Jan 2017), PUSHPA ( not on CPAP, refused sleep study in past), Right kidney staghorn calculous, bilateral hydroureter  and hydronephrosis Right lower lung provoked PE ( Xarelto , UTI ( on meropenum currently) , sacral and right buttock  redness ( unstageable) presents to ED c/o subjective fever, dizziness and weakness since yesterday. Patient is poor historian. History obtained from NH papers. Acc to patient she has cough with whitish sputum production since months. She also endorses that she has diarrhea about 4 days ago, associated with abdominal pain, that is diffuse , especially in lower abdomen. She does not have diarrhea anymore. She also has SOB.  Denies chest pain, headache, nausea vomiting.  As per NH papers she is being transferred for CHF exacerbation ( patient has no CHF) .. She is currently being treated for Klebsiella UTI with meropenum( on 5/8/17 to continue for 7 days, today is D 5)  .    Patient has multiple past admissions to [facility] for COPD exacerbation ( non-compliance , UTIs. She was in past ( Jan) admitted to [facility] which was complicated , requiring the intubation and then Tracheostomy. also she has Hsu at that time for urinary retention ( flomax started), she voided so hsu removed. . Then she admitted to [facility] on 4/27/17 with severe abdominal pain , found to have staghorn calculous in lower right kidney, bladder distention with bilateral hydroureter and hydronephrosis, UTI with E coli ( on flagyll and Meropenum), then she had severe constipation so was given laxatives, then she had diarrhea, initially thought that it was due to C diff, stool sent but cancelled as it was formed stool so likely cause was due to laxative. She also has right lower lung PE , provoked due to prolonged bedridden, , she was discharged with Xarelto ( currently on 15 mg BID to continue till 5/25, then total till 6 months. (12 May 2017 04:51)

## 2017-05-19 NOTE — PROGRESS NOTE ADULT - SUBJECTIVE AND OBJECTIVE BOX
Subjective:   No events overnight. Patient still complaining of constipation No rectal bleeding or melena.     Objective:    MEDICATIONS  (STANDING):  ALBUTerol/ipratropium for Nebulization 3milliLiter(s) Nebulizer every 6 hours  insulin lispro (HumaLOG) corrective regimen sliding scale  SubCutaneous three times a day before meals  dextrose 5%. 1000milliLiter(s) IV Continuous <Continuous>  dextrose 50% Injectable 12.5Gram(s) IV Push once  dextrose 50% Injectable 25Gram(s) IV Push once  dextrose 50% Injectable 25Gram(s) IV Push once  pantoprazole    Tablet 40milliGRAM(s) Oral before breakfast  gabapentin 100milliGRAM(s) Oral three times a day  simvastatin 40milliGRAM(s) Oral at bedtime  silver sulfADIAZINE 1% Cream 1Application(s) Topical daily  metoprolol 25milliGRAM(s) Oral two times a day  meropenem IVPB 1000milliGRAM(s) IV Intermittent every 8 hours  insulin glargine Injectable (LANTUS) 10Unit(s) SubCutaneous at bedtime  rivaroxaban 15milliGRAM(s) Oral two times a day  cholecalciferol 1000Unit(s) Oral daily  vancomycin  IVPB 1250milliGRAM(s) IV Intermittent every 12 hours  tamsulosin 0.4milliGRAM(s) Oral at bedtime  nystatin Powder 1Application(s) Topical two times a day    MEDICATIONS  (PRN):  dextrose Gel 1Dose(s) Oral once PRN Blood Glucose LESS THAN 70 milliGRAM(s)/deciLiter  glucagon  Injectable 1milliGRAM(s) IntraMuscular once PRN Glucose <70 milliGRAM(s)/deciLiter  acetaminophen   Tablet 650milliGRAM(s) Oral every 6 hours PRN For Temp greater than 38 C (100.4 F)  guaiFENesin   Syrup  (Sugar-Free) 100milliGRAM(s) Oral every 6 hours PRN Cough  acetaminophen   Tablet. 650milliGRAM(s) Oral every 6 hours PRN Mild Pain (1 - 3)  sodium chloride 0.65% Nasal 1Spray(s) Both Nostrils three times a day PRN Nasal Congestion  melatonin 3milliGRAM(s) Oral once PRN Insomnia              Vital Signs Last 24 Hrs  T(C): 36.9, Max: 37.2 (05-18 @ 15:28)  T(F): 98.4, Max: 98.9 ( @ 15:28)  HR: 93 (93 - 107)  BP: 95/50 (95/50 - 135/63)  BP(mean): --  RR: 18 (16 - 18)  SpO2: 100% (100% - 100%)      General:  Obese, NAD   HEENT:    Normal appearance of conjunctiva, ears, nose, lips, oropharynx, and oral mucosa, anicteric.  Cardiovascular:  RRR normal S1/S2, no murmur.  Respiratory:  CTA B/L, normal respiratory effort.   Abdominal:   soft, no masses or tenderness, normoactive BS, NT/ND, no HSM.          LABS:                        8.5    13.5  )-----------( 573      ( 19 May 2017 06:26 )             29.1         143  |  104  |  16  ----------------------------<  97  3.9   |  30  |  0.55    Ca    9.6      19 May 2017 06:26  Phos  3.9       Mg     2.0         TPro  7.9  /  Alb  1.9<L>  /  TBili  0.2  /  DBili  x   /  AST  16  /  ALT  30  /  AlkPhos  123<H>        Urinalysis Basic - ( 18 May 2017 13:35 )    Color: Pink / Appearance: bloody / S.010 / pH: x  Gluc: x / Ketone: Negative  / Bili: Negative / Urobili: 1   Blood: x / Protein: 100 / Nitrite: Negative   Leuk Esterase: Moderate / RBC: >50 /HPF / WBC 26-50 /HPF   Sq Epi: x / Non Sq Epi: Moderate / Bacteria: Moderate /HPF        RADIOLOGY & ADDITIONAL TESTS:

## 2017-05-19 NOTE — PROGRESS NOTE ADULT - SUBJECTIVE AND OBJECTIVE BOX
Meds:  meropenem IVPB 1000milliGRAM(s) IV Intermittent every 8 hours  vancomycin  IVPB 1000milliGRAM(s) IV Intermittent every 12 hours    Allergies:  Allergies    Cheese (Pruritus)  oxycodone (Other (Moderate))  peanuts (Unknown)  penicillin (Rash)  seafood (Hives)  strawberry (Pruritus)  Tomatoes (Other)    Intolerances    ROS  [  ] UNABLE TO ELICIT    General:  [  ] Fever   [ x ] Malaise    Skin: sacral ulcer    HEENT:  [  ] Sore Throat  [  ] Photophobia  [x  ] None    Chest: [x  ] SOB  [ x ] Cough  [ ] None    Cardiovascular: [  ] CP	 [x ] None    Gastrointestinal: [ x ] Abd pain   [  ] N    [  ] V   [  ] Diarrhea	 [ ] None    Genitourinary: [  ] Polyuria   [x  ] Urgency   [x  ] Dysuria    [  ]  Hematuria   [ ] None	    Musculoskeletal:  [  ] Back Pain	 [x ] General weakness.    Neurological: [  ]Dizziness  [  ]Visual Disturbance  [  ]Headaches   [ x] General weakness.      Vital Signs Last 24 Hrs  T(C): 36.9, Max: 37.2 (05-18 @ 15:28)  T(F): 98.4, Max: 98.9 (05-18 @ 15:28)  HR: 86 (86 - 107)  BP: 108/57 (95/50 - 135/63)  BP(mean): --  RR: 18 (16 - 18)  SpO2: 100% (100% - 100%)    PHYSICAL EXAM:    HEENT:    Neck:  [ x ] Supple  [  ]Lymphadenopathy  [  ] JVD   [ x ]No JVD  [  ] Masses   [ x ] fugal skin infection on neck crease anteriorly.    CHEST/Respiratory:  [ x ] Rales      [  ] Rhonchi      [  ] Wheezing       [  ] Chest Tenderness  [  ]Clear to auscultation    Cardiovascular:  [ x ]S1 S2  [ x ] Reg  [  ] Irreg   [  ] Murmurs  [  ]Systolic [  ]Diastolic  [x  ]No Murmur    Abdomen:  [ x ]  Bowel Sounds   [  ] Soft           [  ] ABD Distention  [ x ] Tenderness  [  ] Organomegaly                        [  ] Guarding Rigidity  [ x ] No Guarding Rigidity  [  ] Rebound Tenderness [ x ] No Rebound Tenderness    Extremities: [ x ] Edema  [  ] No edema  [  ] Clubbing   [  ] Cyanosis  [ x ] Palpable peripheral pulses                        [ x ] No Tender Calf muscles  [  ] Tender Calf muscles    Neurological:  [ x Alert  [ x ] Awake  [x  ] Oriented  x3                              [  ] Confused    Skin:  [  ] Thrombophlebitis  [x  ] Improving rashes  neck anteriorly. [  ] Dry  [ x ] Ulcers sacral and buttock.    Ortho:  [  ] Joint Swelling  [  ] Joint erythema [ x ] DJD     Luna cath in place.  LABS/DIAGNOSTIC TESTS                                                                            8.5    13.5  )-----------( 573      ( 19 May 2017 06:26 )             29.1       143  |  104  |  16  ----------------------------<  97  3.9   |  30  |  0.55    Ca    9.6      19 May 2017 06:26  Phos  3.9       Mg     2.0         TPro  7.9  /  Alb  1.9<L>  /  TBili  0.2  /  DBili  x   /  AST  16  /  ALT  30  /  AlkPhos  123<H>      Urinalysis Basic - ( 17 May 2017 12:13 )    Color: Kathy / Appearance: Slightly Turbid / S.015 / pH: x  Gluc: x / Ketone: Negative  / Bili: Negative / Urobili: Negative   Blood: x / Protein: 30 mg/dL / Nitrite: Negative   Leuk Esterase: Moderate / RBC: >50 /HPF / WBC >50 /HPF   Sq Epi: x / Non Sq Epi: Moderate / Bacteria: Many /HPF            CULTURES: Culture - Blood (17 @ 09:33)    Specimen Source: .Blood Blood-Peripheral    Culture Results:   No growth to date.    Culture - Blood (17 @ 09:33)    Specimen Source: .Blood Blood-Peripheral    Culture Results:   No growth to date.      Vancomycin Level, Trough (17 @ 06:26)    Vancomycin Level, Trough: 20.8: Vancomycin trough levels should be rapidly reached and maintained at  15-20 ug/ml for life threatening MRSA  infections such as sepsis, endocarditis, osteomyelitis and pneumonia. A  first trough level should be drawn  before the 3rd or 4th dose.  Risk20.8:  of renal toxicity is increased for levels >15 ug/ml, in patients on  other nephrotoxic drugs, who are  hemodynamically unstable, have unstable renal function, or are on  Vancomycin therapy for >14 days. Renal function with  creatinine levels should b20.8: e monitored for those patients. ug/mL          RADIOLOGY  Last EXAM:  CHEST-PORTABLE URGENT                            PROCEDURE DATE:  2017        INTERPRETATION:  AP semisupine chest on May 18 at 1:42 PM. Patient has   urinary infection, and leukocytosis, mucous production, and multiple   medical comorbidities.    Heart is magnified by technique.    On May 15 there was a mild central congestive picture. Present film shows   this has largely resolved. There is slight atelectasis off the left lower   hilum at this time.    IMPRESSION: As above.            Assessment and Recommendation:   		  Patient has no fever today, and leukocytosis is iproving today.    Problem/Recommendation - 1:  Problem: Urinary tract infection without hematuria, site unspecified.   repeat UA suggest for UTI.  Recommendation:   1- Repeat  Blood C&S and urine c&s.   2- Follow WBC closely.  3- Continue Meropenem 1 gm IVPB q 8 hours x 3 more days to finish 14 days total days of Meropenem.  4- Continue but decrease vancomycin to1 gm IVPB q 12 hours x 3 more days.  5- Follow Vancomycin trough tomorrow.  6- CBC & BMP tomorrow.    Problem/Recommendation - 2:  ·  Problem: Hydronephrosis.    Recommendation:   1- Urology management.   2- Improved.    Problem/Recommendation - 3:  ·  Problem: Hydroureter.    Recommendation:   1- Urology management.   2- Improved.    Problem/Recommendation - 4:  ·  Problem: COPD (chronic obstructive pulmonary disease).    Recommendation:   1- IV antibiotics.  2- Bronchodilators.  3-Pulmonary management.   4- O2 as needed.  Problem/Recommendation - 5:  ·  Problem: Diarrhea, unspecified type.    Recommendation:   1- Stool for C&S if recure.  2- stool for O&P and C&S if recure. .     Problem/Recommendation - 6:  Problem: Heart failure.   Recommendation:   1-Cardiac management.  2-Monitor renal functions.    Problem/Recommendation - 7:  Problem: Diabetes.   Recommendation:   1- Blood sugar monitoring and control.  2- Accuichecks with coverage.  3- 1800 nico ADA diet.  4-Follow HB A1C.     Problem/Recommendation - 8:  Problem: Dermatophytoses (improving).   Recommendation:   1- Continue Mycolog cream to the affected area of neck anteriorly TID.  2- Keep neck skin dry all the time.  3- Clinically improving.      Discussed with medical resident and PCP today.

## 2017-05-20 LAB
ANION GAP SERPL CALC-SCNC: 6 MMOL/L — SIGNIFICANT CHANGE UP (ref 5–17)
BASOPHILS # BLD AUTO: 0.1 K/UL — SIGNIFICANT CHANGE UP (ref 0–0.2)
BASOPHILS NFR BLD AUTO: 0.5 % — SIGNIFICANT CHANGE UP (ref 0–2)
BUN SERPL-MCNC: 12 MG/DL — SIGNIFICANT CHANGE UP (ref 7–18)
CALCIUM SERPL-MCNC: 9.7 MG/DL — SIGNIFICANT CHANGE UP (ref 8.4–10.5)
CHLORIDE SERPL-SCNC: 104 MMOL/L — SIGNIFICANT CHANGE UP (ref 96–108)
CO2 SERPL-SCNC: 34 MMOL/L — HIGH (ref 22–31)
CREAT SERPL-MCNC: 0.57 MG/DL — SIGNIFICANT CHANGE UP (ref 0.5–1.3)
EOSINOPHIL # BLD AUTO: 0.6 K/UL — HIGH (ref 0–0.5)
EOSINOPHIL NFR BLD AUTO: 5 % — SIGNIFICANT CHANGE UP (ref 0–6)
GLUCOSE SERPL-MCNC: 91 MG/DL — SIGNIFICANT CHANGE UP (ref 70–99)
HCT VFR BLD CALC: 29.1 % — LOW (ref 34.5–45)
HGB BLD-MCNC: 9.2 G/DL — LOW (ref 11.5–15.5)
LYMPHOCYTES # BLD AUTO: 1.8 K/UL — SIGNIFICANT CHANGE UP (ref 1–3.3)
LYMPHOCYTES # BLD AUTO: 16.2 % — SIGNIFICANT CHANGE UP (ref 13–44)
MAGNESIUM SERPL-MCNC: 1.9 MG/DL — SIGNIFICANT CHANGE UP (ref 1.6–2.6)
MCHC RBC-ENTMCNC: 25.1 PG — LOW (ref 27–34)
MCHC RBC-ENTMCNC: 31.5 GM/DL — LOW (ref 32–36)
MCV RBC AUTO: 79.6 FL — LOW (ref 80–100)
MONOCYTES # BLD AUTO: 0.5 K/UL — SIGNIFICANT CHANGE UP (ref 0–0.9)
MONOCYTES NFR BLD AUTO: 4.4 % — SIGNIFICANT CHANGE UP (ref 2–14)
NEUTROPHILS # BLD AUTO: 8.3 K/UL — HIGH (ref 1.8–7.4)
NEUTROPHILS NFR BLD AUTO: 73.9 % — SIGNIFICANT CHANGE UP (ref 43–77)
PHOSPHATE SERPL-MCNC: 3.7 MG/DL — SIGNIFICANT CHANGE UP (ref 2.5–4.5)
PLATELET # BLD AUTO: 577 K/UL — HIGH (ref 150–400)
POTASSIUM SERPL-MCNC: 4.4 MMOL/L — SIGNIFICANT CHANGE UP (ref 3.5–5.3)
POTASSIUM SERPL-SCNC: 4.4 MMOL/L — SIGNIFICANT CHANGE UP (ref 3.5–5.3)
RBC # BLD: 3.66 M/UL — LOW (ref 3.8–5.2)
RBC # FLD: 22.4 % — HIGH (ref 10.3–14.5)
SODIUM SERPL-SCNC: 144 MMOL/L — SIGNIFICANT CHANGE UP (ref 135–145)
VANCOMYCIN TROUGH SERPL-MCNC: 15.7 UG/ML — SIGNIFICANT CHANGE UP (ref 10–20)
WBC # BLD: 11.2 K/UL — HIGH (ref 3.8–10.5)
WBC # FLD AUTO: 11.2 K/UL — HIGH (ref 3.8–10.5)

## 2017-05-20 PROCEDURE — 99233 SBSQ HOSP IP/OBS HIGH 50: CPT | Mod: GC

## 2017-05-20 RX ORDER — IPRATROPIUM/ALBUTEROL SULFATE 18-103MCG
3 AEROSOL WITH ADAPTER (GRAM) INHALATION ONCE
Qty: 0 | Refills: 0 | Status: COMPLETED | OUTPATIENT
Start: 2017-05-20 | End: 2017-05-20

## 2017-05-20 RX ORDER — VANCOMYCIN HCL 1 G
1000 VIAL (EA) INTRAVENOUS EVERY 12 HOURS
Qty: 0 | Refills: 0 | Status: DISCONTINUED | OUTPATIENT
Start: 2017-05-20 | End: 2017-05-23

## 2017-05-20 RX ADMIN — RIVAROXABAN 15 MILLIGRAM(S): KIT at 17:27

## 2017-05-20 RX ADMIN — Medication 3 MILLIGRAM(S): at 01:47

## 2017-05-20 RX ADMIN — Medication 30 MILLIGRAM(S): at 01:52

## 2017-05-20 RX ADMIN — Medication 3 MILLILITER(S): at 02:18

## 2017-05-20 RX ADMIN — Medication 3 MILLILITER(S): at 20:28

## 2017-05-20 RX ADMIN — GABAPENTIN 100 MILLIGRAM(S): 400 CAPSULE ORAL at 14:07

## 2017-05-20 RX ADMIN — Medication: at 17:28

## 2017-05-20 RX ADMIN — Medication 1 APPLICATION(S): at 11:36

## 2017-05-20 RX ADMIN — RIVAROXABAN 15 MILLIGRAM(S): KIT at 05:29

## 2017-05-20 RX ADMIN — Medication 30 MILLIGRAM(S): at 02:42

## 2017-05-20 RX ADMIN — TAMSULOSIN HYDROCHLORIDE 0.4 MILLIGRAM(S): 0.4 CAPSULE ORAL at 22:11

## 2017-05-20 RX ADMIN — Medication 3 MILLILITER(S): at 14:33

## 2017-05-20 RX ADMIN — Medication 650 MILLIGRAM(S): at 11:50

## 2017-05-20 RX ADMIN — PANTOPRAZOLE SODIUM 40 MILLIGRAM(S): 20 TABLET, DELAYED RELEASE ORAL at 05:48

## 2017-05-20 RX ADMIN — Medication 250 MILLIGRAM(S): at 11:34

## 2017-05-20 RX ADMIN — Medication 3 MILLILITER(S): at 12:44

## 2017-05-20 RX ADMIN — GABAPENTIN 100 MILLIGRAM(S): 400 CAPSULE ORAL at 05:29

## 2017-05-20 RX ADMIN — SIMVASTATIN 40 MILLIGRAM(S): 20 TABLET, FILM COATED ORAL at 22:11

## 2017-05-20 RX ADMIN — Medication 25 MILLIGRAM(S): at 17:27

## 2017-05-20 RX ADMIN — INSULIN GLARGINE 10 UNIT(S): 100 INJECTION, SOLUTION SUBCUTANEOUS at 22:11

## 2017-05-20 RX ADMIN — Medication 3 MILLILITER(S): at 09:11

## 2017-05-20 RX ADMIN — Medication 1000 UNIT(S): at 11:34

## 2017-05-20 RX ADMIN — MEROPENEM 200 MILLIGRAM(S): 1 INJECTION INTRAVENOUS at 05:28

## 2017-05-20 RX ADMIN — Medication 650 MILLIGRAM(S): at 11:35

## 2017-05-20 RX ADMIN — NYSTATIN CREAM 1 APPLICATION(S): 100000 CREAM TOPICAL at 17:27

## 2017-05-20 RX ADMIN — NYSTATIN CREAM 1 APPLICATION(S): 100000 CREAM TOPICAL at 05:28

## 2017-05-20 RX ADMIN — MEROPENEM 200 MILLIGRAM(S): 1 INJECTION INTRAVENOUS at 22:11

## 2017-05-20 RX ADMIN — MEROPENEM 200 MILLIGRAM(S): 1 INJECTION INTRAVENOUS at 14:07

## 2017-05-20 RX ADMIN — GABAPENTIN 100 MILLIGRAM(S): 400 CAPSULE ORAL at 22:11

## 2017-05-20 RX ADMIN — Medication 650 MILLIGRAM(S): at 05:29

## 2017-05-20 RX ADMIN — Medication 1: at 11:50

## 2017-05-20 NOTE — PROGRESS NOTE ADULT - ATTENDING COMMENTS
SOB due to acute on chronic hypoxic respiratory failure due to COPD and CHF, most likely PUSHPA patient has been told she needed PAP in past but refused.     CT abd/pelvis show renal stones and hydronephrosis. Stool is positive for occult blood, anemic, s/p PRBC transfusion. in view of patient morbid obesity  and limited mobility she will need lifelong anticoagulation for PE.     + hsu draining  RECOMMENDATIONS:     - con't with bronchodilators, o2 supplement as needed.    - con't with antibiotics as per ID recommendations for UTI   - hematuria improved   - DVT and GI prophylaxis.
patient was explained that her problem comes from being obese, and complete deconditioning.  was told that her lungs are affected by her weight, sleep apnea and copd, she expressed she disagree with me.
Discussed with Patient and  at bedside findings and plan of care  Discussed with YVROSE Benson
patient seen earlier today with PGY1; Agree with PGY1 A/P above; Discussed with Dr. Hernández    s/p PRBC inadequate compensation;       ROS: Mild hematuria (pink stained); Mild SOB; FOBT positive    Plan:  UTI: Continue IV Antibiotic with Merrem and Vanco; Vanco trough in AM; Adjust accordingly; ID follow up  Hydronephrosis/ Renal calculi/  urinary retention: Continue Luna drainage; d/w Urologist Dr. Bowser, will consider Sx intervention with Ureteroscopy and Laser therapy if could be medically stabilized;   Acute PE: Continue Xarelto; Will hold only if significant hematuria or GI bleed  Anemia: Inadequate response; will give one unit PRBC today; repeat CBC in AM  COPD: On Home O2; Continue Bronchodilators; Pulmonary f/u and optimize     Discussed with Patient findings and plan of care; Discussed with RN/ PGY1

## 2017-05-20 NOTE — PROGRESS NOTE ADULT - SUBJECTIVE AND OBJECTIVE BOX
Meds:  meropenem IVPB 1000milliGRAM(s) IV Intermittent every 8 hours  vancomycin  IVPB 1000milliGRAM(s) IV Intermittent every 12 hours    Allergies:  Allergies    Cheese (Pruritus)  oxycodone (Other (Moderate))  peanuts (Unknown)  penicillin (Rash)  seafood (Hives)  strawberry (Pruritus)  Tomatoes (Other)    Intolerances    ROS  [  ] UNABLE TO ELICIT    General:  [  ] Fever   [ x ] Malaise    Skin: sacral ulcer    HEENT:  [  ] Sore Throat  [  ] Photophobia  [x  ] None    Chest: [x  ] SOB  [ x ] Cough  [ ] None    Cardiovascular: [  ] CP	 [x ] None    Gastrointestinal: [ x ] Abd pain   [  ] N    [  ] V   [  ] Diarrhea	 [ ] None    Genitourinary: [  ] Polyuria   [x  ] Urgency   [x  ] Dysuria    [  ]  Hematuria   [ ] None	    Musculoskeletal:  [  ] Back Pain	 [x ] General weakness.    Neurological: [  ]Dizziness  [  ]Visual Disturbance  [  ]Headaches   [ x] General weakness.      Vital Signs Last 24 Hrs  T(C): 37.1, Max: 37.3 (05-20 @ 00:27)  T(F): 98.8, Max: 99.1 (05-20 @ 00:27)  HR: 112 (90 - 112)  BP: 112/69 (100/65 - 122/63)  BP(mean): --  RR: 18 (16 - 18)  SpO2: 100% (95% - 100%)  PHYSICAL EXAM:    HEENT:    Neck:  [ x ] Supple  [  ]Lymphadenopathy  [  ] JVD   [ x ]No JVD  [  ] Masses   [ x ] fugal skin infection on neck crease anteriorly.    CHEST/Respiratory:  [ x ] Rales      [  ] Rhonchi      [  ] Wheezing       [  ] Chest Tenderness  [  ]Clear to auscultation    Cardiovascular:  [ x ]S1 S2  [ x ] Reg  [  ] Irreg   [  ] Murmurs  [  ]Systolic [  ]Diastolic  [x  ]No Murmur    Abdomen:  [ x ]  Bowel Sounds   [  ] Soft           [  ] ABD Distention  [ x ] Tenderness  [  ] Organomegaly                        [  ] Guarding Rigidity  [ x ] No Guarding Rigidity  [  ] Rebound Tenderness [ x ] No Rebound Tenderness    Extremities: [ x ] Edema  [  ] No edema  [  ] Clubbing   [  ] Cyanosis  [ x ] Palpable peripheral pulses                        [ x ] No Tender Calf muscles  [  ] Tender Calf muscles    Neurological:  [ x Alert  [ x ] Awake  [x  ] Oriented  x3                              [  ] Confused    Skin:  [  ] Thrombophlebitis  [x  ] Improving rashes  neck anteriorly. [  ] Dry  [ x ] Ulcers sacral and buttock.    Ortho:  [  ] Joint Swelling  [  ] Joint erythema [ x ] DJD     Luna cath in place.  LABS/DIAGNOSTIC TESTS                                        9.2    11.2  )-----------( 577      ( 20 May 2017 10:32 )             29.1   05-20    144  |  104  |  12  ----------------------------<  91  4.4   |  34<H>  |  0.57    Ca    9.7      20 May 2017 10:32  Phos  3.7     05-20  Mg     1.9     05-20                                 Culture - Blood (05.18.17 @ 18:19)    Specimen Source: .Blood Blood    Culture Results:   No growth to date.    Culture - Blood (05.18.17 @ 18:19)    Specimen Source: .Blood Blood    Culture Results:   No growth to date.      Culture - Urine (05.18.17 @ 17:49)    Specimen Source: .Urine Catheterized    Culture Results:   No growth        Vancomycin Level, Trough (05.20.17 @ 06:25)    Vancomycin Level, Trough: 15.7: Vancomycin trough levels should be rapidly reached and maintained at  15-20 ug/ml for life threatening MRSA  infections such as sepsis, endocarditis, osteomyelitis and pneumonia. A  first trough level should be drawn  before the 3rd or 4th dose.  Risk15.7:  of renal toxicity is increased for levels >15 ug/ml, in patients on  other nephrotoxic drugs, who are  hemodynamically unstable, have unstable renal function, or are on  Vancomycin therapy for >14 days. Renal function with  creatinine levels should b15.7: e monitored for those patients. ug/mL          RADIOLOGY  Last EXAM:  CHEST-PORTABLE URGENT                            PROCEDURE DATE:  05/18/2017        INTERPRETATION:  AP semisupine chest on May 18 at 1:42 PM. Patient has   urinary infection, and leukocytosis, mucous production, and multiple   medical comorbidities.    Heart is magnified by technique.    On May 15 there was a mild central congestive picture. Present film shows   this has largely resolved. There is slight atelectasis off the left lower   hilum at this time.    IMPRESSION: As above.            Assessment and Recommendation:   		  Patient has no fever today, and leukocytosis is improving today.      Heart : regular S1 & S2, No gallop,:  Problem: Urinary tract infection without hematuria, site unspecified.   repeat UA suggest for UTI.  Recommendation:   1- Follow Blood C&S and urine c&s till final reports.   2- Follow WBC closely.  3- Continue Meropenem 1 gm IVPB q 8 hours x 2 more days to finish 14 days total days of Meropenem.  4- Continue but decrease vancomycin to1 gm IVPB q 12 hours x 2 more days.  5- Follow Vancomycin trough tomorrow.  6- CBC & BMP tomorrow.    Problem/Recommendation - 2:  ·  Problem: Hydronephrosis.    Recommendation:   1- Urology management.   2- Improved.    Problem/Recommendation - 3:  ·  Problem: Hydroureter.    Recommendation:   1- Urology management.   2- Improved.    Problem/Recommendation - 4:  ·  Problem: COPD (chronic obstructive pulmonary disease).    Recommendation:   1- IV antibiotics.  2- Bronchodilators.  3- Pulmonary management.   4- O2 as needed.  Problem/Recommendation - 5:  ·  Problem: Diarrhea, unspecified type.    Recommendation:   1- Stool for C&S if recure.  2- stool for O&P and C&S if recure. .     Problem/Recommendation - 6:  Problem: Heart failure.   Recommendation:   1-Cardiac management.  2-Monitor renal functions.    Problem/Recommendation - 7:  Problem: Diabetes.   Recommendation:   1- Blood sugar monitoring and control.  2- Accuichecks with coverage.  3- 1800 nico ADA diet.  4-Follow HB A1C.     Problem/Recommendation - 8:  Problem: Dermatophytoses (improving).   Recommendation:   1- Continue Mycolog cream to the affected area of neck anteriorly TID.  2- Keep neck skin dry all the time.  3- Clinically improving.      Discussed with medical resident and PCP today.

## 2017-05-20 NOTE — PROGRESS NOTE ADULT - SUBJECTIVE AND OBJECTIVE BOX
Time of Visit:1200  Patient seen and examined.     MEDICATIONS  (STANDING):  ALBUTerol/ipratropium for Nebulization 3milliLiter(s) Nebulizer every 6 hours  insulin lispro (HumaLOG) corrective regimen sliding scale  SubCutaneous three times a day before meals  dextrose 5%. 1000milliLiter(s) IV Continuous <Continuous>  dextrose 50% Injectable 12.5Gram(s) IV Push once  dextrose 50% Injectable 25Gram(s) IV Push once  dextrose 50% Injectable 25Gram(s) IV Push once  pantoprazole    Tablet 40milliGRAM(s) Oral before breakfast  gabapentin 100milliGRAM(s) Oral three times a day  simvastatin 40milliGRAM(s) Oral at bedtime  silver sulfADIAZINE 1% Cream 1Application(s) Topical daily  metoprolol 25milliGRAM(s) Oral two times a day  meropenem IVPB 1000milliGRAM(s) IV Intermittent every 8 hours  insulin glargine Injectable (LANTUS) 10Unit(s) SubCutaneous at bedtime  rivaroxaban 15milliGRAM(s) Oral two times a day  cholecalciferol 1000Unit(s) Oral daily  tamsulosin 0.4milliGRAM(s) Oral at bedtime  nystatin Powder 1Application(s) Topical two times a day  vancomycin  IVPB 1000milliGRAM(s) IV Intermittent every 12 hours      MEDICATIONS  (PRN):  dextrose Gel 1Dose(s) Oral once PRN Blood Glucose LESS THAN 70 milliGRAM(s)/deciLiter  glucagon  Injectable 1milliGRAM(s) IntraMuscular once PRN Glucose <70 milliGRAM(s)/deciLiter  acetaminophen   Tablet 650milliGRAM(s) Oral every 6 hours PRN For Temp greater than 38 C (100.4 F)  guaiFENesin   Syrup  (Sugar-Free) 100milliGRAM(s) Oral every 6 hours PRN Cough  acetaminophen   Tablet. 650milliGRAM(s) Oral every 6 hours PRN Mild Pain (1 - 3)  sodium chloride 0.65% Nasal 1Spray(s) Both Nostrils three times a day PRN Nasal Congestion  melatonin 3milliGRAM(s) Oral once PRN Insomnia  melatonin 3milliGRAM(s) Oral at bedtime PRN Insomnia       Medications up to date at time of exam.      PHYSICAL EXAMINATION:  Patient has no new complaints.  GENERAL: The patient is a well-developed, well-nourished, in no apparent distress.     Vital Signs Last 24 Hrs  T(C): 37, Max: 37.3 ( @ 00:27)  T(F): 98.6, Max: 99.1 ( @ 00:27)  HR: 90 (90 - 108)  BP: 100/65 (100/65 - 122/63)  BP(mean): --  RR: 16 (16 - 18)  SpO2: 95% (95% - 99%)   (if applicable)    Chest Tube (if applicable)    HEENT: Head is normocephalic and atraumatic. Extraocular muscles are intact. Mucous membranes are moist.     NECK: Supple, no palpable adenopathy.    LUNGS: Clear to auscultation, no wheezing, rales, or rhonchi.    HEART: Regular rate and rhythm without murmur.    ABDOMEN: Soft, nontender, and nondistended.  No hepatosplenomegaly is noted.    EXTREMITIES: Without any cyanosis, clubbing, rash, lesions or edema.    NEUROLOGIC: Awake, alert, oriented.     SKIN: Warm, dry, good turgor.      LABS:                        9.2    11.2  )-----------( 577      ( 20 May 2017 10:32 )             29.1         144  |  104  |  12  ----------------------------<  91  4.4   |  34<H>  |  0.57    Ca    9.7      20 May 2017 10:32  Phos  3.7       Mg     1.9                               MICROBIOLOGY: (if applicable)    RADIOLOGY & ADDITIONAL STUDIES:  EKG:   CXR:  ECHO:    IMPRESSION: 57y Female PAST MEDICAL & SURGICAL HISTORY:  Morbid obesity  Heart failure with preserved ejection fraction  CHF (congestive heart failure)  Diabetes  Overactive bladder  Urinary tract infection without hematuria, site unspecified  Congestive heart failure, unspecified congestive heart failure chronicity, unspecified congestive heart failure type  Asthma  Hemorrhoids  Congestive heart failure  Emphysema  S/P   History of appendectomy  Appendicitis  S/P cholecystectomy   p/w SOB due to acute on chronic hypoxic respiratory failure due to COPD and CHF, most likely PUSHPA patient has been told she needed PAP in past but refused. She dx with PE     CT abd/pelvis show renal stones and hydronephrosis. Stool is positive for occult blood, anemic, s/p PRBC transfusion. in view of patient morbid obesity  and limited mobility she will need lifelong anticoagulation for PE.     + hsu draining  RECOMMENDATIONS:     - con't with bronchodilators, o2 supplement as needed.    - con't with antibiotics as per ID recommendations for UTI   - hematuria improved   - DVT and GI prophylaxis.   - continue xeralto  - if she agrees , out patient sleep study

## 2017-05-20 NOTE — PROGRESS NOTE ADULT - ASSESSMENT
HPI:  58 y/o F, from [facility], does not walk as per patient since last Jan,  PMHx of Morbid Obesity, DM ( Insulin), COPD ( home O2 3 L, intubated in Jan 2017), PUSHPA ( not on CPAP, refused sleep study in past), Right kidney staghorn calculous, bilateral hydroureter  and hydronephrosis Right lower lung provoked PE ( Xarelto , UTI ( on meropenum currently) , sacral and right buttock  redness ( unstageable) presents to ED c/o subjective fever, dizziness and weakness since yesterday. Patient is poor historian. History obtained from NH papers. Acc to patient she has cough with whitish sputum production since months. She also endorses that she has diarrhea about 4 days ago, associated with abdominal pain, that is diffuse , especially in lower abdomen. She does not have diarrhea anymore. She also has SOB.  Denies chest pain, headache, nausea vomiting.  As per NH papers she is being transferred for CHF exacerbation ( patient has no CHF) .. She is currently being treated for Klebsiella UTI with meropenum( on 5/8/17 to continue for 7 days, today is D 5)  .    Patient has multiple past admissions to [facility] for COPD exacerbation ( non-compliance , UTIs. She was in past ( Jan) admitted to [facility] which was complicated , requiring the intubation and then Tracheostomy. also she has Hsu at that time for urinary retention ( flomax started), she voided so hsu removed. . Then she admitted to [facility] on 4/27/17 with severe abdominal pain , found to have staghorn calculous in lower right kidney, bladder distention with bilateral hydroureter and hydronephrosis, UTI with E coli ( on flagyll and Meropenum), then she had severe constipation so was given laxatives, then she had diarrhea, initially thought that it was due to C diff, stool sent but cancelled as it was formed stool so likely cause was due to laxative. She also has right lower lung PE , provoked due to prolonged bedridden, , she was discharged with Xarelto ( currently on 15 mg BID to continue till 5/25, then total till 6 months. (12 May 2017 04:51)

## 2017-05-20 NOTE — PROGRESS NOTE ADULT - SUBJECTIVE AND OBJECTIVE BOX
HPI:  58 y/o F, from [facility], does not walk as per patient since last Jan,  PMHx of Morbid Obesity, DM ( Insulin), COPD ( home O2 3 L, intubated in Jan 2017), PUSHPA ( not on CPAP, refused sleep study in past), Right kidney staghorn calculous, bilateral hydroureter  and hydronephrosis Right lower lung provoked PE ( Xarelto , UTI ( on meropenum currently) , sacral and right buttock  redness ( unstageable) presents to ED c/o subjective fever, dizziness and weakness since yesterday. Patient is poor historian. History obtained from NH papers. Acc to patient she has cough with whitish sputum production since months. She also endorses that she has diarrhea about 4 days ago, associated with abdominal pain, that is diffuse , especially in lower abdomen. She does not have diarrhea anymore. She also has SOB.  Denies chest pain, headache, nausea vomiting.  As per NH papers she is being transferred for CHF exacerbation ( patient has no CHF) .. She is currently being treated for Klebsiella UTI with meropenum( on 5/8/17 to continue for 7 days, today is D 5)  .    Patient has multiple past admissions to [facility] for COPD exacerbation ( non-compliance , UTIs. She was in past ( Jan) admitted to [facility] which was complicated , requiring the intubation and then Tracheostomy. also she has Hsu at that time for urinary retention ( flomax started), she voided so hsu removed. . Then she admitted to [facility] on 4/27/17 with severe abdominal pain , found to have staghorn calculous in lower right kidney, bladder distention with bilateral hydroureter and hydronephrosis, UTI with E coli ( on flagyll and Meropenum), then she had severe constipation so was given laxatives, then she had diarrhea, initially thought that it was due to C diff, stool sent but cancelled as it was formed stool so likely cause was due to laxative. She also has right lower lung PE , provoked due to prolonged bedridden, , she was discharged with Xarelto ( currently on 15 mg BID to continue till 5/25, then total till 6 months. (12 May 2017 04:51)      INTERVAL HPI/ OVERNIGHT EVENTS:  patient seen and examined at bedside, says her breathing is not good today  patient was told that her wbcs are trending down, and no fevers overnight, cultures are still negative, patient repeatedly expressed she wishes not to leave the hospital now as she wants to do physical therapy here. Patient was told that no way she will be spending her full recovery here, she is completely deconditioned as she refuses to move.  patient again continues to be unpleasant when comes to discharge planning, she continues to consume fast food and juices inspite of being told multiple times it's not healthy for her.     Allergies    Cheese (Pruritus)  oxycodone (Other (Moderate))  peanuts (Unknown)  penicillin (Rash)  seafood (Hives)  strawberry (Pruritus)  Tomatoes (Other)    REVIEW OF SYSTEMS:  CONSTITUTIONAL: No fever, weight loss, or fatigue  EYES: No eye pain, visual disturbances, or discharge  RESPIRATORY: No cough, wheezing, chills or hemoptysis; No shortness of breath  CARDIOVASCULAR: No chest pain, palpitations, dizziness, or leg swelling  GASTROINTESTINAL: No abdominal or epigastric pain. No nausea, vomiting, or hematemesis; No diarrhea or constipation. No melena or hematochezia.  GENITOURINARY: No dysuria, frequency, hematuria, or incontinence  NEUROLOGICAL: No headaches, memory loss, loss of strength, numbness, or tremors  MUSCULOSKELETAL: No joint pain or swelling; No muscle, back, or extremity pain  PSYCHIATRIC: No depression, anxiety, mood swings, or difficulty sleeping  HEME/LYMPH: No easy bruising, or bleeding gums  ALLERGY AND IMMUNOLOGIC: No hives or eczema    Medications:  ALBUTerol/ipratropium for Nebulization 3milliLiter(s) Nebulizer every 6 hours  insulin lispro (HumaLOG) corrective regimen sliding scale  SubCutaneous three times a day before meals  dextrose 5%. 1000milliLiter(s) IV Continuous <Continuous>  dextrose Gel 1Dose(s) Oral once PRN Blood Glucose LESS THAN 70 milliGRAM(s)/deciLiter  dextrose 50% Injectable 12.5Gram(s) IV Push once  dextrose 50% Injectable 25Gram(s) IV Push once  dextrose 50% Injectable 25Gram(s) IV Push once  glucagon  Injectable 1milliGRAM(s) IntraMuscular once PRN Glucose <70 milliGRAM(s)/deciLiter  pantoprazole    Tablet 40milliGRAM(s) Oral before breakfast  gabapentin 100milliGRAM(s) Oral three times a day  simvastatin 40milliGRAM(s) Oral at bedtime  silver sulfADIAZINE 1% Cream 1Application(s) Topical daily  metoprolol 25milliGRAM(s) Oral two times a day  acetaminophen   Tablet 650milliGRAM(s) Oral every 6 hours PRN For Temp greater than 38 C (100.4 F)  guaiFENesin   Syrup  (Sugar-Free) 100milliGRAM(s) Oral every 6 hours PRN Cough  meropenem IVPB 1000milliGRAM(s) IV Intermittent every 8 hours  insulin glargine Injectable (LANTUS) 10Unit(s) SubCutaneous at bedtime  rivaroxaban 15milliGRAM(s) Oral two times a day  acetaminophen   Tablet. 650milliGRAM(s) Oral every 6 hours PRN Mild Pain (1 - 3)  cholecalciferol 1000Unit(s) Oral daily  sodium chloride 0.65% Nasal 1Spray(s) Both Nostrils three times a day PRN Nasal Congestion  tamsulosin 0.4milliGRAM(s) Oral at bedtime  nystatin Powder 1Application(s) Topical two times a day  melatonin 3milliGRAM(s) Oral once PRN Insomnia  melatonin 3milliGRAM(s) Oral at bedtime PRN Insomnia  vancomycin  IVPB 1000milliGRAM(s) IV Intermittent every 12 hours      PHYSICAL EXAM:  Vital Signs Last 24 Hrs  T(C): 37, Max: 37.3 (05-20 @ 00:27)  T(F): 98.6, Max: 99.1 (05-20 @ 00:27)  HR: 90 (90 - 108)  BP: 100/65 (100/65 - 122/63)  BP(mean): --  RR: 16 (16 - 19)  SpO2: 95% (95% - 99%)    GENERAL: NAD  HEAD:  Atraumatic, Normocephalic  EYES: EOMI, PERRLA, conjunctiva and sclera clear  ENT: moist mucous membranes  NECK: Supple, No JVD, Normal thyroid  NERVOUS SYSTEM:  Alert & Oriented X3, Good concentration; Motor Strength 5/5 B/L upper and lower extremities; DTRs 2+ intact and symmetric  CHEST/LUNG: No rales, rhonchi, wheezing, or rubs  HEART: Regular rate and rhythm; No murmurs, rubs, or gallops  ABDOMEN: Soft, Nontender, Nondistended; Bowel sounds present  EXTREMITIES:  2+ Peripheral Pulses, No clubbing, cyanosis, or edema  SKIN: No rashes or lesions    LABS:                        9.2    11.2  )-----------( 577      ( 20 May 2017 10:32 )             29.1     05-20    144  |  104  |  12  ----------------------------<  91  4.4   |  34<H>  |  0.57    Ca    9.7      20 May 2017 10:32  Phos  3.7     05-20  Mg     1.9     05-20    Culture & Sensitivity:   CAPILLARY BLOOD GLUCOSE  151 (20 May 2017 12:00)  122 (20 May 2017 08:50)  112 (19 May 2017 20:55)  120 (19 May 2017 16:40)      I & Os for current day (as of 05-20 @ 14:36)  =============================================  IN: 0 ml / OUT: 1250 ml / NET: -1250 ml      RADIOLOGY & ADDITIONAL TESTS:  Radiology testing independently reviewed:     Consultant(s) Notes Reviewed:  [ x] YES  [ ] NO    Care Discussed with Consultants/Other Providers [ x] YES  [ ] NO

## 2017-05-21 DIAGNOSIS — H57.11 OCULAR PAIN, RIGHT EYE: ICD-10-CM

## 2017-05-21 LAB — VANCOMYCIN TROUGH SERPL-MCNC: 17.1 UG/ML — SIGNIFICANT CHANGE UP (ref 10–20)

## 2017-05-21 PROCEDURE — 99232 SBSQ HOSP IP/OBS MODERATE 35: CPT | Mod: GC

## 2017-05-21 RX ORDER — HYDROCORTISONE 1 %
1 OINTMENT (GRAM) TOPICAL THREE TIMES A DAY
Qty: 0 | Refills: 0 | Status: DISCONTINUED | OUTPATIENT
Start: 2017-05-21 | End: 2017-05-24

## 2017-05-21 RX ORDER — POLYETHYLENE GLYCOL 3350 17 G/17G
17 POWDER, FOR SOLUTION ORAL DAILY
Qty: 0 | Refills: 0 | Status: DISCONTINUED | OUTPATIENT
Start: 2017-05-21 | End: 2017-05-22

## 2017-05-21 RX ORDER — LACTULOSE 10 G/15ML
20 SOLUTION ORAL ONCE
Qty: 0 | Refills: 0 | Status: COMPLETED | OUTPATIENT
Start: 2017-05-21 | End: 2017-05-21

## 2017-05-21 RX ORDER — IPRATROPIUM/ALBUTEROL SULFATE 18-103MCG
3 AEROSOL WITH ADAPTER (GRAM) INHALATION ONCE
Qty: 0 | Refills: 0 | Status: COMPLETED | OUTPATIENT
Start: 2017-05-21 | End: 2017-05-21

## 2017-05-21 RX ORDER — TOBRAMYCIN AND DEXAMETHASONE 1; 3 MG/ML; MG/ML
1 SUSPENSION/ DROPS OPHTHALMIC
Qty: 0 | Refills: 0 | Status: DISCONTINUED | OUTPATIENT
Start: 2017-05-21 | End: 2017-05-24

## 2017-05-21 RX ADMIN — MEROPENEM 200 MILLIGRAM(S): 1 INJECTION INTRAVENOUS at 05:30

## 2017-05-21 RX ADMIN — Medication 250 MILLIGRAM(S): at 22:54

## 2017-05-21 RX ADMIN — Medication 1: at 18:05

## 2017-05-21 RX ADMIN — Medication 3 MILLILITER(S): at 08:06

## 2017-05-21 RX ADMIN — GABAPENTIN 100 MILLIGRAM(S): 400 CAPSULE ORAL at 15:06

## 2017-05-21 RX ADMIN — Medication 1000 UNIT(S): at 12:00

## 2017-05-21 RX ADMIN — INSULIN GLARGINE 10 UNIT(S): 100 INJECTION, SOLUTION SUBCUTANEOUS at 22:52

## 2017-05-21 RX ADMIN — Medication 1 APPLICATION(S): at 15:06

## 2017-05-21 RX ADMIN — SIMVASTATIN 40 MILLIGRAM(S): 20 TABLET, FILM COATED ORAL at 22:54

## 2017-05-21 RX ADMIN — POLYETHYLENE GLYCOL 3350 17 GRAM(S): 17 POWDER, FOR SOLUTION ORAL at 18:05

## 2017-05-21 RX ADMIN — RIVAROXABAN 15 MILLIGRAM(S): KIT at 05:30

## 2017-05-21 RX ADMIN — PANTOPRAZOLE SODIUM 40 MILLIGRAM(S): 20 TABLET, DELAYED RELEASE ORAL at 06:21

## 2017-05-21 RX ADMIN — Medication 250 MILLIGRAM(S): at 12:00

## 2017-05-21 RX ADMIN — Medication 250 MILLIGRAM(S): at 00:04

## 2017-05-21 RX ADMIN — Medication 3 MILLILITER(S): at 02:32

## 2017-05-21 RX ADMIN — Medication 3 MILLILITER(S): at 18:04

## 2017-05-21 RX ADMIN — TAMSULOSIN HYDROCHLORIDE 0.4 MILLIGRAM(S): 0.4 CAPSULE ORAL at 22:53

## 2017-05-21 RX ADMIN — Medication 3 MILLIGRAM(S): at 00:26

## 2017-05-21 RX ADMIN — NYSTATIN CREAM 1 APPLICATION(S): 100000 CREAM TOPICAL at 18:06

## 2017-05-21 RX ADMIN — Medication 3 MILLILITER(S): at 14:11

## 2017-05-21 RX ADMIN — Medication 3 MILLILITER(S): at 20:37

## 2017-05-21 RX ADMIN — NYSTATIN CREAM 1 APPLICATION(S): 100000 CREAM TOPICAL at 05:30

## 2017-05-21 RX ADMIN — Medication 650 MILLIGRAM(S): at 06:22

## 2017-05-21 RX ADMIN — Medication 650 MILLIGRAM(S): at 18:16

## 2017-05-21 RX ADMIN — TOBRAMYCIN AND DEXAMETHASONE 1 APPLICATION(S): 1; 3 SUSPENSION/ DROPS OPHTHALMIC at 18:05

## 2017-05-21 RX ADMIN — Medication 650 MILLIGRAM(S): at 05:30

## 2017-05-21 RX ADMIN — GABAPENTIN 100 MILLIGRAM(S): 400 CAPSULE ORAL at 05:30

## 2017-05-21 RX ADMIN — Medication 25 MILLIGRAM(S): at 18:06

## 2017-05-21 RX ADMIN — RIVAROXABAN 15 MILLIGRAM(S): KIT at 18:06

## 2017-05-21 RX ADMIN — GABAPENTIN 100 MILLIGRAM(S): 400 CAPSULE ORAL at 22:53

## 2017-05-21 RX ADMIN — MEROPENEM 200 MILLIGRAM(S): 1 INJECTION INTRAVENOUS at 22:52

## 2017-05-21 NOTE — PROGRESS NOTE ADULT - SUBJECTIVE AND OBJECTIVE BOX
Meds:  meropenem IVPB 1000milliGRAM(s) IV Intermittent every 8 hours  vancomycin  IVPB 1000milliGRAM(s) IV Intermittent every 12 hours    Allergies:  Allergies    Cheese (Pruritus)  oxycodone (Other (Moderate))  peanuts (Unknown)  penicillin (Rash)  seafood (Hives)  strawberry (Pruritus)  Tomatoes (Other)    Intolerances    ROS  [  ] UNABLE TO ELICIT    General:  [  ] Fever   [ x ] Malaise    Skin: sacral ulcer    HEENT:  [  ] Sore Throat  [  ] Photophobia  [x  ] None    Chest: [x  ] SOB  [ x ] Cough  [ ] None    Cardiovascular: [  ] CP	 [x ] None    Gastrointestinal: [ x ] Abd pain   [  ] N    [  ] V   [  ] Diarrhea	 [ ] None    Genitourinary: [  ] Polyuria   [x  ] Urgency   [x  ] Dysuria    [  ]  Hematuria   [ ] None	    Musculoskeletal:  [  ] Back Pain	 [x ] General weakness.    Neurological: [  ]Dizziness  [  ]Visual Disturbance  [  ]Headaches   [ x] General weakness        PHYSICAL EXAM:    Vital Signs Last 24 Hrs  T(C): 36.5, Max: 37.2 (05-21 @ 00:16)  T(F): 97.7, Max: 98.9 (05-21 @ 00:16)  HR: 100 (100 - 112)  BP: 106/688 (105/54 - 122/59)  BP(mean): --  RR: 16 (16 - 18)  SpO2: 99% (99% - 100%)    HEENT:    Neck:  [ x ] Supple  [  ]Lymphadenopathy  [  ] JVD   [ x ]No JVD  [  ] Masses   [ x ] fugal skin infection on neck crease anteriorly.    CHEST/Respiratory:  [ x ] Rales      [  ] Rhonchi      [  ] Wheezing       [  ] Chest Tenderness  [  ]Clear to auscultation    Cardiovascular:  [ x ]S1 S2  [ x ] Reg  [  ] Irreg   [  ] Murmurs  [  ]Systolic [  ]Diastolic  [x  ]No Murmur    Abdomen:  [ x ]  Bowel Sounds   [  ] Soft           [  ] ABD Distention  [ x ] Tenderness  [  ] Organomegaly                        [  ] Guarding Rigidity  [ x ] No Guarding Rigidity  [  ] Rebound Tenderness [ x ] No Rebound Tenderness    Extremities: [ x ] Edema  [  ] No edema  [  ] Clubbing   [  ] Cyanosis  [ x ] Palpable peripheral pulses                        [ x ] No Tender Calf muscles  [  ] Tender Calf muscles    Neurological:  [ x Alert  [ x ] Awake  [x  ] Oriented  x3                              [  ] Confused    Skin:  [  ] Thrombophlebitis  [x  ] Improving rashes  neck anteriorly. [  ] Dry  [ x ] Ulcers sacral and buttock.    Ortho:  [  ] Joint Swelling  [  ] Joint erythema [ x ] DJD     Luna cath in place.  LABS/DIAGNOSTIC TESTS                  Last was yesterday.                      9.2    11.2  )-----------( 577      ( 20 May 2017 10:32 )             29.1   05-20    144  |  104  |  12  ----------------------------<  91  4.4   |  34<H>  |  0.57    Ca    9.7      20 May 2017 10:32  Phos  3.7     05-20  Mg     1.9     05-20                                 Culture - Blood (05.18.17 @ 18:19)    Specimen Source: .Blood Blood    Culture Results:   No growth to date.    Culture - Blood (05.18.17 @ 18:19)    Specimen Source: .Blood Blood    Culture Results:   No growth to date.      Culture - Urine (05.18.17 @ 17:49)    Specimen Source: .Urine Catheterized    Culture Results:   No growth        Vancomycin Level, Trough (05.20.17 @ 06:25)    Vancomycin Level, Trough: 15.7: Vancomycin trough levels should be rapidly reached and maintained at  15-20 ug/ml for life threatening MRSA  infections such as sepsis, endocarditis, osteomyelitis and pneumonia. A  first trough level should be drawn  before the 3rd or 4th dose.  Risk15.7:  of renal toxicity is increased for levels >15 ug/ml, in patients on  other nephrotoxic drugs, who are  hemodynamically unstable, have unstable renal function, or are on  Vancomycin therapy for >14 days. Renal function with  creatinine levels should b15.7: e monitored for those patients. ug/mL          RADIOLOGY  Last EXAM:  CHEST-PORTABLE URGENT                            PROCEDURE DATE:  05/18/2017        INTERPRETATION:  AP semisupine chest on May 18 at 1:42 PM. Patient has   urinary infection, and leukocytosis, mucous production, and multiple   medical comorbidities.    Heart is magnified by technique.    On May 15 there was a mild central congestive picture. Present film shows   this has largely resolved. There is slight atelectasis off the left lower   hilum at this time.    IMPRESSION: As above.            Assessment and Recommendation:   		  Patient has no fever today, and leukocytosis is improving today.      Heart : regular S1 & S2, No gallop,:  Problem: Urinary tract infection without hematuria, site unspecified.   repeat UA suggest for UTI.  Recommendation:   1- Follow Blood C&S and urine c&s till final reports.   2- Follow WBC closely.  3- Continue Meropenem 1 gm IVPB q 8 hours x 1 more days to finish 14 days total days of Meropenem.  4- Continue vancomycin to1 gm IVPB q 12 hours x 2 more days.  5- Follow Vancomycin trough.  6- CBC & BMP tomorrow.    Problem/Recommendation - 2:  ·  Problem: Hydronephrosis.    Recommendation:   1- Urology management.   2- Improved.    Problem/Recommendation - 3:  ·  Problem: Hydroureter.    Recommendation:   1- Urology management.   2- Improved.    Problem/Recommendation - 4:  ·  Problem: COPD (chronic obstructive pulmonary disease).    Recommendation:   1- IV antibiotics.  2- Bronchodilators.  3- Pulmonary management.   4- O2 as needed.  Problem/Recommendation - 5:  ·  Problem: Diarrhea, unspecified type.    Recommendation:   1- Stool for C&S if recure.  2- stool for O&P and C&S if recure. .     Problem/Recommendation - 6:  Problem: Heart failure.   Recommendation:   1-Cardiac management.  2-Monitor renal functions.    Problem/Recommendation - 7:  Problem: Diabetes.   Recommendation:   1- Blood sugar monitoring and control.  2- Accuichecks with coverage.  3- 1800 nico ADA diet.  4-Follow HB A1C.     Problem/Recommendation - 8:  Problem: Dermatophytoses (improving).   Recommendation:   1- Continue Mycolog cream to the affected area of neck anteriorly TID.  2- Keep neck skin dry all the time.  3- Clinically improving.      Discussed with PCP today.

## 2017-05-21 NOTE — PROGRESS NOTE ADULT - SUBJECTIVE AND OBJECTIVE BOX
HPI:  56 y/o F, from [facility], does not walk as per patient since last Jan,  PMHx of Morbid Obesity, DM ( Insulin), COPD ( home O2 3 L, intubated in Jan 2017), PUSHPA ( not on CPAP, refused sleep study in past), Right kidney staghorn calculous, bilateral hydroureter  and hydronephrosis Right lower lung provoked PE ( Xarelto , UTI ( on meropenum currently) , sacral and right buttock  redness ( unstageable) presents to ED c/o subjective fever, dizziness and weakness since yesterday. Patient is poor historian. History obtained from NH papers. Acc to patient she has cough with whitish sputum production since months. She also endorses that she has diarrhea about 4 days ago, associated with abdominal pain, that is diffuse , especially in lower abdomen. She does not have diarrhea anymore. She also has SOB.  Denies chest pain, headache, nausea vomiting.  As per NH papers she is being transferred for CHF exacerbation ( patient has no CHF) .. She is currently being treated for Klebsiella UTI with meropenum( on 5/8/17 to continue for 7 days, today is D 5)  .    Patient has multiple past admissions to [facility] for COPD exacerbation ( non-compliance , UTIs. She was in past ( Jan) admitted to [facility] which was complicated , requiring the intubation and then Tracheostomy. also she has Hsu at that time for urinary retention ( flomax started), she voided so hsu removed. . Then she admitted to [facility] on 4/27/17 with severe abdominal pain , found to have staghorn calculous in lower right kidney, bladder distention with bilateral hydroureter and hydronephrosis, UTI with E coli ( on flagyll and Meropenum), then she had severe constipation so was given laxatives, then she had diarrhea, initially thought that it was due to C diff, stool sent but cancelled as it was formed stool so likely cause was due to laxative. She also has right lower lung PE , provoked due to prolonged bedridden, , she was discharged with Xarelto ( currently on 15 mg BID to continue till 5/25, then total till 6 months. (12 May 2017 04:51)      INTERVAL HPI/OVERNIGHT EVENTS:  patient seen and examined at bedside, today she states she has some blurring vision in the right eye, states some pain 2/10 in severity.  she says the blurring of vision is not new but she feels is worse today. Denies any discomfort with bright light  she has cataract, glaucoma in the right eye, never had operated on, was supposed to be seen by Opthalmology but did not inspite of having Ophthalmologist in [facility] where she came from.  otherwise, denies any active complaints.    Allergies    Cheese (Pruritus)  oxycodone (Other (Moderate))  peanuts (Unknown)  penicillin (Rash)  seafood (Hives)  strawberry (Pruritus)  Tomatoes (Other)    REVIEW OF SYSTEMS:  CONSTITUTIONAL: No fever, weight loss, or fatigue  EYES: No eye pain, visual disturbances, or discharge  RESPIRATORY: No cough, wheezing, chills or hemoptysis; No shortness of breath  CARDIOVASCULAR: No chest pain, palpitations, dizziness, or leg swelling  GASTROINTESTINAL: No abdominal or epigastric pain. No nausea, vomiting, or hematemesis; No diarrhea or constipation. No melena or hematochezia.  GENITOURINARY: No dysuria, frequency, hematuria, or incontinence  NEUROLOGICAL: No headaches, memory loss, loss of strength, numbness, or tremors  MUSCULOSKELETAL: No joint pain or swelling; No muscle, back, or extremity pain  PSYCHIATRIC: No depression, anxiety, mood swings, or difficulty sleeping  HEME/LYMPH: No easy bruising, or bleeding gums  ALLERGY AND IMMUNOLOGIC: No hives or eczema    Medications:  ALBUTerol/ipratropium for Nebulization 3milliLiter(s) Nebulizer every 6 hours  insulin lispro (HumaLOG) corrective regimen sliding scale  SubCutaneous three times a day before meals  dextrose 5%. 1000milliLiter(s) IV Continuous <Continuous>  dextrose Gel 1Dose(s) Oral once PRN Blood Glucose LESS THAN 70 milliGRAM(s)/deciLiter  dextrose 50% Injectable 12.5Gram(s) IV Push once  dextrose 50% Injectable 25Gram(s) IV Push once  dextrose 50% Injectable 25Gram(s) IV Push once  glucagon  Injectable 1milliGRAM(s) IntraMuscular once PRN Glucose <70 milliGRAM(s)/deciLiter  pantoprazole    Tablet 40milliGRAM(s) Oral before breakfast  gabapentin 100milliGRAM(s) Oral three times a day  simvastatin 40milliGRAM(s) Oral at bedtime  silver sulfADIAZINE 1% Cream 1Application(s) Topical daily  metoprolol 25milliGRAM(s) Oral two times a day  acetaminophen   Tablet 650milliGRAM(s) Oral every 6 hours PRN For Temp greater than 38 C (100.4 F)  guaiFENesin   Syrup  (Sugar-Free) 100milliGRAM(s) Oral every 6 hours PRN Cough  meropenem IVPB 1000milliGRAM(s) IV Intermittent every 8 hours  insulin glargine Injectable (LANTUS) 10Unit(s) SubCutaneous at bedtime  rivaroxaban 15milliGRAM(s) Oral two times a day  acetaminophen   Tablet. 650milliGRAM(s) Oral every 6 hours PRN Mild Pain (1 - 3)  cholecalciferol 1000Unit(s) Oral daily  sodium chloride 0.65% Nasal 1Spray(s) Both Nostrils three times a day PRN Nasal Congestion  tamsulosin 0.4milliGRAM(s) Oral at bedtime  nystatin Powder 1Application(s) Topical two times a day  melatonin 3milliGRAM(s) Oral once PRN Insomnia  melatonin 3milliGRAM(s) Oral at bedtime PRN Insomnia  vancomycin  IVPB 1000milliGRAM(s) IV Intermittent every 12 hours  tobramycin/dexamethasone Ointment 1Application(s) Right EYE two times a day      PHYSICAL EXAM:  Vital Signs Last 24 Hrs  T(C): 36.5, Max: 37.2 (05-21 @ 00:16)  T(F): 97.7, Max: 98.9 (05-21 @ 00:16)  HR: 100 (100 - 112)  BP: 106/688 (105/54 - 122/59)  BP(mean): --  RR: 16 (16 - 18)  SpO2: 99% (99% - 100%)    GENERAL: NAD  HEAD:  Atraumatic, Normocephalic  EYES: EOMI, sluggish reaction to the right pupil,  no discomfort with bright light.  ENT: moist mucous membranes  NECK: Supple, No JVD, Normal thyroid  NERVOUS SYSTEM:  Alert & Oriented X3, Good concentration; Motor Strength 5/5 B/L upper and lower extremities; DTRs 2+ intact and symmetric  CHEST/LUNG: No rales, rhonchi, wheezing, or rubs  HEART: Regular rate and rhythm; No murmurs, rubs, or gallops  ABDOMEN: Soft, Nontender, Nondistended; Bowel sounds present  EXTREMITIES:  2+ Peripheral Pulses, No clubbing, cyanosis, or edema  SKIN: No rashes or lesions    LABS:                        9.2    11.2  )-----------( 577      ( 20 May 2017 10:32 )             29.1     05-20    144  |  104  |  12  ----------------------------<  91  4.4   |  34<H>  |  0.57    Ca    9.7      20 May 2017 10:32  Phos  3.7     05-20  Mg     1.9     05-20                  Culture & Sensitivity:   CAPILLARY BLOOD GLUCOSE  116 (21 May 2017 11:48)  114 (20 May 2017 21:17)  153 (20 May 2017 17:30)    I & Os for 24h ending 05-21 @ 07:00  =============================================  IN: 0 ml / OUT: 3000 ml / NET: -3000 ml    I & Os for current day (as of 05-21 @ 15:23)  =============================================  IN: 0 ml / OUT: 1500 ml / NET: -1500 ml      RADIOLOGY & ADDITIONAL TESTS:  Radiology testing independently reviewed:     Consultant(s) Notes Reviewed:  [ x] YES  [ ] NO    Care Discussed with Consultants/Other Providers [ x] YES  [ ] NO

## 2017-05-21 NOTE — PROGRESS NOTE ADULT - PROBLEM SELECTOR PLAN 1
dought any acute Glaucoma issue, as patient does not have any discomfort to light, nor any discomfort with eye movements.  sluggish pupillary reaction can be from glaucoma itself.  ophthalmology consult.

## 2017-05-21 NOTE — PROGRESS NOTE ADULT - ASSESSMENT
HPI:  58 y/o F, from [facility], does not walk as per patient since last Jan,  PMHx of Morbid Obesity, DM ( Insulin), COPD ( home O2 3 L, intubated in Jan 2017), PUSHPA ( not on CPAP, refused sleep study in past), Right kidney staghorn calculous, bilateral hydroureter  and hydronephrosis Right lower lung provoked PE ( Xarelto , UTI ( on meropenum currently) , sacral and right buttock  redness ( unstageable) presents to ED c/o subjective fever, dizziness and weakness since yesterday. Patient is poor historian. History obtained from NH papers. Acc to patient she has cough with whitish sputum production since months. She also endorses that she has diarrhea about 4 days ago, associated with abdominal pain, that is diffuse , especially in lower abdomen. She does not have diarrhea anymore. She also has SOB.  Denies chest pain, headache, nausea vomiting.  As per NH papers she is being transferred for CHF exacerbation ( patient has no CHF) .. She is currently being treated for Klebsiella UTI with meropenum( on 5/8/17 to continue for 7 days, today is D 5)  .    Patient has multiple past admissions to [facility] for COPD exacerbation ( non-compliance , UTIs. She was in past ( Jan) admitted to [facility] which was complicated , requiring the intubation and then Tracheostomy. also she has Hsu at that time for urinary retention ( flomax started), she voided so hsu removed. . Then she admitted to [facility] on 4/27/17 with severe abdominal pain , found to have staghorn calculous in lower right kidney, bladder distention with bilateral hydroureter and hydronephrosis, UTI with E coli ( on flagyll and Meropenum), then she had severe constipation so was given laxatives, then she had diarrhea, initially thought that it was due to C diff, stool sent but cancelled as it was formed stool so likely cause was due to laxative. She also has right lower lung PE , provoked due to prolonged bedridden, , she was discharged with Xarelto ( currently on 15 mg BID to continue till 5/25, then total till 6 months. (12 May 2017 04:51)

## 2017-05-21 NOTE — PROGRESS NOTE ADULT - SUBJECTIVE AND OBJECTIVE BOX
Patient is a 57y old  Female who presents with a chief complaint of Sent from NH due to fever, SOB, cough, abdominal pain, diarrhea (12 May 2017 04:51)    -=OVERNIGHT EVENTS / INTERVAL HPI / SUBJECTIVE=-  Patient reporting new onset cloudy vision in the R eye associated with some "tightness." Does not report headache. Otherwise denies fevers chill n/v cp. Says her breathing as a little worsened.    Reports that she has cataracts in both eyes and additionally astigmatism and glaucoma in the R eye. Says she has not followed with opthalmology for the glaucoma and has not been on any medications.     -=OBJECTIVE=-       --VITALS--  Vital Signs Last 24 Hrs  T(C): 36.5, Max: 37.2 (05-21 @ 00:16)  T(F): 97.7, Max: 98.9 (05-21 @ 00:16)  HR: 100 (100 - 112)  BP: 106/688 (105/54 - 122/59)  BP(mean): --  RR: 16 (16 - 18)  SpO2: 99% (99% - 100%)         --PHYSICAL EXAM--  General: morbidly obese  HEENT: NC AT. R eye mildly red without exudate. Upper eye lid slightly swollen without redness or warmth. bilateral pupils sluggish in constriction however appears equal. Tonometry-  R 26; L 13.  Neurology: A&Ox3  Respiratory: CTA B/L, no rhonchi, no rales, no wheeze  CV: RRR, S1S2, no murmur  Abdominal: Soft, mild ttp diffusely, ND no palpable mass, bowel sounds positive  MSK: No edema           --LABS--                        9.2    11.2  )-----------( 577      ( 20 May 2017 10:32 )             29.1     05-20    144  |  104  |  12  ----------------------------<  91  4.4   |  34<H>  |  0.57    Ca    9.7      20 May 2017 10:32  Phos  3.7     05-20  Mg     1.9     05-20      05-12 Chol 98 LDL 43 HDL 31<L> Trig 122         --MEDICATIONS--  MEDICATIONS  (STANDING):  ALBUTerol/ipratropium for Nebulization 3milliLiter(s) Nebulizer every 6 hours  insulin lispro (HumaLOG) corrective regimen sliding scale  SubCutaneous three times a day before meals  dextrose 5%. 1000milliLiter(s) IV Continuous <Continuous>  dextrose 50% Injectable 12.5Gram(s) IV Push once  dextrose 50% Injectable 25Gram(s) IV Push once  dextrose 50% Injectable 25Gram(s) IV Push once  pantoprazole    Tablet 40milliGRAM(s) Oral before breakfast  gabapentin 100milliGRAM(s) Oral three times a day  simvastatin 40milliGRAM(s) Oral at bedtime  silver sulfADIAZINE 1% Cream 1Application(s) Topical daily  metoprolol 25milliGRAM(s) Oral two times a day  meropenem IVPB 1000milliGRAM(s) IV Intermittent every 8 hours  insulin glargine Injectable (LANTUS) 10Unit(s) SubCutaneous at bedtime  rivaroxaban 15milliGRAM(s) Oral two times a day  cholecalciferol 1000Unit(s) Oral daily  tamsulosin 0.4milliGRAM(s) Oral at bedtime  nystatin Powder 1Application(s) Topical two times a day  vancomycin  IVPB 1000milliGRAM(s) IV Intermittent every 12 hours  tobramycin/dexamethasone Ointment 1Application(s) Right EYE two times a day    MEDICATIONS  (PRN):  dextrose Gel 1Dose(s) Oral once PRN Blood Glucose LESS THAN 70 milliGRAM(s)/deciLiter  glucagon  Injectable 1milliGRAM(s) IntraMuscular once PRN Glucose <70 milliGRAM(s)/deciLiter  acetaminophen   Tablet 650milliGRAM(s) Oral every 6 hours PRN For Temp greater than 38 C (100.4 F)  guaiFENesin   Syrup  (Sugar-Free) 100milliGRAM(s) Oral every 6 hours PRN Cough  acetaminophen   Tablet. 650milliGRAM(s) Oral every 6 hours PRN Mild Pain (1 - 3)  sodium chloride 0.65% Nasal 1Spray(s) Both Nostrils three times a day PRN Nasal Congestion  melatonin 3milliGRAM(s) Oral once PRN Insomnia  melatonin 3milliGRAM(s) Oral at bedtime PRN Insomnia Patient is a 57y old  Female who presents with a chief complaint of Sent from NH due to fever, SOB, cough, abdominal pain, diarrhea (12 May 2017 04:51)    -=OVERNIGHT EVENTS / INTERVAL HPI / SUBJECTIVE=-  Patient reporting new onset cloudy vision in the R eye associated with some "tightness." Does not report headache. Otherwise denies fevers chill n/v cp. Says her breathing as a little worsened.    Reports that she has cataracts in both eyes and additionally astigmatism and glaucoma in the R eye. Says she has not followed with opthalmology for the glaucoma and has not been on any medications.     -=OBJECTIVE=-       --VITALS--  Vital Signs Last 24 Hrs  T(C): 36.5, Max: 37.2 (05-21 @ 00:16)  T(F): 97.7, Max: 98.9 (05-21 @ 00:16)  HR: 100 (100 - 112)  BP: 106/688 (105/54 - 122/59)  BP(mean): --  RR: 16 (16 - 18)  SpO2: 99% (99% - 100%)         --PHYSICAL EXAM--  General: morbidly obese  HEENT: NC AT. R eye mildly red without exudate. Upper eye lid slightly swollen without redness or warmth. bilateral pupils sluggish in constriction however appears equal. Tonometry-  R 26; L 13. No loss of vision/  Neurology: A&Ox3  Respiratory: CTA B/L, no rhonchi, no rales, no wheeze  CV: RRR, S1S2, no murmur  Abdominal: Soft, mild ttp diffusely, ND no palpable mass, bowel sounds positive  MSK: No edema           --LABS--                        9.2    11.2  )-----------( 577      ( 20 May 2017 10:32 )             29.1     05-20    144  |  104  |  12  ----------------------------<  91  4.4   |  34<H>  |  0.57    Ca    9.7      20 May 2017 10:32  Phos  3.7     05-20  Mg     1.9     05-20      05-12 Chol 98 LDL 43 HDL 31<L> Trig 122         --MEDICATIONS--  MEDICATIONS  (STANDING):  ALBUTerol/ipratropium for Nebulization 3milliLiter(s) Nebulizer every 6 hours  insulin lispro (HumaLOG) corrective regimen sliding scale  SubCutaneous three times a day before meals  dextrose 5%. 1000milliLiter(s) IV Continuous <Continuous>  dextrose 50% Injectable 12.5Gram(s) IV Push once  dextrose 50% Injectable 25Gram(s) IV Push once  dextrose 50% Injectable 25Gram(s) IV Push once  pantoprazole    Tablet 40milliGRAM(s) Oral before breakfast  gabapentin 100milliGRAM(s) Oral three times a day  simvastatin 40milliGRAM(s) Oral at bedtime  silver sulfADIAZINE 1% Cream 1Application(s) Topical daily  metoprolol 25milliGRAM(s) Oral two times a day  meropenem IVPB 1000milliGRAM(s) IV Intermittent every 8 hours  insulin glargine Injectable (LANTUS) 10Unit(s) SubCutaneous at bedtime  rivaroxaban 15milliGRAM(s) Oral two times a day  cholecalciferol 1000Unit(s) Oral daily  tamsulosin 0.4milliGRAM(s) Oral at bedtime  nystatin Powder 1Application(s) Topical two times a day  vancomycin  IVPB 1000milliGRAM(s) IV Intermittent every 12 hours  tobramycin/dexamethasone Ointment 1Application(s) Right EYE two times a day    MEDICATIONS  (PRN):  dextrose Gel 1Dose(s) Oral once PRN Blood Glucose LESS THAN 70 milliGRAM(s)/deciLiter  glucagon  Injectable 1milliGRAM(s) IntraMuscular once PRN Glucose <70 milliGRAM(s)/deciLiter  acetaminophen   Tablet 650milliGRAM(s) Oral every 6 hours PRN For Temp greater than 38 C (100.4 F)  guaiFENesin   Syrup  (Sugar-Free) 100milliGRAM(s) Oral every 6 hours PRN Cough  acetaminophen   Tablet. 650milliGRAM(s) Oral every 6 hours PRN Mild Pain (1 - 3)  sodium chloride 0.65% Nasal 1Spray(s) Both Nostrils three times a day PRN Nasal Congestion  melatonin 3milliGRAM(s) Oral once PRN Insomnia  melatonin 3milliGRAM(s) Oral at bedtime PRN Insomnia

## 2017-05-21 NOTE — PROGRESS NOTE ADULT - SUBJECTIVE AND OBJECTIVE BOX
Time of Visit:1240  Patient seen and examined.     MEDICATIONS  (STANDING):  ALBUTerol/ipratropium for Nebulization 3milliLiter(s) Nebulizer every 6 hours  insulin lispro (HumaLOG) corrective regimen sliding scale  SubCutaneous three times a day before meals  dextrose 5%. 1000milliLiter(s) IV Continuous <Continuous>  dextrose 50% Injectable 12.5Gram(s) IV Push once  dextrose 50% Injectable 25Gram(s) IV Push once  dextrose 50% Injectable 25Gram(s) IV Push once  pantoprazole    Tablet 40milliGRAM(s) Oral before breakfast  gabapentin 100milliGRAM(s) Oral three times a day  simvastatin 40milliGRAM(s) Oral at bedtime  silver sulfADIAZINE 1% Cream 1Application(s) Topical daily  metoprolol 25milliGRAM(s) Oral two times a day  meropenem IVPB 1000milliGRAM(s) IV Intermittent every 8 hours  insulin glargine Injectable (LANTUS) 10Unit(s) SubCutaneous at bedtime  rivaroxaban 15milliGRAM(s) Oral two times a day  cholecalciferol 1000Unit(s) Oral daily  tamsulosin 0.4milliGRAM(s) Oral at bedtime  nystatin Powder 1Application(s) Topical two times a day  vancomycin  IVPB 1000milliGRAM(s) IV Intermittent every 12 hours  tobramycin/dexamethasone Ointment 1Application(s) Right EYE two times a day  polyethylene glycol 3350 17Gram(s) Oral daily      MEDICATIONS  (PRN):  dextrose Gel 1Dose(s) Oral once PRN Blood Glucose LESS THAN 70 milliGRAM(s)/deciLiter  glucagon  Injectable 1milliGRAM(s) IntraMuscular once PRN Glucose <70 milliGRAM(s)/deciLiter  acetaminophen   Tablet 650milliGRAM(s) Oral every 6 hours PRN For Temp greater than 38 C (100.4 F)  guaiFENesin   Syrup  (Sugar-Free) 100milliGRAM(s) Oral every 6 hours PRN Cough  acetaminophen   Tablet. 650milliGRAM(s) Oral every 6 hours PRN Mild Pain (1 - 3)  sodium chloride 0.65% Nasal 1Spray(s) Both Nostrils three times a day PRN Nasal Congestion  melatonin 3milliGRAM(s) Oral once PRN Insomnia  melatonin 3milliGRAM(s) Oral at bedtime PRN Insomnia       Medications up to date at time of exam.      PHYSICAL EXAMINATION:  Patient has no new complaints.  GENERAL: The patient is a well-developed, well-nourished, in no apparent distress.     Vital Signs Last 24 Hrs  T(C): 37.3, Max: 37.3 (05-21 @ 15:45)  T(F): 99.2, Max: 99.2 (05-21 @ 15:45)  HR: 111 (100 - 111)  BP: 112/61 (105/54 - 122/59)  BP(mean): --  RR: 17 (16 - 18)  SpO2: 100% (99% - 100%)   (if applicable)    Chest Tube (if applicable)    HEENT: Head is normocephalic and atraumatic. Extraocular muscles are intact. Mucous membranes are moist.     NECK: Supple, no palpable adenopathy.    LUNGS: Clear to auscultation, no wheezing, rales, or rhonchi.    HEART: Regular rate and rhythm without murmur.    ABDOMEN: Soft, nontender, and nondistended.  No hepatosplenomegaly is noted.    EXTREMITIES: Without any cyanosis, clubbing, rash, lesions or edema.    NEUROLOGIC: Awake, alert, oriented.     SKIN: Warm, dry, good turgor.      LABS:                        9.2    11.2  )-----------( 577      ( 20 May 2017 10:32 )             29.1     05-20    144  |  104  |  12  ----------------------------<  91  4.4   |  34<H>  |  0.57    Ca    9.7      20 May 2017 10:32  Phos  3.7     05-20  Mg     1.9     05-20                          MICROBIOLOGY: (if applicable)    RADIOLOGY & ADDITIONAL STUDIES:  EKG:   CXR:  ECHO:    IMPRESSION: 57y Female PAST MEDICAL & SURGICAL HISTORY:  Morbid obesity  Heart failure with preserved ejection fraction  CHF (congestive heart failure)  Diabetes  Overactive bladder  Urinary tract infection without hematuria, site unspecified  Congestive heart failure, unspecified congestive heart failure chronicity, unspecified congestive heart failure type  Asthma  Hemorrhoids  Congestive heart failure  Emphysema  S/P   History of appendectomy  Appendicitis  S/P cholecystectomy   p/w  p/w SOB due to acute on chronic hypoxic respiratory failure due to COPD and CHF, most likely PUSHPA patient has been told she needed PAP in past but refused. She dx with PE     CT abd/pelvis show renal stones and hydronephrosis. Stool is positive for occult blood, anemic, s/p PRBC transfusion. in view of patient morbid obesity  and limited mobility she will need lifelong anticoagulation for PE.     + hsu draining  RECOMMENDATIONS:     - con't with bronchodilators, o2 supplement as needed.    - con't with antibiotics as per ID recommendations for UTI   - hematuria improved   - DVT and GI prophylaxis.   - continue xeralto  - if she agrees , out patient sleep study

## 2017-05-21 NOTE — PROGRESS NOTE ADULT - ASSESSMENT
57F PMHx Morbid Obesity, DM, COPD, PUSHPA, Provoked RLL PE,  R kidney staghorn calculus, Hydronephrosis / Hydroureter, unstageable R sacral ulcer, UTI presnted with subjective fever, dizziness, diarrhea and weakness at NH while on Meropenem for UTI. Admitted for sepsis 2/2 UTI likely complicated given R Staghorn calculus. c/b persistent leukocytosis despite appropriate abx.     Today complaining about new onset R eye cloudy vision without pain however with "tightness."    #R Eye Cloudy Vision w/ hx of glaucoma: New onset. Associated with subjective tightness. Not painful. +jackson mild redness of eye. Pupils appear sluggish but equal bilaterally. +mild swelling of upper eyelid. Suspect underlying open angle glaucoma with superimposed conjunctivitis. Given pressure and lack of strong symptoms, not suspecting acute angle closure glaucoma. Tonometry pressures R 26, L 13.  -Starting tobradex in R eye  -Will try to obtain opthalmology consult    #Sepsis 2/2 UTI, complicated given hx of R Staghorn Calculus. Presumptively Sepsis 2/2 UTI. ED T max of 102.9, tachycardiac 112, WBC of 15.9, Lactate 1.6, UA 6-10 WBC ( positive). // -UCx -ve, BCx -ve, however was on treatment in NH. // Repeat UA +ve, rpt BCx UCx -ve.  -WBC improving 11<13<18, afebrile.  -C/w IV Meropenem 1gm Q8H and IV Vancomycin 1gm Q12H for 1 more days  -ID (Dr. Wright)    #R Staghorn Calculus / Urinary Retention: Hx of R Staghorn last admission at [place]. CT here "Bilateral hydroureter as well as left hydronephrosis. No obstructing abnormality visualized. Urinary bladder appears distended. Multiple non-obstructing right intrarenal stones."  Renal ultrasound only shows R renal calculus (did not indicate staghorn) without hydronephrosis... hydronephrosis likely resolving s/p hsu catheter placement. Hsu catheter placed because urinary retention. //  removed but failed trial void, so replaced.  -C/w hsu  -F/up urology (Dr. Rayo): outpatient urodynamic studies  -C/w flomax    #Anemia: Hx Iron deficiency anemia , guaiac negative in April 2017 , given 1 U PRBC at that time. Here, with H/H downtrend to 6.6. s/p 1UPRBC with incomplete improvement to 7.2.  + hematuria noted. + FOBT +ve though no gross blood (melena or bright red blood) on rectal exam. Additional unit given. // Hgb stable s/p 2U PRBC  - F/up GI Eval (Dr. Jamison): outpatient colonoscopy.    #Acute Provoked RLL PE: Patient has been bedridden. Diagnosed at [facility] in April.. TTE (April 2017, [place]) shows normal EF 55%, moderate biatrial enlargement, mild Mitral and tricuspid regurgitation, mild calcific degenerative valve disease.  -C/w Xarelto 15mg BID  (switch to 20mg QD on 5/25/17), will likely need indefinitely given immobility    #Pulmonary Edema: Seen on recent CXR, likely accounts for increased dyspnea. s/p lasix 20mg PO x1. // resolved on repeat CXR  -Patient diuresing, so will follow clinically    #Metabolic Alkalosis: Likely chronic compensation for CO2 retention, however likely over compensated  -Bicarb improved s/p diamox. diamox dc'd.  -Continue to monitor bicarb    #COPD + Likely underling PUSHPA: ABG indicates patient is retaining CO2  -C/w NC O2 supplement (O2 dependent at baseline)  -C/w duonebs.    #Diabetes: c/w home dose of Lantus  10 Units and SSI.     #Initial CHRISTINA resolved s/p IVF.#    #PPx: on full dose AC. 57F PMHx Morbid Obesity, DM, COPD, PUSHPA, Provoked RLL PE,  R kidney staghorn calculus, Hydronephrosis / Hydroureter, unstageable R sacral ulcer, UTI presnted with subjective fever, dizziness, diarrhea and weakness at NH while on Meropenem for UTI. Admitted for sepsis 2/2 UTI likely complicated given R Staghorn calculus. c/b persistent leukocytosis despite appropriate abx.     Today complaining about new onset R eye cloudy vision without pain however with "tightness."    #R Eye Cloudy Vision w/ hx of glaucoma: New onset. Associated with subjective tightness. Not painful. +jackson mild redness of eye. Pupils appear sluggish but equal bilaterally. +mild swelling of upper eyelid. Suspect underlying open angle glaucoma with superimposed conjunctivitis. Given pressure and lack of strong symptoms, not suspecting acute angle closure glaucoma. Tonometry pressures R 26, L 13. No loss of vision.  -Starting tobradex in R eye  -Will try to obtain opthalmology consult (Dr. Mayberry  - spoke to answering service)    #Sepsis 2/2 UTI, complicated given hx of R Staghorn Calculus. Presumptively Sepsis 2/2 UTI. ED T max of 102.9, tachycardiac 112, WBC of 15.9, Lactate 1.6, UA 6-10 WBC ( positive). // -UCx -ve, BCx -ve, however was on treatment in NH. // Repeat UA +ve, rpt BCx UCx -ve.  -WBC improving 11<13<18, afebrile.  -C/w IV Meropenem 1gm Q8H and IV Vancomycin 1gm Q12H for 1 more days  -ID (Dr. Wright)    #R Staghorn Calculus / Urinary Retention: Hx of R Staghorn last admission at [place]. CT here "Bilateral hydroureter as well as left hydronephrosis. No obstructing abnormality visualized. Urinary bladder appears distended. Multiple non-obstructing right intrarenal stones."  Renal ultrasound only shows R renal calculus (did not indicate staghorn) without hydronephrosis... hydronephrosis likely resolving s/p hsu catheter placement. Hsu catheter placed because urinary retention. //  removed but failed trial void, so replaced.  -C/w hsu  -F/up urology (Dr. Rayo): outpatient urodynamic studies  -C/w flomax    #Anemia: Hx Iron deficiency anemia , guaiac negative in April 2017 , given 1 U PRBC at that time. Here, with H/H downtrend to 6.6. s/p 1UPRBC with incomplete improvement to 7.2.  + hematuria noted. + FOBT +ve though no gross blood (melena or bright red blood) on rectal exam. Additional unit given. // Hgb stable s/p 2U PRBC  - F/up GI Eval (Dr. Jamison): outpatient colonoscopy.    #Acute Provoked RLL PE: Patient has been bedridden. Diagnosed at [facility] in April.. TTE (April 2017, [place]) shows normal EF 55%, moderate biatrial enlargement, mild Mitral and tricuspid regurgitation, mild calcific degenerative valve disease.  -C/w Xarelto 15mg BID  (switch to 20mg QD on 5/25/17), will likely need indefinitely given immobility    #Pulmonary Edema: Seen on recent CXR, likely accounts for increased dyspnea. s/p lasix 20mg PO x1. // resolved on repeat CXR  -Patient diuresing, so will follow clinically    #Metabolic Alkalosis: Likely chronic compensation for CO2 retention, however likely over compensated  -Bicarb improved s/p diamox. diamox dc'd.  -Continue to monitor bicarb    #COPD + Likely underling PUSHPA: ABG indicates patient is retaining CO2  -C/w NC O2 supplement (O2 dependent at baseline)  -C/w duonebs.    #Diabetes: c/w home dose of Lantus  10 Units and SSI.     #Initial CHRISTINA resolved s/p IVF.#    #PPx: on full dose AC.

## 2017-05-22 PROCEDURE — 99232 SBSQ HOSP IP/OBS MODERATE 35: CPT | Mod: GC

## 2017-05-22 RX ORDER — KETOROLAC TROMETHAMINE 30 MG/ML
15 SYRINGE (ML) INJECTION ONCE
Qty: 0 | Refills: 0 | Status: DISCONTINUED | OUTPATIENT
Start: 2017-05-22 | End: 2017-05-22

## 2017-05-22 RX ORDER — DOCUSATE SODIUM 100 MG
100 CAPSULE ORAL THREE TIMES A DAY
Qty: 0 | Refills: 0 | Status: DISCONTINUED | OUTPATIENT
Start: 2017-05-22 | End: 2017-05-22

## 2017-05-22 RX ORDER — LATANOPROST 0.05 MG/ML
1 SOLUTION/ DROPS OPHTHALMIC; TOPICAL AT BEDTIME
Qty: 0 | Refills: 0 | Status: DISCONTINUED | OUTPATIENT
Start: 2017-05-22 | End: 2017-05-24

## 2017-05-22 RX ORDER — SENNA PLUS 8.6 MG/1
2 TABLET ORAL AT BEDTIME
Qty: 0 | Refills: 0 | Status: DISCONTINUED | OUTPATIENT
Start: 2017-05-22 | End: 2017-05-22

## 2017-05-22 RX ORDER — LACTOBACILLUS ACIDOPHILUS 100MM CELL
1 CAPSULE ORAL
Qty: 0 | Refills: 0 | Status: DISCONTINUED | OUTPATIENT
Start: 2017-05-22 | End: 2017-05-24

## 2017-05-22 RX ADMIN — Medication 650 MILLIGRAM(S): at 19:20

## 2017-05-22 RX ADMIN — NYSTATIN CREAM 1 APPLICATION(S): 100000 CREAM TOPICAL at 06:14

## 2017-05-22 RX ADMIN — Medication 1 TABLET(S): at 22:10

## 2017-05-22 RX ADMIN — TOBRAMYCIN AND DEXAMETHASONE 1 APPLICATION(S): 1; 3 SUSPENSION/ DROPS OPHTHALMIC at 06:14

## 2017-05-22 RX ADMIN — Medication 250 MILLIGRAM(S): at 12:11

## 2017-05-22 RX ADMIN — Medication 15 MILLIGRAM(S): at 03:34

## 2017-05-22 RX ADMIN — PANTOPRAZOLE SODIUM 40 MILLIGRAM(S): 20 TABLET, DELAYED RELEASE ORAL at 06:15

## 2017-05-22 RX ADMIN — RIVAROXABAN 15 MILLIGRAM(S): KIT at 06:15

## 2017-05-22 RX ADMIN — MEROPENEM 200 MILLIGRAM(S): 1 INJECTION INTRAVENOUS at 06:14

## 2017-05-22 RX ADMIN — SIMVASTATIN 40 MILLIGRAM(S): 20 TABLET, FILM COATED ORAL at 21:47

## 2017-05-22 RX ADMIN — Medication 650 MILLIGRAM(S): at 18:57

## 2017-05-22 RX ADMIN — GABAPENTIN 100 MILLIGRAM(S): 400 CAPSULE ORAL at 15:40

## 2017-05-22 RX ADMIN — TAMSULOSIN HYDROCHLORIDE 0.4 MILLIGRAM(S): 0.4 CAPSULE ORAL at 21:47

## 2017-05-22 RX ADMIN — Medication 3 MILLILITER(S): at 08:07

## 2017-05-22 RX ADMIN — Medication 650 MILLIGRAM(S): at 01:18

## 2017-05-22 RX ADMIN — GABAPENTIN 100 MILLIGRAM(S): 400 CAPSULE ORAL at 21:47

## 2017-05-22 RX ADMIN — Medication 3 MILLILITER(S): at 20:19

## 2017-05-22 RX ADMIN — Medication 650 MILLIGRAM(S): at 00:47

## 2017-05-22 RX ADMIN — TOBRAMYCIN AND DEXAMETHASONE 1 APPLICATION(S): 1; 3 SUSPENSION/ DROPS OPHTHALMIC at 18:53

## 2017-05-22 RX ADMIN — INSULIN GLARGINE 10 UNIT(S): 100 INJECTION, SOLUTION SUBCUTANEOUS at 21:45

## 2017-05-22 RX ADMIN — Medication 3 MILLILITER(S): at 14:12

## 2017-05-22 RX ADMIN — Medication 250 MILLIGRAM(S): at 22:10

## 2017-05-22 RX ADMIN — MEROPENEM 200 MILLIGRAM(S): 1 INJECTION INTRAVENOUS at 21:47

## 2017-05-22 RX ADMIN — LACTULOSE 20 GRAM(S): 10 SOLUTION ORAL at 00:47

## 2017-05-22 RX ADMIN — Medication 1000 UNIT(S): at 12:11

## 2017-05-22 RX ADMIN — Medication 1 APPLICATION(S): at 00:48

## 2017-05-22 RX ADMIN — NYSTATIN CREAM 1 APPLICATION(S): 100000 CREAM TOPICAL at 18:52

## 2017-05-22 RX ADMIN — Medication 1 APPLICATION(S): at 15:39

## 2017-05-22 RX ADMIN — Medication 25 MILLIGRAM(S): at 18:53

## 2017-05-22 RX ADMIN — Medication 15 MILLIGRAM(S): at 03:45

## 2017-05-22 RX ADMIN — MEROPENEM 200 MILLIGRAM(S): 1 INJECTION INTRAVENOUS at 15:40

## 2017-05-22 RX ADMIN — RIVAROXABAN 15 MILLIGRAM(S): KIT at 18:53

## 2017-05-22 RX ADMIN — GABAPENTIN 100 MILLIGRAM(S): 400 CAPSULE ORAL at 06:15

## 2017-05-22 RX ADMIN — LATANOPROST 1 DROP(S): 0.05 SOLUTION/ DROPS OPHTHALMIC; TOPICAL at 21:46

## 2017-05-22 NOTE — PROGRESS NOTE ADULT - SUBJECTIVE AND OBJECTIVE BOX
HPI:  58 y/o F, from [facility], does not walk as per patient since last Jan,  PMHx of Morbid Obesity, DM ( Insulin), COPD ( home O2 3 L, intubated in Jan 2017), PUSHPA ( not on CPAP, refused sleep study in past), Right kidney staghorn calculous, bilateral hydroureter  and hydronephrosis Right lower lung provoked PE ( Xarelto , UTI ( on meropenum currently) , sacral and right buttock  redness ( unstageable) presents to ED c/o subjective fever, dizziness and weakness since yesterday. Patient is poor historian. History obtained from NH papers. Acc to patient she has cough with whitish sputum production since months. She also endorses that she has diarrhea about 4 days ago, associated with abdominal pain, that is diffuse , especially in lower abdomen. She does not have diarrhea anymore. She also has SOB.  Denies chest pain, headache, nausea vomiting.  As per NH papers she is being transferred for CHF exacerbation ( patient has no CHF) .. She is currently being treated for Klebsiella UTI with meropenum( on 5/8/17 to continue for 7 days, today is D 5)  .    Patient has multiple past admissions to [facility] for COPD exacerbation ( non-compliance , UTIs. She was in past ( Jan) admitted to [facility] which was complicated , requiring the intubation and then Tracheostomy. also she has Hsu at that time for urinary retention ( flomax started), she voided so hsu removed. . Then she admitted to [facility] on 4/27/17 with severe abdominal pain , found to have staghorn calculous in lower right kidney, bladder distention with bilateral hydroureter and hydronephrosis, UTI with E coli ( on flagyll and Meropenum), then she had severe constipation so was given laxatives, then she had diarrhea, initially thought that it was due to C diff, stool sent but cancelled as it was formed stool so likely cause was due to laxative. She also has right lower lung PE , provoked due to prolonged bedridden, , she was discharged with Xarelto ( currently on 15 mg BID to continue till 5/25, then total till 6 months. (12 May 2017 04:51)      INTERVAL HPI/OVERNIGHT EVENTS:  patient seen and examined at bedside in am, she was told that she is medically cleared from my stand point to be discharged to Rehab where she can resume her PT, get reconditioned, and also to see Ophthalmology, patient then said she does not feel her breathing today is good be discharged home. I told her that her breathing is at baseline and that all cultures are negative to date. She was told before that problem in her lungs arise from the Obesity hypoventilation syndrome, possible sleep apnea or copd, and on top the PE.  patient said that she doesn't want to leave today, maybe tomorrow, that we need to speak to her family which I said it's ok will do, but will still start the process from today as she is medically cleared.  Later on, patient requested second attending opinion to deem if her condition is medically stable or cleared for discharge. Dr Meade was asked to provide his professional opinion.    Allergies  Cheese (Pruritus)  oxycodone (Other (Moderate))  peanuts (Unknown)  penicillin (Rash)  seafood (Hives)  strawberry (Pruritus)  Tomatoes (Other)    Intolerances        REVIEW OF SYSTEMS:  CONSTITUTIONAL: No fever, weight loss, or fatigue  EYES: No eye pain, visual disturbances, or discharge  RESPIRATORY: No cough, wheezing, chills or hemoptysis; No shortness of breath  CARDIOVASCULAR: No chest pain, palpitations, dizziness, or leg swelling  GASTROINTESTINAL: No abdominal or epigastric pain. No nausea, vomiting, or hematemesis; No diarrhea or constipation. No melena or hematochezia.  GENITOURINARY: No dysuria, frequency, hematuria, or incontinence  NEUROLOGICAL: No headaches, memory loss, loss of strength, numbness, or tremors  MUSCULOSKELETAL: No joint pain or swelling; No muscle, back, or extremity pain  PSYCHIATRIC: No depression, anxiety, mood swings, or difficulty sleeping  HEME/LYMPH: No easy bruising, or bleeding gums  ALLERGY AND IMMUNOLOGIC: No hives or eczema    Medications:  ALBUTerol/ipratropium for Nebulization 3milliLiter(s) Nebulizer every 6 hours  insulin lispro (HumaLOG) corrective regimen sliding scale  SubCutaneous three times a day before meals  dextrose 5%. 1000milliLiter(s) IV Continuous <Continuous>  dextrose Gel 1Dose(s) Oral once PRN Blood Glucose LESS THAN 70 milliGRAM(s)/deciLiter  dextrose 50% Injectable 12.5Gram(s) IV Push once  dextrose 50% Injectable 25Gram(s) IV Push once  dextrose 50% Injectable 25Gram(s) IV Push once  glucagon  Injectable 1milliGRAM(s) IntraMuscular once PRN Glucose <70 milliGRAM(s)/deciLiter  pantoprazole    Tablet 40milliGRAM(s) Oral before breakfast  gabapentin 100milliGRAM(s) Oral three times a day  simvastatin 40milliGRAM(s) Oral at bedtime  silver sulfADIAZINE 1% Cream 1Application(s) Topical daily  metoprolol 25milliGRAM(s) Oral two times a day  acetaminophen   Tablet 650milliGRAM(s) Oral every 6 hours PRN For Temp greater than 38 C (100.4 F)  guaiFENesin   Syrup  (Sugar-Free) 100milliGRAM(s) Oral every 6 hours PRN Cough  meropenem IVPB 1000milliGRAM(s) IV Intermittent every 8 hours  insulin glargine Injectable (LANTUS) 10Unit(s) SubCutaneous at bedtime  rivaroxaban 15milliGRAM(s) Oral two times a day  acetaminophen   Tablet. 650milliGRAM(s) Oral every 6 hours PRN Mild Pain (1 - 3)  cholecalciferol 1000Unit(s) Oral daily  sodium chloride 0.65% Nasal 1Spray(s) Both Nostrils three times a day PRN Nasal Congestion  tamsulosin 0.4milliGRAM(s) Oral at bedtime  nystatin Powder 1Application(s) Topical two times a day  melatonin 3milliGRAM(s) Oral once PRN Insomnia  melatonin 3milliGRAM(s) Oral at bedtime PRN Insomnia  vancomycin  IVPB 1000milliGRAM(s) IV Intermittent every 12 hours  tobramycin/dexamethasone Ointment 1Application(s) Right EYE two times a day  polyethylene glycol 3350 17Gram(s) Oral daily  hydrocortisone 0.5% Cream 1Application(s) Topical three times a day PRN Itching  senna 2Tablet(s) Oral at bedtime  latanoprost 0.005% Ophthalmic Solution 1Drop(s) Right EYE at bedtime  senna 2Tablet(s) Oral at bedtime  docusate sodium 100milliGRAM(s) Oral three times a day      PHYSICAL EXAM:  Vital Signs Last 24 Hrs  T(C): 37.3, Max: 37.4 (05-22 @ 00:06)  T(F): 99.2, Max: 99.3 (05-22 @ 00:06)  HR: 109 (105 - 109)  BP: 112/50 (108/50 - 116/76)  BP(mean): --  RR: 17 (16 - 19)  SpO2: 100% (100% - 100%)    GENERAL: NAD  HEAD:  Atraumatic, Normocephalic  EYES: EOMI, PERRLA, conjunctiva and sclera clear  ENT: moist mucous membranes  NECK: Supple, No JVD, Normal thyroid  NERVOUS SYSTEM:  Alert & Oriented X3, Good concentration; Motor Strength 5/5 B/L upper and lower extremities; DTRs 2+ intact and symmetric  CHEST/LUNG: No rales, rhonchi, wheezing, or rubs  HEART: Regular rate and rhythm; No murmurs, rubs, or gallops  ABDOMEN: Soft, Nontender, Nondistended; Bowel sounds present  EXTREMITIES:  2+ Peripheral Pulses, No clubbing, cyanosis, or edema  SKIN: No rashes or lesions    LABS:                      Culture & Sensitivity:   CAPILLARY BLOOD GLUCOSE  119 (22 May 2017 12:47)  128 (21 May 2017 20:47)  159 (21 May 2017 16:28)      I & Os for current day (as of 05-22 @ 16:20)  =============================================  IN: 0 ml / OUT: 2500 ml / NET: -2500 ml      RADIOLOGY & ADDITIONAL TESTS:  Radiology testing independently reviewed:     Consultant(s) Notes Reviewed:  [ x] YES  [ ] NO    Care Discussed with Consultants/Other Providers [ x] YES  [ ] NO

## 2017-05-22 NOTE — PROGRESS NOTE ADULT - SUBJECTIVE AND OBJECTIVE BOX
Time of Visit:1450  Patient seen and examined.     MEDICATIONS  (STANDING):  ALBUTerol/ipratropium for Nebulization 3milliLiter(s) Nebulizer every 6 hours  insulin lispro (HumaLOG) corrective regimen sliding scale  SubCutaneous three times a day before meals  dextrose 5%. 1000milliLiter(s) IV Continuous <Continuous>  dextrose 50% Injectable 12.5Gram(s) IV Push once  dextrose 50% Injectable 25Gram(s) IV Push once  dextrose 50% Injectable 25Gram(s) IV Push once  pantoprazole    Tablet 40milliGRAM(s) Oral before breakfast  gabapentin 100milliGRAM(s) Oral three times a day  simvastatin 40milliGRAM(s) Oral at bedtime  silver sulfADIAZINE 1% Cream 1Application(s) Topical daily  metoprolol 25milliGRAM(s) Oral two times a day  meropenem IVPB 1000milliGRAM(s) IV Intermittent every 8 hours  insulin glargine Injectable (LANTUS) 10Unit(s) SubCutaneous at bedtime  rivaroxaban 15milliGRAM(s) Oral two times a day  cholecalciferol 1000Unit(s) Oral daily  tamsulosin 0.4milliGRAM(s) Oral at bedtime  nystatin Powder 1Application(s) Topical two times a day  vancomycin  IVPB 1000milliGRAM(s) IV Intermittent every 12 hours  tobramycin/dexamethasone Ointment 1Application(s) Right EYE two times a day  polyethylene glycol 3350 17Gram(s) Oral daily  senna 2Tablet(s) Oral at bedtime  latanoprost 0.005% Ophthalmic Solution 1Drop(s) Right EYE at bedtime  senna 2Tablet(s) Oral at bedtime  docusate sodium 100milliGRAM(s) Oral three times a day      MEDICATIONS  (PRN):  dextrose Gel 1Dose(s) Oral once PRN Blood Glucose LESS THAN 70 milliGRAM(s)/deciLiter  glucagon  Injectable 1milliGRAM(s) IntraMuscular once PRN Glucose <70 milliGRAM(s)/deciLiter  acetaminophen   Tablet 650milliGRAM(s) Oral every 6 hours PRN For Temp greater than 38 C (100.4 F)  guaiFENesin   Syrup  (Sugar-Free) 100milliGRAM(s) Oral every 6 hours PRN Cough  acetaminophen   Tablet. 650milliGRAM(s) Oral every 6 hours PRN Mild Pain (1 - 3)  sodium chloride 0.65% Nasal 1Spray(s) Both Nostrils three times a day PRN Nasal Congestion  melatonin 3milliGRAM(s) Oral once PRN Insomnia  melatonin 3milliGRAM(s) Oral at bedtime PRN Insomnia  hydrocortisone 0.5% Cream 1Application(s) Topical three times a day PRN Itching     Medications up to date at time of exam.    PHYSICAL EXAMINATION:  Patient has no new complaints.  GENERAL: The patient is a well-developed, well-nourished, in no apparent distress.     Vital Signs Last 24 Hrs  T(C): 37.3, Max: 37.4 (- @ 00:06)  T(F): 99.2, Max: 99.3 (05- @ 00:06)  HR: 109 (105 - 109)  BP: 112/50 (108/50 - 116/76)  BP(mean): --  RR: 17 (16 - 19)  SpO2: 100% (100% - 100%)   (if applicable)    Chest Tube (if applicable)    HEENT: Head is normocephalic and atraumatic.     NECK: Supple, no palpable adenopathy.    LUNGS: Clear to auscultation, no wheezing, rales, or rhonchi.    HEART: Regular rate and rhythm without murmur.    ABDOMEN: Soft, nontender, and nondistended.      EXTREMITIES: Without any cyanosis, clubbing, rash, lesions or edema.    NEUROLOGIC: Awake, alert.    SKIN: Warm, dry, good turgor.    LABS:          MICROBIOLOGY: (if applicable)    RADIOLOGY & ADDITIONAL STUDIES:  EKG:   CXR:  ECHO:    IMPRESSION: 57y Female PAST MEDICAL & SURGICAL HISTORY:  Morbid obesity  Heart failure with preserved ejection fraction  CHF (congestive heart failure)  Diabetes  Overactive bladder  Urinary tract infection without hematuria, site unspecified  Congestive heart failure, unspecified congestive heart failure chronicity, unspecified congestive heart failure type  Asthma  Hemorrhoids  Congestive heart failure  Emphysema  S/P   History of appendectomy  Appendicitis  S/P cholecystectomy       p/w  p/w SOB due to acute on chronic hypoxic respiratory failure due to COPD and CHF, most likely PUSHPA patient has been told she needed PAP in past but refused. She dx with PE     CT abd/pelvis show renal stones and hydronephrosis. Stool is positive for occult blood, anemic, s/p PRBC transfusion. in view of patient morbid obesity  and limited mobility she will need lifelong anticoagulation for PE.       RECOMMENDATIONS:     - con't with bronchodilators, o2 supplement as needed.    - con't with antibiotics as per ID recommendations for UTI   - hematuria improved   - DVT and GI prophylaxis.    - continue xeralto   - if she agrees , out patient sleep study

## 2017-05-22 NOTE — PROGRESS NOTE ADULT - ASSESSMENT
HPI:  58 y/o F, from [facility], does not walk as per patient since last Jan,  PMHx of Morbid Obesity, DM ( Insulin), COPD ( home O2 3 L, intubated in Jan 2017), PUSHPA ( not on CPAP, refused sleep study in past), Right kidney staghorn calculous, bilateral hydroureter  and hydronephrosis Right lower lung provoked PE ( Xarelto , UTI ( on meropenum currently) , sacral and right buttock  redness ( unstageable) presents to ED c/o subjective fever, dizziness and weakness since yesterday. Patient is poor historian. History obtained from NH papers. Acc to patient she has cough with whitish sputum production since months. She also endorses that she has diarrhea about 4 days ago, associated with abdominal pain, that is diffuse , especially in lower abdomen. She does not have diarrhea anymore. She also has SOB.  Denies chest pain, headache, nausea vomiting.  As per NH papers she is being transferred for CHF exacerbation ( patient has no CHF) .. She is currently being treated for Klebsiella UTI with meropenum( on 5/8/17 to continue for 7 days, today is D 5)  .    Patient has multiple past admissions to [facility] for COPD exacerbation ( non-compliance , UTIs. She was in past ( Jan) admitted to [facility] which was complicated , requiring the intubation and then Tracheostomy. also she has Hsu at that time for urinary retention ( flomax started), she voided so hsu removed. . Then she admitted to [facility] on 4/27/17 with severe abdominal pain , found to have staghorn calculous in lower right kidney, bladder distention with bilateral hydroureter and hydronephrosis, UTI with E coli ( on flagyll and Meropenum), then she had severe constipation so was given laxatives, then she had diarrhea, initially thought that it was due to C diff, stool sent but cancelled as it was formed stool so likely cause was due to laxative. She also has right lower lung PE , provoked due to prolonged bedridden, , she was discharged with Xarelto ( currently on 15 mg BID to continue till 5/25, then total till 6 months. (12 May 2017 04:51)

## 2017-05-22 NOTE — CHART NOTE - NSCHARTNOTEFT_GEN_A_CORE
Patient seen and examined at the request of Dr. Casper Conklin for second opinion requested by patient if she is medically stable for discharge.    Patient admitted with sepsis secondary to UTI from Rehab, recent diagnosis of Acute PE, on Home oxygen for potential COPD, morbid obesity and possible PUSHPA but refused/s CPAP intervention. patient tearful during conversation, upset at her Son who does not care,  also handicapped.     Vitals reviewed stable; Afebrile;   Vital Signs Last 24 Hrs  T(C): 37, Max: 37.4 (05-22 @ 00:06)  T(F): 98.6, Max: 99.3 (05-22 @ 00:06)  HR: 105 (105 - 111)  BP: 108/456 (108/50 - 116/76)  RR: 16 (16 - 19)  SpO2: 100% (100% - 100%)    P/E:   Neuro: AAO x3; Depressed mood  Resp: BLAE++, No wheeze or Rhonchi  GI: Soft Abdomen, BS+, Non tender  Extr: No significant edema b/l LE    Labs: Stable 5/20/17    Plan:  Patient s/p Rx Patient seen and examined at the request of Dr. Casper Conklin for second opinion requested by patient if she is medically stable for discharge.    Patient admitted with sepsis secondary to UTI from Rehab, recent diagnosis of Acute PE, on Home oxygen for potential COPD, morbid obesity and possible PUSHPA but refused/s CPAP intervention. patient tearful during conversation, upset at her Son who does not care,  also handicapped.     Vitals reviewed stable; Afebrile;   Vital Signs Last 24 Hrs  T(C): 37, Max: 37.4 (05-22 @ 00:06)  T(F): 98.6, Max: 99.3 (05-22 @ 00:06)  HR: 105 (105 - 111)  BP: 108/456 (108/50 - 116/76)  RR: 16 (16 - 19)  SpO2: 100% (100% - 100%)    P/E:   Neuro: AAO x3; Depressed mood  Resp: BLAE++, No wheeze or Rhonchi  GI: Soft Abdomen, BS+, Non tender  Extr: No significant edema b/l LE    Labs: Stable 5/20/17    Plan:  Patient s/p Rx for UTI on Merepenem and IV Vanco clinically improved repeat urine cx negative, Blood cx x2 sets negative; Follow up as per ID recommendation duration of Antibiotic Rx.  Patient with acute PE needs to be on Xeralto as planned.  Discussed with patient needs Urological intervention electively after about 4 to 6 weeks of treatment for PE. Follow up with Urologist Dr. Bowser.     I have counseled patient that, I agree with Dr. Casper Conklin opinion that she is medically stable for discharge to a Rehab where she would be under supervisions of Health care providers like MD and RN's;     Discussed with Dr. Casper Conklin, given patient appears depressed, will benefit from Psych eval/ Antidepressant.     Discussed with  Rosangela who has provided patient with alternate Rehabs in the event [facility] not accepting. Patient willing to go to [facility] where CM will send JAILENE. We also spoke with representative from [facility] on the floor who will d/w Admissions and review the case.     Discussed with RN/ Nurse Manager

## 2017-05-22 NOTE — PROGRESS NOTE ADULT - PROBLEM SELECTOR PLAN 5
secondary to COPD, obesity hypoventilation syndrome, PUSHPA. and superimposed PE.  continue with nebulization as needed.

## 2017-05-22 NOTE — PROGRESS NOTE ADULT - PROBLEM SELECTOR PLAN 1
Today she says that her vision has always been bad as she has glaucoma, cataract.  ophthalmology consult as outpatient

## 2017-05-22 NOTE — PROGRESS NOTE ADULT - SUBJECTIVE AND OBJECTIVE BOX
Meds:  meropenem IVPB 1000milliGRAM(s) IV Intermittent every 8 hours  vancomycin  IVPB 1000milliGRAM(s) IV Intermittent every 12 hours    Allergies:  Allergies    Cheese (Pruritus)  oxycodone (Other (Moderate))  peanuts (Unknown)  penicillin (Rash)  seafood (Hives)  strawberry (Pruritus)  Tomatoes (Other)    Intolerances    ROS  [  ] UNABLE TO ELICIT    General:  [  ] Fever   [ x ] Malaise    Skin: sacral ulcer    HEENT:  [  ] Sore Throat  [  ] Photophobia  [x  ] None    Chest: [x  ] SOB  [ x ] Cough  [ ] None    Cardiovascular: [  ] CP	 [x ] None    Gastrointestinal: [ x ] Abd pain   [  ] N    [  ] V   [  ] Diarrhea	 [ ] None    Genitourinary: [  ] Polyuria   [x  ] Urgency   [x  ] Dysuria    [  ]  Hematuria   [ ] None	    Musculoskeletal:  [  ] Back Pain	 [x ] General weakness.    Neurological: [  ]Dizziness  [  ]Visual Disturbance  [  ]Headaches   [ x] General weakness        PHYSICAL EXAM:    Vital Signs Last 24 Hrs  T(C): 37, Max: 37.4 (05-22 @ 00:06)  T(F): 98.6, Max: 99.3 (05-22 @ 00:06)  HR: 105 (105 - 111)  BP: 108/456 (108/50 - 116/76)  BP(mean): --  RR: 16 (16 - 19)  SpO2: 100% (100% - 100%)  HEENT:    Neck:  [ x ] Supple  [  ]Lymphadenopathy  [  ] JVD   [ x ]No JVD  [  ] Masses   [ x ] fugal skin infection on neck crease anteriorly.    CHEST/Respiratory:  [ x ] Rales      [  ] Rhonchi      [  ] Wheezing       [  ] Chest Tenderness  [  ]Clear to auscultation    Cardiovascular:  [ x ]S1 S2  [ x ] Reg  [  ] Irreg   [  ] Murmurs  [  ]Systolic [  ]Diastolic  [x  ]No Murmur    Abdomen:  [ x ]  Bowel Sounds   [  ] Soft           [  ] ABD Distention  [ x ] Tenderness  [  ] Organomegaly                        [  ] Guarding Rigidity  [ x ] No Guarding Rigidity  [  ] Rebound Tenderness [ x ] No Rebound Tenderness    Extremities: [ x ] Edema  [  ] No edema  [  ] Clubbing   [  ] Cyanosis  [ x ] Palpable peripheral pulses                        [ x ] No Tender Calf muscles  [  ] Tender Calf muscles    Neurological:  [ x Alert  [ x ] Awake  [x  ] Oriented  x3                              [  ] Confused    Skin:  [  ] Thrombophlebitis  [x  ] Improving rashes  neck anteriorly. [  ] Dry  [ x ] Ulcers sacral and buttock.    Ortho:  [  ] Joint Swelling  [  ] Joint erythema [ x ] DJD     Luna cath in place.  Last LABS/DIAGNOSTIC TESTS                  Last was yesterday.                      9.2    11.2  )-----------( 577      ( 20 May 2017 10:32 )             29.1   05-20    144  |  104  |  12  ----------------------------<  91  4.4   |  34<H>  |  0.57    Ca    9.7      20 May 2017 10:32  Phos  3.7     05-20  Mg     1.9     05-20    Vancomycin Level, Trough (05.21.17 @ 22:04)    Vancomycin Level, Trough: 17.1: Vancomycin trough levels should be rapidly reached and maintained at  15-20 ug/ml for life threatening MRSA  infections such as sepsis, endocarditis, osteomyelitis and pneumonia. A  first trough level should be drawn  before the 3rd or 4th dose.  Risk17.1:  of renal toxicity is increased for levels >15 ug/ml, in patients on  other nephrotoxic drugs, who are  hemodynamically unstable, have unstable renal function, or are on  Vancomycin therapy for >14 days. Renal function with  creatinine levels should b17.1: e monitored for those patients. ug/mL                                   Culture - Blood (05.18.17 @ 18:19)    Specimen Source: .Blood Blood    Culture Results:   No growth to date.    Culture - Blood (05.18.17 @ 18:19)    Specimen Source: .Blood Blood    Culture Results:   No growth to date.      Culture - Urine (05.18.17 @ 17:49)    Specimen Source: .Urine Catheterized    Culture Results:   No growth                RADIOLOGY  Last EXAM:  CHEST-PORTABLE URGENT                            PROCEDURE DATE:  05/18/2017        INTERPRETATION:  AP semisupine chest on May 18 at 1:42 PM. Patient has   urinary infection, and leukocytosis, mucous production, and multiple   medical comorbidities.    Heart is magnified by technique.    On May 15 there was a mild central congestive picture. Present film shows   this has largely resolved. There is slight atelectasis off the left lower   hilum at this time.    IMPRESSION: As above.            Assessment and Recommendation:   		  Patient has no fever today, and leukocytosis is improving today.      Heart : regular S1 & S2, No gallop,:  Problem: Urinary tract infection without hematuria, site unspecified.   repeat UA suggest for UTI.  Recommendation:   1- Follow Blood C&S and urine c&s till final reports.   2- Follow WBC closely.  3- Continue Meropenem 1 gm IVPB q 8 hours x 1 more days to finish 14 days total days of Meropenem.  4- Continue vancomycin to1 gm IVPB q 12 hours x 1 more days.  5- Fluid and Electrolytes management .  6- CBC & BMP tomorrow.    Problem/Recommendation - 2:  ·  Problem: Hydronephrosis.    Recommendation:   1- Urology management.   2- Improved.    Problem/Recommendation - 3:  ·  Problem: Hydroureter.    Recommendation:   1- Urology management.   2- Improved.    Problem/Recommendation - 4:  ·  Problem: COPD (chronic obstructive pulmonary disease).    Recommendation:   1- IV antibiotics.  2- Bronchodilators.  3- Pulmonary management.   4- O2 as needed.  Problem/Recommendation - 5:  ·  Problem: Diarrhea, unspecified type.    Recommendation:   1- Stool for C&S if recure.  2- stool for O&P and C&S if recure. .     Problem/Recommendation - 6:  Problem: Heart failure.   Recommendation:   1-Cardiac management.  2-Monitor renal functions.    Problem/Recommendation - 7:  Problem: Diabetes.   Recommendation:   1- Blood sugar monitoring and control.  2- Accuichecks with coverage.  3- 1800 nico ADA diet.  4-Follow HB A1C.     Problem/Recommendation - 8:  Problem: Dermatophytoses (improving).   Recommendation:   1- Continue Mycolog cream to the affected area of neck anteriorly TID.  2- Keep neck skin dry all the time.  3- Clinically improving.      Discussed with PCP today.

## 2017-05-22 NOTE — PROGRESS NOTE ADULT - SUBJECTIVE AND OBJECTIVE BOX
Patient is a 57y old  Female who presents with a chief complaint of Sent from NH due to fever, SOB, cough, abdominal pain, diarrhea (12 May 2017 04:51)    -=OVERNIGHT EVENTS / INTERVAL HPI / SUBJECTIVE=-  Patient without particular complaint except itching of her back. R eye complaints are resolved. Denies, fevers, chills n v cp. Breathing continues to be at baseline.    Verbal discharge notice given by resident and attending. Patient refused to sign.  -=OBJECTIVE=-       --VITALS--  Vital Signs Last 24 Hrs  T(C): 37, Max: 37.4 (05-22 @ 00:06)  T(F): 98.6, Max: 99.3 (05-22 @ 00:06)  HR: 105 (105 - 111)  BP: 108/456 (108/50 - 116/76)  BP(mean): --  RR: 16 (16 - 19)  SpO2: 100% (100% - 100%)         --PHYSICAL EXAM--  General: morbid obesity  Neurology: A&Ox3  Respiratory: CTA B/L, no wheezes, no rhonchi, no rales  CV: RRR, S1S2, no murmur  Abdominal: Soft, mild TTP diffusely, ND no palpable mass, bowel sounds positive  MSK: No edema,  +Luna- hematuria appears improving       --LABS--  No labs today.          --MEDICATIONS--  MEDICATIONS  (STANDING):  ALBUTerol/ipratropium for Nebulization 3milliLiter(s) Nebulizer every 6 hours  insulin lispro (HumaLOG) corrective regimen sliding scale  SubCutaneous three times a day before meals  dextrose 5%. 1000milliLiter(s) IV Continuous <Continuous>  dextrose 50% Injectable 12.5Gram(s) IV Push once  dextrose 50% Injectable 25Gram(s) IV Push once  dextrose 50% Injectable 25Gram(s) IV Push once  pantoprazole    Tablet 40milliGRAM(s) Oral before breakfast  gabapentin 100milliGRAM(s) Oral three times a day  simvastatin 40milliGRAM(s) Oral at bedtime  silver sulfADIAZINE 1% Cream 1Application(s) Topical daily  metoprolol 25milliGRAM(s) Oral two times a day  meropenem IVPB 1000milliGRAM(s) IV Intermittent every 8 hours  insulin glargine Injectable (LANTUS) 10Unit(s) SubCutaneous at bedtime  rivaroxaban 15milliGRAM(s) Oral two times a day  cholecalciferol 1000Unit(s) Oral daily  tamsulosin 0.4milliGRAM(s) Oral at bedtime  nystatin Powder 1Application(s) Topical two times a day  vancomycin  IVPB 1000milliGRAM(s) IV Intermittent every 12 hours  tobramycin/dexamethasone Ointment 1Application(s) Right EYE two times a day  polyethylene glycol 3350 17Gram(s) Oral daily  senna 2Tablet(s) Oral at bedtime  latanoprost 0.005% Ophthalmic Solution 1Drop(s) Right EYE at bedtime    MEDICATIONS  (PRN):  dextrose Gel 1Dose(s) Oral once PRN Blood Glucose LESS THAN 70 milliGRAM(s)/deciLiter  glucagon  Injectable 1milliGRAM(s) IntraMuscular once PRN Glucose <70 milliGRAM(s)/deciLiter  acetaminophen   Tablet 650milliGRAM(s) Oral every 6 hours PRN For Temp greater than 38 C (100.4 F)  guaiFENesin   Syrup  (Sugar-Free) 100milliGRAM(s) Oral every 6 hours PRN Cough  acetaminophen   Tablet. 650milliGRAM(s) Oral every 6 hours PRN Mild Pain (1 - 3)  sodium chloride 0.65% Nasal 1Spray(s) Both Nostrils three times a day PRN Nasal Congestion  melatonin 3milliGRAM(s) Oral once PRN Insomnia  melatonin 3milliGRAM(s) Oral at bedtime PRN Insomnia  hydrocortisone 0.5% Cream 1Application(s) Topical three times a day PRN Itching

## 2017-05-23 LAB
CULTURE RESULTS: SIGNIFICANT CHANGE UP
CULTURE RESULTS: SIGNIFICANT CHANGE UP
SPECIMEN SOURCE: SIGNIFICANT CHANGE UP
SPECIMEN SOURCE: SIGNIFICANT CHANGE UP

## 2017-05-23 PROCEDURE — 99232 SBSQ HOSP IP/OBS MODERATE 35: CPT | Mod: GC

## 2017-05-23 RX ORDER — ACETAMINOPHEN WITH CODEINE 300MG-30MG
2 TABLET ORAL EVERY 4 HOURS
Qty: 0 | Refills: 0 | Status: DISCONTINUED | OUTPATIENT
Start: 2017-05-23 | End: 2017-05-24

## 2017-05-23 RX ORDER — ZOLPIDEM TARTRATE 10 MG/1
5 TABLET ORAL AT BEDTIME
Qty: 0 | Refills: 0 | Status: DISCONTINUED | OUTPATIENT
Start: 2017-05-23 | End: 2017-05-24

## 2017-05-23 RX ORDER — ACETAZOLAMIDE 250 MG/1
250 TABLET ORAL EVERY 12 HOURS
Qty: 0 | Refills: 0 | Status: DISCONTINUED | OUTPATIENT
Start: 2017-05-23 | End: 2017-05-24

## 2017-05-23 RX ORDER — DIPHENHYDRAMINE HCL 50 MG
25 CAPSULE ORAL ONCE
Qty: 0 | Refills: 0 | Status: COMPLETED | OUTPATIENT
Start: 2017-05-23 | End: 2017-05-23

## 2017-05-23 RX ADMIN — Medication 3 MILLILITER(S): at 02:30

## 2017-05-23 RX ADMIN — Medication 3 MILLILITER(S): at 08:16

## 2017-05-23 RX ADMIN — Medication 3 MILLIGRAM(S): at 01:26

## 2017-05-23 RX ADMIN — NYSTATIN CREAM 1 APPLICATION(S): 100000 CREAM TOPICAL at 17:46

## 2017-05-23 RX ADMIN — Medication 1: at 17:10

## 2017-05-23 RX ADMIN — PANTOPRAZOLE SODIUM 40 MILLIGRAM(S): 20 TABLET, DELAYED RELEASE ORAL at 06:18

## 2017-05-23 RX ADMIN — Medication 2 TABLET(S): at 22:58

## 2017-05-23 RX ADMIN — Medication 2 TABLET(S): at 15:23

## 2017-05-23 RX ADMIN — LATANOPROST 1 DROP(S): 0.05 SOLUTION/ DROPS OPHTHALMIC; TOPICAL at 21:40

## 2017-05-23 RX ADMIN — Medication 2 TABLET(S): at 19:13

## 2017-05-23 RX ADMIN — ACETAZOLAMIDE 250 MILLIGRAM(S): 250 TABLET ORAL at 09:55

## 2017-05-23 RX ADMIN — GABAPENTIN 100 MILLIGRAM(S): 400 CAPSULE ORAL at 13:10

## 2017-05-23 RX ADMIN — Medication 3 MILLILITER(S): at 14:02

## 2017-05-23 RX ADMIN — RIVAROXABAN 15 MILLIGRAM(S): KIT at 17:10

## 2017-05-23 RX ADMIN — ACETAZOLAMIDE 250 MILLIGRAM(S): 250 TABLET ORAL at 17:10

## 2017-05-23 RX ADMIN — Medication 1 TABLET(S): at 12:49

## 2017-05-23 RX ADMIN — Medication 1000 UNIT(S): at 12:49

## 2017-05-23 RX ADMIN — Medication 1 TABLET(S): at 17:10

## 2017-05-23 RX ADMIN — TOBRAMYCIN AND DEXAMETHASONE 1 APPLICATION(S): 1; 3 SUSPENSION/ DROPS OPHTHALMIC at 17:46

## 2017-05-23 RX ADMIN — Medication 3 MILLILITER(S): at 20:28

## 2017-05-23 RX ADMIN — NYSTATIN CREAM 1 APPLICATION(S): 100000 CREAM TOPICAL at 06:20

## 2017-05-23 RX ADMIN — GABAPENTIN 100 MILLIGRAM(S): 400 CAPSULE ORAL at 21:39

## 2017-05-23 RX ADMIN — TOBRAMYCIN AND DEXAMETHASONE 1 APPLICATION(S): 1; 3 SUSPENSION/ DROPS OPHTHALMIC at 06:18

## 2017-05-23 RX ADMIN — Medication 650 MILLIGRAM(S): at 06:45

## 2017-05-23 RX ADMIN — Medication 650 MILLIGRAM(S): at 12:50

## 2017-05-23 RX ADMIN — Medication 1 APPLICATION(S): at 12:49

## 2017-05-23 RX ADMIN — Medication 650 MILLIGRAM(S): at 06:19

## 2017-05-23 RX ADMIN — Medication 25 MILLIGRAM(S): at 21:38

## 2017-05-23 RX ADMIN — Medication 1 TABLET(S): at 09:55

## 2017-05-23 RX ADMIN — RIVAROXABAN 15 MILLIGRAM(S): KIT at 06:18

## 2017-05-23 RX ADMIN — Medication 2 TABLET(S): at 18:13

## 2017-05-23 RX ADMIN — GABAPENTIN 100 MILLIGRAM(S): 400 CAPSULE ORAL at 06:18

## 2017-05-23 RX ADMIN — Medication 2 TABLET(S): at 21:54

## 2017-05-23 RX ADMIN — Medication 2 TABLET(S): at 15:57

## 2017-05-23 RX ADMIN — INSULIN GLARGINE 10 UNIT(S): 100 INJECTION, SOLUTION SUBCUTANEOUS at 21:57

## 2017-05-23 RX ADMIN — Medication 650 MILLIGRAM(S): at 13:50

## 2017-05-23 RX ADMIN — TAMSULOSIN HYDROCHLORIDE 0.4 MILLIGRAM(S): 0.4 CAPSULE ORAL at 21:39

## 2017-05-23 RX ADMIN — SIMVASTATIN 40 MILLIGRAM(S): 20 TABLET, FILM COATED ORAL at 21:42

## 2017-05-23 RX ADMIN — MEROPENEM 200 MILLIGRAM(S): 1 INJECTION INTRAVENOUS at 06:19

## 2017-05-23 NOTE — PROGRESS NOTE ADULT - ASSESSMENT
57F PMHx Morbid Obesity, DM, COPD, PUSHPA, Provoked RLL PE,  R kidney staghorn calculus, Hydronephrosis / Hydroureter, ?unstageable R sacral ulcer, UTI presnted with subjective fever, dizziness, diarrhea and weakness at NH while on Meropenem for UTI. Admitted for sepsis 2/2 UTI likely complicated given R Staghorn calculus. c/b persistent leukocytosis despite appropriate abx.     #Sepsis 2/2 UTI, complicated given hx of R Staghorn Calculus. Presumptively Sepsis 2/2 UTI. ED T max of 102.9, tachycardiac 112, WBC of 15.9, Lactate 1.6, UA 6-10 WBC ( positive). // -UCx -ve, BCx -ve, however was on treatment in NH. // Repeat UA +ve, rpt BCx UCx -ve. WBC improved 11<13<18, afebrile. Completed 14 days IV Meropenem 1gm Q8H and IV Vancomycin 1gm Q12H. ID (Dr. Wright)    #R Eye Cloudy Vision w/ hx of glaucoma: New onset on 05/21, associated with subjective tightness. Not painful. +ve mild redness of eye. Pupils appear sluggish but equal bilaterally. +mild swelling of upper eyelid. Suspect underlying open angle glaucoma with superimposed conjunctivitis. Given pressure and lack of strong symptoms, not suspecting acute angle closure glaucoma. Tonometry pressures R 26, L 13. No loss of vision.  -Resolved / back to baseline, likely has chronically  -Patient previously prescribed latanoprost, restarted  -C/w tobradex  -Will need close opthalmology follow-up at nursing facility    #R Staghorn Calculus / Urinary Retention: Hx of R Staghorn last admission at [facility]. CT here "Bilateral hydroureter as well as left hydronephrosis. No obstructing abnormality visualized. Urinary bladder appears distended. Multiple non-obstructing right intrarenal stones."  Renal ultrasound only shows R renal calculus (did not indicate staghorn) without hydronephrosis... hydronephrosis likely resolving s/p hsu catheter placement. Hsu catheter placed because urinary retention. //  removed but failed trial void, so replaced.  -C/w hsu  -F/up urology (Dr. Rayo): outpatient urodynamic studies  -C/w flomax    #Anemia: Hx Iron deficiency anemia , guaiac negative in April 2017 , given 1 U PRBC at that time. Here, with H/H downtrend to 6.6. s/p 1UPRBC with incomplete improvement to 7.2.  + hematuria noted. + FOBT +ve though no gross blood (melena or bright red blood) on rectal exam. Additional unit given. // Hgb stable s/p 2U PRBC  - F/up GI Eval (Dr. Jamison): not a candidate for endoscopy given risk/benfit    #Acute Provoked RLL PE: Patient has been bedridden. Diagnosed at [facility] in April. TTE (April 2017, [facility]) shows normal EF 55%, moderate biatrial enlargement, mild Mitral and tricuspid regurgitation, mild calcific degenerative valve disease.  -C/w Xarelto 15mg BID  (switch to 20mg QD on 5/25/17), will likely need indefinitely given immobility  -Pulm = Dr. Chung    #Metabolic Alkalosis: Likely chronic compensation for CO2 retention, however likely over compensated. // Bicarb improved s/p diamox. diamox dc'd.  -Restarted Diamox today    #COPD + Likely underling PUSHPA: ABG indicates patient is retaining CO2  -C/w NC O2 supplement (O2 dependent at baseline)  -C/w duonebs.    #Diabetes: c/w home dose of Lantus  10 Units and SSI.     #Initial CHRISTINA resolved s/p IVF.#    #Pulmonary Edema (resolved): Seen on recent CXR, likely accounts for increased dyspnea. s/p lasix 20mg PO x1. // resolved on repeat CXR#    #PPx: on full dose AC.    #Dispo: pending acceptance to facility

## 2017-05-23 NOTE — PROGRESS NOTE ADULT - SUBJECTIVE AND OBJECTIVE BOX
Time of Visit: 1520  Patient seen and examined.     MEDICATIONS  (STANDING):  ALBUTerol/ipratropium for Nebulization 3milliLiter(s) Nebulizer every 6 hours  insulin lispro (HumaLOG) corrective regimen sliding scale  SubCutaneous three times a day before meals  dextrose 5%. 1000milliLiter(s) IV Continuous <Continuous>  dextrose 50% Injectable 12.5Gram(s) IV Push once  dextrose 50% Injectable 25Gram(s) IV Push once  dextrose 50% Injectable 25Gram(s) IV Push once  pantoprazole    Tablet 40milliGRAM(s) Oral before breakfast  gabapentin 100milliGRAM(s) Oral three times a day  simvastatin 40milliGRAM(s) Oral at bedtime  silver sulfADIAZINE 1% Cream 1Application(s) Topical daily  metoprolol 25milliGRAM(s) Oral two times a day  insulin glargine Injectable (LANTUS) 10Unit(s) SubCutaneous at bedtime  rivaroxaban 15milliGRAM(s) Oral two times a day  cholecalciferol 1000Unit(s) Oral daily  tamsulosin 0.4milliGRAM(s) Oral at bedtime  nystatin Powder 1Application(s) Topical two times a day  tobramycin/dexamethasone Ointment 1Application(s) Right EYE two times a day  latanoprost 0.005% Ophthalmic Solution 1Drop(s) Right EYE at bedtime  lactobacillus acidophilus 1Tablet(s) Oral three times a day with meals  acetazolamide    Tablet 250milliGRAM(s) Oral every 12 hours  acetaminophen 300 mG/codeine 30 mG 2Tablet(s) Oral every 4 hours      MEDICATIONS  (PRN):  dextrose Gel 1Dose(s) Oral once PRN Blood Glucose LESS THAN 70 milliGRAM(s)/deciLiter  glucagon  Injectable 1milliGRAM(s) IntraMuscular once PRN Glucose <70 milliGRAM(s)/deciLiter  acetaminophen   Tablet 650milliGRAM(s) Oral every 6 hours PRN For Temp greater than 38 C (100.4 F)  guaiFENesin   Syrup  (Sugar-Free) 100milliGRAM(s) Oral every 6 hours PRN Cough  acetaminophen   Tablet. 650milliGRAM(s) Oral every 6 hours PRN Mild Pain (1 - 3)  sodium chloride 0.65% Nasal 1Spray(s) Both Nostrils three times a day PRN Nasal Congestion  melatonin 3milliGRAM(s) Oral once PRN Insomnia  hydrocortisone 0.5% Cream 1Application(s) Topical three times a day PRN Itching  zolpidem 5milliGRAM(s) Oral at bedtime PRN Insomnia     Medications up to date at time of exam.    PHYSICAL EXAMINATION:  Patient has no new complaints.  GENERAL: The patient is a well-developed, well-nourished, in no apparent distress.     Vital Signs Last 24 Hrs  T(C): 37.1, Max: 37.1 (05-23 @ 16:00)  T(F): 98.8, Max: 98.8 (05-23 @ 16:00)  HR: 113 (98 - 113)  BP: 114/- (106/54 - 127/61)  BP(mean): 56 (56 - 56)  RR: 16 (16 - 18)  SpO2: 99% (99% - 100%)   (if applicable)    Chest Tube (if applicable)    HEENT: Head is normocephalic and atraumatic.     NECK: Supple, no palpable adenopathy.    LUNGS: Clear to auscultation, no wheezing, rales, or rhonchi, + decreased breath sounds at B/L bases.     HEART: Regular rate and rhythm without murmur.    ABDOMEN: Soft, nontender, and nondistended.      EXTREMITIES: Without any cyanosis, clubbing, rash, lesions or edema.    NEUROLOGIC: Awake, alert.    SKIN: Warm, dry, good turgor.    LABS: pending        MICROBIOLOGY: (if applicable)    RADIOLOGY & ADDITIONAL STUDIES:  EKG:   CXR:  ECHO:    IMPRESSION: 57y Female PAST MEDICAL & SURGICAL HISTORY:  Morbid obesity  Heart failure with preserved ejection fraction  CHF (congestive heart failure)  Diabetes  Overactive bladder  Urinary tract infection without hematuria, site unspecified  Congestive heart failure, unspecified congestive heart failure chronicity, unspecified congestive heart failure type  Asthma  Hemorrhoids  Congestive heart failure  Emphysema  S/P   History of appendectomy  Appendicitis  S/P cholecystectomy       p/w SOB due to acute on chronic hypoxic respiratory failure due to COPD and CHF, most likely PUSHPA patient has been told she needed PAP in past but refused. She dx with PE     CT abd/pelvis show renal stones and hydronephrosis. Stool is positive for occult blood, anemic, s/p PRBC transfusion. in view of patient morbid obesity  and limited mobility she will need lifelong anticoagulation for PE.       RECOMMENDATIONS:     - con't with bronchodilators, o2 supplement as needed.    - patient s/p abx, WBC trending down   - DVT and GI prophylaxis.    - continue xaralto   - if she agrees, out patient sleep study

## 2017-05-23 NOTE — PROGRESS NOTE ADULT - PROBLEM SELECTOR PLAN 4
Continue with home dose of Lantus  10 Units , Humalog scale  Sugars controlled
Hx Iron deficiency anemia , guaiac negative in April 2017 , given 1 U PRBC at that time.  Here, with H/H downtrend to 6.6. s/p 1UPRBC with incomplete improvement to 7.2.   - + hematuria noted  - + FOBT +ve though no gross blood (melena or bright red blood) on rectal exam  - Will give additional 1U PRBC today  - Will continue Xarelto as needed for PE. ASA KAMRON'd.
Hx Iron deficiency anemia , guaiac negative in April 2017 , given 1 U PRBC at that time.  Here, with H/H downtrend to 6.6. s/p 1UPRBC with incomplete improvement to 7.2.  + hematuria noted. + FOBT +ve though no gross blood (melena or bright red blood) on rectal exam.  - Hgb stable s/p 2U PRBC  - F/up CBC AM  - Will continue Xarelto as needed for PE. ASA DC'd.
continue xarelto,  pulmonary evaluation appreciated
secondary to COPD, obesity hypoventilation syndrome, PUSHPA. and superimposed PE.  continue with nebulization as needed.
Nutritional evaluation
Nutritional evaluation
continue with bronchodilators  pulmonary evaluation appreciated
continue xarelto,  pulmonary evaluation appreciated
secondary to COPD, obesity hypoventilation syndrome, PUSHPA.  continue with nebulization as needed.

## 2017-05-23 NOTE — PROGRESS NOTE ADULT - PROBLEM SELECTOR PLAN 5
nutritional evaluation appreciated  extensive counselling given to patient but she is refusing to change her habits

## 2017-05-23 NOTE — PROGRESS NOTE ADULT - PROBLEM SELECTOR PLAN 2
urology input appreciated,  likely dysfunction is secondary to autonomic neuropathy secondary to diabetes, as well deconditioning. patient probably has overflow incontinence secondary to non cecilio bladder  discharge with Luna and follow up outpatient for urodynamic study.

## 2017-05-23 NOTE — PROGRESS NOTE ADULT - PROBLEM SELECTOR PROBLEM 6
CHRISTINA (acute kidney injury)
COPD (chronic obstructive pulmonary disease)
Morbid obesity due to excess calories
Protein calorie malnutrition
Anemia
Morbid obesity due to excess calories
Protein calorie malnutrition
Protein calorie malnutrition
Anemia
COPD (chronic obstructive pulmonary disease)
Morbid obesity due to excess calories
Protein calorie malnutrition

## 2017-05-23 NOTE — PROGRESS NOTE ADULT - PROBLEM SELECTOR PROBLEM 4
Anemia
Anemia
Chronic respiratory failure with hypercapnia
Diabetes
Other acute pulmonary embolism
Chronic respiratory failure with hypercapnia
Chronic respiratory failure with hypercapnia
Morbid obesity due to excess calories
Other acute pulmonary embolism
Chronic obstructive pulmonary disease, unspecified COPD type
Chronic respiratory failure with hypercapnia
Morbid obesity due to excess calories

## 2017-05-23 NOTE — PROGRESS NOTE ADULT - PROBLEM SELECTOR PLAN 8
Likely chronic compensation from CO2 retention (confirmed on ABG)  -Monitor
GI evaluation appreciated  no indication for emergent endoscopy, she will be scoped outpatient
Likely due to neuropathy   Chronic ischemic vascular changes , pulses barely palpable  Continue with gabapentin at home dose of 100 mg Q8
Likely due to neuropathy .Chronic ischemic vascular changes , pulses barely palpable. KAUSHAL right 1.42,  Left 1.18 ( Normal KAUSHAL)   -Continue with gabapentin at home dose of 100 mg Q8
restart Diamox
GI evaluation appreciated  no indication for emergent endoscopy, she will be scoped outpatient
continue diamox,
continue diamox,
patient will need urine emptying studies outpatient  start Tamsulosin for now.
patient will need urine emptying studies outpatient  start Tamsulosin for now.  likely bladder dysfunction are secondary to autonomic neuropathy from Diabetes  severe deconditioning from lack of mobility
will restart diamox if it continues to increase

## 2017-05-23 NOTE — PROGRESS NOTE ADULT - SUBJECTIVE AND OBJECTIVE BOX
Meds:  meropenem IVPB 1000milliGRAM(s) IV Intermittent every 8 hours  vancomycin  IVPB 1000milliGRAM(s) IV Intermittent every 12 hours    Allergies:  Allergies    Cheese (Pruritus)  oxycodone (Other (Moderate))  peanuts (Unknown)  penicillin (Rash)  seafood (Hives)  strawberry (Pruritus)  Tomatoes (Other)    Intolerances    ROS  [  ] UNABLE TO ELICIT    General:  [  ] Fever   [ x ] Malaise    Skin: sacral ulcer    HEENT:  [  ] Sore Throat  [  ] Photophobia  [x  ] None    Chest: [x  ] SOB  [ x ] Cough  [ ] None    Cardiovascular: [  ] CP	 [x ] None    Gastrointestinal: [ x ] Abd pain   [  ] N    [  ] V   [  ] Diarrhea	 [ ] None    Genitourinary: [  ] Polyuria   [x  ] Urgency   [x  ] Dysuria    [  ]  Hematuria   [ ] None	    Musculoskeletal:  [  ] Back Pain	 [x ] General weakness.    Neurological: [  ]Dizziness  [  ]Visual Disturbance  [  ]Headaches   [ x] General weakness        PHYSICAL EXAM:    Vital Signs Last 24 Hrs  T(C): 36.1, Max: 37.3 (05-22 @ 15:48)  T(F): 97, Max: 99.2 (05-22 @ 15:48)  HR: 100 (98 - 112)  BP: 118/71 (106/54 - 127/61)  BP(mean): --  RR: 17 (17 - 18)  SpO2: 99% (99% - 100%)      HEENT:    Neck:  [ x ] Supple  [  ]Lymphadenopathy  [  ] JVD   [ x ]No JVD  [  ] Masses   [ x ] fugal skin infection on neck crease anteriorly.    CHEST/Respiratory:  [ x ] Rales      [  ] Rhonchi      [  ] Wheezing       [  ] Chest Tenderness  [  ]Clear to auscultation    Cardiovascular:  [ x ]S1 S2  [ x ] Reg  [  ] Irreg   [  ] Murmurs  [  ]Systolic [  ]Diastolic  [x  ]No Murmur    Abdomen:  [ x ]  Bowel Sounds   [  ] Soft           [  ] ABD Distention  [ x ] Tenderness  [  ] Organomegaly                        [  ] Guarding Rigidity  [ x ] No Guarding Rigidity  [  ] Rebound Tenderness [ x ] No Rebound Tenderness    Extremities: [ x ] Edema  [  ] No edema  [  ] Clubbing   [  ] Cyanosis  [ x ] Palpable peripheral pulses                        [ x ] No Tender Calf muscles  [  ] Tender Calf muscles    Neurological:  [ x Alert  [ x ] Awake  [x  ] Oriented  x3                              [  ] Confused    Skin:  [  ] Thrombophlebitis  [x  ] Improving rashes  neck anteriorly. [  ] Dry  [ x ] Ulcers sacral and buttock.    Ortho:  [  ] Joint Swelling  [  ] Joint erythema [ x ] DJD     Luna cath in place.  Last LABS/DIAGNOSTIC TESTS                  Last LABS.                      9.2    11.2  )-----------( 577      ( 20 May 2017 10:32 )             29.1   05-20    144  |  104  |  12  ----------------------------<  91  4.4   |  34<H>  |  0.57    Ca    9.7      20 May 2017 10:32  Phos  3.7     05-20  Mg     1.9     05-20    Vancomycin Level, Trough (05.21.17 @ 22:04)    Vancomycin Level, Trough: 17.1                                 Culture - Blood (05.18.17 @ 18:19)    Specimen Source: .Blood Blood    Culture Results:   No growth to date.    Culture - Blood (05.18.17 @ 18:19)    Specimen Source: .Blood Blood    Culture Results:   No growth to date.      Culture - Urine (05.18.17 @ 17:49)    Specimen Source: .Urine Catheterized    Culture Results:   No growth                RADIOLOGY  Last EXAM:  CHEST-PORTABLE URGENT                            PROCEDURE DATE:  05/18/2017        INTERPRETATION:  AP semisupine chest on May 18 at 1:42 PM. Patient has   urinary infection, and leukocytosis, mucous production, and multiple   medical comorbidities.    Heart is magnified by technique.    On May 15 there was a mild central congestive picture. Present film shows   this has largely resolved. There is slight atelectasis off the left lower   hilum at this time.    IMPRESSION: As above.            Assessment and Recommendation:   		  Patient has no fever today, and leukocytosis is improving today.      Heart : regular S1 & S2, No gallop,:  Problem: Urinary tract infection without hematuria, site unspecified.   repeat UA suggest for UTI.  Recommendation:   1- Follow Blood C&S and urine c&s till final reports.   2- Follow WBC closely.  3- Continue Meropenem 1 gm IVPB q 8 hours today and d/c it tomorrow.  4- Continue vancomycin to1 gm IVPB q 12 hours today and d/c it tomorrow.  5- Fluid and Electrolytes management .  6- CBC & BMP tomorrow for follow up.    Problem/Recommendation - 2:  ·  Problem: Hydronephrosis.    Recommendation:   1- Urology management.   2- Improved.    Problem/Recommendation - 3:  ·  Problem: Hydroureter.    Recommendation:   1- Urology management.   2- Improved.    Problem/Recommendation - 4:  ·  Problem: COPD (chronic obstructive pulmonary disease).    Recommendation:   1- IV antibiotics.  2- Bronchodilators.  3- Pulmonary management.   4- O2 as needed.  Problem/Recommendation - 5:  ·  Problem: Diarrhea, unspecified type.    Recommendation:   1- Stool for C&S if recure.  2- stool for O&P and C&S if recure. .     Problem/Recommendation - 6:  Problem: Heart failure.   Recommendation:   1-Cardiac management.  2-Monitor renal functions.    Problem/Recommendation - 7:  Problem: Diabetes.   Recommendation:   1- Blood sugar monitoring and control.  2- Accuichecks with coverage.  3- 1800 nico ADA diet.  4-Follow HB A1C.     Problem/Recommendation - 8:  Problem: Dermatophytoses (improved).   Recommendation:   1- Continue Mycolog cream to the affected area of neck anteriorly TID.  2- Keep neck skin dry all the time.  3- Clinically improving.

## 2017-05-23 NOTE — PROGRESS NOTE ADULT - PROBLEM SELECTOR PLAN 3
Provoked PE due to prolonged bedridden > 4 months ( NO DVT)   Diagnosed recently at [facility] in April , Right lower lung  Last ECHO in April 2017 shows normal EF 55%, moderate biatrial enlargement, mild Mitral and tricuspid regurgitation, mild calcific degenerative valve disease.  Currently on 15 mg BID to continue till 5/25/17 , then 20 mg till Nov ( to complete total 6 months)
Resolved s/p IVF
Resolved s/p IVF
continue xarelto,  pulmonary evaluation appreciated
urology input appreciated,  likely dysfunction is secondary to autonomic neuropathy secondary to diabetes, patient probably has overflow incontinence secondary to non cecilio bladder  discharge with Luna and follow up outpatient for urodynamic study.
continue with anticoagulation.  will discuss with pulmonary and urology about intervention plan during the acute PE phase.
continue xarelto,  pulmonary evaluation appreciated
multifactorial:   Obesity hypoventilation syndrome vs Sleep apnea vs underlying COPD.
multifactorial:   Obesity hypoventilation syndrome vs Sleep apnea vs underlying COPD.
urology input appreciated,  likely dysfunction is secondary to autonomic neuropathy secondary to diabetes, patient probably has overflow incontinence secondary to non cecilio bladder  discharge with Luna and follow up outpatient for urodynamic study.

## 2017-05-23 NOTE — PROGRESS NOTE ADULT - PROBLEM SELECTOR PROBLEM 3
Bladder dysfunction
CHRISTINA (acute kidney injury)
CHRISTINA (acute kidney injury)
Other acute pulmonary embolism
Pulmonary embolism on right
Bladder dysfunction
Chronic type 2 respiratory failure
Other acute pulmonary embolism
Other acute pulmonary embolism
Chronic type 2 respiratory failure
Other acute pulmonary embolism
Pulmonary embolism on right

## 2017-05-23 NOTE — PROGRESS NOTE ADULT - ASSESSMENT
HPI:  58 y/o F, from [facility], does not walk as per patient since last Jan,  PMHx of Morbid Obesity, DM ( Insulin), COPD ( home O2 3 L, intubated in Jan 2017), PUSHPA ( not on CPAP, refused sleep study in past), Right kidney staghorn calculous, bilateral hydroureter  and hydronephrosis Right lower lung provoked PE ( Xarelto , UTI ( on meropenum currently) , sacral and right buttock  redness ( unstageable) presents to ED c/o subjective fever, dizziness and weakness since yesterday. Patient is poor historian. History obtained from NH papers. Acc to patient she has cough with whitish sputum production since months. She also endorses that she has diarrhea about 4 days ago, associated with abdominal pain, that is diffuse , especially in lower abdomen. She does not have diarrhea anymore. She also has SOB.  Denies chest pain, headache, nausea vomiting.  As per NH papers she is being transferred for CHF exacerbation ( patient has no CHF) .. She is currently being treated for Klebsiella UTI with meropenum( on 5/8/17 to continue for 7 days, today is D 5)  .    Patient has multiple past admissions to [facility], [facility] for COPD exacerbation ( non-compliance , UTIs. She was in past ( Jan) admitted to [facility] which was complicated , requiring the intubation and then Tracheostomy. also she has Hsu at that time for urinary retention ( flomax started), she voided so hsu removed. . Then she admitted to [facility] on 4/27/17 with severe abdominal pain , found to have staghorn calculous in lower right kidney, bladder distention with bilateral hydroureter and hydronephrosis, UTI with E coli ( on flagyll and Meropenum), then she had severe constipation so was given laxatives, then she had diarrhea, initially thought that it was due to C diff, stool sent but cancelled as it was formed stool so likely cause was due to laxative. She also has right lower lung PE , provoked due to prolonged bedridden, , she was discharged with Xarelto ( currently on 15 mg BID to continue till 5/25, then total till 6 months. (12 May 2017 04:51)

## 2017-05-23 NOTE — PROGRESS NOTE ADULT - PROBLEM SELECTOR PLAN 6
Not in acute exacerbation , no wheezing   Continue with bronchodilators   O2 supplementation as needed
Cr 1.37, BUN/Cr ratio is > 20    due to urinary retention; Bladder scan 600 cc; Luna placed by RN
Patient appears moer dyspneic today. CXR suggests possible component of pulmonary edema.  -Consider diuresis  -C/w NC O2 supplement (O2 dependent at baseline)  -C/w duonebs
nutritional evaluation  extensive counselling given to patient but she is refusing to change her habits
suspect sandra
Guaiac is positive, patient is s/p 2 units PRBC transfusion.
Guaiac is positive, patient is s/p 2 units PRBC transfusion.  GI workup as outpatient
nutritional evaluation
nutritional evalution
suspect sandra

## 2017-05-23 NOTE — PROGRESS NOTE ADULT - PROBLEM SELECTOR PROBLEM 7
Anemia
Diabetes
Diabetes
Guaiac positive stools
Protein calorie malnutrition
Guaiac positive stools
Guaiac positive stools
Protein calorie malnutrition
Type 2 diabetes mellitus with autonomic neuropathy, unspecified long term insulin use status
Guaiac positive stools
Type 2 diabetes mellitus with autonomic neuropathy, unspecified long term insulin use status

## 2017-05-23 NOTE — PROGRESS NOTE ADULT - SUBJECTIVE AND OBJECTIVE BOX
Patient is a 57y old  Female who presents with a chief complaint of Sent from NH due to fever, SOB, cough, abdominal pain, diarrhea (12 May 2017 04:51)    -=OVERNIGHT EVENTS / INTERVAL HPI / SUBJECTIVE=-  Patient without new complaint. Cont   -=OBJECTIVE=-       --VITALS--  Vital Signs Last 24 Hrs  T(C): 37.1, Max: 37.1 (05-23 @ 16:00)  T(F): 98.8, Max: 98.8 (05-23 @ 16:00)  HR: 113 (98 - 113)  BP: 114/- (106/54 - 127/61)  BP(mean): 56 (56 - 56)  RR: 16 (16 - 18)  SpO2: 99% (99% - 100%)         --PHYSICAL EXAM--  General: WN/WD NAD  Neurology: A&Ox3  Respiratory: CTA B/L, no wheezes, no rhonchi, no rales  CV: RRR, S1S2, no murmur  Abdominal: Soft, NT, ND no palpable mass, bowel sounds positive  MSK: No edema, + peripheral pulses           --LABS--          05-12 Chol 98 LDL 43 HDL 31<L> Trig 122         --IMAGING--         --MEDICATIONS--  MEDICATIONS  (STANDING):  ALBUTerol/ipratropium for Nebulization 3milliLiter(s) Nebulizer every 6 hours  insulin lispro (HumaLOG) corrective regimen sliding scale  SubCutaneous three times a day before meals  dextrose 5%. 1000milliLiter(s) IV Continuous <Continuous>  dextrose 50% Injectable 12.5Gram(s) IV Push once  dextrose 50% Injectable 25Gram(s) IV Push once  dextrose 50% Injectable 25Gram(s) IV Push once  pantoprazole    Tablet 40milliGRAM(s) Oral before breakfast  gabapentin 100milliGRAM(s) Oral three times a day  simvastatin 40milliGRAM(s) Oral at bedtime  silver sulfADIAZINE 1% Cream 1Application(s) Topical daily  metoprolol 25milliGRAM(s) Oral two times a day  insulin glargine Injectable (LANTUS) 10Unit(s) SubCutaneous at bedtime  rivaroxaban 15milliGRAM(s) Oral two times a day  cholecalciferol 1000Unit(s) Oral daily  tamsulosin 0.4milliGRAM(s) Oral at bedtime  nystatin Powder 1Application(s) Topical two times a day  tobramycin/dexamethasone Ointment 1Application(s) Right EYE two times a day  latanoprost 0.005% Ophthalmic Solution 1Drop(s) Right EYE at bedtime  lactobacillus acidophilus 1Tablet(s) Oral three times a day with meals  acetazolamide    Tablet 250milliGRAM(s) Oral every 12 hours  acetaminophen 300 mG/codeine 30 mG 2Tablet(s) Oral every 4 hours    MEDICATIONS  (PRN):  dextrose Gel 1Dose(s) Oral once PRN Blood Glucose LESS THAN 70 milliGRAM(s)/deciLiter  glucagon  Injectable 1milliGRAM(s) IntraMuscular once PRN Glucose <70 milliGRAM(s)/deciLiter  acetaminophen   Tablet 650milliGRAM(s) Oral every 6 hours PRN For Temp greater than 38 C (100.4 F)  guaiFENesin   Syrup  (Sugar-Free) 100milliGRAM(s) Oral every 6 hours PRN Cough  acetaminophen   Tablet. 650milliGRAM(s) Oral every 6 hours PRN Mild Pain (1 - 3)  sodium chloride 0.65% Nasal 1Spray(s) Both Nostrils three times a day PRN Nasal Congestion  melatonin 3milliGRAM(s) Oral once PRN Insomnia  hydrocortisone 0.5% Cream 1Application(s) Topical three times a day PRN Itching  zolpidem 5milliGRAM(s) Oral at bedtime PRN Insomnia Patient is a 57y old  Female who presents with a chief complaint of Sent from NH due to fever, SOB, cough, abdominal pain, diarrhea (12 May 2017 04:51)    -=OVERNIGHT EVENTS / INTERVAL HPI / SUBJECTIVE=-  Patient without new complaint. Continues to endorse back pain.    -=OBJECTIVE=-       --VITALS--  Vital Signs Last 24 Hrs  T(C): 37.1, Max: 37.1 (05-23 @ 16:00)  T(F): 98.8, Max: 98.8 (05-23 @ 16:00)  HR: 113 (98 - 113)  BP: 114/- (106/54 - 127/61)  BP(mean): 56 (56 - 56)  RR: 16 (16 - 18)  SpO2: 99% (99% - 100%)         --PHYSICAL EXAM--  General: morbidy obesity  Neurology: A&Ox3  Respiratory: CTA B/L, no wheezes, no rhonchi, no rales  CV: RRR, S1S2, no murmur  Abdominal: Soft, mild TTP diffusely, ND no palpable mass, bowel sounds positive  MSK: No edema,       --MEDICATIONS--  MEDICATIONS  (STANDING):  ALBUTerol/ipratropium for Nebulization 3milliLiter(s) Nebulizer every 6 hours  insulin lispro (HumaLOG) corrective regimen sliding scale  SubCutaneous three times a day before meals  dextrose 5%. 1000milliLiter(s) IV Continuous <Continuous>  dextrose 50% Injectable 12.5Gram(s) IV Push once  dextrose 50% Injectable 25Gram(s) IV Push once  dextrose 50% Injectable 25Gram(s) IV Push once  pantoprazole    Tablet 40milliGRAM(s) Oral before breakfast  gabapentin 100milliGRAM(s) Oral three times a day  simvastatin 40milliGRAM(s) Oral at bedtime  silver sulfADIAZINE 1% Cream 1Application(s) Topical daily  metoprolol 25milliGRAM(s) Oral two times a day  insulin glargine Injectable (LANTUS) 10Unit(s) SubCutaneous at bedtime  rivaroxaban 15milliGRAM(s) Oral two times a day  cholecalciferol 1000Unit(s) Oral daily  tamsulosin 0.4milliGRAM(s) Oral at bedtime  nystatin Powder 1Application(s) Topical two times a day  tobramycin/dexamethasone Ointment 1Application(s) Right EYE two times a day  latanoprost 0.005% Ophthalmic Solution 1Drop(s) Right EYE at bedtime  lactobacillus acidophilus 1Tablet(s) Oral three times a day with meals  acetazolamide    Tablet 250milliGRAM(s) Oral every 12 hours  acetaminophen 300 mG/codeine 30 mG 2Tablet(s) Oral every 4 hours    MEDICATIONS  (PRN):  dextrose Gel 1Dose(s) Oral once PRN Blood Glucose LESS THAN 70 milliGRAM(s)/deciLiter  glucagon  Injectable 1milliGRAM(s) IntraMuscular once PRN Glucose <70 milliGRAM(s)/deciLiter  acetaminophen   Tablet 650milliGRAM(s) Oral every 6 hours PRN For Temp greater than 38 C (100.4 F)  guaiFENesin   Syrup  (Sugar-Free) 100milliGRAM(s) Oral every 6 hours PRN Cough  acetaminophen   Tablet. 650milliGRAM(s) Oral every 6 hours PRN Mild Pain (1 - 3)  sodium chloride 0.65% Nasal 1Spray(s) Both Nostrils three times a day PRN Nasal Congestion  melatonin 3milliGRAM(s) Oral once PRN Insomnia  hydrocortisone 0.5% Cream 1Application(s) Topical three times a day PRN Itching  zolpidem 5milliGRAM(s) Oral at bedtime PRN Insomnia

## 2017-05-23 NOTE — PROGRESS NOTE ADULT - PROBLEM SELECTOR PROBLEM 1
Eye pain, right
Leukocytosis, unspecified type
Sepsis
Eye pain, right
Leukocytosis, unspecified type
Leukocytosis, unspecified type
Sepsis, due to unspecified organism
Anemia due to other cause, not classified
Hydronephrosis, unspecified hydronephrosis type
Hydronephrosis, unspecified hydronephrosis type
Leukocytosis, unspecified type
Sepsis, due to unspecified organism

## 2017-05-23 NOTE — PROGRESS NOTE ADULT - PROBLEM SELECTOR PROBLEM 5
Chronic respiratory failure with hypercapnia
Morbid obesity due to excess calories
Pulmonary embolism on right
Pulmonary embolism on right
Urinary retention with incomplete bladder emptying
Chronic respiratory failure with hypercapnia
Morbid obesity due to excess calories
Morbid obesity due to excess calories
Protein calorie malnutrition
Fever, unspecified fever cause
Morbid obesity due to excess calories
Protein calorie malnutrition

## 2017-05-23 NOTE — PROGRESS NOTE ADULT - PROBLEM SELECTOR PLAN 7
Continue with home dose of Lantus  10 Units and SSI.
Continue with home dose of Lantus  10 Units and SSI.
GI evaluation appreciated  no indication for emergent endoscopy, she will be scoped outpatient
Iron deficiency anemia , guaiac negative in April 2017 , given 1 U PRBC   F/up on anemia panel , on feso4 at home  FOBT
suspect sandra
A1c: 5.6, Finger sticks acceptable
A1c: 5.6, Finger sticks acceptable  likely bladder dystention and dysfunction are secondary to autonomic neuropathy
GI evaluation appreciated  awaiting recommendation
suspect sandra

## 2017-05-23 NOTE — PROGRESS NOTE ADULT - PROBLEM SELECTOR PROBLEM 2
Bladder dysfunction
COPD (chronic obstructive pulmonary disease)
Leukocytosis, unspecified type
Urinary retention with incomplete bladder emptying
Urinary retention with incomplete bladder emptying
Bladder dysfunction
Bladder dysfunction
Chronic obstructive pulmonary disease, unspecified COPD type
Leukocytosis, unspecified type
Bladder dysfunction
Chronic obstructive pulmonary disease, unspecified COPD type
Other constipation
Sepsis, due to unspecified organism
Urinary tract infection without hematuria, site unspecified

## 2017-05-23 NOTE — PROGRESS NOTE ADULT - SUBJECTIVE AND OBJECTIVE BOX
INTERVAL HPI/OVERNIGHT EVENTS:  patient seen and examined at bedside, looks ok, complaints of severe back pain asking to optimize her medication as tylenol is not effective.    Allergies    Cheese (Pruritus)  oxycodone (Other (Moderate))  peanuts (Unknown)  penicillin (Rash)  seafood (Hives)  strawberry (Pruritus)  Tomatoes (Other)  Intolerances    REVIEW OF SYSTEMS:  CONSTITUTIONAL: No fever, weight loss, or fatigue  EYES: No eye pain, visual disturbances, or discharge  RESPIRATORY: No cough, wheezing, chills or hemoptysis; No shortness of breath  CARDIOVASCULAR: No chest pain, palpitations, dizziness, or leg swelling  GASTROINTESTINAL: No abdominal or epigastric pain. No nausea, vomiting, or hematemesis; No diarrhea or constipation. No melena or hematochezia.  GENITOURINARY: No dysuria, frequency, hematuria, or incontinence  NEUROLOGICAL: No headaches, memory loss, loss of strength, numbness, or tremors  MUSCULOSKELETAL: No joint pain or swelling; No muscle, back, or extremity pain  PSYCHIATRIC: No depression, anxiety, mood swings, or difficulty sleeping  HEME/LYMPH: No easy bruising, or bleeding gums  ALLERGY AND IMMUNOLOGIC: No hives or eczema    Medications:  ALBUTerol/ipratropium for Nebulization 3milliLiter(s) Nebulizer every 6 hours  insulin lispro (HumaLOG) corrective regimen sliding scale  SubCutaneous three times a day before meals  dextrose 5%. 1000milliLiter(s) IV Continuous <Continuous>  dextrose Gel 1Dose(s) Oral once PRN Blood Glucose LESS THAN 70 milliGRAM(s)/deciLiter  dextrose 50% Injectable 12.5Gram(s) IV Push once  dextrose 50% Injectable 25Gram(s) IV Push once  dextrose 50% Injectable 25Gram(s) IV Push once  glucagon  Injectable 1milliGRAM(s) IntraMuscular once PRN Glucose <70 milliGRAM(s)/deciLiter  pantoprazole    Tablet 40milliGRAM(s) Oral before breakfast  gabapentin 100milliGRAM(s) Oral three times a day  simvastatin 40milliGRAM(s) Oral at bedtime  silver sulfADIAZINE 1% Cream 1Application(s) Topical daily  metoprolol 25milliGRAM(s) Oral two times a day  acetaminophen   Tablet 650milliGRAM(s) Oral every 6 hours PRN For Temp greater than 38 C (100.4 F)  guaiFENesin   Syrup  (Sugar-Free) 100milliGRAM(s) Oral every 6 hours PRN Cough  insulin glargine Injectable (LANTUS) 10Unit(s) SubCutaneous at bedtime  rivaroxaban 15milliGRAM(s) Oral two times a day  acetaminophen   Tablet. 650milliGRAM(s) Oral every 6 hours PRN Mild Pain (1 - 3)  cholecalciferol 1000Unit(s) Oral daily  sodium chloride 0.65% Nasal 1Spray(s) Both Nostrils three times a day PRN Nasal Congestion  tamsulosin 0.4milliGRAM(s) Oral at bedtime  nystatin Powder 1Application(s) Topical two times a day  melatonin 3milliGRAM(s) Oral once PRN Insomnia  tobramycin/dexamethasone Ointment 1Application(s) Right EYE two times a day  hydrocortisone 0.5% Cream 1Application(s) Topical three times a day PRN Itching  latanoprost 0.005% Ophthalmic Solution 1Drop(s) Right EYE at bedtime  acetazolamide    Tablet 250milliGRAM(s) Oral every 12 hours  acetaminophen 300 mG/codeine 30 mG 2Tablet(s) Oral every 4 hours      PHYSICAL EXAM:  Vital Signs Last 24 Hrs  T(C): 36.1, Max: 37 (05-23 @ 00:25)  T(F): 97, Max: 98.6 (05-23 @ 00:25)  HR: 100 (98 - 112)  BP: 118/71 (106/54 - 127/61)  BP(mean): --  RR: 17 (17 - 18)  SpO2: 99% (99% - 100%)    GENERAL: NAD, morbidly obese.  HEAD:  Atraumatic, Normocephalic  EYES: EOMI, PERRLA, conjunctiva and sclera clear  ENT: moist mucous membranes  NECK: Supple, No JVD, Normal thyroid  NERVOUS SYSTEM:  Alert & Oriented X3, Good concentration; Motor Strength 5/5 B/L upper and lower extremities; DTRs 2+ intact and symmetric  CHEST/LUNG: No rales, rhonchi, wheezing, or rubs  HEART: Regular rate and rhythm; No murmurs, rubs, or gallops  ABDOMEN: Soft, Nontender, Nondistended; Bowel sounds present  EXTREMITIES:  2+ Peripheral Pulses, No clubbing, cyanosis, or edema  SKIN: No rashes or lesions    LABS:  no labs today    Culture & Sensitivity:   CAPILLARY BLOOD GLUCOSE  120 (23 May 2017 12:08)  103 (23 May 2017 08:29)  129 (22 May 2017 21:27)    I & Os for 24h ending 05-23 @ 07:00  =============================================  IN: 0 ml / OUT: 1650 ml / NET: -1650 ml    I & Os for current day (as of 05-23 @ 15:50)  =============================================  IN: 0 ml / OUT: 1400 ml / NET: -1400 ml      RADIOLOGY & ADDITIONAL TESTS:  cxr: improving     Consultant(s) Notes Reviewed:  [ x] YES  [ ] NO    Care Discussed with Consultants/Other Providers [ x] YES  [ ] NO

## 2017-05-23 NOTE — PROGRESS NOTE ADULT - PROBLEM SELECTOR PROBLEM 8
Guaiac positive stools
Lower leg pain
Lower leg pain
Metabolic alkalosis
Metabolic alkalosis
Guaiac positive stools
Metabolic alkalosis
Metabolic alkalosis
Urinary retention with incomplete bladder emptying
Metabolic alkalosis
Urinary retention with incomplete bladder emptying

## 2017-05-24 ENCOUNTER — TRANSCRIPTION ENCOUNTER (OUTPATIENT)
Age: 57
End: 2017-05-24

## 2017-05-24 VITALS
RESPIRATION RATE: 16 BRPM | OXYGEN SATURATION: 100 % | TEMPERATURE: 98 F | HEART RATE: 109 BPM | SYSTOLIC BLOOD PRESSURE: 109 MMHG | DIASTOLIC BLOOD PRESSURE: 66 MMHG

## 2017-05-24 PROCEDURE — 97163 PT EVAL HIGH COMPLEX 45 MIN: CPT

## 2017-05-24 PROCEDURE — 80202 ASSAY OF VANCOMYCIN: CPT

## 2017-05-24 PROCEDURE — 83880 ASSAY OF NATRIURETIC PEPTIDE: CPT

## 2017-05-24 PROCEDURE — 82306 VITAMIN D 25 HYDROXY: CPT

## 2017-05-24 PROCEDURE — 84300 ASSAY OF URINE SODIUM: CPT

## 2017-05-24 PROCEDURE — 76775 US EXAM ABDO BACK WALL LIM: CPT

## 2017-05-24 PROCEDURE — 83605 ASSAY OF LACTIC ACID: CPT

## 2017-05-24 PROCEDURE — 83735 ASSAY OF MAGNESIUM: CPT

## 2017-05-24 PROCEDURE — 84443 ASSAY THYROID STIM HORMONE: CPT

## 2017-05-24 PROCEDURE — 99239 HOSP IP/OBS DSCHRG MGMT >30: CPT

## 2017-05-24 PROCEDURE — 82746 ASSAY OF FOLIC ACID SERUM: CPT

## 2017-05-24 PROCEDURE — 82272 OCCULT BLD FECES 1-3 TESTS: CPT

## 2017-05-24 PROCEDURE — 83036 HEMOGLOBIN GLYCOSYLATED A1C: CPT

## 2017-05-24 PROCEDURE — 85027 COMPLETE CBC AUTOMATED: CPT

## 2017-05-24 PROCEDURE — 83550 IRON BINDING TEST: CPT

## 2017-05-24 PROCEDURE — P9040: CPT

## 2017-05-24 PROCEDURE — 93005 ELECTROCARDIOGRAM TRACING: CPT

## 2017-05-24 PROCEDURE — 87086 URINE CULTURE/COLONY COUNT: CPT

## 2017-05-24 PROCEDURE — 87449 NOS EACH ORGANISM AG IA: CPT

## 2017-05-24 PROCEDURE — 86900 BLOOD TYPING SEROLOGIC ABO: CPT

## 2017-05-24 PROCEDURE — 80061 LIPID PANEL: CPT

## 2017-05-24 PROCEDURE — 82607 VITAMIN B-12: CPT

## 2017-05-24 PROCEDURE — 82728 ASSAY OF FERRITIN: CPT

## 2017-05-24 PROCEDURE — 99285 EMERGENCY DEPT VISIT HI MDM: CPT | Mod: 25

## 2017-05-24 PROCEDURE — 87581 M.PNEUMON DNA AMP PROBE: CPT

## 2017-05-24 PROCEDURE — 82803 BLOOD GASES ANY COMBINATION: CPT

## 2017-05-24 PROCEDURE — 87798 DETECT AGENT NOS DNA AMP: CPT

## 2017-05-24 PROCEDURE — 74176 CT ABD & PELVIS W/O CONTRAST: CPT

## 2017-05-24 PROCEDURE — 97110 THERAPEUTIC EXERCISES: CPT

## 2017-05-24 PROCEDURE — 85610 PROTHROMBIN TIME: CPT

## 2017-05-24 PROCEDURE — 81001 URINALYSIS AUTO W/SCOPE: CPT

## 2017-05-24 PROCEDURE — 84484 ASSAY OF TROPONIN QUANT: CPT

## 2017-05-24 PROCEDURE — 87633 RESP VIRUS 12-25 TARGETS: CPT

## 2017-05-24 PROCEDURE — 94760 N-INVAS EAR/PLS OXIMETRY 1: CPT

## 2017-05-24 PROCEDURE — 71045 X-RAY EXAM CHEST 1 VIEW: CPT

## 2017-05-24 PROCEDURE — 82570 ASSAY OF URINE CREATININE: CPT

## 2017-05-24 PROCEDURE — 87486 CHLMYD PNEUM DNA AMP PROBE: CPT

## 2017-05-24 PROCEDURE — 86920 COMPATIBILITY TEST SPIN: CPT

## 2017-05-24 PROCEDURE — 94640 AIRWAY INHALATION TREATMENT: CPT

## 2017-05-24 PROCEDURE — 80053 COMPREHEN METABOLIC PANEL: CPT

## 2017-05-24 PROCEDURE — 84100 ASSAY OF PHOSPHORUS: CPT

## 2017-05-24 PROCEDURE — 82550 ASSAY OF CK (CPK): CPT

## 2017-05-24 PROCEDURE — 86901 BLOOD TYPING SEROLOGIC RH(D): CPT

## 2017-05-24 PROCEDURE — 97530 THERAPEUTIC ACTIVITIES: CPT

## 2017-05-24 PROCEDURE — 87040 BLOOD CULTURE FOR BACTERIA: CPT

## 2017-05-24 PROCEDURE — 85730 THROMBOPLASTIN TIME PARTIAL: CPT

## 2017-05-24 PROCEDURE — 82553 CREATINE MB FRACTION: CPT

## 2017-05-24 PROCEDURE — 36430 TRANSFUSION BLD/BLD COMPNT: CPT

## 2017-05-24 PROCEDURE — 80048 BASIC METABOLIC PNL TOTAL CA: CPT

## 2017-05-24 PROCEDURE — 86850 RBC ANTIBODY SCREEN: CPT

## 2017-05-24 RX ORDER — LATANOPROST 0.05 MG/ML
1 SOLUTION/ DROPS OPHTHALMIC; TOPICAL
Qty: 0 | Refills: 0 | COMMUNITY
Start: 2017-05-24

## 2017-05-24 RX ORDER — RIVAROXABAN 15 MG-20MG
1 KIT ORAL
Qty: 0 | Refills: 0 | COMMUNITY
Start: 2017-05-24 | End: 2017-05-25

## 2017-05-24 RX ORDER — DIPHENHYDRAMINE HCL 50 MG
25 CAPSULE ORAL EVERY 4 HOURS
Qty: 0 | Refills: 0 | Status: DISCONTINUED | OUTPATIENT
Start: 2017-05-24 | End: 2017-05-24

## 2017-05-24 RX ORDER — METOPROLOL TARTRATE 50 MG
1 TABLET ORAL
Qty: 0 | Refills: 0 | COMMUNITY
Start: 2017-05-24

## 2017-05-24 RX ORDER — RIVAROXABAN 15 MG-20MG
1 KIT ORAL
Qty: 30 | Refills: 0 | OUTPATIENT
Start: 2017-05-24 | End: 2017-06-23

## 2017-05-24 RX ORDER — DIPHENHYDRAMINE HCL 50 MG
50 CAPSULE ORAL EVERY 4 HOURS
Qty: 0 | Refills: 0 | Status: DISCONTINUED | OUTPATIENT
Start: 2017-05-24 | End: 2017-05-24

## 2017-05-24 RX ORDER — NYSTATIN CREAM 100000 [USP'U]/G
1 CREAM TOPICAL
Qty: 0 | Refills: 0 | COMMUNITY
Start: 2017-05-24

## 2017-05-24 RX ORDER — LACTOBACILLUS ACIDOPHILUS 100MM CELL
1 CAPSULE ORAL
Qty: 0 | Refills: 0 | COMMUNITY
Start: 2017-05-24

## 2017-05-24 RX ORDER — HYDROCORTISONE 1 %
1 OINTMENT (GRAM) TOPICAL
Qty: 0 | Refills: 0 | COMMUNITY
Start: 2017-05-24

## 2017-05-24 RX ORDER — GABAPENTIN 400 MG/1
1 CAPSULE ORAL
Qty: 0 | Refills: 0 | COMMUNITY
Start: 2017-05-24

## 2017-05-24 RX ORDER — TAMSULOSIN HYDROCHLORIDE 0.4 MG/1
1 CAPSULE ORAL
Qty: 0 | Refills: 0 | COMMUNITY
Start: 2017-05-24

## 2017-05-24 RX ORDER — CHOLECALCIFEROL (VITAMIN D3) 125 MCG
1000 CAPSULE ORAL
Qty: 0 | Refills: 0 | COMMUNITY
Start: 2017-05-24

## 2017-05-24 RX ORDER — ACETAMINOPHEN WITH CODEINE 300MG-30MG
2 TABLET ORAL
Qty: 0 | Refills: 0 | COMMUNITY
Start: 2017-05-24

## 2017-05-24 RX ORDER — ACETAMINOPHEN 500 MG
2 TABLET ORAL
Qty: 0 | Refills: 0 | COMMUNITY
Start: 2017-05-24

## 2017-05-24 RX ORDER — SIMVASTATIN 20 MG/1
1 TABLET, FILM COATED ORAL
Qty: 0 | Refills: 0 | COMMUNITY
Start: 2017-05-24

## 2017-05-24 RX ORDER — IPRATROPIUM/ALBUTEROL SULFATE 18-103MCG
3 AEROSOL WITH ADAPTER (GRAM) INHALATION
Qty: 0 | Refills: 0 | COMMUNITY
Start: 2017-05-24

## 2017-05-24 RX ADMIN — Medication 2 TABLET(S): at 13:35

## 2017-05-24 RX ADMIN — Medication 2 TABLET(S): at 01:20

## 2017-05-24 RX ADMIN — Medication 1000 UNIT(S): at 12:01

## 2017-05-24 RX ADMIN — Medication 2 TABLET(S): at 06:20

## 2017-05-24 RX ADMIN — RIVAROXABAN 15 MILLIGRAM(S): KIT at 05:50

## 2017-05-24 RX ADMIN — Medication 1: at 12:01

## 2017-05-24 RX ADMIN — ZOLPIDEM TARTRATE 5 MILLIGRAM(S): 10 TABLET ORAL at 01:20

## 2017-05-24 RX ADMIN — Medication 2 TABLET(S): at 05:48

## 2017-05-24 RX ADMIN — GABAPENTIN 100 MILLIGRAM(S): 400 CAPSULE ORAL at 05:50

## 2017-05-24 RX ADMIN — Medication 2 TABLET(S): at 02:00

## 2017-05-24 RX ADMIN — Medication 1 TABLET(S): at 12:01

## 2017-05-24 RX ADMIN — Medication 2 TABLET(S): at 12:01

## 2017-05-24 RX ADMIN — NYSTATIN CREAM 1 APPLICATION(S): 100000 CREAM TOPICAL at 05:51

## 2017-05-24 RX ADMIN — TOBRAMYCIN AND DEXAMETHASONE 1 APPLICATION(S): 1; 3 SUSPENSION/ DROPS OPHTHALMIC at 05:52

## 2017-05-24 RX ADMIN — Medication 3 MILLILITER(S): at 09:43

## 2017-05-24 RX ADMIN — Medication 1 TABLET(S): at 09:40

## 2017-05-24 RX ADMIN — Medication 1 APPLICATION(S): at 12:02

## 2017-05-24 RX ADMIN — Medication 3 MILLILITER(S): at 02:19

## 2017-05-24 RX ADMIN — Medication 25 MILLIGRAM(S): at 05:50

## 2017-05-24 RX ADMIN — PANTOPRAZOLE SODIUM 40 MILLIGRAM(S): 20 TABLET, DELAYED RELEASE ORAL at 06:01

## 2017-05-24 RX ADMIN — ACETAZOLAMIDE 250 MILLIGRAM(S): 250 TABLET ORAL at 05:50

## 2017-05-24 RX ADMIN — Medication 25 MILLIGRAM(S): at 01:21

## 2017-05-24 NOTE — PROGRESS NOTE ADULT - PROVIDER SPECIALTY LIST ADULT
Gastroenterology
Infectious Disease
Internal Medicine
Pulmonology
Urology
Internal Medicine
Pulmonology

## 2017-05-24 NOTE — PROGRESS NOTE ADULT - SUBJECTIVE AND OBJECTIVE BOX
Time of Visit: 1200  Patient seen and examined.     MEDICATIONS  (STANDING):  ALBUTerol/ipratropium for Nebulization 3milliLiter(s) Nebulizer every 6 hours  insulin lispro (HumaLOG) corrective regimen sliding scale  SubCutaneous three times a day before meals  dextrose 5%. 1000milliLiter(s) IV Continuous <Continuous>  dextrose 50% Injectable 12.5Gram(s) IV Push once  dextrose 50% Injectable 25Gram(s) IV Push once  dextrose 50% Injectable 25Gram(s) IV Push once  pantoprazole    Tablet 40milliGRAM(s) Oral before breakfast  gabapentin 100milliGRAM(s) Oral three times a day  simvastatin 40milliGRAM(s) Oral at bedtime  silver sulfADIAZINE 1% Cream 1Application(s) Topical daily  metoprolol 25milliGRAM(s) Oral two times a day  insulin glargine Injectable (LANTUS) 10Unit(s) SubCutaneous at bedtime  rivaroxaban 15milliGRAM(s) Oral two times a day  cholecalciferol 1000Unit(s) Oral daily  tamsulosin 0.4milliGRAM(s) Oral at bedtime  nystatin Powder 1Application(s) Topical two times a day  tobramycin/dexamethasone Ointment 1Application(s) Right EYE two times a day  latanoprost 0.005% Ophthalmic Solution 1Drop(s) Right EYE at bedtime  lactobacillus acidophilus 1Tablet(s) Oral three times a day with meals  acetazolamide    Tablet 250milliGRAM(s) Oral every 12 hours  acetaminophen 300 mG/codeine 30 mG 2Tablet(s) Oral every 4 hours      MEDICATIONS  (PRN):  dextrose Gel 1Dose(s) Oral once PRN Blood Glucose LESS THAN 70 milliGRAM(s)/deciLiter  glucagon  Injectable 1milliGRAM(s) IntraMuscular once PRN Glucose <70 milliGRAM(s)/deciLiter  acetaminophen   Tablet 650milliGRAM(s) Oral every 6 hours PRN For Temp greater than 38 C (100.4 F)  guaiFENesin   Syrup  (Sugar-Free) 100milliGRAM(s) Oral every 6 hours PRN Cough  acetaminophen   Tablet. 650milliGRAM(s) Oral every 6 hours PRN Mild Pain (1 - 3)  sodium chloride 0.65% Nasal 1Spray(s) Both Nostrils three times a day PRN Nasal Congestion  melatonin 3milliGRAM(s) Oral once PRN Insomnia  hydrocortisone 0.5% Cream 1Application(s) Topical three times a day PRN Itching  zolpidem 5milliGRAM(s) Oral at bedtime PRN Insomnia  diphenhydrAMINE   Injectable 25milliGRAM(s) IV Push every 4 hours PRN Rash and/or Itching     Medications up to date at time of exam.    PHYSICAL EXAMINATION:  Patient has no new complaints.  GENERAL: The patient is a well-developed, well-nourished, in no apparent distress.     Vital Signs Last 24 Hrs  T(C): 36.9, Max: 37.1 (05-23 @ 16:00)  T(F): 98.5, Max: 98.8 (05-23 @ 16:00)  HR: 109 (103 - 113)  BP: 109/66 (97/66 - 114/-)  BP(mean): 56 (56 - 56)  RR: 16 (16 - 18)  SpO2: 100% (94% - 100%)   (if applicable)    Chest Tube (if applicable)    HEENT: Head is normocephalic and atraumatic.     NECK: Supple, no palpable adenopathy.    LUNGS: Clear to auscultation, no wheezing, rales, or rhonchi, + decreased breath sounds at bases    HEART: Regular rate and rhythm without murmur.    ABDOMEN: Soft, nontender, and nondistended.      EXTREMITIES: Without any cyanosis, clubbing, rash, lesions, + B/L LE edema.    NEUROLOGIC: Awake, alert.    SKIN: Warm, dry, good turgor.    LABS: pending              MICROBIOLOGY: (if applicable)    RADIOLOGY & ADDITIONAL STUDIES:  EKG:   CXR:  ECHO:    IMPRESSION: 57y Female PAST MEDICAL & SURGICAL HISTORY:  Morbid obesity  Heart failure with preserved ejection fraction  CHF (congestive heart failure)  Diabetes  Overactive bladder  Urinary tract infection without hematuria, site unspecified  Congestive heart failure, unspecified congestive heart failure chronicity, unspecified congestive heart failure type  Asthma  Hemorrhoids  Congestive heart failure  Emphysema  S/P   History of appendectomy  Appendicitis  S/P cholecystectomy       p/w SOB due to acute on chronic hypoxic respiratory failure due to COPD and CHF, most likely PUSHPA patient has been told she needed PAP in past but refused. She dx with PE     CT abd/pelvis show renal stones and hydronephrosis. Stool is positive for occult blood, anemic, s/p PRBC transfusion. in view of patient morbid obesity  and limited mobility she will need lifelong anticoagulation for PE.       RECOMMENDATIONS:     - con't with bronchodilators, o2 supplement as needed.    - patient s/p abx, WBC trending down   - DVT and GI prophylaxis.    - continue xaralto   - if she agrees, out patient sleep study

## 2017-05-24 NOTE — PROGRESS NOTE ADULT - SUBJECTIVE AND OBJECTIVE BOX
Meds:  meropenem IVPB 1000milliGRAM(s) IV Intermittent every 8 hours  vancomycin  IVPB 1000milliGRAM(s) IV Intermittent every 12 hours    Allergies:  Allergies    Cheese (Pruritus)  oxycodone (Other (Moderate))  peanuts (Unknown)  penicillin (Rash)  seafood (Hives)  strawberry (Pruritus)  Tomatoes (Other)    Intolerances    ROS  [  ] UNABLE TO ELICIT    General:  [  ] Fever   [ x ] Malaise    Skin: sacral ulcer    HEENT:  [  ] Sore Throat  [  ] Photophobia  [x  ] None    Chest: [x  ] SOB  [ x ] Cough  [ ] None    Cardiovascular: [  ] CP	 [x ] None    Gastrointestinal: [ x ] Abd pain   [  ] N    [  ] V   [  ] Diarrhea	 [ ] None    Genitourinary: [  ] Polyuria   [x  ] Urgency   [x  ] Dysuria    [  ]  Hematuria   [ ] None	    Musculoskeletal:  [  ] Back Pain	 [x ] General weakness.    Neurological: [  ]Dizziness  [  ]Visual Disturbance  [  ]Headaches   [ x] General weakness        PHYSICAL EXAM:    Vital Signs Last 24 Hrs  T(C): 36.9, Max: 37.1 (05-23 @ 16:00)  T(F): 98.5, Max: 98.8 (05-23 @ 16:00)  HR: 109 (103 - 113)  BP: 109/66 (97/66 - 114/-)  BP(mean): 56 (56 - 56)  RR: 16 (16 - 18)  SpO2: 100% (94% - 100%)    HEENT:    Neck:  [ x ] Supple  [  ]Lymphadenopathy  [  ] JVD   [ x ]No JVD  [  ] Masses   [ x ] fugal skin infection on neck crease anteriorly.    CHEST/Respiratory:  [ x ] Rales      [  ] Rhonchi      [  ] Wheezing       [  ] Chest Tenderness  [  ]Clear to auscultation    Cardiovascular:  [ x ]S1 S2  [ x ] Reg  [  ] Irreg   [  ] Murmurs  [  ]Systolic [  ]Diastolic  [x  ]No Murmur    Abdomen:  [ x ]  Bowel Sounds   [  ] Soft           [  ] ABD Distention  [ x ] Tenderness  [  ] Organomegaly                        [  ] Guarding Rigidity  [ x ] No Guarding Rigidity  [  ] Rebound Tenderness [ x ] No Rebound Tenderness    Extremities: [ x ] Edema  [  ] No edema  [  ] Clubbing   [  ] Cyanosis  [ x ] Palpable peripheral pulses                        [ x ] No Tender Calf muscles  [  ] Tender Calf muscles    Neurological:  [ x Alert  [ x ] Awake  [x  ] Oriented  x3                              [  ] Confused    Skin:  [  ] Thrombophlebitis  [x  ] Improving rashes  neck anteriorly. [  ] Dry  [ x ] Ulcers sacral and buttock.    Ortho:  [  ] Joint Swelling  [  ] Joint erythema [ x ] DJD     Luna cath in place.  Last LABS/DIAGNOSTIC TESTS                  Last LABS.                      9.2    11.2  )-----------( 577      ( 20 May 2017 10:32 )             29.1   05-20    144  |  104  |  12  ----------------------------<  91  4.4   |  34<H>  |  0.57    Ca    9.7      20 May 2017 10:32  Phos  3.7     05-20  Mg     1.9     05-20    last Vancomycin Level, Trough (05.21.17 @ 22:04)    Vancomycin Level, Trough: 17.1                               Culture - Blood (05.18.17 @ 18:19)    Specimen Source: .Blood Blood    Culture Results:   No growth to date.    Culture - Blood (05.18.17 @ 18:19)    Specimen Source: .Blood Blood    Culture Results:   No growth to date.      Culture - Urine (05.18.17 @ 17:49)    Specimen Source: .Urine Catheterized    Culture Results:   No growth                RADIOLOGY  Last EXAM:  CHEST-PORTABLE URGENT                            PROCEDURE DATE:  05/18/2017        INTERPRETATION:  AP semisupine chest on May 18 at 1:42 PM. Patient has   urinary infection, and leukocytosis, mucous production, and multiple   medical comorbidities.    Heart is magnified by technique.    On May 15 there was a mild central congestive picture. Present film shows   this has largely resolved. There is slight atelectasis off the left lower   hilum at this time.    IMPRESSION: As above.            Assessment and Recommendation:   		  Patient has no fever today, No cbc today.      Heart : regular S1 & S2, No gallop,:  Problem: Urinary tract infection without hematuria.     Recommendation:   1- Follow Blood C&S and urine c&s are till final negative.   2- Follow WBC closely.  3- Discontinue Meropenem 1 gm IVPB q 8 hours today and d/c it tomorrow.  4- Discontinue  vancomycin to1 gm IVPB q 12 hours today and d/c it tomorrow.  5- Fluid and Electrolytes management .  6- CBC & BMP for follow up.  7- Observe for fever or leukocytosis.    Problem/Recommendation - 2:  ·  Problem: Hydronephrosis.    Recommendation:   1- Urology management.   2- Improved.    Problem/Recommendation - 3:  ·  Problem: Hydroureter.    Recommendation:   1- Urology management.   2- Improved.    Problem/Recommendation - 4:  ·  Problem: COPD (chronic obstructive pulmonary disease).    Recommendation:   1- Finished 2 weeks of antibiotics.  2- Bronchodilators.  3- Pulmonary management.   4- O2 as needed.  Problem/Recommendation - 5:  ·  Problem: Diarrhea, unspecified type.    Recommendation:   1- Stool for C&S if recure.  2- stool for O&P and C&S if recure. .     Problem/Recommendation - 6:  Problem: Heart failure.   Recommendation:   1-Cardiac management.  2-Monitor renal functions.    Problem/Recommendation - 7:  Problem: Diabetes.   Recommendation:   1- Blood sugar monitoring and control.  2- Accuichecks with coverage.  3- 1800 nico ADA diet.  4-Follow HB A1C.     Problem/Recommendation - 8:  Problem: Dermatophytoses (improved).   Recommendation:   1- Local care.  2- Keep neck skin dry all the time.  3- Clinically improved.

## 2017-05-24 NOTE — DISCHARGE NOTE ADULT - CARE PLAN
Principal Discharge DX:	UTI (urinary tract infection)  Goal:	monitor  Instructions for follow-up, activity and diet:	you completed treatment durning the admission. Monitor for signs of infection.  Secondary Diagnosis:	COPD (chronic obstructive pulmonary disease)  Goal:	keep stable  Instructions for follow-up, activity and diet:	continue supplemental oxygen and bronchodilators  Secondary Diagnosis:	Anemia  Goal:	Hgb > 8  Instructions for follow-up, activity and diet:	Have repeat BMP and CBC in 3 days for continued monitoring. Monitor the hsu catheter for hematuria (which appears resolving on the day of discharge).  Secondary Diagnosis:	Diabetes  Goal:	keep controlled  Instructions for follow-up, activity and diet:	continue home metformin.  Secondary Diagnosis:	Urinary retention with incomplete bladder emptying  Goal:	urology eval  Instructions for follow-up, activity and diet:	keep hsu in place and continue taking flomax as prescribed. follow-up with Dr. Bowser in 2-4 weeks for urodynamic studies and trial void.  Secondary Diagnosis:	Pulmonary embolism on right  Goal:	keep treated adequately  Instructions for follow-up, activity and diet:	continue on the 15mg twice a day Xarelto treatment until 05/25/17. After which switch to 20mg once a day. You will likely need to be on this regimen indefinitely given your immobility. Follow-up with pulmonary as outpatient.  Secondary Diagnosis:	Glaucoma  Goal:	keep controlled  Instructions for follow-up, activity and diet:	You need to follow-up with an ophthalmologist as soon as possible. Ask the nursing home / rehab to have the one they have available to see you. Continue the latanoprost for now for your R eye as prescribed.

## 2017-05-24 NOTE — DISCHARGE NOTE ADULT - CARE PROVIDER_API CALL
Alex Bowser), Urology  [address] [place] [address]  Phone: [phone]  Fax: [phone]    Jaswinder Chung), Medicine  Dept Director  [address] [place] [address]  Phone: [phone]  Fax: [phone]    Niles Kemp), Gastroenterology; Internal Medicine  [address] [place] [address]  Phone: [phone]  Fax: [phone]    Felicia Cole  [address] [address]  Phone: [phone]  Fax: (   )    -

## 2017-05-24 NOTE — DISCHARGE NOTE ADULT - MEDICATION SUMMARY - MEDICATIONS TO TAKE
I will START or STAY ON the medications listed below when I get home from the hospital:    acetaminophen 325 mg oral tablet  -- 2 tab(s) by mouth every 6 hours, As needed, Mild Pain (1 - 3)  -- Indication: For Pain    acetaminophen-codeine 300 mg-30 mg oral tablet  -- 2 tab(s) by mouth every 4 hours  -- Indication: For Pain    aspirin 81 mg oral tablet, chewable  -- 1 tab(s) by mouth once a day  -- Indication: For Need for prophylactic measure    tamsulosin 0.4 mg oral capsule  -- 1 cap(s) by mouth once a day (at bedtime)  -- Indication: For Urinary retention    rivaroxaban 15 mg oral tablet  -- 1 tab(s) by mouth 2 times a day  -- Last day 05/25.    Switch to 20 QD on 05/26.  -- Indication: For Pulmonary embolism on right    Xarelto 20 mg oral tablet  -- 1 tab(s) by mouth once a day. Start on 05/26/17.  -- Check with your doctor before becoming pregnant.  It is very important that you take or use this exactly as directed.  Do not skip doses or discontinue unless directed by your doctor.  Obtain medical advice before taking any non-prescription drugs as some may affect the action of this medication.  Take with food.    -- Indication: For Pulmonary embolism on right    gabapentin 100 mg oral capsule  -- 1 cap(s) by mouth 3 times a day  -- Indication: For Pain    metFORMIN 500 mg oral tablet  -- 1 tab(s) by mouth 2 times a day (with meals)  -- Indication: For Diabetes    simvastatin 40 mg oral tablet  -- 1 tab(s) by mouth once a day (at bedtime)  -- Indication: For HLD    metoprolol tartrate 25 mg oral tablet  -- 1 tab(s) by mouth 2 times a day  -- Indication: For HTN / Tachycardia    albuterol-ipratropium 2.5 mg-0.5 mg/3 mL inhalation solution  -- 3 milliliter(s) inhaled every 6 hours  -- Indication: For COPD (chronic obstructive pulmonary disease)    albuterol CFC free 90 mcg/inh inhalation aerosol  -- 2 puff(s) inhaled every 6 hours, As needed, Respiratory Distress  -- Indication: For COPD (chronic obstructive pulmonary disease)    tiotropium 18 mcg inhalation capsule  -- 1 cap(s) inhaled once a day  -- Indication: For COPD (chronic obstructive pulmonary disease)    Symbicort 160 mcg-4.5 mcg/inh inhalation aerosol  -- 2 puff(s) inhaled 2 times a day  -- Indication: For COPD (chronic obstructive pulmonary disease)    hydrocortisone 0.5% topical cream  -- 1 application on skin 3 times a day, As needed, Itching  -- Indication: For Cream    nystatin 100,000 units/g topical powder  -- 1 application on skin 2 times a day  -- Indication: For Neck rash    silver sulfADIAZINE 1% topical cream  -- 1 application on skin once a day  -- Indication: For Silvadene    Flonase 50 mcg/inh nasal spray  -- 1 spray(s) into nose 2 times a day  -- Indication: For Nasal Congestion    latanoprost 0.005% ophthalmic solution  -- 1 drop(s) to each affected eye once a day (at bedtime)  -- Indication: For R eye glaucoma    lactobacillus acidophilus oral capsule  -- 1 cap(s) by mouth 3 times a day (with meals)  -- Indication: For Stomach    cholecalciferol oral tablet  -- 1000 unit(s) by mouth once a day  -- Indication: For Vitamin D Deficiency

## 2017-05-24 NOTE — DISCHARGE NOTE ADULT - PROVIDER TOKENS
TOKEN:'[id number]:'[id number]:'[id number]',FREE:[LAST:[Douglas],FIRST:[Felicia],PHONE:[phone]],FAX:[(   )    -],ADDRESS:[[address] [place] [address]]]

## 2017-05-24 NOTE — DISCHARGE NOTE ADULT - HOSPITAL COURSE
HPI: 58 y/o F, from [facility], does not walk as per patient since last Jan,  PMHx of Morbid Obesity, DM ( Insulin), COPD ( home O2 3 L, intubated in Jan 2017), PUSHPA ( not on CPAP, refused sleep study in past), Right kidney staghorn calculous, bilateral hydroureter  and hydronephrosis Right lower lung provoked PE ( Xarelto , UTI ( on meropenum currently) , sacral and right buttock  redness ( unstageable) presents to ED c/o subjective fever, dizziness and weakness since yesterday. Patient is poor historian. History obtained from NH papers. Acc to patient she has cough with whitish sputum production since months. She also endorses that she has diarrhea about 4 days ago, associated with abdominal pain, that is diffuse , especially in lower abdomen. She does not have diarrhea anymore. She also has SOB.  Denies chest pain, headache, nausea vomiting.  As per NH papers she is being transferred for CHF exacerbation ( patient has no CHF) .. She is currently being treated for Klebsiella UTI with meropenum( on 5/8/17 to continue for 7 days, today is D 5)  .    Patient has multiple past admissions to [facility] for COPD exacerbation ( non-compliance , UTIs. She was in past ( Jan) admitted to [facility] which was complicated , requiring the intubation and then Tracheostomy. also she has Hsu at that time for urinary retention ( flomax started), she voided so hsu removed. . Then she admitted to [facility] on 4/27/17 with severe abdominal pain , found to have staghorn calculous in lower right kidney, bladder distention with bilateral hydroureter and hydronephrosis, UTI with E coli ( on flagyll and Meropenum), then she had severe constipation so was given laxatives, then she had diarrhea, initially thought that it was due to C diff, stool sent but cancelled as it was formed stool so likely cause was due to laxative. She also has right lower lung PE , provoked due to prolonged bedridden, , she was discharged with Xarelto ( currently on 15 mg BID to continue till 5/25, then total till 6 months.    Hospital Course: 57F PMHx Morbid Obesity, DM, COPD, PUSHPA, Provoked RLL PE,  R kidney staghorn calculus, Hydronephrosis / Hydroureter, ?unstageable R sacral ulcer, UTI presented with subjective fever, dizziness, diarrhea and weakness at NH while on Meropenem for UTI. Admitted for sepsis 2/2 UTI likely complicated given R Staghorn calculus. Course complicated by initial blood loss anemia, urinary retention, metabolic alkalosis, and R eye cloudiness.    #Sepsis 2/2 UTI, complicated given hx of R Staghorn Calculus. Presumptively Sepsis 2/2 UTI. ED T max of 102.9, tachycardiac 112, WBC of 15.9, Lactate 1.6, UA 6-10 WBC ( positive). // Initial UCx -ve, BCx -ve, however was on treatment in NH. // Repeat UA +ve (with hsu + hematuria), rpt BCx UCx -ve. // WBC was improving, then worsened to 18 and eventually improved to 11<13<18. Afebrile throughout the course.. Completed 14 days IV Meropenem 1gm Q8H and IV Vancomycin 1gm Q12H. ID (Dr. Wright).  -Monitor for further infectious symptoms  -Patient will be discharged with hsu catheter 2/2 urinary retention.    #R Staghorn Calculus / Urinary Retention: Hx of R Staghorn last admission at [place]. Initial CT here "Bilateral hydroureter as well as left hydronephrosis. No obstructing abnormality visualized. Urinary bladder appears distended. Multiple non-obstructing right intrarenal stones."  Renal ultrasound after hsu catheter was placed on admission only showed R renal calculus (did not indicate staghorn) without hydronephrosis. Impression was that hydronephrosis likely resolving s/p hsu catheter placement. Hsu catheter placed because urinary retention. Failed trial void x 1. Patient will be discharged on hsu catheter + flomax. To follow-up with Urology (Dr. Bowser) for outpatient urodynamic studies and voidiing trial.    #Anemia: Hx Iron deficiency anemia , guaiac negative in April 2017 , given 1 U PRBC at that time. Here, with H/H downtrend to 6.6. s/p 1UPRBC with incomplete improvement to 7.2.  + hematuria noted. + FOBT +ve though no gross blood (melena or bright red blood) on rectal exam.  Hgb stable s/p 2U PRBC. Stable throughout the rest of the course: Evaluated by GI (Dr. Jamison): not a candidate for endoscopy given risk/benefit.  -Continue to monitor H/H as outpatient.  -F/up GI as outpatient.    #Acute Provoked RLL PE: Patient has been bedridden. Diagnosed at [facility] in April. TTE (April 2017, [place]) shows normal EF 55%, moderate biatrial enlargement, mild Mitral and tricuspid regurgitation, mild calcific degenerative valve disease. Pulm Dr. Chung was consulted. Patient was continued on Xarelto 15mg BID. Per Dr. Chung, will likely need indefinitely given immobility. Scheduled to be switched to 20mg QD on 5/25/17.  -Switched to Xarelto 20mg QD on 05/25/17  -F/up Pulm as outpatient    #Metabolic Alkalosis: Likely chronic compensation for CO2 retention, however likely over compensated. Bicarb was reaching 35-36 with near nl pH. Given trial of diamox, alkalosis improved. Diamox stopped. repeat BMP showed resumption of alkalosis. Plan was to give diamox for 2 days starting 05/24.  -F/up repeat BMP in 3 days.    #COPD + Likely underling PUSHPA: ABG indicates patient is retaining CO2. Reportedly patient has refused CPAP / sleep studies  -C/w NC O2 supplement (O2 dependent at baseline)  -C/w duonebs.  -F/up pulm as outpatient for sleep studies    #Diabetes: stable on home dose of Lantus  10 Units.    #R Eye Cloudy Vision w/ hx of glaucoma: New onset on 05/21, associated with subjective tightness. Not painful. +ve mild redness of eye. Pupils appear sluggish but equal bilaterally. +mild swelling of upper eyelid. Suspect underlying open angle glaucoma with superimposed conjunctivitis. Given pressure and lack of strong symptoms, not suspecting acute angle closure glaucoma. Tonometry pressures R 26, L 13. No loss of vision.  -Resolved / back to baseline, likely has chronically  -Patient previously prescribed latanoprost, restarted  -C/w tobradex  -Will need close opthalmology follow-up at nursing facility    #Initial CHRISTINA resolved s/p IVF.#    #Pulmonary Edema (resolved): Seen on recent CXR, likely accounts for increased dyspnea. s/p lasix 20mg PO x1. // resolved on repeat CXR#  -Patient was reportedly taking lasix as outpatient. Consider restarting if need arises.    Discussed with medical attending, patient medically stable for discharge to rehab facility with repeat BMP in 3 days, follow-up with Pulm, follow-up with urology, follow-up with GI.

## 2017-05-24 NOTE — DISCHARGE NOTE ADULT - PLAN OF CARE
monitor you completed treatment durning the admission. Monitor for signs of infection. keep stable continue supplemental oxygen and bronchodilators Hgb > 8 Have repeat BMP and CBC in 3 days for continued monitoring. Monitor the hsu catheter for hematuria (which appears resolving on the day of discharge). keep controlled continue home metformin. urology eval keep hsu in place and continue taking flomax as prescribed. follow-up with Dr. Bowser in 2-4 weeks for urodynamic studies and trial void. keep treated adequately continue on the 15mg twice a day Xarelto treatment until 05/25/17. After which switch to 20mg once a day. You will likely need to be on this regimen indefinitely given your immobility. Follow-up with pulmonary as outpatient. You need to follow-up with an ophthalmologist as soon as possible. Ask the nursing home / rehab to have the one they have available to see you. Continue the latanoprost for now for your R eye as prescribed.

## 2017-05-24 NOTE — DISCHARGE NOTE ADULT - MEDICATION SUMMARY - MEDICATIONS TO STOP TAKING
I will STOP taking the medications listed below when I get home from the hospital:    bisacodyl 5 mg oral delayed release tablet  -- 1 tab(s) by mouth every 12 hours, As needed, Constipation    docusate sodium 100 mg oral capsule  -- 1 cap(s) by mouth 2 times a day    furosemide 20 mg oral tablet  -- 1 tab(s) by mouth once a day    predniSONE 10 mg oral tablet  -- Take 4 tabs daily X 3 days; 3 tabs daily X 3 days; 2 tabs daily X 3 days, 1 tab daily X 3 days, and then stop.  -- It is very important that you take or use this exactly as directed.  Do not skip doses or discontinue unless directed by your doctor.  Obtain medical advice before taking any non-prescription drugs as some may affect the action of this medication.  Take with food or milk.

## 2017-05-24 NOTE — DISCHARGE NOTE ADULT - SECONDARY DIAGNOSIS.
COPD (chronic obstructive pulmonary disease) Anemia Diabetes Urinary retention with incomplete bladder emptying Pulmonary embolism on right Glaucoma

## 2017-05-24 NOTE — DISCHARGE NOTE ADULT - PATIENT PORTAL LINK FT
“You can access the Follow[employer] Patient Portal, offered by [facility], by registering with the following website: [email or web address]”

## 2017-05-26 DIAGNOSIS — Z91.010 ALLERGY TO PEANUTS: ICD-10-CM

## 2017-05-26 DIAGNOSIS — K59.00 CONSTIPATION, UNSPECIFIED: ICD-10-CM

## 2017-05-26 DIAGNOSIS — I50.32 CHRONIC DIASTOLIC (CONGESTIVE) HEART FAILURE: ICD-10-CM

## 2017-05-26 DIAGNOSIS — E87.3 ALKALOSIS: ICD-10-CM

## 2017-05-26 DIAGNOSIS — N17.9 ACUTE KIDNEY FAILURE, UNSPECIFIED: ICD-10-CM

## 2017-05-26 DIAGNOSIS — N39.0 URINARY TRACT INFECTION, SITE NOT SPECIFIED: ICD-10-CM

## 2017-05-26 DIAGNOSIS — Z79.4 LONG TERM (CURRENT) USE OF INSULIN: ICD-10-CM

## 2017-05-26 DIAGNOSIS — Z88.0 ALLERGY STATUS TO PENICILLIN: ICD-10-CM

## 2017-05-26 DIAGNOSIS — K92.1 MELENA: ICD-10-CM

## 2017-05-26 DIAGNOSIS — Z87.891 PERSONAL HISTORY OF NICOTINE DEPENDENCE: ICD-10-CM

## 2017-05-26 DIAGNOSIS — R82.71 BACTERIURIA: ICD-10-CM

## 2017-05-26 DIAGNOSIS — D50.0 IRON DEFICIENCY ANEMIA SECONDARY TO BLOOD LOSS (CHRONIC): ICD-10-CM

## 2017-05-26 DIAGNOSIS — K57.90 DIVERTICULOSIS OF INTESTINE, PART UNSPECIFIED, WITHOUT PERFORATION OR ABSCESS WITHOUT BLEEDING: ICD-10-CM

## 2017-05-26 DIAGNOSIS — B96.1 KLEBSIELLA PNEUMONIAE [K. PNEUMONIAE] AS THE CAUSE OF DISEASES CLASSIFIED ELSEWHERE: ICD-10-CM

## 2017-05-26 DIAGNOSIS — E11.40 TYPE 2 DIABETES MELLITUS WITH DIABETIC NEUROPATHY, UNSPECIFIED: ICD-10-CM

## 2017-05-26 DIAGNOSIS — N32.81 OVERACTIVE BLADDER: ICD-10-CM

## 2017-05-26 DIAGNOSIS — D50.9 IRON DEFICIENCY ANEMIA, UNSPECIFIED: ICD-10-CM

## 2017-05-26 DIAGNOSIS — N13.4 HYDROURETER: ICD-10-CM

## 2017-05-26 DIAGNOSIS — Z91.013 ALLERGY TO SEAFOOD: ICD-10-CM

## 2017-05-26 DIAGNOSIS — H40.9 UNSPECIFIED GLAUCOMA: ICD-10-CM

## 2017-05-26 DIAGNOSIS — J81.1 CHRONIC PULMONARY EDEMA: ICD-10-CM

## 2017-05-26 DIAGNOSIS — J45.909 UNSPECIFIED ASTHMA, UNCOMPLICATED: ICD-10-CM

## 2017-05-26 DIAGNOSIS — Z79.82 LONG TERM (CURRENT) USE OF ASPIRIN: ICD-10-CM

## 2017-05-26 DIAGNOSIS — J96.21 ACUTE AND CHRONIC RESPIRATORY FAILURE WITH HYPOXIA: ICD-10-CM

## 2017-05-26 DIAGNOSIS — Z74.01 BED CONFINEMENT STATUS: ICD-10-CM

## 2017-05-26 DIAGNOSIS — Z79.01 LONG TERM (CURRENT) USE OF ANTICOAGULANTS: ICD-10-CM

## 2017-05-26 DIAGNOSIS — J43.9 EMPHYSEMA, UNSPECIFIED: ICD-10-CM

## 2017-05-26 DIAGNOSIS — I27.82 CHRONIC PULMONARY EMBOLISM: ICD-10-CM

## 2017-05-26 DIAGNOSIS — Z79.52 LONG TERM (CURRENT) USE OF SYSTEMIC STEROIDS: ICD-10-CM

## 2017-05-26 DIAGNOSIS — Z79.84 LONG TERM (CURRENT) USE OF ORAL HYPOGLYCEMIC DRUGS: ICD-10-CM

## 2017-05-26 DIAGNOSIS — N13.2 HYDRONEPHROSIS WITH RENAL AND URETERAL CALCULOUS OBSTRUCTION: ICD-10-CM

## 2017-05-26 DIAGNOSIS — E66.01 MORBID (SEVERE) OBESITY DUE TO EXCESS CALORIES: ICD-10-CM

## 2017-05-26 DIAGNOSIS — Z91.018 ALLERGY TO OTHER FOODS: ICD-10-CM

## 2017-05-26 DIAGNOSIS — G47.33 OBSTRUCTIVE SLEEP APNEA (ADULT) (PEDIATRIC): ICD-10-CM

## 2017-05-26 DIAGNOSIS — A41.9 SEPSIS, UNSPECIFIED ORGANISM: ICD-10-CM

## 2017-05-26 DIAGNOSIS — L89.150 PRESSURE ULCER OF SACRAL REGION, UNSTAGEABLE: ICD-10-CM

## 2017-05-26 DIAGNOSIS — Z90.49 ACQUIRED ABSENCE OF OTHER SPECIFIED PARTS OF DIGESTIVE TRACT: ICD-10-CM

## 2018-01-26 NOTE — H&P ADULT - PROBLEM/PLAN-5
63 yom pw fall onto train tracks, c/o R hip pain.  hx of R knee arthroplasty.  denies head injury.  admits to EtOH use.  pain w/ R hip during movement, though able to ambulate.  denies SI or HI.  pt also requesting methadone upon arrival.  no cp, no sob.
DISPLAY PLAN FREE TEXT

## 2018-09-18 ENCOUNTER — INPATIENT (INPATIENT)
Facility: HOSPITAL | Age: 58
LOS: 2 days | Discharge: NURSING, REHAB OR LONG-TERM CARE | End: 2018-09-21
Attending: INTERNAL MEDICINE | Admitting: INTERNAL MEDICINE
Payer: MEDICARE

## 2018-09-18 VITALS
TEMPERATURE: 99 F | HEART RATE: 122 BPM | OXYGEN SATURATION: 100 % | DIASTOLIC BLOOD PRESSURE: 66 MMHG | RESPIRATION RATE: 30 BRPM | SYSTOLIC BLOOD PRESSURE: 120 MMHG

## 2018-09-18 DIAGNOSIS — Z93.59 OTHER CYSTOSTOMY STATUS: Chronic | ICD-10-CM

## 2018-09-18 DIAGNOSIS — Z98.891 HISTORY OF UTERINE SCAR FROM PREVIOUS SURGERY: Chronic | ICD-10-CM

## 2018-09-18 DIAGNOSIS — Z90.49 ACQUIRED ABSENCE OF OTHER SPECIFIED PARTS OF DIGESTIVE TRACT: Chronic | ICD-10-CM

## 2018-09-18 LAB
ALBUMIN SERPL ELPH-MCNC: 3.2 G/DL — LOW (ref 3.3–5)
ALP SERPL-CCNC: 83 U/L — SIGNIFICANT CHANGE UP (ref 40–120)
ALT FLD-CCNC: 6 U/L — SIGNIFICANT CHANGE UP (ref 4–33)
APTT BLD: 35.8 SEC — SIGNIFICANT CHANGE UP (ref 27.5–37.4)
AST SERPL-CCNC: 9 U/L — SIGNIFICANT CHANGE UP (ref 4–32)
BASE EXCESS BLDV CALC-SCNC: 16.4 MMOL/L — SIGNIFICANT CHANGE UP
BASOPHILS # BLD AUTO: 0.03 K/UL — SIGNIFICANT CHANGE UP (ref 0–0.2)
BASOPHILS NFR BLD AUTO: 0.3 % — SIGNIFICANT CHANGE UP (ref 0–2)
BILIRUB SERPL-MCNC: < 0.2 MG/DL — LOW (ref 0.2–1.2)
BLOOD GAS VENOUS - CREATININE: 0.39 MG/DL — LOW (ref 0.5–1.3)
BUN SERPL-MCNC: 10 MG/DL — SIGNIFICANT CHANGE UP (ref 7–23)
CALCIUM SERPL-MCNC: 9.4 MG/DL — SIGNIFICANT CHANGE UP (ref 8.4–10.5)
CHLORIDE BLDV-SCNC: 98 MMOL/L — SIGNIFICANT CHANGE UP (ref 96–108)
CHLORIDE SERPL-SCNC: 94 MMOL/L — LOW (ref 98–107)
CO2 SERPL-SCNC: 34 MMOL/L — HIGH (ref 22–31)
CREAT SERPL-MCNC: 0.42 MG/DL — LOW (ref 0.5–1.3)
EOSINOPHIL # BLD AUTO: 0.24 K/UL — SIGNIFICANT CHANGE UP (ref 0–0.5)
EOSINOPHIL NFR BLD AUTO: 2.3 % — SIGNIFICANT CHANGE UP (ref 0–6)
GAS PNL BLDV: 137 MMOL/L — SIGNIFICANT CHANGE UP (ref 136–146)
GLUCOSE BLDV-MCNC: 129 — HIGH (ref 70–99)
GLUCOSE SERPL-MCNC: 125 MG/DL — HIGH (ref 70–99)
HCO3 BLDV-SCNC: 38 MMOL/L — HIGH (ref 20–27)
HCT VFR BLD CALC: 32.4 % — LOW (ref 34.5–45)
HCT VFR BLDV CALC: 28.9 % — LOW (ref 34.5–45)
HGB BLD-MCNC: 9.1 G/DL — LOW (ref 11.5–15.5)
HGB BLDV-MCNC: 9.3 G/DL — LOW (ref 11.5–15.5)
IMM GRANULOCYTES # BLD AUTO: 0.04 # — SIGNIFICANT CHANGE UP
IMM GRANULOCYTES NFR BLD AUTO: 0.4 % — SIGNIFICANT CHANGE UP (ref 0–1.5)
INR BLD: 1.04 — SIGNIFICANT CHANGE UP (ref 0.88–1.17)
LACTATE BLDV-MCNC: 1.8 MMOL/L — SIGNIFICANT CHANGE UP (ref 0.5–2)
LYMPHOCYTES # BLD AUTO: 1.91 K/UL — SIGNIFICANT CHANGE UP (ref 1–3.3)
LYMPHOCYTES # BLD AUTO: 18.6 % — SIGNIFICANT CHANGE UP (ref 13–44)
MCHC RBC-ENTMCNC: 26.3 PG — LOW (ref 27–34)
MCHC RBC-ENTMCNC: 28.1 % — LOW (ref 32–36)
MCV RBC AUTO: 93.6 FL — SIGNIFICANT CHANGE UP (ref 80–100)
MONOCYTES # BLD AUTO: 0.55 K/UL — SIGNIFICANT CHANGE UP (ref 0–0.9)
MONOCYTES NFR BLD AUTO: 5.4 % — SIGNIFICANT CHANGE UP (ref 2–14)
NEUTROPHILS # BLD AUTO: 7.51 K/UL — HIGH (ref 1.8–7.4)
NEUTROPHILS NFR BLD AUTO: 73 % — SIGNIFICANT CHANGE UP (ref 43–77)
NRBC # FLD: 0 — SIGNIFICANT CHANGE UP
NT-PROBNP SERPL-SCNC: 143.7 PG/ML — SIGNIFICANT CHANGE UP
PCO2 BLDV: 91 MMHG — CRITICAL HIGH (ref 41–51)
PH BLDV: 7.3 PH — LOW (ref 7.32–7.43)
PLATELET # BLD AUTO: 459 K/UL — HIGH (ref 150–400)
PMV BLD: 10.2 FL — SIGNIFICANT CHANGE UP (ref 7–13)
PO2 BLDV: 44 MMHG — HIGH (ref 35–40)
POTASSIUM BLDV-SCNC: 4.2 MMOL/L — SIGNIFICANT CHANGE UP (ref 3.4–4.5)
POTASSIUM SERPL-MCNC: 4.8 MMOL/L — SIGNIFICANT CHANGE UP (ref 3.5–5.3)
POTASSIUM SERPL-SCNC: 4.8 MMOL/L — SIGNIFICANT CHANGE UP (ref 3.5–5.3)
PROT SERPL-MCNC: 7.9 G/DL — SIGNIFICANT CHANGE UP (ref 6–8.3)
PROTHROM AB SERPL-ACNC: 11.6 SEC — SIGNIFICANT CHANGE UP (ref 9.8–13.1)
RBC # BLD: 3.46 M/UL — LOW (ref 3.8–5.2)
RBC # FLD: 19.4 % — HIGH (ref 10.3–14.5)
SAO2 % BLDV: 69.6 % — SIGNIFICANT CHANGE UP (ref 60–85)
SODIUM SERPL-SCNC: 139 MMOL/L — SIGNIFICANT CHANGE UP (ref 135–145)
TROPONIN T, HIGH SENSITIVITY: < 6 NG/L — SIGNIFICANT CHANGE UP (ref ?–14)
WBC # BLD: 10.28 K/UL — SIGNIFICANT CHANGE UP (ref 3.8–10.5)
WBC # FLD AUTO: 10.28 K/UL — SIGNIFICANT CHANGE UP (ref 3.8–10.5)

## 2018-09-18 PROCEDURE — 99223 1ST HOSP IP/OBS HIGH 75: CPT

## 2018-09-18 PROCEDURE — 71045 X-RAY EXAM CHEST 1 VIEW: CPT | Mod: 26

## 2018-09-18 RX ORDER — IPRATROPIUM/ALBUTEROL SULFATE 18-103MCG
3 AEROSOL WITH ADAPTER (GRAM) INHALATION ONCE
Qty: 0 | Refills: 0 | Status: COMPLETED | OUTPATIENT
Start: 2018-09-18 | End: 2018-09-18

## 2018-09-18 RX ORDER — FUROSEMIDE 40 MG
40 TABLET ORAL ONCE
Qty: 0 | Refills: 0 | Status: COMPLETED | OUTPATIENT
Start: 2018-09-18 | End: 2018-09-18

## 2018-09-18 RX ORDER — ACETAMINOPHEN 500 MG
650 TABLET ORAL ONCE
Qty: 0 | Refills: 0 | Status: COMPLETED | OUTPATIENT
Start: 2018-09-18 | End: 2018-09-18

## 2018-09-18 RX ADMIN — Medication 3 MILLILITER(S): at 21:33

## 2018-09-18 RX ADMIN — Medication 3 MILLILITER(S): at 22:02

## 2018-09-18 RX ADMIN — Medication 650 MILLIGRAM(S): at 22:26

## 2018-09-18 RX ADMIN — Medication 125 MILLIGRAM(S): at 20:50

## 2018-09-18 RX ADMIN — Medication 3 MILLILITER(S): at 20:50

## 2018-09-18 RX ADMIN — Medication 40 MILLIGRAM(S): at 21:36

## 2018-09-18 NOTE — ED PROVIDER NOTE - MEDICAL DECISION MAKING DETAILS
59 y/o F with PMH morbid obesity, HTN, HLD, suprapubic cath for urinary retntion, CKD , COPD on 4L oxygen, CHF, DM p/w SOB since today afternoon. chf, vs copd exacerbation, dimished BS, cxr, cbc, cmp, vbg, pt/inr, trop, ekg, bipap  pt w/ molst DNR/ DNI

## 2018-09-18 NOTE — ED ADULT NURSE NOTE - NSIMPLEMENTINTERV_GEN_ALL_ED
Implemented All Fall Risk Interventions:  [place] to call system. Call bell, personal items and telephone within reach. Instruct patient to call for assistance. Room bathroom lighting operational. Non-slip footwear when patient is off stretcher. Physically safe environment: no spills, clutter or unnecessary equipment. Stretcher in lowest position, wheels locked, appropriate side rails in place. Provide visual cue, wrist band, yellow gown, etc. Monitor gait and stability. Monitor for mental status changes and reorient to person, place, and time. Review medications for side effects contributing to fall risk. Reinforce activity limits and safety measures with patient and family.

## 2018-09-18 NOTE — H&P ADULT - HISTORY OF PRESENT ILLNESS
57 y/o F with DM, COPD on home O2, PUSHPA (not on CPAP,) chronic urinary retention s/p suprapubic cath, PE on Xarelto presents from [facility] with dyspnea starting this afternoon. 59 y/o F with DM, COPD on home O2, PUSHPA (not on CPAP,) chronic urinary retention s/p suprapubic cath, PE on Xarelto presents from [facility] with dyspnea starting this afternoon.  Pt also reports cough productive of sputum.  She has not had fever, chills, chest pain or any other new complaints.  No known sick contacts.  She was given nebulizer en route to the ED.     On arrival to the ED, she was noted to be hypoxic to 85% on 4L O2, and BIPAP initiated.  CXR showed possible pulmonary edema.  She was given Duoneb treatments, Solumedrol 125mg, and Lasix 40mg IV.  She presently feels improved.

## 2018-09-18 NOTE — H&P ADULT - PMH
Asthma    CHF (congestive heart failure)    CKD (chronic kidney disease)    COPD (chronic obstructive pulmonary disease)    Diabetes    DM (diabetes mellitus)    Emphysema    Hemorrhoids    HTN (hypertension)    Morbid obesity    Overactive bladder    Urinary retention    Urinary tract infection without hematuria, site unspecified Asthma    CKD (chronic kidney disease)    COPD (chronic obstructive pulmonary disease)    Diabetes    DM (diabetes mellitus)    Emphysema    Hemorrhoids    HTN (hypertension)    Morbid obesity    Overactive bladder    Urinary retention    Urinary tract infection without hematuria, site unspecified

## 2018-09-18 NOTE — ED ADULT NURSE NOTE - OBJECTIVE STATEMENT
pt arrives w/ c/o SOB and bilateral leg pain from nursing home. pt arrive on 4L NC sat in the 80s. pt placed on BIPAP now sating 100%. pt with labored breathing but in distress. IV accessed 20 gauge R arm and 22 gauge left hand labs sent. pt placed on cardiac monitor sinus tach noted. pt known DNR/DNI will continue to monitor.

## 2018-09-18 NOTE — ED ADULT TRIAGE NOTE - CHIEF COMPLAINT QUOTE
Pt presents from [facility] via EMS for sob and an o2 sat of 85% on o2 via nc @ 4 lpm. In addition pt with c/o rle pain,Per [facility] pt is a DNR / DNI

## 2018-09-18 NOTE — H&P ADULT - NSHPREVIEWOFSYSTEMS_GEN_ALL_CORE
Review of Systems:   CONSTITUTIONAL: No fever or chills  EYES: No eye pain, visual disturbances, or discharge  ENMT:  No difficulty hearing, no throat pain  NECK: No pain or stiffness  RESPIRATORY: Cough, dyspnea, sputum production  CARDIOVASCULAR: No chest pain, palpitations, dizziness, or leg swelling  GASTROINTESTINAL: No abdominal pain, nausea or vomiting or diarrhea  GENITOURINARY: No dysuria, or hematuria  NEUROLOGICAL: No headaches, memory loss, loss of strength, numbness, or tremors  SKIN: No rashes, or lesions   LYMPH NODES: No enlarged glands  ENDOCRINE: No heat or cold intolerance  MUSCULOSKELETAL: No joint pain or swelling  PSYCHIATRIC: No depression or anxiety  HEME/LYMPH: No easy bruising, or bleeding  ALLERY AND IMMUNOLOGIC: No hives or eczema

## 2018-09-18 NOTE — ED PROVIDER NOTE - PMH
CHF (congestive heart failure)    CKD (chronic kidney disease)    COPD (chronic obstructive pulmonary disease)    DM (diabetes mellitus)    HTN (hypertension)    Urinary retention

## 2018-09-18 NOTE — H&P ADULT - NSHPPHYSICALEXAM_GEN_ALL_CORE
Vital Signs Last 24 Hrs  T(C): 36.8 (18 Sep 2018 23:41), Max: 37.3 (18 Sep 2018 19:39)  T(F): 98.3 (18 Sep 2018 23:41), Max: 99.1 (18 Sep 2018 19:39)  HR: 110 (18 Sep 2018 23:41) (110 - 122)  BP: 143/73 (18 Sep 2018 23:41) (120/66 - 143/73)  BP(mean): --  RR: 17 (18 Sep 2018 23:41) (16 - 30)  SpO2: 100% (18 Sep 2018 23:41) (100% - 100%)    PHYSICAL EXAM:  GENERAL: No Acute Distress  EYES: conjunctiva and sclera clear  ENMT: Moist mucous membranes   NECK: Supple  RESPIRATORY: Breathing comfortably on Bipap. Clear to auscultation bilaterally  HEART: Regular rate and rhythm; No murmurs, rubs, or gallops  ABDOMEN: Soft, Nontender, Nondistended; Bowel sounds normal  EXTREMITIES:   No clubbing, cyanosis, or pedal edema  PSYCH: Normal Affect, Normal Behavior  NEUROLOGY:   - Mental status A&O x 3,  SKIN: No rashes or lesions  MUSCULOSKELETAL: No joint swelling

## 2018-09-18 NOTE — H&P ADULT - ASSESSMENT
57 y/o F with DM, COPD on home O2, PUSHPA (not on CPAP,) chronic urinary retention s/p suprapubic cath, PE on Xarelto presents from Nursing Home with dyspnea and cough 2/2 COPD exacerbation.

## 2018-09-18 NOTE — H&P ADULT - NSHPLABSRESULTS_GEN_ALL_CORE
09-18    139  |  94<L>  |  10  ----------------------------<  125<H>  4.8   |  34<H>  |  0.42<L>    Ca    9.4      18 Sep 2018 20:43    TPro  7.9  /  Alb  3.2<L>  /  TBili  < 0.2<L>  /  DBili  x   /  AST  9   /  ALT  6   /  AlkPhos  83  09-18                            9.1    10.28 )-----------( 459      ( 18 Sep 2018 20:43 )             32.4             PT/INR - ( 18 Sep 2018 20:43 )   PT: 11.6 SEC;   INR: 1.04          PTT - ( 18 Sep 2018 20:43 )  PTT:35.8 SEC      CXR film reviewed: mild blunting of L CP angle.

## 2018-09-18 NOTE — ED ADULT NURSE NOTE - CHIEF COMPLAINT QUOTE
Pt presents from [facility] via EMS for sob and an o2 sat of 85% on o2 via nc @ 4 lpm. In addition pt with c/o rle pain,Per [facility] pt is a DNR / DNI

## 2018-09-18 NOTE — ED PROVIDER NOTE - CRITICAL CARE PROVIDED
direct patient care (not related to procedure)/additional history taking/consultation with other physicians/documentation

## 2018-09-18 NOTE — H&P ADULT - PROBLEM SELECTOR PLAN 1
2/2 COPD exacerbation likely with component of OHS  - Continue BIPAP for now  - Duoneb treatments  - prednisone 40mg daily

## 2018-09-18 NOTE — ED PROVIDER NOTE - OBJECTIVE STATEMENT
59 y/o F with PMH morbid obesity, HTN, HLD, suprapubic cath for urinary retntion, CKD , COPD on 4L oxygen, CHF, DM p/w SOB since today afternoon. Pt. pt. from [facility] w/ tachypnea and SOB since this afternoon. pt reports shortness of breath with increased cough, states it mildly improved with a nebulizer in ambulance on way here. Denies fever, chills, chest pain, diarrhea, suprapubic changed few days prior to ed arrival. st 85 on 4L on ed arrival, tachypneic in high 30's 59 y/o F with PMH morbid obesity, HTN, HLD, suprapubic cath for urinary retntion, CKD , COPD on 4L oxygen, CHF, DM p/w SOB since today afternoon. Pt. pt. from [facility] w/ tachypnea and SOB since this afternoon. pt reports shortness of breath with increased cough, states it mildly improved with a nebulizer in ambulance on way here. Denies fever, chills, chest pain, diarrhea, suprapubic changed few days prior to ed arrival. st 85 on 4L on ed arrival, tachypneic in high 30's,. pt on xarelto 20mg daily

## 2018-09-19 DIAGNOSIS — J96.21 ACUTE AND CHRONIC RESPIRATORY FAILURE WITH HYPOXIA: ICD-10-CM

## 2018-09-19 DIAGNOSIS — Z86.711 PERSONAL HISTORY OF PULMONARY EMBOLISM: ICD-10-CM

## 2018-09-19 DIAGNOSIS — J44.1 CHRONIC OBSTRUCTIVE PULMONARY DISEASE WITH (ACUTE) EXACERBATION: ICD-10-CM

## 2018-09-19 DIAGNOSIS — I10 ESSENTIAL (PRIMARY) HYPERTENSION: ICD-10-CM

## 2018-09-19 DIAGNOSIS — E11.9 TYPE 2 DIABETES MELLITUS WITHOUT COMPLICATIONS: ICD-10-CM

## 2018-09-19 LAB
BUN SERPL-MCNC: 11 MG/DL — SIGNIFICANT CHANGE UP (ref 7–23)
CALCIUM SERPL-MCNC: 9.6 MG/DL — SIGNIFICANT CHANGE UP (ref 8.4–10.5)
CHLORIDE SERPL-SCNC: 95 MMOL/L — LOW (ref 98–107)
CO2 SERPL-SCNC: 37 MMOL/L — HIGH (ref 22–31)
CREAT SERPL-MCNC: 0.35 MG/DL — LOW (ref 0.5–1.3)
GLUCOSE SERPL-MCNC: 128 MG/DL — HIGH (ref 70–99)
HBA1C BLD-MCNC: 4.9 % — SIGNIFICANT CHANGE UP (ref 4–5.6)
HCT VFR BLD CALC: 32.1 % — LOW (ref 34.5–45)
HGB BLD-MCNC: 9 G/DL — LOW (ref 11.5–15.5)
MAGNESIUM SERPL-MCNC: 1.8 MG/DL — SIGNIFICANT CHANGE UP (ref 1.6–2.6)
MCHC RBC-ENTMCNC: 25.3 PG — LOW (ref 27–34)
MCHC RBC-ENTMCNC: 28 % — LOW (ref 32–36)
MCV RBC AUTO: 90.2 FL — SIGNIFICANT CHANGE UP (ref 80–100)
NRBC # FLD: 0 — SIGNIFICANT CHANGE UP
PLATELET # BLD AUTO: 459 K/UL — HIGH (ref 150–400)
PMV BLD: 10.6 FL — SIGNIFICANT CHANGE UP (ref 7–13)
POTASSIUM SERPL-MCNC: 5 MMOL/L — SIGNIFICANT CHANGE UP (ref 3.5–5.3)
POTASSIUM SERPL-SCNC: 5 MMOL/L — SIGNIFICANT CHANGE UP (ref 3.5–5.3)
RBC # BLD: 3.56 M/UL — LOW (ref 3.8–5.2)
RBC # FLD: 18.3 % — HIGH (ref 10.3–14.5)
SODIUM SERPL-SCNC: 142 MMOL/L — SIGNIFICANT CHANGE UP (ref 135–145)
WBC # BLD: 10.8 K/UL — HIGH (ref 3.8–10.5)
WBC # FLD AUTO: 10.8 K/UL — HIGH (ref 3.8–10.5)

## 2018-09-19 PROCEDURE — 99233 SBSQ HOSP IP/OBS HIGH 50: CPT | Mod: GC

## 2018-09-19 PROCEDURE — 93010 ELECTROCARDIOGRAM REPORT: CPT

## 2018-09-19 RX ORDER — METOPROLOL TARTRATE 50 MG
25 TABLET ORAL
Qty: 0 | Refills: 0 | Status: DISCONTINUED | OUTPATIENT
Start: 2018-09-19 | End: 2018-09-21

## 2018-09-19 RX ORDER — TAMSULOSIN HYDROCHLORIDE 0.4 MG/1
0.4 CAPSULE ORAL AT BEDTIME
Qty: 0 | Refills: 0 | Status: DISCONTINUED | OUTPATIENT
Start: 2018-09-19 | End: 2018-09-21

## 2018-09-19 RX ORDER — RIVAROXABAN 15 MG-20MG
20 KIT ORAL ONCE
Qty: 0 | Refills: 0 | Status: COMPLETED | OUTPATIENT
Start: 2018-09-19 | End: 2018-09-19

## 2018-09-19 RX ORDER — FERROUS SULFATE 325(65) MG
325 TABLET ORAL DAILY
Qty: 0 | Refills: 0 | Status: DISCONTINUED | OUTPATIENT
Start: 2018-09-19 | End: 2018-09-21

## 2018-09-19 RX ORDER — DEXTROSE 50 % IN WATER 50 %
12.5 SYRINGE (ML) INTRAVENOUS ONCE
Qty: 0 | Refills: 0 | Status: DISCONTINUED | OUTPATIENT
Start: 2018-09-19 | End: 2018-09-21

## 2018-09-19 RX ORDER — RIVAROXABAN 15 MG-20MG
20 KIT ORAL EVERY 24 HOURS
Qty: 0 | Refills: 0 | Status: DISCONTINUED | OUTPATIENT
Start: 2018-09-19 | End: 2018-09-21

## 2018-09-19 RX ORDER — LATANOPROST 0.05 MG/ML
1 SOLUTION/ DROPS OPHTHALMIC; TOPICAL AT BEDTIME
Qty: 0 | Refills: 0 | Status: DISCONTINUED | OUTPATIENT
Start: 2018-09-19 | End: 2018-09-21

## 2018-09-19 RX ORDER — HALOPERIDOL DECANOATE 100 MG/ML
3 INJECTION INTRAMUSCULAR ONCE
Qty: 0 | Refills: 0 | Status: DISCONTINUED | OUTPATIENT
Start: 2018-09-19 | End: 2018-09-21

## 2018-09-19 RX ORDER — SIMVASTATIN 20 MG/1
20 TABLET, FILM COATED ORAL AT BEDTIME
Qty: 0 | Refills: 0 | Status: DISCONTINUED | OUTPATIENT
Start: 2018-09-19 | End: 2018-09-21

## 2018-09-19 RX ORDER — INSULIN LISPRO 100/ML
VIAL (ML) SUBCUTANEOUS
Qty: 0 | Refills: 0 | Status: DISCONTINUED | OUTPATIENT
Start: 2018-09-19 | End: 2018-09-21

## 2018-09-19 RX ORDER — ASPIRIN/CALCIUM CARB/MAGNESIUM 324 MG
81 TABLET ORAL DAILY
Qty: 0 | Refills: 0 | Status: DISCONTINUED | OUTPATIENT
Start: 2018-09-19 | End: 2018-09-21

## 2018-09-19 RX ORDER — GABAPENTIN 400 MG/1
300 CAPSULE ORAL THREE TIMES A DAY
Qty: 0 | Refills: 0 | Status: DISCONTINUED | OUTPATIENT
Start: 2018-09-19 | End: 2018-09-20

## 2018-09-19 RX ORDER — IPRATROPIUM/ALBUTEROL SULFATE 18-103MCG
3 AEROSOL WITH ADAPTER (GRAM) INHALATION EVERY 6 HOURS
Qty: 0 | Refills: 0 | Status: DISCONTINUED | OUTPATIENT
Start: 2018-09-19 | End: 2018-09-21

## 2018-09-19 RX ORDER — BRIMONIDINE TARTRATE 2 MG/MG
1 SOLUTION/ DROPS OPHTHALMIC THREE TIMES A DAY
Qty: 0 | Refills: 0 | Status: DISCONTINUED | OUTPATIENT
Start: 2018-09-19 | End: 2018-09-21

## 2018-09-19 RX ORDER — DEXTROSE 50 % IN WATER 50 %
15 SYRINGE (ML) INTRAVENOUS ONCE
Qty: 0 | Refills: 0 | Status: DISCONTINUED | OUTPATIENT
Start: 2018-09-19 | End: 2018-09-21

## 2018-09-19 RX ORDER — DEXTROSE 50 % IN WATER 50 %
25 SYRINGE (ML) INTRAVENOUS ONCE
Qty: 0 | Refills: 0 | Status: DISCONTINUED | OUTPATIENT
Start: 2018-09-19 | End: 2018-09-21

## 2018-09-19 RX ORDER — INSULIN LISPRO 100/ML
VIAL (ML) SUBCUTANEOUS AT BEDTIME
Qty: 0 | Refills: 0 | Status: DISCONTINUED | OUTPATIENT
Start: 2018-09-19 | End: 2018-09-21

## 2018-09-19 RX ORDER — GLUCAGON INJECTION, SOLUTION 0.5 MG/.1ML
1 INJECTION, SOLUTION SUBCUTANEOUS ONCE
Qty: 0 | Refills: 0 | Status: DISCONTINUED | OUTPATIENT
Start: 2018-09-19 | End: 2018-09-21

## 2018-09-19 RX ORDER — ACETAMINOPHEN 500 MG
650 TABLET ORAL EVERY 6 HOURS
Qty: 0 | Refills: 0 | Status: DISCONTINUED | OUTPATIENT
Start: 2018-09-19 | End: 2018-09-21

## 2018-09-19 RX ADMIN — GABAPENTIN 300 MILLIGRAM(S): 400 CAPSULE ORAL at 21:50

## 2018-09-19 RX ADMIN — Medication 81 MILLIGRAM(S): at 12:25

## 2018-09-19 RX ADMIN — Medication 650 MILLIGRAM(S): at 12:35

## 2018-09-19 RX ADMIN — Medication 650 MILLIGRAM(S): at 23:44

## 2018-09-19 RX ADMIN — RIVAROXABAN 20 MILLIGRAM(S): KIT at 17:58

## 2018-09-19 RX ADMIN — Medication 25 MILLIGRAM(S): at 05:38

## 2018-09-19 RX ADMIN — BRIMONIDINE TARTRATE 1 DROP(S): 2 SOLUTION/ DROPS OPHTHALMIC at 13:26

## 2018-09-19 RX ADMIN — TAMSULOSIN HYDROCHLORIDE 0.4 MILLIGRAM(S): 0.4 CAPSULE ORAL at 21:50

## 2018-09-19 RX ADMIN — Medication 3 MILLILITER(S): at 16:41

## 2018-09-19 RX ADMIN — BRIMONIDINE TARTRATE 1 DROP(S): 2 SOLUTION/ DROPS OPHTHALMIC at 21:50

## 2018-09-19 RX ADMIN — GABAPENTIN 300 MILLIGRAM(S): 400 CAPSULE ORAL at 05:38

## 2018-09-19 RX ADMIN — Medication 40 MILLIGRAM(S): at 05:38

## 2018-09-19 RX ADMIN — Medication 325 MILLIGRAM(S): at 12:25

## 2018-09-19 RX ADMIN — BRIMONIDINE TARTRATE 1 DROP(S): 2 SOLUTION/ DROPS OPHTHALMIC at 05:38

## 2018-09-19 RX ADMIN — RIVAROXABAN 20 MILLIGRAM(S): KIT at 01:47

## 2018-09-19 RX ADMIN — Medication 25 MILLIGRAM(S): at 17:58

## 2018-09-19 RX ADMIN — Medication 3 MILLILITER(S): at 23:18

## 2018-09-19 RX ADMIN — Medication 650 MILLIGRAM(S): at 23:14

## 2018-09-19 RX ADMIN — GABAPENTIN 300 MILLIGRAM(S): 400 CAPSULE ORAL at 13:27

## 2018-09-19 RX ADMIN — Medication 3 MILLILITER(S): at 04:30

## 2018-09-19 RX ADMIN — Medication 650 MILLIGRAM(S): at 13:30

## 2018-09-19 RX ADMIN — SIMVASTATIN 20 MILLIGRAM(S): 20 TABLET, FILM COATED ORAL at 21:50

## 2018-09-19 RX ADMIN — LATANOPROST 1 DROP(S): 0.05 SOLUTION/ DROPS OPHTHALMIC; TOPICAL at 23:11

## 2018-09-19 RX ADMIN — Medication 3 MILLILITER(S): at 11:14

## 2018-09-19 NOTE — PROGRESS NOTE ADULT - SUBJECTIVE AND OBJECTIVE BOX
CHIEF COMPLAINT: Patient is a 58y old  Female who presents with a chief complaint of dyspnea (18 Sep 2018 23:45)      Interval Events:       OBJECTIVE:  ICU Vital Signs Last 24 Hrs  T(C): 37 (19 Sep 2018 05:23), Max: 37.3 (18 Sep 2018 19:39)  T(F): 98.6 (19 Sep 2018 05:23), Max: 99.1 (18 Sep 2018 19:39)  HR: 67 (19 Sep 2018 08:00) (67 - 122)  BP: 133/75 (19 Sep 2018 05:23) (120/66 - 143/73)  BP(mean): --  ABP: --  ABP(mean): --  RR: 18 (19 Sep 2018 05:23) (16 - 30)  SpO2: 91% (19 Sep 2018 08:00) (91% - 100%)        CAPILLARY BLOOD GLUCOSE            HOSPITAL MEDICATIONS:  MEDICATIONS  (STANDING):  ALBUTerol/ipratropium for Nebulization 3 milliLiter(s) Nebulizer every 6 hours  aspirin  chewable 81 milliGRAM(s) Oral daily  brimonidine 0.2% Ophthalmic Solution 1 Drop(s) Right EYE three times a day  dextrose 50% Injectable 12.5 Gram(s) IV Push once  dextrose 50% Injectable 25 Gram(s) IV Push once  dextrose 50% Injectable 25 Gram(s) IV Push once  ferrous    sulfate 325 milliGRAM(s) Oral daily  gabapentin 300 milliGRAM(s) Oral three times a day  haloperidol    Injectable 3 milliGRAM(s) IV Push once  insulin lispro (HumaLOG) corrective regimen sliding scale   SubCutaneous three times a day before meals  insulin lispro (HumaLOG) corrective regimen sliding scale   SubCutaneous at bedtime  latanoprost 0.005% Ophthalmic Solution 1 Drop(s) Right EYE at bedtime  metoprolol tartrate 25 milliGRAM(s) Oral two times a day  predniSONE   Tablet 40 milliGRAM(s) Oral daily  rivaroxaban 20 milliGRAM(s) Oral every 24 hours  simvastatin 20 milliGRAM(s) Oral at bedtime  tamsulosin 0.4 milliGRAM(s) Oral at bedtime    MEDICATIONS  (PRN):  acetaminophen   Tablet .. 650 milliGRAM(s) Oral every 6 hours PRN Mild Pain (1 - 3), Moderate Pain (4 - 6), Severe Pain (7 - 10)  dextrose 40% Gel 15 Gram(s) Oral once PRN Blood Glucose LESS THAN 70 milliGRAM(s)/deciliter  glucagon  Injectable 1 milliGRAM(s) IntraMuscular once PRN Glucose LESS THAN 70 milligrams/deciliter      LABS:                        9.0    10.80 )-----------( 459      ( 19 Sep 2018 05:55 )             32.1     09-19    142  |  95<L>  |  11  ----------------------------<  128<H>  5.0   |  37<H>  |  0.35<L>    Ca    9.6      19 Sep 2018 05:55  Mg     1.8     09-19    TPro  7.9  /  Alb  3.2<L>  /  TBili  < 0.2<L>  /  DBili  x   /  AST  9   /  ALT  6   /  AlkPhos  83  09-18    PT/INR - ( 18 Sep 2018 20:43 )   PT: 11.6 SEC;   INR: 1.04          PTT - ( 18 Sep 2018 20:43 )  PTT:35.8 SEC      Venous Blood Gas:  09-18 @ 20:43  7.30/91/44/38/69.6  VBG Lactate: 1.8      MICROBIOLOGY:     RADIOLOGY:  [ ] Reviewed and interpreted by me    PULMONARY FUNCTION TESTS:    EKG: CHIEF COMPLAINT: Patient is a 58y old  Female who presents with a chief complaint of dyspnea (18 Sep 2018 23:45)      Interval Events:  no acute events, patient reports feeling better      OBJECTIVE:  ICU Vital Signs Last 24 Hrs  T(C): 37 (19 Sep 2018 05:23), Max: 37.3 (18 Sep 2018 19:39)  T(F): 98.6 (19 Sep 2018 05:23), Max: 99.1 (18 Sep 2018 19:39)  HR: 67 (19 Sep 2018 08:00) (67 - 122)  BP: 133/75 (19 Sep 2018 05:23) (120/66 - 143/73)  BP(mean): --  ABP: --  ABP(mean): --  RR: 18 (19 Sep 2018 05:23) (16 - 30)  SpO2: 91% (19 Sep 2018 08:00) (91% - 100%)        CAPILLARY BLOOD GLUCOSE            HOSPITAL MEDICATIONS:  MEDICATIONS  (STANDING):  ALBUTerol/ipratropium for Nebulization 3 milliLiter(s) Nebulizer every 6 hours  aspirin  chewable 81 milliGRAM(s) Oral daily  brimonidine 0.2% Ophthalmic Solution 1 Drop(s) Right EYE three times a day  dextrose 50% Injectable 12.5 Gram(s) IV Push once  dextrose 50% Injectable 25 Gram(s) IV Push once  dextrose 50% Injectable 25 Gram(s) IV Push once  ferrous    sulfate 325 milliGRAM(s) Oral daily  gabapentin 300 milliGRAM(s) Oral three times a day  haloperidol    Injectable 3 milliGRAM(s) IV Push once  insulin lispro (HumaLOG) corrective regimen sliding scale   SubCutaneous three times a day before meals  insulin lispro (HumaLOG) corrective regimen sliding scale   SubCutaneous at bedtime  latanoprost 0.005% Ophthalmic Solution 1 Drop(s) Right EYE at bedtime  metoprolol tartrate 25 milliGRAM(s) Oral two times a day  predniSONE   Tablet 40 milliGRAM(s) Oral daily  rivaroxaban 20 milliGRAM(s) Oral every 24 hours  simvastatin 20 milliGRAM(s) Oral at bedtime  tamsulosin 0.4 milliGRAM(s) Oral at bedtime    MEDICATIONS  (PRN):  acetaminophen   Tablet .. 650 milliGRAM(s) Oral every 6 hours PRN Mild Pain (1 - 3), Moderate Pain (4 - 6), Severe Pain (7 - 10)  dextrose 40% Gel 15 Gram(s) Oral once PRN Blood Glucose LESS THAN 70 milliGRAM(s)/deciliter  glucagon  Injectable 1 milliGRAM(s) IntraMuscular once PRN Glucose LESS THAN 70 milligrams/deciliter      LABS:                        9.0    10.80 )-----------( 459      ( 19 Sep 2018 05:55 )             32.1     09-19    142  |  95<L>  |  11  ----------------------------<  128<H>  5.0   |  37<H>  |  0.35<L>    Ca    9.6      19 Sep 2018 05:55  Mg     1.8     09-19    TPro  7.9  /  Alb  3.2<L>  /  TBili  < 0.2<L>  /  DBili  x   /  AST  9   /  ALT  6   /  AlkPhos  83  09-18    PT/INR - ( 18 Sep 2018 20:43 )   PT: 11.6 SEC;   INR: 1.04          PTT - ( 18 Sep 2018 20:43 )  PTT:35.8 SEC      Venous Blood Gas:  09-18 @ 20:43  7.30/91/44/38/69.6  VBG Lactate: 1.8      MICROBIOLOGY:     RADIOLOGY:  [ ] Reviewed and interpreted by me    PULMONARY FUNCTION TESTS:    EKG:

## 2018-09-19 NOTE — PROGRESS NOTE ADULT - RESPIRATORY
ED / Discharge Outreach Protocol    Patient Contact    Attempt # 2    Was call answered?  No.  Left message on voicemail with information to call me back.    Ravin Ellis RN           detailed exam

## 2018-09-20 ENCOUNTER — TRANSCRIPTION ENCOUNTER (OUTPATIENT)
Age: 58
End: 2018-09-20

## 2018-09-20 LAB
BUN SERPL-MCNC: 18 MG/DL — SIGNIFICANT CHANGE UP (ref 7–23)
CALCIUM SERPL-MCNC: 9.6 MG/DL — SIGNIFICANT CHANGE UP (ref 8.4–10.5)
CHLORIDE SERPL-SCNC: 93 MMOL/L — LOW (ref 98–107)
CO2 SERPL-SCNC: 36 MMOL/L — HIGH (ref 22–31)
CREAT SERPL-MCNC: 0.46 MG/DL — LOW (ref 0.5–1.3)
GLUCOSE SERPL-MCNC: 101 MG/DL — HIGH (ref 70–99)
HCT VFR BLD CALC: 34 % — LOW (ref 34.5–45)
HGB BLD-MCNC: 9.3 G/DL — LOW (ref 11.5–15.5)
MCHC RBC-ENTMCNC: 24 PG — LOW (ref 27–34)
MCHC RBC-ENTMCNC: 27.4 % — LOW (ref 32–36)
MCV RBC AUTO: 87.6 FL — SIGNIFICANT CHANGE UP (ref 80–100)
NRBC # FLD: 0 — SIGNIFICANT CHANGE UP
PLATELET # BLD AUTO: 416 K/UL — HIGH (ref 150–400)
PMV BLD: 10.7 FL — SIGNIFICANT CHANGE UP (ref 7–13)
POTASSIUM SERPL-MCNC: 4.1 MMOL/L — SIGNIFICANT CHANGE UP (ref 3.5–5.3)
POTASSIUM SERPL-SCNC: 4.1 MMOL/L — SIGNIFICANT CHANGE UP (ref 3.5–5.3)
RBC # BLD: 3.88 M/UL — SIGNIFICANT CHANGE UP (ref 3.8–5.2)
RBC # FLD: 18.1 % — HIGH (ref 10.3–14.5)
SODIUM SERPL-SCNC: 142 MMOL/L — SIGNIFICANT CHANGE UP (ref 135–145)
WBC # BLD: 6.75 K/UL — SIGNIFICANT CHANGE UP (ref 3.8–10.5)
WBC # FLD AUTO: 6.75 K/UL — SIGNIFICANT CHANGE UP (ref 3.8–10.5)

## 2018-09-20 PROCEDURE — 99233 SBSQ HOSP IP/OBS HIGH 50: CPT | Mod: GC

## 2018-09-20 RX ORDER — IBUPROFEN 200 MG
400 TABLET ORAL ONCE
Qty: 0 | Refills: 0 | Status: COMPLETED | OUTPATIENT
Start: 2018-09-20 | End: 2018-09-20

## 2018-09-20 RX ORDER — RIVAROXABAN 15 MG-20MG
1 KIT ORAL
Qty: 0 | Refills: 0 | COMMUNITY
Start: 2018-09-20

## 2018-09-20 RX ORDER — IPRATROPIUM/ALBUTEROL SULFATE 18-103MCG
3 AEROSOL WITH ADAPTER (GRAM) INHALATION
Qty: 0 | Refills: 0 | COMMUNITY
Start: 2018-09-20

## 2018-09-20 RX ORDER — GABAPENTIN 400 MG/1
400 CAPSULE ORAL THREE TIMES A DAY
Qty: 0 | Refills: 0 | Status: DISCONTINUED | OUTPATIENT
Start: 2018-09-20 | End: 2018-09-21

## 2018-09-20 RX ORDER — ASPIRIN/CALCIUM CARB/MAGNESIUM 324 MG
1 TABLET ORAL
Qty: 0 | Refills: 0 | COMMUNITY
Start: 2018-09-20

## 2018-09-20 RX ORDER — GABAPENTIN 400 MG/1
1 CAPSULE ORAL
Qty: 0 | Refills: 0 | COMMUNITY
Start: 2018-09-20

## 2018-09-20 RX ADMIN — Medication 650 MILLIGRAM(S): at 15:15

## 2018-09-20 RX ADMIN — Medication 3 MILLILITER(S): at 21:55

## 2018-09-20 RX ADMIN — TAMSULOSIN HYDROCHLORIDE 0.4 MILLIGRAM(S): 0.4 CAPSULE ORAL at 22:45

## 2018-09-20 RX ADMIN — LATANOPROST 1 DROP(S): 0.05 SOLUTION/ DROPS OPHTHALMIC; TOPICAL at 22:45

## 2018-09-20 RX ADMIN — Medication 25 MILLIGRAM(S): at 05:51

## 2018-09-20 RX ADMIN — Medication 400 MILLIGRAM(S): at 12:51

## 2018-09-20 RX ADMIN — Medication 1: at 12:17

## 2018-09-20 RX ADMIN — RIVAROXABAN 20 MILLIGRAM(S): KIT at 18:51

## 2018-09-20 RX ADMIN — Medication 400 MILLIGRAM(S): at 12:21

## 2018-09-20 RX ADMIN — SIMVASTATIN 20 MILLIGRAM(S): 20 TABLET, FILM COATED ORAL at 22:48

## 2018-09-20 RX ADMIN — Medication 3 MILLILITER(S): at 03:52

## 2018-09-20 RX ADMIN — Medication 650 MILLIGRAM(S): at 23:02

## 2018-09-20 RX ADMIN — Medication 325 MILLIGRAM(S): at 12:21

## 2018-09-20 RX ADMIN — Medication 650 MILLIGRAM(S): at 06:33

## 2018-09-20 RX ADMIN — BRIMONIDINE TARTRATE 1 DROP(S): 2 SOLUTION/ DROPS OPHTHALMIC at 05:51

## 2018-09-20 RX ADMIN — Medication 81 MILLIGRAM(S): at 12:21

## 2018-09-20 RX ADMIN — Medication 650 MILLIGRAM(S): at 06:03

## 2018-09-20 RX ADMIN — Medication 40 MILLIGRAM(S): at 05:51

## 2018-09-20 RX ADMIN — GABAPENTIN 400 MILLIGRAM(S): 400 CAPSULE ORAL at 14:50

## 2018-09-20 RX ADMIN — Medication 650 MILLIGRAM(S): at 14:45

## 2018-09-20 RX ADMIN — GABAPENTIN 300 MILLIGRAM(S): 400 CAPSULE ORAL at 05:51

## 2018-09-20 RX ADMIN — Medication 650 MILLIGRAM(S): at 23:32

## 2018-09-20 RX ADMIN — Medication 25 MILLIGRAM(S): at 18:51

## 2018-09-20 RX ADMIN — GABAPENTIN 400 MILLIGRAM(S): 400 CAPSULE ORAL at 22:45

## 2018-09-20 RX ADMIN — BRIMONIDINE TARTRATE 1 DROP(S): 2 SOLUTION/ DROPS OPHTHALMIC at 14:51

## 2018-09-20 RX ADMIN — BRIMONIDINE TARTRATE 1 DROP(S): 2 SOLUTION/ DROPS OPHTHALMIC at 22:45

## 2018-09-20 RX ADMIN — Medication 3 MILLILITER(S): at 16:10

## 2018-09-20 RX ADMIN — Medication 3 MILLILITER(S): at 10:14

## 2018-09-20 NOTE — DISCHARGE NOTE ADULT - PATIENT PORTAL LINK FT
You can access the [facility], offered by [facility], by registering with the following website: [email or web address]

## 2018-09-20 NOTE — DISCHARGE NOTE ADULT - PLAN OF CARE
*******Prednisone - 5 days in total   ?30mg to go home on********** to be normotensive to reduce cardiovascular risk factors Continue current blood pressure medication regimen as directed. Monitor for any visual changes, headaches or dizziness.  Monitor blood pressure regularly.  Follow up with your PCP for further management for high blood pressure, please call to make appointment within 1 week of discharge continue xarelto to be normoglycemic and reduce cardiovascular risk factors prevent exacerbations, we prevent disease progression and thus will reduce mortality. Continue prednisone 30mg x 3 more days Continue home regimen of diabetes management Maintain optimal blood pressures to avoid further deterioration Continue prednisone 30mg x 3 more days then 20mg orally for 3 days then 10mg orally for 3 days Continue home regimen of diabetes management  Continue consistent carbohydrate diet.  Monitor blood glucose levels throughout the day before meals and at bedtime.  Record blood sugars and bring to outpatient providers appointment in order to be reviewed by your doctor for management modifications.  Be aware of diabetes management symptoms including feeling cool and clammy may be related to low glucose levels.  Feeling hot and dry may indicate high glucose levels.  If  you feel these symptoms, check your blood sugar.  Make regular podiatry appointments in order to have feet checked for wounds and toe nails cut by a doctor to prevent infections. Follow up with your PCP outpatient

## 2018-09-20 NOTE — DISCHARGE NOTE ADULT - SECONDARY DIAGNOSIS.
Essential (primary) hypertension History of pulmonary embolus (PE) DM (diabetes mellitus) CKD (chronic kidney disease)

## 2018-09-20 NOTE — DISCHARGE NOTE ADULT - MEDICATION SUMMARY - MEDICATIONS TO TAKE
I will START or STAY ON the medications listed below when I get home from the hospital:    predniSONE 20 mg oral tablet  -- 1.5 tab(s) by mouth once a day (30mg)  -- Indication: For Chronic obstructive pulmonary disease with acute exacerbation    aspirin 81 mg oral tablet, chewable  -- 1 tab(s) by mouth once a day  -- Indication: For History of pulmonary embolus (PE)    acetaminophen 325 mg oral tablet  -- 2 tab(s) by mouth every 6 hours, As needed, Mild Pain (1 - 3)  -- Indication: For pain management     tamsulosin 0.4 mg oral capsule  -- 1 cap(s) by mouth once a day (at bedtime)  -- Indication: For Urinary retention    Xarelto 20 mg oral tablet  -- 1 tab(s) by mouth once a day. Start on 05/26/17.  -- Check with your doctor before becoming pregnant.  It is very important that you take or use this exactly as directed.  Do not skip doses or discontinue unless directed by your doctor.  Obtain medical advice before taking any non-prescription drugs as some may affect the action of this medication.  Take with food.    -- Indication: For History of pulmonary embolus (PE)    gabapentin 400 mg oral capsule  -- 1 cap(s) by mouth 3 times a day  -- Indication: For neurontin    HumaLOG 100 units/mL injectable solution  -- As per sliding scale  -- Indication: For Diabetes    simvastatin 20 mg oral tablet  -- 1 tab(s) by mouth once a day (at bedtime)  -- Indication: For Hyperlipidemia     metoprolol tartrate 25 mg oral tablet  -- 1 tab(s) by mouth 2 times a day  -- Indication: For Essential (primary) hypertension    Xopenex 0.63 mg/3 mL inhalation solution  -- 3 milliliter(s) inhaled 3 times a day, As Needed  -- Indication: For Chronic obstructive pulmonary disease with acute exacerbation    ipratropium-albuterol 0.5 mg-2.5 mg/3 mLinhalation solution  -- 3 milliliter(s) inhaled every 6 hours  -- Indication: For Chronic obstructive pulmonary disease with acute exacerbation    ferrous sulfate 325 mg (65 mg elemental iron) oral tablet  -- 1 tab(s) by mouth once a day  -- Indication: For prophylactic     lactulose 10 g/15 mL oral syrup  -- 15 milliliter(s) by mouth every other day  -- Indication: For COnstipation    latanoprost 0.005% ophthalmic solution  -- 1 drop(s) in the right eye once a day (at bedtime)  -- Indication: For Chronic obstructive pulmonary disease with acute exacerbation    brimonidine 0.2% ophthalmic solution  -- 1 drop(s) in the right eye 3 times a day  -- Indication: For Chronic obstructive pulmonary disease with acute exacerbation I will START or STAY ON the medications listed below when I get home from the hospital:    predniSONE 20 mg oral tablet  -- 30 mg oral daily for 3 days then 20mg oral daily for 3 days then 10mg oral daily for 3 days then stop  -- Indication: For COPD (chronic obstructive pulmonary disease)    aspirin 81 mg oral tablet, chewable  -- 1 tab(s) by mouth once a day  -- Indication: For CAD ppx    acetaminophen 325 mg oral tablet  -- 2 tab(s) by mouth every 6 hours, As needed, Mild Pain (1 - 3)  -- Indication: For pain management     tamsulosin 0.4 mg oral capsule  -- 1 cap(s) by mouth once a day (at bedtime)  -- Indication: For Urinary retention    rivaroxaban 20 mg oral tablet  -- 1 tab(s) by mouth every 24 hours  -- Indication: For +PE    gabapentin 400 mg oral capsule  -- 1 cap(s) by mouth 3 times a day  -- Indication: For neurontin    HumaLOG 100 units/mL injectable solution  -- As per sliding scale  -- Indication: For Diabetes    simvastatin 20 mg oral tablet  -- 1 tab(s) by mouth once a day (at bedtime)  -- Indication: For Hyperlipidemia     metoprolol tartrate 25 mg oral tablet  -- 1 tab(s) by mouth 2 times a day  -- Indication: For Essential (primary) hypertension    Xopenex 0.63 mg/3 mL inhalation solution  -- 3 milliliter(s) inhaled 3 times a day, As Needed  -- Indication: For Chronic obstructive pulmonary disease with acute exacerbation    ipratropium-albuterol 0.5 mg-2.5 mg/3 mLinhalation solution  -- 3 milliliter(s) inhaled every 6 hours  -- Indication: For Chronic obstructive pulmonary disease with acute exacerbation    ferrous sulfate 325 mg (65 mg elemental iron) oral tablet  -- 1 tab(s) by mouth once a day  -- Indication: For prophylactic     lactulose 10 g/15 mL oral syrup  -- 15 milliliter(s) by mouth every other day  -- Indication: For COnstipation    brimonidine 0.2% ophthalmic solution  -- 1 drop(s) in the right eye 3 times a day  -- Indication: For Chronic obstructive pulmonary disease with acute exacerbation    latanoprost 0.005% ophthalmic solution  -- 1 drop(s) in the right eye once a day (at bedtime)  -- Indication: For Chronic obstructive pulmonary disease with acute exacerbation

## 2018-09-20 NOTE — DISCHARGE NOTE ADULT - HOSPITAL COURSE
59 y/o F with Diabetes, COPD on home O2, Obstructive Sleep Apnea (not on CPAP,) chronic urinary retention s/p suprapubic cath, Pulmonary embolus  on Xarelto presented from [facility] with dyspnea.  Pt reportsed cough productive of sputum.  Denies fever, chills, chest pain or any other new complaints.  No known sick contacts.  Treat nebulizer en route to the ED.     ·  Acute on chronic respiratory failure with hypoxia and hypercapnia.  -2/2 COPD exacerbation likely with component of OHS  - Treated BIPAP at night   - Duoneb treatments  - prednisone 40mg daily.     · Type 2 diabetes mellitus without complication, without long-term current use of insulin.    - held metformin during hospital stay  - Humalog sliding scale.       ·  Essential (primary) hypertension.    - continued metoprolol.       · History of pulmonary embolus (PE).     - continued Xarelto.     Neuropathy  -treated with gabapentin 400mg TID 59 y/o F with Diabetes, COPD on home O2, Obstructive Sleep Apnea (not on CPAP,) chronic urinary retention s/p suprapubic cath, Pulmonary embolus  on Xarelto presented from [facility] with dyspnea.  Pt reportsed cough productive of sputum.  Denies fever, chills, chest pain or any other new complaints.  No known sick contacts.  Treat nebulizer en route to the ED.     ·  Acute on chronic respiratory failure with hypoxia and hypercapnia.  -2/2 COPD exacerbation likely with component of OHS  - Treated BIPAP at night   - Duoneb treatments  - prednisone 40mg daily. (discharged on 30mg po prednisone x 3 more days)    · Type 2 diabetes mellitus without complication, without long-term current use of insulin.    - held metformin during hospital stay - restart metformin upon discharge   - Humalog sliding scale.       ·  Essential (primary) hypertension.    - continued metoprolol.       · History of pulmonary embolus (PE).     - continued Xarelto.     Neuropathy  -treated with gabapentin 400mg TID 57 y/o F with Diabetes, COPD on home O2, Obstructive Sleep Apnea (not on CPAP,) chronic urinary retention s/p suprapubic cath, Pulmonary embolus  on Xarelto presented from [facility] with dyspnea.  Pt reportsed cough productive of sputum.  Denies fever, chills, chest pain or any other new complaints.  No known sick contacts.  Treat nebulizer en route to the ED.     ·  Acute on chronic respiratory failure with hypoxia and hypercapnia.  -2/2 COPD exacerbation likely with component of OHS  - Treated BIPAP at night   - Duoneb treatments  - prednisone 40mg daily. (discharged on 30mg po prednisone x 3 more days)    · Type 2 diabetes mellitus without complication, without long-term current use of insulin.    - held metformin during hospital stay - restart metformin upon discharge   - Humalog sliding scale.       ·  Essential (primary) hypertension.    - continued metoprolol.       · History of pulmonary embolus (PE).     - continued Xarelto.     Neuropathy  -treated with gabapentin 400mg TID    Dispo:  rehab

## 2018-09-20 NOTE — CHART NOTE - NSCHARTNOTEFT_GEN_A_CORE
CHIEF COMPLAINT:    Interval Events:    REVIEW OF SYSTEMS:  Constitutional:   Eyes:  ENT:  CV:  Resp:  GI:  :  MSK:  Integumentary:  Neurological:  Psychiatric:  Endocrine:  Hematologic/Lymphatic:  Allergic/Immunologic:  [ ] All other systems negative  [ ] Unable to assess ROS because ________    OBJECTIVE:  ICU Vital Signs Last 24 Hrs  T(C): 36.8 (20 Sep 2018 05:52), Max: 37 (19 Sep 2018 14:00)  T(F): 98.3 (20 Sep 2018 05:52), Max: 98.6 (19 Sep 2018 14:00)  HR: 79 (20 Sep 2018 07:14) (79 - 100)  BP: 116/76 (20 Sep 2018 05:52) (115/68 - 136/75)  BP(mean): --  ABP: --  ABP(mean): --  RR: 19 (20 Sep 2018 05:52) (18 - 19)  SpO2: 100% (20 Sep 2018 07:14) (91% - 100%)        09-19 @ 07:01  -  09-20 @ 07:00  --------------------------------------------------------  IN: 0 mL / OUT: 1550 mL / NET: -1550 mL      CAPILLARY BLOOD GLUCOSE      POCT Blood Glucose.: 155 mg/dL (19 Sep 2018 21:21)      PHYSICAL EXAM:  General:   HEENT:   Lymph Nodes:  Neck:   Respiratory:   Cardiovascular:   Abdomen:   Extremities:   Skin:   Neurological:  Psychiatry:    HOSPITAL MEDICATIONS:  MEDICATIONS  (STANDING):  ALBUTerol/ipratropium for Nebulization 3 milliLiter(s) Nebulizer every 6 hours  aspirin  chewable 81 milliGRAM(s) Oral daily  brimonidine 0.2% Ophthalmic Solution 1 Drop(s) Right EYE three times a day  dextrose 50% Injectable 12.5 Gram(s) IV Push once  dextrose 50% Injectable 25 Gram(s) IV Push once  dextrose 50% Injectable 25 Gram(s) IV Push once  ferrous    sulfate 325 milliGRAM(s) Oral daily  gabapentin 300 milliGRAM(s) Oral three times a day  haloperidol    Injectable 3 milliGRAM(s) IV Push once  insulin lispro (HumaLOG) corrective regimen sliding scale   SubCutaneous three times a day before meals  insulin lispro (HumaLOG) corrective regimen sliding scale   SubCutaneous at bedtime  latanoprost 0.005% Ophthalmic Solution 1 Drop(s) Right EYE at bedtime  metoprolol tartrate 25 milliGRAM(s) Oral two times a day  predniSONE   Tablet 40 milliGRAM(s) Oral daily  rivaroxaban 20 milliGRAM(s) Oral every 24 hours  simvastatin 20 milliGRAM(s) Oral at bedtime  tamsulosin 0.4 milliGRAM(s) Oral at bedtime    MEDICATIONS  (PRN):  acetaminophen   Tablet .. 650 milliGRAM(s) Oral every 6 hours PRN Mild Pain (1 - 3), Moderate Pain (4 - 6), Severe Pain (7 - 10)  dextrose 40% Gel 15 Gram(s) Oral once PRN Blood Glucose LESS THAN 70 milliGRAM(s)/deciliter  glucagon  Injectable 1 milliGRAM(s) IntraMuscular once PRN Glucose LESS THAN 70 milligrams/deciliter      LABS:                        9.3    6.75  )-----------( 416      ( 20 Sep 2018 06:25 )             34.0     09-20    142  |  93<L>  |  18  ----------------------------<  101<H>  4.1   |  36<H>  |  0.46<L>    Ca    9.6      20 Sep 2018 06:25  Mg     1.8     09-19    TPro  7.9  /  Alb  3.2<L>  /  TBili  < 0.2<L>  /  DBili  x   /  AST  9   /  ALT  6   /  AlkPhos  83  09-18    PT/INR - ( 18 Sep 2018 20:43 )   PT: 11.6 SEC;   INR: 1.04          PTT - ( 18 Sep 2018 20:43 )  PTT:35.8 SEC      Venous Blood Gas:  09-18 @ 20:43  7.30/91/44/38/69.6  VBG Lactate: 1.8      MICROBIOLOGY:     RADIOLOGY:  [ ] Reviewed and interpreted by me    PULMONARY FUNCTION TESTS:    EKG: CHIEF COMPLAINT:    Interval Events:    REVIEW OF SYSTEMS:  Constitutional: denies  Eyes: denies  ENT: denies  CV: denies  Resp: denies  GI: denies  : denies  MSK: c/o bilateral lower extremity pain  Integumentary: denies  Neurological: denies  Psychiatric: denies  Endocrine:  denies  Hematologic/Lymph; denies  Allergic/Immunologic: denies  [ x ] All other systems: denies  OBJECTIVE:  ICU Vital Signs Last 24 Hrs  T(C): 36.8 (20 Sep 2018 05:52), Max: 37 (19 Sep 2018 14:00)  T(F): 98.3 (20 Sep 2018 05:52), Max: 98.6 (19 Sep 2018 14:00)  HR: 79 (20 Sep 2018 07:14) (79 - 100)  BP: 116/76 (20 Sep 2018 05:52) (115/68 - 136/75)  BP(mean): --  ABP: --  ABP(mean): --  RR: 19 (20 Sep 2018 05:52) (18 - 19)  SpO2: 100% (20 Sep 2018 07:14) (91% - 100%)        09-19 @ 07:01  -  09-20 @ 07:00  --------------------------------------------------------  IN: 0 mL / OUT: 1550 mL / NET: -1550 mL      CAPILLARY BLOOD GLUCOSE      POCT Blood Glucose.: 155 mg/dL (19 Sep 2018 21:21)      PHYSICAL EXAM:  General:   HEENT:   Lymph Nodes:  Neck:   Respiratory:   Cardiovascular:   Abdomen:   Extremities:   Skin:   Neurological:  Psychiatry:    HOSPITAL MEDICATIONS:  MEDICATIONS  (STANDING):  ALBUTerol/ipratropium for Nebulization 3 milliLiter(s) Nebulizer every 6 hours  aspirin  chewable 81 milliGRAM(s) Oral daily  brimonidine 0.2% Ophthalmic Solution 1 Drop(s) Right EYE three times a day  dextrose 50% Injectable 12.5 Gram(s) IV Push once  dextrose 50% Injectable 25 Gram(s) IV Push once  dextrose 50% Injectable 25 Gram(s) IV Push once  ferrous    sulfate 325 milliGRAM(s) Oral daily  gabapentin 300 milliGRAM(s) Oral three times a day  haloperidol    Injectable 3 milliGRAM(s) IV Push once  insulin lispro (HumaLOG) corrective regimen sliding scale   SubCutaneous three times a day before meals  insulin lispro (HumaLOG) corrective regimen sliding scale   SubCutaneous at bedtime  latanoprost 0.005% Ophthalmic Solution 1 Drop(s) Right EYE at bedtime  metoprolol tartrate 25 milliGRAM(s) Oral two times a day  predniSONE   Tablet 40 milliGRAM(s) Oral daily  rivaroxaban 20 milliGRAM(s) Oral every 24 hours  simvastatin 20 milliGRAM(s) Oral at bedtime  tamsulosin 0.4 milliGRAM(s) Oral at bedtime    MEDICATIONS  (PRN):  acetaminophen   Tablet .. 650 milliGRAM(s) Oral every 6 hours PRN Mild Pain (1 - 3), Moderate Pain (4 - 6), Severe Pain (7 - 10)  dextrose 40% Gel 15 Gram(s) Oral once PRN Blood Glucose LESS THAN 70 milliGRAM(s)/deciliter  glucagon  Injectable 1 milliGRAM(s) IntraMuscular once PRN Glucose LESS THAN 70 milligrams/deciliter      LABS:                        9.3    6.75  )-----------( 416      ( 20 Sep 2018 06:25 )             34.0     09-20    142  |  93<L>  |  18  ----------------------------<  101<H>  4.1   |  36<H>  |  0.46<L>    Ca    9.6      20 Sep 2018 06:25  Mg     1.8     09-19    TPro  7.9  /  Alb  3.2<L>  /  TBili  < 0.2<L>  /  DBili  x   /  AST  9   /  ALT  6   /  AlkPhos  83  09-18    PT/INR - ( 18 Sep 2018 20:43 )   PT: 11.6 SEC;   INR: 1.04          PTT - ( 18 Sep 2018 20:43 )  PTT:35.8 SEC      Venous Blood Gas:  09-18 @ 20:43  7.30/91/44/38/69.6  VBG Lactate: 1.8      MICROBIOLOGY:     RADIOLOGY:  [ ] Reviewed and interpreted by me    PULMONARY FUNCTION TESTS:    EKG: CHIEF COMPLAINT:    Interval Events:    REVIEW OF SYSTEMS:  Constitutional: denies  Eyes: denies  ENT: denies  CV: denies  Resp: denies  GI: denies  : denies  MSK: c/o bilateral lower extremity pain, pt unable to lift legs off the bed, unable to walk and is wheelchair bound  Integumentary: denies  Neurological: denies  Psychiatric: denies  Endocrine:  denies  Hematologic/Lymph; denies  Allergic/Immunologic: denies  [ x ] All other systems: denies  OBJECTIVE:  ICU Vital Signs Last 24 Hrs  T(C): 36.8 (20 Sep 2018 05:52), Max: 37 (19 Sep 2018 14:00)  T(F): 98.3 (20 Sep 2018 05:52), Max: 98.6 (19 Sep 2018 14:00)  HR: 79 (20 Sep 2018 07:14) (79 - 100)  BP: 116/76 (20 Sep 2018 05:52) (115/68 - 136/75)  BP(mean): --  ABP: --  ABP(mean): --  RR: 19 (20 Sep 2018 05:52) (18 - 19)  SpO2: 100% (20 Sep 2018 07:14) (91% - 100%)        09-19 @ 07:01  -  09-20 @ 07:00  --------------------------------------------------------  IN: 0 mL / OUT: 1550 mL / NET: -1550 mL      CAPILLARY BLOOD GLUCOSE      POCT Blood Glucose.: 155 mg/dL (19 Sep 2018 21:21)      PHYSICAL EXAM:  · Respiratory	detailed exam  · Respiratory Details	normal; airway patent; breath sounds equal  · Cardiovascular	detailed exam  · Cardiovascular Details	regular rate and rhythm   · Gastrointestinal	detailed exam  · GI Normal	normal; soft; nontender  · Neurological	detailed exam  · Neurological Details	alert and oriented x 3  · Musculoskeletal	detailed exam  · Musculoskeletal Comments	: pt complained of b/l LE pain - scale 7/10.         Assessment and Plan:   · Assessment	  57 y/o F with DM, COPD on home O2, PUSHPA (not on CPAP,) chronic urinary retention s/p suprapubic cath, PE on Xarelto presents from Nursing Home with dyspnea and cough 2/2 COPD exacerbation.     Problem/Plan - 1:  ·  Problem: Acute on chronic respiratory failure with hypoxia and hypercapnia.  Plan: 2/2 COPD exacerbation likely with component of OHS  - Continue BIPAP for now  - Duoneb treatments  - prednisone 40mg daily.      Problem/Plan - 2:  ·  Problem: Type 2 diabetes mellitus without complication, without long-term current use of insulin.  Plan: - hold metformin  - Humalog sliding scale       Problem/Plan - 3:  ·  Problem: Essential (primary) hypertension.  Plan: - continue metoprolol.      Problem/Plan - 4:  ·  Problem: History of pulmonary embolus (PE).  Plan: - continue Xarelto.      Problem/Plan - 5:  * Problem: Neuropathy. Plan: - continue with neruontin 300mg TID  -tylenol/motrin prn for pain          HOSPITAL MEDICATIONS:  MEDICATIONS  (STANDING):  ALBUTerol/ipratropium for Nebulization 3 milliLiter(s) Nebulizer every 6 hours  aspirin  chewable 81 milliGRAM(s) Oral daily  brimonidine 0.2% Ophthalmic Solution 1 Drop(s) Right EYE three times a day  dextrose 50% Injectable 12.5 Gram(s) IV Push once  dextrose 50% Injectable 25 Gram(s) IV Push once  dextrose 50% Injectable 25 Gram(s) IV Push once  ferrous    sulfate 325 milliGRAM(s) Oral daily  gabapentin 300 milliGRAM(s) Oral three times a day  haloperidol    Injectable 3 milliGRAM(s) IV Push once  insulin lispro (HumaLOG) corrective regimen sliding scale   SubCutaneous three times a day before meals  insulin lispro (HumaLOG) corrective regimen sliding scale   SubCutaneous at bedtime  latanoprost 0.005% Ophthalmic Solution 1 Drop(s) Right EYE at bedtime  metoprolol tartrate 25 milliGRAM(s) Oral two times a day  predniSONE   Tablet 40 milliGRAM(s) Oral daily  rivaroxaban 20 milliGRAM(s) Oral every 24 hours  simvastatin 20 milliGRAM(s) Oral at bedtime  tamsulosin 0.4 milliGRAM(s) Oral at bedtime    MEDICATIONS  (PRN):  acetaminophen   Tablet .. 650 milliGRAM(s) Oral every 6 hours PRN Mild Pain (1 - 3), Moderate Pain (4 - 6), Severe Pain (7 - 10)  dextrose 40% Gel 15 Gram(s) Oral once PRN Blood Glucose LESS THAN 70 milliGRAM(s)/deciliter  glucagon  Injectable 1 milliGRAM(s) IntraMuscular once PRN Glucose LESS THAN 70 milligrams/deciliter      LABS:                        9.3    6.75  )-----------( 416      ( 20 Sep 2018 06:25 )             34.0     09-20    142  |  93<L>  |  18  ----------------------------<  101<H>  4.1   |  36<H>  |  0.46<L>    Ca    9.6      20 Sep 2018 06:25  Mg     1.8     09-19    TPro  7.9  /  Alb  3.2<L>  /  TBili  < 0.2<L>  /  DBili  x   /  AST  9   /  ALT  6   /  AlkPhos  83  09-18    PT/INR - ( 18 Sep 2018 20:43 )   PT: 11.6 SEC;   INR: 1.04          PTT - ( 18 Sep 2018 20:43 )  PTT:35.8 SEC      Venous Blood Gas:  09-18 @ 20:43  7.30/91/44/38/69.6  VBG Lactate: 1.8      MICROBIOLOGY:     RADIOLOGY:  [ ] Reviewed and interpreted by me    PULMONARY FUNCTION TESTS:    EKG:    Attending CHIEF COMPLAINT:   Patient is a 58y old  Female who presented with a chief complaint of dyspnea    Interval Events:  b/l LE pain overnight    REVIEW OF SYSTEMS:  Constitutional: denies  Eyes: denies  ENT: denies  CV: denies  Resp: denies  GI: denies  : denies  MSK: c/o bilateral lower extremity pain, pt unable to lift legs off the bed, unable to walk and is wheelchair bound  Integumentary: denies  Neurological: denies  Psychiatric: denies  Endocrine:  denies  Hematologic/Lymph; denies  Allergic/Immunologic: denies  [ x ] All other systems: denies  OBJECTIVE:  ICU Vital Signs Last 24 Hrs  T(C): 36.8 (20 Sep 2018 05:52), Max: 37 (19 Sep 2018 14:00)  T(F): 98.3 (20 Sep 2018 05:52), Max: 98.6 (19 Sep 2018 14:00)  HR: 79 (20 Sep 2018 07:14) (79 - 100)  BP: 116/76 (20 Sep 2018 05:52) (115/68 - 136/75)  BP(mean): --  ABP: --  ABP(mean): --  RR: 19 (20 Sep 2018 05:52) (18 - 19)  SpO2: 100% (20 Sep 2018 07:14) (91% - 100%)        09-19 @ 07:01  -  09-20 @ 07:00  --------------------------------------------------------  IN: 0 mL / OUT: 1550 mL / NET: -1550 mL      CAPILLARY BLOOD GLUCOSE      POCT Blood Glucose.: 155 mg/dL (19 Sep 2018 21:21)      PHYSICAL EXAM:  · Respiratory	detailed exam  · Respiratory Details	normal; airway patent; breath sounds equal  · Cardiovascular	detailed exam  · Cardiovascular Details	regular rate and rhythm   · Gastrointestinal	detailed exam  · GI Normal	normal; soft; nontender  · Neurological	detailed exam  · Neurological Details	alert and oriented x 3  · Musculoskeletal	detailed exam  · Musculoskeletal Comments	: pt complained of b/l LE pain - scale 7/10.         Assessment and Plan:   · Assessment	  59 y/o F with DM, COPD on home O2, PUSHPA (not on CPAP,) chronic urinary retention s/p suprapubic cath, PE on Xarelto presents from Nursing Home with dyspnea and cough 2/2 COPD exacerbation.     Problem/Plan - 1:  ·  Problem: Acute on chronic respiratory failure with hypoxia and hypercapnia.  Plan: 2/2 COPD exacerbation likely with component of OHS  - Continue BIPAP for now  - Duoneb treatments  - prednisone 40mg daily.      Problem/Plan - 2:  ·  Problem: Type 2 diabetes mellitus without complication, without long-term current use of insulin.  Plan: - hold metformin  - Humalog sliding scale       Problem/Plan - 3:  ·  Problem: Essential (primary) hypertension.  Plan: - continue metoprolol.      Problem/Plan - 4:  ·  Problem: History of pulmonary embolus (PE).  Plan: - continue Xarelto.      Problem/Plan - 5:  * Problem: Neuropathy. Plan: - continue with neruontin 300mg TID  -tylenol/motrin prn for pain          HOSPITAL MEDICATIONS:  MEDICATIONS  (STANDING):  ALBUTerol/ipratropium for Nebulization 3 milliLiter(s) Nebulizer every 6 hours  aspirin  chewable 81 milliGRAM(s) Oral daily  brimonidine 0.2% Ophthalmic Solution 1 Drop(s) Right EYE three times a day  dextrose 50% Injectable 12.5 Gram(s) IV Push once  dextrose 50% Injectable 25 Gram(s) IV Push once  dextrose 50% Injectable 25 Gram(s) IV Push once  ferrous    sulfate 325 milliGRAM(s) Oral daily  gabapentin 300 milliGRAM(s) Oral three times a day  haloperidol    Injectable 3 milliGRAM(s) IV Push once  insulin lispro (HumaLOG) corrective regimen sliding scale   SubCutaneous three times a day before meals  insulin lispro (HumaLOG) corrective regimen sliding scale   SubCutaneous at bedtime  latanoprost 0.005% Ophthalmic Solution 1 Drop(s) Right EYE at bedtime  metoprolol tartrate 25 milliGRAM(s) Oral two times a day  predniSONE   Tablet 40 milliGRAM(s) Oral daily  rivaroxaban 20 milliGRAM(s) Oral every 24 hours  simvastatin 20 milliGRAM(s) Oral at bedtime  tamsulosin 0.4 milliGRAM(s) Oral at bedtime    MEDICATIONS  (PRN):  acetaminophen   Tablet .. 650 milliGRAM(s) Oral every 6 hours PRN Mild Pain (1 - 3), Moderate Pain (4 - 6), Severe Pain (7 - 10)  dextrose 40% Gel 15 Gram(s) Oral once PRN Blood Glucose LESS THAN 70 milliGRAM(s)/deciliter  glucagon  Injectable 1 milliGRAM(s) IntraMuscular once PRN Glucose LESS THAN 70 milligrams/deciliter      LABS:                        9.3    6.75  )-----------( 416      ( 20 Sep 2018 06:25 )             34.0     09-20    142  |  93<L>  |  18  ----------------------------<  101<H>  4.1   |  36<H>  |  0.46<L>    Ca    9.6      20 Sep 2018 06:25  Mg     1.8     09-19    TPro  7.9  /  Alb  3.2<L>  /  TBili  < 0.2<L>  /  DBili  x   /  AST  9   /  ALT  6   /  AlkPhos  83  09-18    PT/INR - ( 18 Sep 2018 20:43 )   PT: 11.6 SEC;   INR: 1.04          PTT - ( 18 Sep 2018 20:43 )  PTT:35.8 SEC      Venous Blood Gas:  09-18 @ 20:43  7.30/91/44/38/69.6  VBG Lactate: 1.8      MICROBIOLOGY:     RADIOLOGY:  [ ] Reviewed and interpreted by me    PULMONARY FUNCTION TESTS:    EKG:    Attending CHIEF COMPLAINT:   Patient is a 58y old  Female who presented with a chief complaint of dyspnea    Interval Events:  b/l LE pain overnight    REVIEW OF SYSTEMS:  Constitutional: denies  Eyes: denies  ENT: denies  CV: denies  Resp: denies  GI: denies  : denies  MSK: c/o bilateral lower extremity pain, pt unable to lift legs off the bed  Neurological: denies  Psychiatric: denies  Endocrine:  denies  Hematologic/Lymph; denies  Allergic/Immunologic: denies  [ x ] All other systems: denies  OBJECTIVE:  ICU Vital Signs Last 24 Hrs  T(C): 36.8 (20 Sep 2018 05:52), Max: 37 (19 Sep 2018 14:00)  T(F): 98.3 (20 Sep 2018 05:52), Max: 98.6 (19 Sep 2018 14:00)  HR: 79 (20 Sep 2018 07:14) (79 - 100)  BP: 116/76 (20 Sep 2018 05:52) (115/68 - 136/75)  BP(mean): --  ABP: --  ABP(mean): --  RR: 19 (20 Sep 2018 05:52) (18 - 19)  SpO2: 100% (20 Sep 2018 07:14) (91% - 100%)        09-19 @ 07:01  -  09-20 @ 07:00  --------------------------------------------------------  IN: 0 mL / OUT: 1550 mL / NET: -1550 mL      CAPILLARY BLOOD GLUCOSE      POCT Blood Glucose.: 155 mg/dL (19 Sep 2018 21:21)      PHYSICAL EXAM:  · Respiratory	detailed exam  · Respiratory Details	normal; airway patent; breath sounds equal  · Cardiovascular	detailed exam  · Cardiovascular Details	regular rate and rhythm , b/l LE warm to touch, + distal pulses, +1 edema  · Gastrointestinal	detailed exam  · GI Normal	normal; soft; nontender  · Neurological	detailed exam  · Neurological Details	alert and oriented x 3  · Musculoskeletal	detailed exam  · Musculoskeletal Comments	: pt complained of b/l LE pain - scale 9/10        Assessment and Plan:   · Assessment	  57 y/o F with DM, COPD on home O2, PUSHPA (not on CPAP,) chronic urinary retention s/p suprapubic cath, PE on Xarelto presents from Nursing Home with dyspnea and cough 2/2 COPD exacerbation.     Problem/Plan - 1:  ·  Problem: Acute on chronic respiratory failure with hypoxia and hypercapnia.  Plan: 2/2 COPD exacerbation likely with component of OHS  - Continue BIPAP for now  - Duoneb treatments  - prednisone 40mg daily.      Problem/Plan - 2:  ·  Problem: Type 2 diabetes mellitus without complication, without long-term current use of insulin.  Plan: - hold metformin  - Humalog sliding scale       Problem/Plan - 3:  ·  Problem: Essential (primary) hypertension.  Plan: - continue metoprolol.      Problem/Plan - 4:  ·  Problem: History of pulmonary embolus (PE).  Plan: - continue Xarelto.      Problem/Plan - 5:  * Problem: Neuropathy. Plan: - increased neurontin 400mg TID  -tylenol/motrin prn for pain          HOSPITAL MEDICATIONS:  MEDICATIONS  (STANDING):  ALBUTerol/ipratropium for Nebulization 3 milliLiter(s) Nebulizer every 6 hours  aspirin  chewable 81 milliGRAM(s) Oral daily  brimonidine 0.2% Ophthalmic Solution 1 Drop(s) Right EYE three times a day  dextrose 50% Injectable 12.5 Gram(s) IV Push once  dextrose 50% Injectable 25 Gram(s) IV Push once  dextrose 50% Injectable 25 Gram(s) IV Push once  ferrous    sulfate 325 milliGRAM(s) Oral daily  gabapentin 300 milliGRAM(s) Oral three times a day  haloperidol    Injectable 3 milliGRAM(s) IV Push once  insulin lispro (HumaLOG) corrective regimen sliding scale   SubCutaneous three times a day before meals  insulin lispro (HumaLOG) corrective regimen sliding scale   SubCutaneous at bedtime  latanoprost 0.005% Ophthalmic Solution 1 Drop(s) Right EYE at bedtime  metoprolol tartrate 25 milliGRAM(s) Oral two times a day  predniSONE   Tablet 40 milliGRAM(s) Oral daily  rivaroxaban 20 milliGRAM(s) Oral every 24 hours  simvastatin 20 milliGRAM(s) Oral at bedtime  tamsulosin 0.4 milliGRAM(s) Oral at bedtime    MEDICATIONS  (PRN):  acetaminophen   Tablet .. 650 milliGRAM(s) Oral every 6 hours PRN Mild Pain (1 - 3), Moderate Pain (4 - 6), Severe Pain (7 - 10)  dextrose 40% Gel 15 Gram(s) Oral once PRN Blood Glucose LESS THAN 70 milliGRAM(s)/deciliter  glucagon  Injectable 1 milliGRAM(s) IntraMuscular once PRN Glucose LESS THAN 70 milligrams/deciliter      LABS:                        9.3    6.75  )-----------( 416      ( 20 Sep 2018 06:25 )             34.0     09-20    142  |  93<L>  |  18  ----------------------------<  101<H>  4.1   |  36<H>  |  0.46<L>    Ca    9.6      20 Sep 2018 06:25  Mg     1.8     09-19    TPro  7.9  /  Alb  3.2<L>  /  TBili  < 0.2<L>  /  DBili  x   /  AST  9   /  ALT  6   /  AlkPhos  83  09-18    PT/INR - ( 18 Sep 2018 20:43 )   PT: 11.6 SEC;   INR: 1.04          PTT - ( 18 Sep 2018 20:43 )  PTT:35.8 SEC      Venous Blood Gas:  09-18 @ 20:43  7.30/91/44/38/69.6  VBG Lactate: 1.8      MICROBIOLOGY:     RADIOLOGY:  [ ] Reviewed and interpreted by me    PULMONARY FUNCTION TESTS:    EKG:    Attending CHIEF COMPLAINT:   Patient is a 58y old  Female who presented with a chief complaint of dyspnea    Interval Events:  b/l LE pain overnight    REVIEW OF SYSTEMS:  Constitutional: denies  Eyes: denies  ENT: denies  CV: denies  Resp: denies  GI: denies  : denies  MSK: c/o bilateral lower extremity pain, pt unable to lift legs off the bed  Neurological: denies  Psychiatric: denies  Endocrine:  denies  Hematologic/Lymph; denies  Allergic/Immunologic: denies  [ x ] All other systems: denies  OBJECTIVE:  ICU Vital Signs Last 24 Hrs  T(C): 36.8 (20 Sep 2018 05:52), Max: 37 (19 Sep 2018 14:00)  T(F): 98.3 (20 Sep 2018 05:52), Max: 98.6 (19 Sep 2018 14:00)  HR: 79 (20 Sep 2018 07:14) (79 - 100)  BP: 116/76 (20 Sep 2018 05:52) (115/68 - 136/75)  BP(mean): --  ABP: --  ABP(mean): --  RR: 19 (20 Sep 2018 05:52) (18 - 19)  SpO2: 100% (20 Sep 2018 07:14) (91% - 100%)        09-19 @ 07:01  -  09-20 @ 07:00  --------------------------------------------------------  IN: 0 mL / OUT: 1550 mL / NET: -1550 mL      CAPILLARY BLOOD GLUCOSE      POCT Blood Glucose.: 155 mg/dL (19 Sep 2018 21:21)      PHYSICAL EXAM:  · Respiratory	detailed exam  · Respiratory Details	normal; airway patent; breath sounds equal  · Cardiovascular	detailed exam  · Cardiovascular Details	regular rate and rhythm , b/l LE warm to touch, + distal pulses, +1 edema  · Gastrointestinal	detailed exam  · GI Normal	normal; soft; nontender  · Neurological	detailed exam  · Neurological Details	alert and oriented x 3  · Musculoskeletal	detailed exam  · Musculoskeletal Comments	: unable to lift legs off bed        Assessment and Plan:   · Assessment	  59 y/o F with DM, COPD on home O2, PUSHPA (not on CPAP,) chronic urinary retention s/p suprapubic cath, PE on Xarelto presents from Nursing Home with dyspnea and cough 2/2 COPD exacerbation.     Problem/Plan - 1:  ·  Problem: Acute on chronic respiratory failure with hypoxia and hypercapnia.  Plan: 2/2 COPD exacerbation likely with component of OHS  - Continue BIPAP for now  - Duoneb treatments  - prednisone 40mg daily.      Problem/Plan - 2:  ·  Problem: Type 2 diabetes mellitus without complication, without long-term current use of insulin.  Plan: - hold metformin  - Humalog sliding scale       Problem/Plan - 3:  ·  Problem: Essential (primary) hypertension.  Plan: - continue metoprolol.      Problem/Plan - 4:  ·  Problem: History of pulmonary embolus (PE).  Plan: - continue Xarelto.      Problem/Plan - 5:  * Problem: Neuropathy. Plan: - increased neurontin 400mg TID  -tylenol/motrin prn for pain    Dispo: rehab with PT 3-5 x a week          HOSPITAL MEDICATIONS:  MEDICATIONS  (STANDING):  ALBUTerol/ipratropium for Nebulization 3 milliLiter(s) Nebulizer every 6 hours  aspirin  chewable 81 milliGRAM(s) Oral daily  brimonidine 0.2% Ophthalmic Solution 1 Drop(s) Right EYE three times a day  dextrose 50% Injectable 12.5 Gram(s) IV Push once  dextrose 50% Injectable 25 Gram(s) IV Push once  dextrose 50% Injectable 25 Gram(s) IV Push once  ferrous    sulfate 325 milliGRAM(s) Oral daily  gabapentin 300 milliGRAM(s) Oral three times a day  haloperidol    Injectable 3 milliGRAM(s) IV Push once  insulin lispro (HumaLOG) corrective regimen sliding scale   SubCutaneous three times a day before meals  insulin lispro (HumaLOG) corrective regimen sliding scale   SubCutaneous at bedtime  latanoprost 0.005% Ophthalmic Solution 1 Drop(s) Right EYE at bedtime  metoprolol tartrate 25 milliGRAM(s) Oral two times a day  predniSONE   Tablet 40 milliGRAM(s) Oral daily  rivaroxaban 20 milliGRAM(s) Oral every 24 hours  simvastatin 20 milliGRAM(s) Oral at bedtime  tamsulosin 0.4 milliGRAM(s) Oral at bedtime    MEDICATIONS  (PRN):  acetaminophen   Tablet .. 650 milliGRAM(s) Oral every 6 hours PRN Mild Pain (1 - 3), Moderate Pain (4 - 6), Severe Pain (7 - 10)  dextrose 40% Gel 15 Gram(s) Oral once PRN Blood Glucose LESS THAN 70 milliGRAM(s)/deciliter  glucagon  Injectable 1 milliGRAM(s) IntraMuscular once PRN Glucose LESS THAN 70 milligrams/deciliter      LABS:                        9.3    6.75  )-----------( 416      ( 20 Sep 2018 06:25 )             34.0     09-20    142  |  93<L>  |  18  ----------------------------<  101<H>  4.1   |  36<H>  |  0.46<L>    Ca    9.6      20 Sep 2018 06:25  Mg     1.8     09-19    TPro  7.9  /  Alb  3.2<L>  /  TBili  < 0.2<L>  /  DBili  x   /  AST  9   /  ALT  6   /  AlkPhos  83  09-18    PT/INR - ( 18 Sep 2018 20:43 )   PT: 11.6 SEC;   INR: 1.04          PTT - ( 18 Sep 2018 20:43 )  PTT:35.8 SEC      Venous Blood Gas:  09-18 @ 20:43  7.30/91/44/38/69.6  VBG Lactate: 1.8      MICROBIOLOGY:     RADIOLOGY:  [ ] Reviewed and interpreted by me    PULMONARY FUNCTION TESTS:    EKG:    Attending

## 2018-09-20 NOTE — DISCHARGE NOTE ADULT - CARE PROVIDER_API CALL
Nigel Torres), Internal Medicine  [address] [place], [place] [address]  Phone: [phone]  Fax: [phone]

## 2018-09-20 NOTE — DISCHARGE NOTE ADULT - CARE PLAN
Principal Discharge DX:	COPD with exacerbation  Assessment and plan of treatment:	*******Prednisone - 5 days in total   ?30mg to go home on**********  Secondary Diagnosis:	Essential (primary) hypertension  Goal:	to be normotensive to reduce cardiovascular risk factors  Assessment and plan of treatment:	Continue current blood pressure medication regimen as directed. Monitor for any visual changes, headaches or dizziness.  Monitor blood pressure regularly.  Follow up with your PCP for further management for high blood pressure, please call to make appointment within 1 week of discharge  Secondary Diagnosis:	History of pulmonary embolus (PE)  Assessment and plan of treatment:	continue xarelto  Secondary Diagnosis:	DM (diabetes mellitus)  Goal:	to be normoglycemic and reduce cardiovascular risk factors  Secondary Diagnosis:	CKD (chronic kidney disease) Principal Discharge DX:	COPD with exacerbation  Goal:	prevent exacerbations, we prevent disease progression and thus will reduce mortality.  Assessment and plan of treatment:	Continue prednisone 30mg x 3 more days  Secondary Diagnosis:	Essential (primary) hypertension  Goal:	to be normotensive to reduce cardiovascular risk factors  Assessment and plan of treatment:	Continue current blood pressure medication regimen as directed. Monitor for any visual changes, headaches or dizziness.  Monitor blood pressure regularly.  Follow up with your PCP for further management for high blood pressure, please call to make appointment within 1 week of discharge  Secondary Diagnosis:	History of pulmonary embolus (PE)  Assessment and plan of treatment:	continue xarelto  Secondary Diagnosis:	DM (diabetes mellitus)  Goal:	to be normoglycemic and reduce cardiovascular risk factors  Assessment and plan of treatment:	Continue home regimen of diabetes management  Secondary Diagnosis:	CKD (chronic kidney disease)  Goal:	Maintain optimal blood pressures to avoid further deterioration Principal Discharge DX:	COPD with exacerbation  Goal:	prevent exacerbations, we prevent disease progression and thus will reduce mortality.  Assessment and plan of treatment:	Continue prednisone 30mg x 3 more days then 20mg orally for 3 days then 10mg orally for 3 days  Secondary Diagnosis:	Essential (primary) hypertension  Goal:	to be normotensive to reduce cardiovascular risk factors  Assessment and plan of treatment:	Continue current blood pressure medication regimen as directed. Monitor for any visual changes, headaches or dizziness.  Monitor blood pressure regularly.  Follow up with your PCP for further management for high blood pressure, please call to make appointment within 1 week of discharge  Secondary Diagnosis:	History of pulmonary embolus (PE)  Assessment and plan of treatment:	continue xarelto  Secondary Diagnosis:	DM (diabetes mellitus)  Goal:	to be normoglycemic and reduce cardiovascular risk factors  Assessment and plan of treatment:	Continue home regimen of diabetes management  Secondary Diagnosis:	CKD (chronic kidney disease)  Goal:	Maintain optimal blood pressures to avoid further deterioration Principal Discharge DX:	COPD with exacerbation  Goal:	prevent exacerbations, we prevent disease progression and thus will reduce mortality.  Assessment and plan of treatment:	Continue prednisone 30mg x 3 more days then 20mg orally for 3 days then 10mg orally for 3 days  Secondary Diagnosis:	Essential (primary) hypertension  Goal:	to be normotensive to reduce cardiovascular risk factors  Assessment and plan of treatment:	Continue current blood pressure medication regimen as directed. Monitor for any visual changes, headaches or dizziness.  Monitor blood pressure regularly.  Follow up with your PCP for further management for high blood pressure, please call to make appointment within 1 week of discharge  Secondary Diagnosis:	History of pulmonary embolus (PE)  Assessment and plan of treatment:	continue xarelto  Secondary Diagnosis:	DM (diabetes mellitus)  Goal:	to be normoglycemic and reduce cardiovascular risk factors  Assessment and plan of treatment:	Continue home regimen of diabetes management  Continue consistent carbohydrate diet.  Monitor blood glucose levels throughout the day before meals and at bedtime.  Record blood sugars and bring to outpatient providers appointment in order to be reviewed by your doctor for management modifications.  Be aware of diabetes management symptoms including feeling cool and clammy may be related to low glucose levels.  Feeling hot and dry may indicate high glucose levels.  If  you feel these symptoms, check your blood sugar.  Make regular podiatry appointments in order to have feet checked for wounds and toe nails cut by a doctor to prevent infections.  Secondary Diagnosis:	CKD (chronic kidney disease)  Goal:	Maintain optimal blood pressures to avoid further deterioration  Assessment and plan of treatment:	Follow up with your PCP outpatient

## 2018-09-21 VITALS
SYSTOLIC BLOOD PRESSURE: 105 MMHG | OXYGEN SATURATION: 99 % | RESPIRATION RATE: 16 BRPM | TEMPERATURE: 99 F | DIASTOLIC BLOOD PRESSURE: 55 MMHG | HEART RATE: 84 BPM

## 2018-09-21 PROCEDURE — 99233 SBSQ HOSP IP/OBS HIGH 50: CPT | Mod: GC

## 2018-09-21 RX ADMIN — Medication 650 MILLIGRAM(S): at 09:25

## 2018-09-21 RX ADMIN — BRIMONIDINE TARTRATE 1 DROP(S): 2 SOLUTION/ DROPS OPHTHALMIC at 12:58

## 2018-09-21 RX ADMIN — Medication 3 MILLILITER(S): at 11:44

## 2018-09-21 RX ADMIN — GABAPENTIN 400 MILLIGRAM(S): 400 CAPSULE ORAL at 05:59

## 2018-09-21 RX ADMIN — Medication 40 MILLIGRAM(S): at 05:59

## 2018-09-21 RX ADMIN — Medication 25 MILLIGRAM(S): at 05:59

## 2018-09-21 RX ADMIN — Medication 325 MILLIGRAM(S): at 12:57

## 2018-09-21 RX ADMIN — Medication 3 MILLILITER(S): at 03:28

## 2018-09-21 RX ADMIN — GABAPENTIN 400 MILLIGRAM(S): 400 CAPSULE ORAL at 12:58

## 2018-09-21 RX ADMIN — Medication 81 MILLIGRAM(S): at 12:57

## 2018-09-21 RX ADMIN — Medication 1: at 12:57

## 2018-09-21 RX ADMIN — Medication 650 MILLIGRAM(S): at 08:52

## 2018-09-21 RX ADMIN — BRIMONIDINE TARTRATE 1 DROP(S): 2 SOLUTION/ DROPS OPHTHALMIC at 05:58

## 2018-09-21 NOTE — DIETITIAN INITIAL EVALUATION ADULT. - ENERGY NEEDS
No height or weight obtained on this admission.  Per prior hospitalization chart review...  Height= 62"  Current Weight= 113.9kg on 9/21/18 obtained by RDN via bedscale  BMI=45.8kg /m2    IBW= 110lbs (+/- 10%)  1+ generalized edema.  Right ischium and sacral unstageable pressure injuries.

## 2018-09-21 NOTE — PROGRESS NOTE ADULT - ATTENDING COMMENTS
COPD on home O2 admitted with cough, SOB, acute on chronic hypoxemia and hypercapnic respiratory failure  PAtient will likely benefit from nocturnal bilevel given chronic hypercapnic respiratory failure  Will continue nebs, prednisone 40  On xarelto for A fib
COPD on home O2 admitted with cough, SOB, acute on chronic hypoxemia and hypercapnic respiratory failure  PAtient will likely benefit from nocturnal bilevel given chronic hypercapnic respiratory failure  Will continue nebs, prednisone 40  On xarelto for A fib  DIscharge to NH today

## 2018-09-21 NOTE — DIETITIAN INITIAL EVALUATION ADULT. - PERTINENT LABORATORY DATA
09-20 Na142 mmol/L Glu 101 mg/dL<H> K+ 4.1 mmol/L Cr  0.46 mg/dL<L> BUN 18 mg/dL 09-18 Alb 3.2 g/dL<L> 09-19 [id number] 4.9 %        POCT Glucose: [phone].

## 2018-09-21 NOTE — DIETITIAN INITIAL EVALUATION ADULT. - OTHER INFO
Pt. endorses good appetite/PO intake.  She denies nausea/vomiting/diarrhea/constipation, or issues with chewing/swallowing.  Allergic to cheese and fish (gets itchy), although states she is not allergic to strawberry, tomatoes or peanuts (which is recorded in chart).  Discussed therapeutic diet modifications and encouraged protein intake to help promote wound healing.

## 2018-09-21 NOTE — DIETITIAN INITIAL EVALUATION ADULT. - NS AS NUTRI INTERV ED CONTENT
Nutrition relationship to health/disease/Purpose of the nutrition education/Priority modifications/Recommended modifications

## 2018-09-21 NOTE — DIETITIAN INITIAL EVALUATION ADULT. - NUTRITION INTERVENTION
Vitamin/Collaboration and Referral of Nutrition Care/Meals and Snack/Nutrition Education/Medical Food Supplements

## 2018-09-21 NOTE — PROGRESS NOTE ADULT - ASSESSMENT
59 y/o F with DM, COPD on home O2, PUSHPA (not on CPAP,) chronic urinary retention s/p suprapubic cath, PE on Xarelto presents from Nursing Home with dyspnea and cough 2/2 COPD exacerbation.
59 y/o F with DM, COPD on home O2, PUSHPA (not on CPAP,) chronic urinary retention s/p suprapubic cath, PE on Xarelto presents from Nursing Home with dyspnea and cough 2/2 COPD exacerbation.

## 2018-09-21 NOTE — PROGRESS NOTE ADULT - PROBLEM SELECTOR PLAN 1
2/2 COPD exacerbation likely with component of OHS  - Continue BIPAP for now  - Duoneb treatments  - prednisone 40mg daily 2/2 COPD exacerbation likely with component of OHS  - Continue BIPAP for now  - Duoneb treatments  - prednisone 40mg daily - taper on discharge

## 2018-09-21 NOTE — DIETITIAN INITIAL EVALUATION ADULT. - NS AS NUTRI INTERV COLLABORAT
1)Add Prosource 1 packet (30mL) PO 1x daily to consistent carbohydrate low sodium diet                2)Add Multivitamin for micronutrient coverage                3)Obtain weekly weights                  4)RDN remains available.  Katiuska Barron RDN, CDN  pager [phone]

## 2018-09-21 NOTE — PROGRESS NOTE ADULT - SUBJECTIVE AND OBJECTIVE BOX
CHIEF COMPLAINT: Patient is a 58y old  Female who presents with a chief complaint of dyspnea (20 Sep 2018 11:56)    Interval Events:      OBJECTIVE:  ICU Vital Signs Last 24 Hrs  T(C): 36.7 (21 Sep 2018 05:50), Max: 37.2 (20 Sep 2018 22:37)  T(F): 98.1 (21 Sep 2018 05:50), Max: 98.9 (20 Sep 2018 22:37)  HR: 74 (21 Sep 2018 07:59) (74 - 92)  BP: 111/68 (21 Sep 2018 05:50) (109/66 - 121/57)  BP(mean): --  ABP: --  ABP(mean): --  RR: 16 (21 Sep 2018 05:50) (16 - 20)  SpO2: 94% (21 Sep 2018 07:59) (94% - 100%)        09-20 @ 07:01  -  09-21 @ 07:00  --------------------------------------------------------  IN: 0 mL / OUT: 600 mL / NET: -600 mL      CAPILLARY BLOOD GLUCOSE      POCT Blood Glucose.: 132 mg/dL (20 Sep 2018 21:50)        HOSPITAL MEDICATIONS:  MEDICATIONS  (STANDING):  ALBUTerol/ipratropium for Nebulization 3 milliLiter(s) Nebulizer every 6 hours  aspirin  chewable 81 milliGRAM(s) Oral daily  brimonidine 0.2% Ophthalmic Solution 1 Drop(s) Right EYE three times a day  dextrose 50% Injectable 12.5 Gram(s) IV Push once  dextrose 50% Injectable 25 Gram(s) IV Push once  dextrose 50% Injectable 25 Gram(s) IV Push once  ferrous    sulfate 325 milliGRAM(s) Oral daily  gabapentin 400 milliGRAM(s) Oral three times a day  haloperidol    Injectable 3 milliGRAM(s) IV Push once  insulin lispro (HumaLOG) corrective regimen sliding scale   SubCutaneous three times a day before meals  insulin lispro (HumaLOG) corrective regimen sliding scale   SubCutaneous at bedtime  latanoprost 0.005% Ophthalmic Solution 1 Drop(s) Right EYE at bedtime  metoprolol tartrate 25 milliGRAM(s) Oral two times a day  predniSONE   Tablet 40 milliGRAM(s) Oral daily  rivaroxaban 20 milliGRAM(s) Oral every 24 hours  simvastatin 20 milliGRAM(s) Oral at bedtime  tamsulosin 0.4 milliGRAM(s) Oral at bedtime    MEDICATIONS  (PRN):  acetaminophen   Tablet .. 650 milliGRAM(s) Oral every 6 hours PRN Mild Pain (1 - 3), Moderate Pain (4 - 6), Severe Pain (7 - 10)  dextrose 40% Gel 15 Gram(s) Oral once PRN Blood Glucose LESS THAN 70 milliGRAM(s)/deciliter  glucagon  Injectable 1 milliGRAM(s) IntraMuscular once PRN Glucose LESS THAN 70 milligrams/deciliter      LABS:                        9.3    6.75  )-----------( 416      ( 20 Sep 2018 06:25 )             34.0     09-20    142  |  93<L>  |  18  ----------------------------<  101<H>  4.1   |  36<H>  |  0.46<L>    Ca    9.6      20 Sep 2018 06:25                MICROBIOLOGY:     RADIOLOGY:  [ ] Reviewed and interpreted by me    PULMONARY FUNCTION TESTS:    EKG: CHIEF COMPLAINT: Patient is a 58y old  Female who presents with a chief complaint of dyspnea (20 Sep 2018 11:56)    Interval Events: no acute events      OBJECTIVE:  ICU Vital Signs Last 24 Hrs  T(C): 36.7 (21 Sep 2018 05:50), Max: 37.2 (20 Sep 2018 22:37)  T(F): 98.1 (21 Sep 2018 05:50), Max: 98.9 (20 Sep 2018 22:37)  HR: 74 (21 Sep 2018 07:59) (74 - 92)  BP: 111/68 (21 Sep 2018 05:50) (109/66 - 121/57)  BP(mean): --  ABP: --  ABP(mean): --  RR: 16 (21 Sep 2018 05:50) (16 - 20)  SpO2: 94% (21 Sep 2018 07:59) (94% - 100%)        09-20 @ 07:01  -  09-21 @ 07:00  --------------------------------------------------------  IN: 0 mL / OUT: 600 mL / NET: -600 mL      CAPILLARY BLOOD GLUCOSE      POCT Blood Glucose.: 132 mg/dL (20 Sep 2018 21:50)        HOSPITAL MEDICATIONS:  MEDICATIONS  (STANDING):  ALBUTerol/ipratropium for Nebulization 3 milliLiter(s) Nebulizer every 6 hours  aspirin  chewable 81 milliGRAM(s) Oral daily  brimonidine 0.2% Ophthalmic Solution 1 Drop(s) Right EYE three times a day  dextrose 50% Injectable 12.5 Gram(s) IV Push once  dextrose 50% Injectable 25 Gram(s) IV Push once  dextrose 50% Injectable 25 Gram(s) IV Push once  ferrous    sulfate 325 milliGRAM(s) Oral daily  gabapentin 400 milliGRAM(s) Oral three times a day  haloperidol    Injectable 3 milliGRAM(s) IV Push once  insulin lispro (HumaLOG) corrective regimen sliding scale   SubCutaneous three times a day before meals  insulin lispro (HumaLOG) corrective regimen sliding scale   SubCutaneous at bedtime  latanoprost 0.005% Ophthalmic Solution 1 Drop(s) Right EYE at bedtime  metoprolol tartrate 25 milliGRAM(s) Oral two times a day  predniSONE   Tablet 40 milliGRAM(s) Oral daily  rivaroxaban 20 milliGRAM(s) Oral every 24 hours  simvastatin 20 milliGRAM(s) Oral at bedtime  tamsulosin 0.4 milliGRAM(s) Oral at bedtime    MEDICATIONS  (PRN):  acetaminophen   Tablet .. 650 milliGRAM(s) Oral every 6 hours PRN Mild Pain (1 - 3), Moderate Pain (4 - 6), Severe Pain (7 - 10)  dextrose 40% Gel 15 Gram(s) Oral once PRN Blood Glucose LESS THAN 70 milliGRAM(s)/deciliter  glucagon  Injectable 1 milliGRAM(s) IntraMuscular once PRN Glucose LESS THAN 70 milligrams/deciliter      LABS:                        9.3    6.75  )-----------( 416      ( 20 Sep 2018 06:25 )             34.0     09-20    142  |  93<L>  |  18  ----------------------------<  101<H>  4.1   |  36<H>  |  0.46<L>    Ca    9.6      20 Sep 2018 06:25                MICROBIOLOGY:     RADIOLOGY:  [ ] Reviewed and interpreted by me    PULMONARY FUNCTION TESTS:    EKG:

## 2018-09-28 NOTE — ED PROVIDER NOTE - DIAGNOSIS COUNSELING, MDM
Authored by Resident/PA/NP conducted a detailed discussion... I had a detailed discussion with the patient and/or guardian regarding the historical points, exam findings, and any diagnostic results supporting the discharge/admit diagnosis.

## 2018-10-04 ENCOUNTER — INPATIENT (INPATIENT)
Facility: HOSPITAL | Age: 58
LOS: 7 days | Discharge: NURSING, REHAB OR LONG-TERM CARE | DRG: 190 | End: 2018-10-12
Attending: INTERNAL MEDICINE | Admitting: INTERNAL MEDICINE
Payer: MEDICARE

## 2018-10-04 VITALS
RESPIRATION RATE: 22 BRPM | HEART RATE: 112 BPM | WEIGHT: 293 LBS | TEMPERATURE: 99 F | HEIGHT: 63 IN | SYSTOLIC BLOOD PRESSURE: 160 MMHG | OXYGEN SATURATION: 100 % | DIASTOLIC BLOOD PRESSURE: 83 MMHG

## 2018-10-04 DIAGNOSIS — Z90.49 ACQUIRED ABSENCE OF OTHER SPECIFIED PARTS OF DIGESTIVE TRACT: Chronic | ICD-10-CM

## 2018-10-04 DIAGNOSIS — Z98.891 HISTORY OF UTERINE SCAR FROM PREVIOUS SURGERY: Chronic | ICD-10-CM

## 2018-10-04 DIAGNOSIS — Z93.59 OTHER CYSTOSTOMY STATUS: Chronic | ICD-10-CM

## 2018-10-05 DIAGNOSIS — R33.9 RETENTION OF URINE, UNSPECIFIED: ICD-10-CM

## 2018-10-05 DIAGNOSIS — I10 ESSENTIAL (PRIMARY) HYPERTENSION: ICD-10-CM

## 2018-10-05 DIAGNOSIS — J44.9 CHRONIC OBSTRUCTIVE PULMONARY DISEASE, UNSPECIFIED: ICD-10-CM

## 2018-10-05 DIAGNOSIS — R31.9 HEMATURIA, UNSPECIFIED: ICD-10-CM

## 2018-10-05 DIAGNOSIS — J44.1 CHRONIC OBSTRUCTIVE PULMONARY DISEASE WITH (ACUTE) EXACERBATION: ICD-10-CM

## 2018-10-05 DIAGNOSIS — N18.9 CHRONIC KIDNEY DISEASE, UNSPECIFIED: ICD-10-CM

## 2018-10-05 DIAGNOSIS — M53.3 SACROCOCCYGEAL DISORDERS, NOT ELSEWHERE CLASSIFIED: ICD-10-CM

## 2018-10-05 DIAGNOSIS — E11.9 TYPE 2 DIABETES MELLITUS WITHOUT COMPLICATIONS: ICD-10-CM

## 2018-10-05 DIAGNOSIS — Z79.2 LONG TERM (CURRENT) USE OF ANTIBIOTICS: ICD-10-CM

## 2018-10-05 DIAGNOSIS — I26.99 OTHER PULMONARY EMBOLISM WITHOUT ACUTE COR PULMONALE: ICD-10-CM

## 2018-10-05 LAB
24R-OH-CALCIDIOL SERPL-MCNC: 18 NG/ML — LOW (ref 30–80)
ALBUMIN SERPL ELPH-MCNC: 2.5 G/DL — LOW (ref 3.5–5)
ALP SERPL-CCNC: 79 U/L — SIGNIFICANT CHANGE UP (ref 40–120)
ALT FLD-CCNC: 14 U/L DA — SIGNIFICANT CHANGE UP (ref 10–60)
ANION GAP SERPL CALC-SCNC: 2 MMOL/L — LOW (ref 5–17)
ANION GAP SERPL CALC-SCNC: 5 MMOL/L — SIGNIFICANT CHANGE UP (ref 5–17)
APPEARANCE UR: ABNORMAL
AST SERPL-CCNC: 28 U/L — SIGNIFICANT CHANGE UP (ref 10–40)
BASE EXCESS BLDV CALC-SCNC: 10 MMOL/L — HIGH (ref -2–2)
BASOPHILS # BLD AUTO: 0 K/UL — SIGNIFICANT CHANGE UP (ref 0–0.2)
BASOPHILS # BLD AUTO: 0.2 K/UL — SIGNIFICANT CHANGE UP (ref 0–0.2)
BASOPHILS NFR BLD AUTO: 0.3 % — SIGNIFICANT CHANGE UP (ref 0–2)
BASOPHILS NFR BLD AUTO: 1.7 % — SIGNIFICANT CHANGE UP (ref 0–2)
BILIRUB SERPL-MCNC: 0.2 MG/DL — SIGNIFICANT CHANGE UP (ref 0.2–1.2)
BILIRUB UR-MCNC: NEGATIVE — SIGNIFICANT CHANGE UP
BLOOD GAS COMMENTS, VENOUS: SIGNIFICANT CHANGE UP
BUN SERPL-MCNC: 14 MG/DL — SIGNIFICANT CHANGE UP (ref 7–18)
BUN SERPL-MCNC: 16 MG/DL — SIGNIFICANT CHANGE UP (ref 7–18)
CALCIUM SERPL-MCNC: 8.9 MG/DL — SIGNIFICANT CHANGE UP (ref 8.4–10.5)
CALCIUM SERPL-MCNC: 9.2 MG/DL — SIGNIFICANT CHANGE UP (ref 8.4–10.5)
CHLORIDE SERPL-SCNC: 102 MMOL/L — SIGNIFICANT CHANGE UP (ref 96–108)
CHLORIDE SERPL-SCNC: 99 MMOL/L — SIGNIFICANT CHANGE UP (ref 96–108)
CHOLEST SERPL-MCNC: 109 MG/DL — SIGNIFICANT CHANGE UP (ref 10–199)
CO2 SERPL-SCNC: 37 MMOL/L — HIGH (ref 22–31)
CO2 SERPL-SCNC: 38 MMOL/L — HIGH (ref 22–31)
COLOR SPEC: ABNORMAL
CREAT SERPL-MCNC: 0.46 MG/DL — LOW (ref 0.5–1.3)
CREAT SERPL-MCNC: 0.54 MG/DL — SIGNIFICANT CHANGE UP (ref 0.5–1.3)
DIFF PNL FLD: ABNORMAL
EOSINOPHIL # BLD AUTO: 0 K/UL — SIGNIFICANT CHANGE UP (ref 0–0.5)
EOSINOPHIL # BLD AUTO: 0.4 K/UL — SIGNIFICANT CHANGE UP (ref 0–0.5)
EOSINOPHIL NFR BLD AUTO: 0 % — SIGNIFICANT CHANGE UP (ref 0–6)
EOSINOPHIL NFR BLD AUTO: 3.1 % — SIGNIFICANT CHANGE UP (ref 0–6)
GLUCOSE BLDC GLUCOMTR-MCNC: 137 MG/DL — HIGH (ref 70–99)
GLUCOSE BLDC GLUCOMTR-MCNC: 155 MG/DL — HIGH (ref 70–99)
GLUCOSE BLDC GLUCOMTR-MCNC: 220 MG/DL — HIGH (ref 70–99)
GLUCOSE BLDC GLUCOMTR-MCNC: 98 MG/DL — SIGNIFICANT CHANGE UP (ref 70–99)
GLUCOSE SERPL-MCNC: 133 MG/DL — HIGH (ref 70–99)
GLUCOSE SERPL-MCNC: 260 MG/DL — HIGH (ref 70–99)
GLUCOSE UR QL: NEGATIVE — SIGNIFICANT CHANGE UP
HBA1C BLD-MCNC: 5.1 % — SIGNIFICANT CHANGE UP (ref 4–5.6)
HCO3 BLDV-SCNC: 37 MMOL/L — HIGH (ref 21–29)
HCT VFR BLD CALC: 29.9 % — LOW (ref 34.5–45)
HCT VFR BLD CALC: 30.4 % — LOW (ref 34.5–45)
HDLC SERPL-MCNC: 36 MG/DL — LOW
HGB BLD-MCNC: 8.3 G/DL — LOW (ref 11.5–15.5)
HGB BLD-MCNC: 8.4 G/DL — LOW (ref 11.5–15.5)
HOROWITZ INDEX BLDV+IHG-RTO: 28 — SIGNIFICANT CHANGE UP
IRON SATN MFR SERPL: 31 UG/DL — LOW (ref 40–150)
IRON SATN MFR SERPL: 8 % — LOW (ref 15–50)
KETONES UR-MCNC: NEGATIVE — SIGNIFICANT CHANGE UP
LACTATE SERPL-SCNC: 0.8 MMOL/L — SIGNIFICANT CHANGE UP (ref 0.7–2)
LEUKOCYTE ESTERASE UR-ACNC: ABNORMAL
LIPID PNL WITH DIRECT LDL SERPL: 60 MG/DL — SIGNIFICANT CHANGE UP
LYMPHOCYTES # BLD AUTO: 0.5 K/UL — LOW (ref 1–3.3)
LYMPHOCYTES # BLD AUTO: 1.5 K/UL — SIGNIFICANT CHANGE UP (ref 1–3.3)
LYMPHOCYTES # BLD AUTO: 12.2 % — LOW (ref 13–44)
LYMPHOCYTES # BLD AUTO: 5 % — LOW (ref 13–44)
MAGNESIUM SERPL-MCNC: 1.7 MG/DL — SIGNIFICANT CHANGE UP (ref 1.6–2.6)
MCHC RBC-ENTMCNC: 23.7 PG — LOW (ref 27–34)
MCHC RBC-ENTMCNC: 23.7 PG — LOW (ref 27–34)
MCHC RBC-ENTMCNC: 27.7 GM/DL — LOW (ref 32–36)
MCHC RBC-ENTMCNC: 27.8 GM/DL — LOW (ref 32–36)
MCV RBC AUTO: 85 FL — SIGNIFICANT CHANGE UP (ref 80–100)
MCV RBC AUTO: 85.8 FL — SIGNIFICANT CHANGE UP (ref 80–100)
MONOCYTES # BLD AUTO: 0 K/UL — SIGNIFICANT CHANGE UP (ref 0–0.9)
MONOCYTES # BLD AUTO: 0.6 K/UL — SIGNIFICANT CHANGE UP (ref 0–0.9)
MONOCYTES NFR BLD AUTO: 0.3 % — LOW (ref 2–14)
MONOCYTES NFR BLD AUTO: 4.6 % — SIGNIFICANT CHANGE UP (ref 2–14)
NEUTROPHILS # BLD AUTO: 10.1 K/UL — HIGH (ref 1.8–7.4)
NEUTROPHILS # BLD AUTO: 9.5 K/UL — HIGH (ref 1.8–7.4)
NEUTROPHILS NFR BLD AUTO: 78.4 % — HIGH (ref 43–77)
NEUTROPHILS NFR BLD AUTO: 94.4 % — HIGH (ref 43–77)
NITRITE UR-MCNC: NEGATIVE — SIGNIFICANT CHANGE UP
NT-PROBNP SERPL-SCNC: 336 PG/ML — HIGH (ref 0–125)
PCO2 BLDV: 69 MMHG — HIGH (ref 35–50)
PH BLDV: 7.35 — SIGNIFICANT CHANGE UP (ref 7.35–7.45)
PH UR: 8 — SIGNIFICANT CHANGE UP (ref 5–8)
PHOSPHATE SERPL-MCNC: 2.1 MG/DL — LOW (ref 2.5–4.5)
PLATELET # BLD AUTO: 376 K/UL — SIGNIFICANT CHANGE UP (ref 150–400)
PLATELET # BLD AUTO: 402 K/UL — HIGH (ref 150–400)
PO2 BLDV: 50 MMHG — HIGH (ref 25–45)
POTASSIUM SERPL-MCNC: 3.7 MMOL/L — SIGNIFICANT CHANGE UP (ref 3.5–5.3)
POTASSIUM SERPL-MCNC: 4.6 MMOL/L — SIGNIFICANT CHANGE UP (ref 3.5–5.3)
POTASSIUM SERPL-SCNC: 3.7 MMOL/L — SIGNIFICANT CHANGE UP (ref 3.5–5.3)
POTASSIUM SERPL-SCNC: 4.6 MMOL/L — SIGNIFICANT CHANGE UP (ref 3.5–5.3)
PROCALCITONIN SERPL-MCNC: 0.04 NG/ML — SIGNIFICANT CHANGE UP (ref 0.02–0.1)
PROT SERPL-MCNC: 7.3 G/DL — SIGNIFICANT CHANGE UP (ref 6–8.3)
PROT UR-MCNC: 100
RAPID RVP RESULT: SIGNIFICANT CHANGE UP
RBC # BLD: 3.52 M/UL — LOW (ref 3.8–5.2)
RBC # BLD: 3.55 M/UL — LOW (ref 3.8–5.2)
RBC # FLD: 18.4 % — HIGH (ref 10.3–14.5)
RBC # FLD: 19.1 % — HIGH (ref 10.3–14.5)
SAO2 % BLDV: 82 % — SIGNIFICANT CHANGE UP (ref 67–88)
SODIUM SERPL-SCNC: 141 MMOL/L — SIGNIFICANT CHANGE UP (ref 135–145)
SODIUM SERPL-SCNC: 142 MMOL/L — SIGNIFICANT CHANGE UP (ref 135–145)
SP GR SPEC: 1.01 — SIGNIFICANT CHANGE UP (ref 1.01–1.02)
TIBC SERPL-MCNC: 378 UG/DL — SIGNIFICANT CHANGE UP (ref 250–450)
TOTAL CHOLESTEROL/HDL RATIO MEASUREMENT: 3 RATIO — LOW (ref 3.3–7.1)
TRIGL SERPL-MCNC: 63 MG/DL — SIGNIFICANT CHANGE UP (ref 10–149)
TROPONIN I SERPL-MCNC: <0.015 NG/ML — SIGNIFICANT CHANGE UP (ref 0–0.04)
UIBC SERPL-MCNC: 347 UG/DL — SIGNIFICANT CHANGE UP (ref 110–370)
UROBILINOGEN FLD QL: NEGATIVE — SIGNIFICANT CHANGE UP
WBC # BLD: 10.7 K/UL — HIGH (ref 3.8–10.5)
WBC # BLD: 12.1 K/UL — HIGH (ref 3.8–10.5)
WBC # FLD AUTO: 10.7 K/UL — HIGH (ref 3.8–10.5)
WBC # FLD AUTO: 12.1 K/UL — HIGH (ref 3.8–10.5)

## 2018-10-05 PROCEDURE — 99285 EMERGENCY DEPT VISIT HI MDM: CPT | Mod: 25

## 2018-10-05 PROCEDURE — 71045 X-RAY EXAM CHEST 1 VIEW: CPT | Mod: 26

## 2018-10-05 RX ORDER — ASPIRIN/CALCIUM CARB/MAGNESIUM 324 MG
81 TABLET ORAL DAILY
Qty: 0 | Refills: 0 | Status: DISCONTINUED | OUTPATIENT
Start: 2018-10-05 | End: 2018-10-12

## 2018-10-05 RX ORDER — LACTULOSE 10 G/15ML
15 SOLUTION ORAL
Qty: 0 | Refills: 0 | Status: DISCONTINUED | OUTPATIENT
Start: 2018-10-05 | End: 2018-10-12

## 2018-10-05 RX ORDER — PANTOPRAZOLE SODIUM 20 MG/1
40 TABLET, DELAYED RELEASE ORAL
Qty: 0 | Refills: 0 | Status: DISCONTINUED | OUTPATIENT
Start: 2018-10-05 | End: 2018-10-12

## 2018-10-05 RX ORDER — IPRATROPIUM/ALBUTEROL SULFATE 18-103MCG
3 AEROSOL WITH ADAPTER (GRAM) INHALATION ONCE
Qty: 0 | Refills: 0 | Status: COMPLETED | OUTPATIENT
Start: 2018-10-05 | End: 2018-10-05

## 2018-10-05 RX ORDER — AZITHROMYCIN 500 MG/1
TABLET, FILM COATED ORAL
Qty: 0 | Refills: 0 | Status: DISCONTINUED | OUTPATIENT
Start: 2018-10-05 | End: 2018-10-12

## 2018-10-05 RX ORDER — RIVAROXABAN 15 MG-20MG
20 KIT ORAL EVERY 24 HOURS
Qty: 0 | Refills: 0 | Status: DISCONTINUED | OUTPATIENT
Start: 2018-10-05 | End: 2018-10-12

## 2018-10-05 RX ORDER — BRIMONIDINE TARTRATE 2 MG/MG
1 SOLUTION/ DROPS OPHTHALMIC
Qty: 0 | Refills: 0 | COMMUNITY

## 2018-10-05 RX ORDER — FERROUS SULFATE 325(65) MG
325 TABLET ORAL DAILY
Qty: 0 | Refills: 0 | Status: DISCONTINUED | OUTPATIENT
Start: 2018-10-05 | End: 2018-10-12

## 2018-10-05 RX ORDER — AZITHROMYCIN 500 MG/1
500 TABLET, FILM COATED ORAL ONCE
Qty: 0 | Refills: 0 | Status: COMPLETED | OUTPATIENT
Start: 2018-10-05 | End: 2018-10-05

## 2018-10-05 RX ORDER — AZITHROMYCIN 500 MG/1
500 TABLET, FILM COATED ORAL EVERY 24 HOURS
Qty: 0 | Refills: 0 | Status: DISCONTINUED | OUTPATIENT
Start: 2018-10-06 | End: 2018-10-12

## 2018-10-05 RX ORDER — SODIUM,POTASSIUM PHOSPHATES 278-250MG
1 POWDER IN PACKET (EA) ORAL
Qty: 0 | Refills: 0 | Status: COMPLETED | OUTPATIENT
Start: 2018-10-05 | End: 2018-10-06

## 2018-10-05 RX ORDER — RIVAROXABAN 15 MG-20MG
20 KIT ORAL EVERY 24 HOURS
Qty: 0 | Refills: 0 | Status: DISCONTINUED | OUTPATIENT
Start: 2018-10-05 | End: 2018-10-05

## 2018-10-05 RX ORDER — IPRATROPIUM/ALBUTEROL SULFATE 18-103MCG
3 AEROSOL WITH ADAPTER (GRAM) INHALATION EVERY 6 HOURS
Qty: 0 | Refills: 0 | Status: DISCONTINUED | OUTPATIENT
Start: 2018-10-05 | End: 2018-10-12

## 2018-10-05 RX ORDER — SIMVASTATIN 20 MG/1
20 TABLET, FILM COATED ORAL AT BEDTIME
Qty: 0 | Refills: 0 | Status: DISCONTINUED | OUTPATIENT
Start: 2018-10-05 | End: 2018-10-12

## 2018-10-05 RX ORDER — INFLUENZA VIRUS VACCINE 15; 15; 15; 15 UG/.5ML; UG/.5ML; UG/.5ML; UG/.5ML
0.5 SUSPENSION INTRAMUSCULAR ONCE
Qty: 0 | Refills: 0 | Status: COMPLETED | OUTPATIENT
Start: 2018-10-05 | End: 2018-10-09

## 2018-10-05 RX ORDER — BRIMONIDINE TARTRATE 2 MG/MG
1 SOLUTION/ DROPS OPHTHALMIC THREE TIMES A DAY
Qty: 0 | Refills: 0 | Status: DISCONTINUED | OUTPATIENT
Start: 2018-10-05 | End: 2018-10-12

## 2018-10-05 RX ORDER — INSULIN LISPRO 100/ML
VIAL (ML) SUBCUTANEOUS
Qty: 0 | Refills: 0 | Status: DISCONTINUED | OUTPATIENT
Start: 2018-10-05 | End: 2018-10-12

## 2018-10-05 RX ORDER — CEFTRIAXONE 500 MG/1
1 INJECTION, POWDER, FOR SOLUTION INTRAMUSCULAR; INTRAVENOUS ONCE
Qty: 0 | Refills: 0 | Status: COMPLETED | OUTPATIENT
Start: 2018-10-05 | End: 2018-10-05

## 2018-10-05 RX ORDER — GABAPENTIN 400 MG/1
400 CAPSULE ORAL THREE TIMES A DAY
Qty: 0 | Refills: 0 | Status: DISCONTINUED | OUTPATIENT
Start: 2018-10-05 | End: 2018-10-12

## 2018-10-05 RX ORDER — LATANOPROST 0.05 MG/ML
1 SOLUTION/ DROPS OPHTHALMIC; TOPICAL AT BEDTIME
Qty: 0 | Refills: 0 | Status: DISCONTINUED | OUTPATIENT
Start: 2018-10-05 | End: 2018-10-12

## 2018-10-05 RX ORDER — TAMSULOSIN HYDROCHLORIDE 0.4 MG/1
0.4 CAPSULE ORAL AT BEDTIME
Qty: 0 | Refills: 0 | Status: DISCONTINUED | OUTPATIENT
Start: 2018-10-05 | End: 2018-10-12

## 2018-10-05 RX ORDER — BUDESONIDE AND FORMOTEROL FUMARATE DIHYDRATE 160; 4.5 UG/1; UG/1
2 AEROSOL RESPIRATORY (INHALATION)
Qty: 0 | Refills: 0 | Status: DISCONTINUED | OUTPATIENT
Start: 2018-10-05 | End: 2018-10-12

## 2018-10-05 RX ORDER — CEFTRIAXONE 500 MG/1
INJECTION, POWDER, FOR SOLUTION INTRAMUSCULAR; INTRAVENOUS
Qty: 0 | Refills: 0 | Status: DISCONTINUED | OUTPATIENT
Start: 2018-10-05 | End: 2018-10-12

## 2018-10-05 RX ORDER — CEFTRIAXONE 500 MG/1
1 INJECTION, POWDER, FOR SOLUTION INTRAMUSCULAR; INTRAVENOUS EVERY 24 HOURS
Qty: 0 | Refills: 0 | Status: DISCONTINUED | OUTPATIENT
Start: 2018-10-06 | End: 2018-10-12

## 2018-10-05 RX ORDER — LACTULOSE 10 G/15ML
15 SOLUTION ORAL
Qty: 0 | Refills: 0 | COMMUNITY

## 2018-10-05 RX ORDER — DEXTROSE MONOHYDRATE, SODIUM CHLORIDE, AND POTASSIUM CHLORIDE 50; .745; 4.5 G/1000ML; G/1000ML; G/1000ML
1000 INJECTION, SOLUTION INTRAVENOUS
Qty: 0 | Refills: 0 | Status: DISCONTINUED | OUTPATIENT
Start: 2018-10-05 | End: 2018-10-07

## 2018-10-05 RX ORDER — ACETAMINOPHEN 500 MG
650 TABLET ORAL EVERY 6 HOURS
Qty: 0 | Refills: 0 | Status: DISCONTINUED | OUTPATIENT
Start: 2018-10-05 | End: 2018-10-12

## 2018-10-05 RX ORDER — AMLODIPINE BESYLATE 2.5 MG/1
2.5 TABLET ORAL DAILY
Qty: 0 | Refills: 0 | Status: DISCONTINUED | OUTPATIENT
Start: 2018-10-05 | End: 2018-10-12

## 2018-10-05 RX ADMIN — Medication 1 TABLET(S): at 22:40

## 2018-10-05 RX ADMIN — SIMVASTATIN 20 MILLIGRAM(S): 20 TABLET, FILM COATED ORAL at 22:40

## 2018-10-05 RX ADMIN — BUDESONIDE AND FORMOTEROL FUMARATE DIHYDRATE 2 PUFF(S): 160; 4.5 AEROSOL RESPIRATORY (INHALATION) at 14:57

## 2018-10-05 RX ADMIN — Medication 2: at 11:56

## 2018-10-05 RX ADMIN — Medication 650 MILLIGRAM(S): at 16:46

## 2018-10-05 RX ADMIN — BUDESONIDE AND FORMOTEROL FUMARATE DIHYDRATE 2 PUFF(S): 160; 4.5 AEROSOL RESPIRATORY (INHALATION) at 22:44

## 2018-10-05 RX ADMIN — RIVAROXABAN 20 MILLIGRAM(S): KIT at 18:31

## 2018-10-05 RX ADMIN — TAMSULOSIN HYDROCHLORIDE 0.4 MILLIGRAM(S): 0.4 CAPSULE ORAL at 22:40

## 2018-10-05 RX ADMIN — Medication 3 MILLILITER(S): at 15:20

## 2018-10-05 RX ADMIN — Medication 3 MILLILITER(S): at 20:34

## 2018-10-05 RX ADMIN — Medication 650 MILLIGRAM(S): at 18:11

## 2018-10-05 RX ADMIN — Medication 3 MILLILITER(S): at 09:10

## 2018-10-05 RX ADMIN — Medication 40 MILLIGRAM(S): at 14:59

## 2018-10-05 RX ADMIN — BRIMONIDINE TARTRATE 1 DROP(S): 2 SOLUTION/ DROPS OPHTHALMIC at 22:41

## 2018-10-05 RX ADMIN — Medication 40 MILLIGRAM(S): at 22:41

## 2018-10-05 RX ADMIN — Medication 325 MILLIGRAM(S): at 12:00

## 2018-10-05 RX ADMIN — Medication 1 TABLET(S): at 16:48

## 2018-10-05 RX ADMIN — Medication 81 MILLIGRAM(S): at 12:00

## 2018-10-05 RX ADMIN — AZITHROMYCIN 250 MILLIGRAM(S): 500 TABLET, FILM COATED ORAL at 09:33

## 2018-10-05 RX ADMIN — GABAPENTIN 400 MILLIGRAM(S): 400 CAPSULE ORAL at 22:40

## 2018-10-05 RX ADMIN — Medication 3 MILLILITER(S): at 00:25

## 2018-10-05 RX ADMIN — CEFTRIAXONE 100 GRAM(S): 500 INJECTION, POWDER, FOR SOLUTION INTRAMUSCULAR; INTRAVENOUS at 09:32

## 2018-10-05 RX ADMIN — LATANOPROST 1 DROP(S): 0.05 SOLUTION/ DROPS OPHTHALMIC; TOPICAL at 22:54

## 2018-10-05 RX ADMIN — BRIMONIDINE TARTRATE 1 DROP(S): 2 SOLUTION/ DROPS OPHTHALMIC at 14:58

## 2018-10-05 RX ADMIN — GABAPENTIN 400 MILLIGRAM(S): 400 CAPSULE ORAL at 14:57

## 2018-10-05 RX ADMIN — Medication 125 MILLIGRAM(S): at 01:22

## 2018-10-05 NOTE — ED PROVIDER NOTE - PMH
CKD (chronic kidney disease)    COPD (chronic obstructive pulmonary disease)    Diabetes    DM (diabetes mellitus)    Emphysema    Hemorrhoids    HTN (hypertension)    Morbid obesity    Overactive bladder    Urinary retention

## 2018-10-05 NOTE — H&P ADULT - ASSESSMENT
58 year old female, bed bound from [facility] with PMHx of  DM, COPD on home O2, PUSHPA (not on CPAP,) chronic urinary retention s/p suprapubic cath, PE on Xarelto, paraplegic ( unknown etiology ) presents from  with  difficulty breathing. As per patient she was having  difficulty breathing since 2 days with non productive cough, she used inhalers multiple times with no significant improved in her breathing, As per NH staff patient was saturating at 70;s on nasal canula and subsequently pt was sent to hospital. Patient denies any fever, chills, chest pain, nausea, vomiting, diarrhea, abdominal pain. Patient was suprapubic catheter which was leaking form the sides. Patient also has multiple draining sinus around buttock area with purulent discharge. Patient was recently discharged for COPD exacerbation on isaiah steroid therapy.     on admission patient was afebrile, HD stable, tachycardiac Hr 104, EKG sinus tachycardia, RR 22, O2 sat 95 at 3 l nasal canula, Trop t1 neg   S/p IV solumedrol, Duoneb and Levofloxacin in ED

## 2018-10-05 NOTE — H&P ADULT - PROBLEM SELECTOR PLAN 4
How Severe Are Your Spot(S)?: moderate
Have Your Spot(S) Been Treated In The Past?: has not been treated
Hpi Title: Evaluation of Skin Lesions
Additional History: Pain 0/10.
currently stable

## 2018-10-05 NOTE — ED PROVIDER NOTE - PHYSICAL EXAMINATION
GENERAL: wells appearing, no acute distress, laying flat in stretcher   HEAD: atraumatic   EYES: EOMI, pink conjunctiva   ENT: moist oral mucosa   CARDIAC: tachycardic, no edema, distal pulses present   RESPIRATORY: lungs CTAB, no increased work of breathing   GASTROINTESTINAL: no abdominal tenderness, no rebound or guarding, bowel sounds presents, obese  GENITOURINARY: suprapubic catheter in place with cloudy urine   MUSCULOSKELETAL: no deformity   NEUROLOGICAL: AAOx3, spontaneous movement of extremities   SKIN: intact, +sacral bedsore (per RN)   PSYCHIATRIC: cooperative  HEME LYMPH: no lymphadenopathy

## 2018-10-05 NOTE — H&P ADULT - HISTORY OF PRESENT ILLNESS
58 year old female, bed bound from [facility] with PMHx of  DM, COPD on home O2, PUSHPA (not on CPAP,) chronic urinary retention s/p suprapubic cath, PE on Xarelto, paraplegic ( unknown etiology ) presents from  with  difficulty breathing. As per patient she was having  difficulty breathing since 2 days with non productive cough 58 year old female, bed bound from [facility] with PMHx of  DM, COPD on home O2, PUSHPA (not on CPAP,) chronic urinary retention s/p suprapubic cath, PE on Xarelto, paraplegic ( unknown etiology ) presents from  with  difficulty breathing. As per patient she was having  difficulty breathing since 2 days with non productive cough, she used inhalers multiple times with no significant improved in her breathing,  As per NH staff patient was saturating at 70;s on nasal canula and subsequently pt was sent to hospital.  and subsequently pt was sent to hospital. Patient denies any fever, chills, chest pain, nausea, vomiting, diarrhea, abdominal pain. Patient was suprapubic catheter which was leaking form the sides. Patient also has multiple draining sinus around buttock area with purulent discharge. Patient was recently discharged for COPD exacerbation on isaiah steroid therapy.

## 2018-10-05 NOTE — H&P ADULT - PROBLEM SELECTOR PLAN 1
patient came with SOB and non productive cough   afebrile, HD stable, leucocytosis 12K   RR 22, O2 sat 95 at 3 l nasal canula, Trop t1 neg  VBG  7.36/69/50\  on PE patient has B/l decreased breath sound   f/u chest Xray, RVP, procalcitonin level  continue with solumedrol 40 mg IV @ 8 hrs   Duoneb, Symbicort  Pulmonary consult   Rocephin and Azithromycin patient came with SOB and non productive cough   afebrile, HD stable, leucocytosis 12K   RR 22, O2 sat 95 at 3 l nasal canula, Trop t1 neg  VBG  7.36/69/50\  on PE patient has B/l decreased breath sound   f/u chest Xray, RVP, procalcitonin level  continue with solumedrol 40 mg IV @ 8 hrs   Duoneb, Symbicort  Rocephin and Azithromycin

## 2018-10-05 NOTE — ED PROVIDER NOTE - PROGRESS NOTE DETAILS
xray chest - mild vascular congestion, L pleural effusion vs infiltrate xray chest - mild vascular congestion, L pleural effusion vs infiltrate  labs - leukocytosis, anemia   Oxygen 88-92% on 4L NC. HR 85-95 bpm. Pt sleeping.   Will admit for SOB, possibly 2/2 to COPD exacerbation and r/o pneumonia.   Endorsed to MAR.

## 2018-10-05 NOTE — H&P ADULT - PROBLEM SELECTOR PLAN 8
IMPROVE VTE Individual Risk Assessment          RISK                                                          Points  [  ] Previous VTE                                                3  [  ] Thrombophilia                                             2  [ x ] Lower limb paralysis                                   2        (unable to hold up >15 seconds)    [  ] Current Cancer                                             2         (within 6 months)  [ x ] Immobilization > 24 hrs                              1  [  ] ICU/CCU stay > 24 hours                             1  [ x ] Age > 60                                                         1    IMPROVE VTE Score: VTE score 4   will hold chemo prophylaxis due to hematuria   SCD for NOW   Protonix for GI prophylaxis

## 2018-10-05 NOTE — CHART NOTE - NSCHARTNOTEFT_GEN_A_CORE
Patient seen at bedside for change of SPT. Suprapubic catheter balloon fully deflated (approx. 35cc) and catheter gently pulled. Resistance met when trying to fully remove catheter which required catheter to be gently re-inserted and balloon fully reinflated to with 35cc. Existing catheter is 16fr and is draining well. Will keep existing suprapubic in place. Recommend DSD around catheter site as needed. Discussed with Dr. Bowser.

## 2018-10-05 NOTE — ADVANCED PRACTICE NURSE CONSULT - ASSESSMENT
This is a 58yr old female patient admitted for COPD, presenting with the following:  -There is evidence of Hidradenitis Suppurativa to the Bilateral Gluteus, with purulent drainage and surrounding tissue edema	  -There is evidence of Incontinence Associated Dermatitis to the Groin area

## 2018-10-05 NOTE — PATIENT PROFILE ADULT. - NS TRANSFER PATIENT BELONGINGS
Clothing/Cell Phone/PDA (specify)/Jewelry/Money (specify)/3 wallets, $64. headphones, 3 cell phones, oxygen sensor

## 2018-10-05 NOTE — H&P ADULT - PROBLEM SELECTOR PLAN 7
pt has sacral decub with multiple draining sinus   wound care consult   will need surgical consult for further evaluation

## 2018-10-05 NOTE — CONSULT NOTE ADULT - PROBLEM SELECTOR RECOMMENDATION 2
No evidence of hematuria on exam  H/H stable, monitor  Check UA  Monitor quality of urine output  Resume AC as needed per medical team

## 2018-10-05 NOTE — ED PROVIDER NOTE - MEDICAL DECISION MAKING DETAILS
59 yo F with COPD and PUSHPA presents with SOB and chest pain. Will check, labs, UA, EKG, CXR, treat for COD exacerbation, observe and reassess.

## 2018-10-05 NOTE — CONSULT NOTE ADULT - PROBLEM SELECTOR RECOMMENDATION 9
Continue SPT and monitor urine output.  D/w Dr. Bowser, last catheter change was 8/10 at [facility]  Will attempt to change SPT at bedside if patient agreeable  Keep area clean and dry

## 2018-10-05 NOTE — ED ADULT NURSE NOTE - NSIMPLEMENTINTERV_GEN_ALL_ED
Implemented All Fall Risk Interventions:  [place] to call system. Call bell, personal items and telephone within reach. Instruct patient to call for assistance. Room bathroom lighting operational. Non-slip footwear when patient is off stretcher. Physically safe environment: no spills, clutter or unnecessary equipment. Stretcher in lowest position, wheels locked, appropriate side rails in place. Provide visual cue, wrist band, yellow gown, etc. Monitor gait and stability. Monitor for mental status changes and reorient to person, place, and time. Review medications for side effects contributing to fall risk. Reinforce activity limits and safety measures with patient and family.

## 2018-10-05 NOTE — H&P ADULT - NSHPLABSRESULTS_GEN_ALL_CORE
8.3    12.1  )-----------( 402      ( 05 Oct 2018 00:39 )             29.9       10-05    142  |  102  |  16  ----------------------------<  133<H>  4.6   |  38<H>  |  0.46<L>    Ca    8.9      05 Oct 2018 00:39    TPro  7.3  /  Alb  2.5<L>  /  TBili  0.2  /  DBili  x   /  AST  28  /  ALT  14  /  AlkPhos  79  10-05

## 2018-10-05 NOTE — ED PROVIDER NOTE - OBJECTIVE STATEMENT
57 y/o F with DM, COPD on home O2, PUSHPA (not on CPAP,) HTN, CKD, chronic urinary retention s/p suprapubic cath, PE (on Xarelto), bedbound presents from NH with SOB x 1 day. Associated with chest tightness and chest pain. Denies fever, cough, hemoptysis, syncope, other acute complaints. 59 y/o F with DM, COPD on home O2, PUSHPA (not on CPAP), HTN, CKD, chronic urinary retention with suprapubic cath, PE (on Xarelto), bedbound presents from NH with SOB x 1 day. Associated with chest tightness and chest pain. Denies fever, cough, hemoptysis, syncope, other acute complaints.

## 2018-10-05 NOTE — ADVANCED PRACTICE NURSE CONSULT - RECOMMEDATIONS
-Clean the all affected areas with warm water, mild soap, pat dry, and apply skin prep to the surrounding skin  -Apply Antifungal Powder to the Groin areas b.i.d. PRN  -Please consult Surgery for further evaluation of the Bilateral Gluteus  -Until then apply layers of ABD pads and secure with tape b.i.d. PRN  -Elevate/float the patients heels using heel protectors and reposition the patient Q 2hrs using wedges or pillows

## 2018-10-05 NOTE — H&P ADULT - NSHPPHYSICALEXAM_GEN_ALL_CORE
Vital Signs Last 24 Hrs  T(C): 37.2 (05 Oct 2018 06:12), Max: 37.2 (05 Oct 2018 06:12)  T(F): 98.9 (05 Oct 2018 06:12), Max: 98.9 (05 Oct 2018 06:12)  HR: 98 (05 Oct 2018 06:12) (95 - 112)  BP: 138/79 (05 Oct 2018 06:12) (103/57 - 160/83)  BP(mean): --  RR: 18 (05 Oct 2018 06:12) (18 - 22)  SpO2: 95% (05 Oct 2018 06:12) (92% - 100%)

## 2018-10-05 NOTE — ED PROVIDER NOTE - NS ED ROS FT
CONSTITUTIONAL: no fever, no chills   EYES: no visual changes, no eye pain   ENMT: no nasal congestion, no throat pain  CARDIOVASCULAR: +chest pain, no edema, no palpitations   RESPIRATORY: +shortness of breath, no cough   GASTROINTESTINAL: no abdominal pain, no nausea, no vomiting, no diarrhea, no constipation   GENITOURINARY: no dysuria, no frequency  MUSCULOSKELETAL: no joint pains, no myalgias, no back pain   SKIN: no rashes  NEUROLOGICAL: no weakness, no headache, no dizziness, no slurred speech, no syncope   PSYCHIATRIC: no known mental health illness   HEME/LYMPH: no lymphadenopathy      All other ROS negative except as per HPI

## 2018-10-05 NOTE — H&P ADULT - PROBLEM SELECTOR PLAN 3
patient is metoprolol 25 mg BID   will hold due to prevent bronchospasm   will start on amlodipine 5 mg daily

## 2018-10-06 LAB
ANION GAP SERPL CALC-SCNC: 4 MMOL/L — LOW (ref 5–17)
BUN SERPL-MCNC: 16 MG/DL — SIGNIFICANT CHANGE UP (ref 7–18)
CALCIUM SERPL-MCNC: 9.2 MG/DL — SIGNIFICANT CHANGE UP (ref 8.4–10.5)
CHLORIDE SERPL-SCNC: 101 MMOL/L — SIGNIFICANT CHANGE UP (ref 96–108)
CO2 SERPL-SCNC: 36 MMOL/L — HIGH (ref 22–31)
CREAT SERPL-MCNC: 0.53 MG/DL — SIGNIFICANT CHANGE UP (ref 0.5–1.3)
FERRITIN SERPL-MCNC: 13 NG/ML — LOW (ref 15–150)
FOLATE SERPL-MCNC: 6.8 NG/ML — SIGNIFICANT CHANGE UP
GLUCOSE BLDC GLUCOMTR-MCNC: 122 MG/DL — HIGH (ref 70–99)
GLUCOSE BLDC GLUCOMTR-MCNC: 126 MG/DL — HIGH (ref 70–99)
GLUCOSE SERPL-MCNC: 124 MG/DL — HIGH (ref 70–99)
HCT VFR BLD CALC: 29.7 % — LOW (ref 34.5–45)
HGB BLD-MCNC: 8.5 G/DL — LOW (ref 11.5–15.5)
MAGNESIUM SERPL-MCNC: 1.9 MG/DL — SIGNIFICANT CHANGE UP (ref 1.6–2.6)
MCHC RBC-ENTMCNC: 24.4 PG — LOW (ref 27–34)
MCHC RBC-ENTMCNC: 28.6 GM/DL — LOW (ref 32–36)
MCV RBC AUTO: 85.2 FL — SIGNIFICANT CHANGE UP (ref 80–100)
PHOSPHATE SERPL-MCNC: 3.6 MG/DL — SIGNIFICANT CHANGE UP (ref 2.5–4.5)
PLATELET # BLD AUTO: 365 K/UL — SIGNIFICANT CHANGE UP (ref 150–400)
POTASSIUM SERPL-MCNC: 4.1 MMOL/L — SIGNIFICANT CHANGE UP (ref 3.5–5.3)
POTASSIUM SERPL-SCNC: 4.1 MMOL/L — SIGNIFICANT CHANGE UP (ref 3.5–5.3)
RBC # BLD: 3.49 M/UL — LOW (ref 3.8–5.2)
RBC # FLD: 19.3 % — HIGH (ref 10.3–14.5)
SODIUM SERPL-SCNC: 141 MMOL/L — SIGNIFICANT CHANGE UP (ref 135–145)
WBC # BLD: 13.2 K/UL — HIGH (ref 3.8–10.5)
WBC # FLD AUTO: 13.2 K/UL — HIGH (ref 3.8–10.5)

## 2018-10-06 RX ADMIN — Medication 81 MILLIGRAM(S): at 12:35

## 2018-10-06 RX ADMIN — BUDESONIDE AND FORMOTEROL FUMARATE DIHYDRATE 2 PUFF(S): 160; 4.5 AEROSOL RESPIRATORY (INHALATION) at 15:00

## 2018-10-06 RX ADMIN — BRIMONIDINE TARTRATE 1 DROP(S): 2 SOLUTION/ DROPS OPHTHALMIC at 05:50

## 2018-10-06 RX ADMIN — LATANOPROST 1 DROP(S): 0.05 SOLUTION/ DROPS OPHTHALMIC; TOPICAL at 22:07

## 2018-10-06 RX ADMIN — DEXTROSE MONOHYDRATE, SODIUM CHLORIDE, AND POTASSIUM CHLORIDE 75 MILLILITER(S): 50; .745; 4.5 INJECTION, SOLUTION INTRAVENOUS at 00:54

## 2018-10-06 RX ADMIN — Medication 40 MILLIGRAM(S): at 15:00

## 2018-10-06 RX ADMIN — Medication 40 MILLIGRAM(S): at 05:52

## 2018-10-06 RX ADMIN — SIMVASTATIN 20 MILLIGRAM(S): 20 TABLET, FILM COATED ORAL at 22:08

## 2018-10-06 RX ADMIN — CEFTRIAXONE 100 GRAM(S): 500 INJECTION, POWDER, FOR SOLUTION INTRAMUSCULAR; INTRAVENOUS at 08:13

## 2018-10-06 RX ADMIN — Medication 650 MILLIGRAM(S): at 22:09

## 2018-10-06 RX ADMIN — AMLODIPINE BESYLATE 2.5 MILLIGRAM(S): 2.5 TABLET ORAL at 05:49

## 2018-10-06 RX ADMIN — Medication 40 MILLIGRAM(S): at 22:07

## 2018-10-06 RX ADMIN — AZITHROMYCIN 250 MILLIGRAM(S): 500 TABLET, FILM COATED ORAL at 05:51

## 2018-10-06 RX ADMIN — TAMSULOSIN HYDROCHLORIDE 0.4 MILLIGRAM(S): 0.4 CAPSULE ORAL at 22:08

## 2018-10-06 RX ADMIN — Medication 3 MILLILITER(S): at 14:51

## 2018-10-06 RX ADMIN — GABAPENTIN 400 MILLIGRAM(S): 400 CAPSULE ORAL at 22:08

## 2018-10-06 RX ADMIN — BRIMONIDINE TARTRATE 1 DROP(S): 2 SOLUTION/ DROPS OPHTHALMIC at 22:07

## 2018-10-06 RX ADMIN — RIVAROXABAN 20 MILLIGRAM(S): KIT at 17:25

## 2018-10-06 RX ADMIN — Medication 1 TABLET(S): at 12:34

## 2018-10-06 RX ADMIN — BRIMONIDINE TARTRATE 1 DROP(S): 2 SOLUTION/ DROPS OPHTHALMIC at 15:01

## 2018-10-06 RX ADMIN — PANTOPRAZOLE SODIUM 40 MILLIGRAM(S): 20 TABLET, DELAYED RELEASE ORAL at 05:50

## 2018-10-06 RX ADMIN — GABAPENTIN 400 MILLIGRAM(S): 400 CAPSULE ORAL at 05:49

## 2018-10-06 RX ADMIN — Medication 650 MILLIGRAM(S): at 23:09

## 2018-10-06 RX ADMIN — Medication 650 MILLIGRAM(S): at 05:57

## 2018-10-06 RX ADMIN — DEXTROSE MONOHYDRATE, SODIUM CHLORIDE, AND POTASSIUM CHLORIDE 75 MILLILITER(S): 50; .745; 4.5 INJECTION, SOLUTION INTRAVENOUS at 17:25

## 2018-10-06 RX ADMIN — Medication 650 MILLIGRAM(S): at 07:30

## 2018-10-06 RX ADMIN — Medication 3 MILLILITER(S): at 09:52

## 2018-10-06 RX ADMIN — BUDESONIDE AND FORMOTEROL FUMARATE DIHYDRATE 2 PUFF(S): 160; 4.5 AEROSOL RESPIRATORY (INHALATION) at 23:17

## 2018-10-06 RX ADMIN — Medication 3 MILLILITER(S): at 20:09

## 2018-10-06 RX ADMIN — GABAPENTIN 400 MILLIGRAM(S): 400 CAPSULE ORAL at 15:01

## 2018-10-06 NOTE — PROGRESS NOTE ADULT - SUBJECTIVE AND OBJECTIVE BOX
Patient is a 58y old  Female who presents with a chief complaint of SOB (05 Oct 2018 11:16)      INTERVAL HPI/OVERNIGHT EVENTS: none.  T(C): 37 (10-06-18 @ 16:25), Max: 37.7 (10-05-18 @ 20:39)  HR: 99 (10-06-18 @ 16:25) (80 - 107)  BP: 118/63 (10-06-18 @ 16:25) (116/62 - 133/65)  RR: 18 (10-06-18 @ 16:25) (16 - 20)  SpO2: 90% (10-06-18 @ 16:25) (89% - 100%)  Wt(kg): --  I&O's Summary      LABS:                        8.5    13.2  )-----------( 365      ( 06 Oct 2018 10:53 )             29.7     10-    141  |  101  |  16  ----------------------------<  124<H>  4.1   |  36<H>  |  0.53    Ca    9.2      06 Oct 2018 10:53  Phos  3.6     10-  Mg     1.9     10-    TPro  7.3  /  Alb  2.5<L>  /  TBili  0.2  /  DBili  x   /  AST  28  /  ALT  14  /  AlkPhos  79  10-05      Urinalysis Basic - ( 05 Oct 2018 13:06 )    Color: Pink / Appearance: very cloudy / S.010 / pH: x  Gluc: x / Ketone: Negative  / Bili: Negative / Urobili: Negative   Blood: x / Protein: 100 / Nitrite: Negative   Leuk Esterase: Moderate / RBC: >50 /HPF / WBC 11-25 /HPF   Sq Epi: x / Non Sq Epi: Moderate /HPF / Bacteria: TNTC /HPF      CAPILLARY BLOOD GLUCOSE      POCT Blood Glucose.: 122 mg/dL (06 Oct 2018 11:44)  POCT Blood Glucose.: 126 mg/dL (06 Oct 2018 07:54)  POCT Blood Glucose.: 137 mg/dL (05 Oct 2018 21:41)        Urinalysis Basic - ( 05 Oct 2018 13:06 )    Color: Pink / Appearance: very cloudy / S.010 / pH: x  Gluc: x / Ketone: Negative  / Bili: Negative / Urobili: Negative   Blood: x / Protein: 100 / Nitrite: Negative   Leuk Esterase: Moderate / RBC: >50 /HPF / WBC 11-25 /HPF   Sq Epi: x / Non Sq Epi: Moderate /HPF / Bacteria: TNTC /HPF      REVIEW OF SYSTEMS:  CONSTITUTIONAL: No fever, weight loss, or fatigue  EYES: No eye pain, visual disturbances, or discharge  ENMT:  No difficulty hearing, tinnitus, vertigo; No sinus or throat pain  NECK: No pain or stiffness  BREASTS: No pain, masses, or nipple discharge  RESPIRATORY: No cough, wheezing, chills or hemoptysis; No shortness of breath  CARDIOVASCULAR: No chest pain, palpitations, dizziness, or leg swelling  GASTROINTESTINAL: No abdominal or epigastric pain. No nausea, vomiting, or hematemesis; No diarrhea or constipation. No melena or hematochezia.  GENITOURINARY: No dysuria, frequency, hematuria, or incontinence  NEUROLOGICAL: No headaches, memory loss, loss of strength, numbness, or tremors  SKIN: No itching, burning, rashes, or lesions   LYMPH NODES: No enlarged glands  ENDOCRINE: No heat or cold intolerance; No hair loss  MUSCULOSKELETAL: No joint pain or swelling; No muscle, back, or extremity pain  PSYCHIATRIC: No depression, anxiety, mood swings, or difficulty sleeping  HEME/LYMPH: No easy bruising, or bleeding gums  ALLERY AND IMMUNOLOGIC: No hives or eczema    RADIOLOGY & ADDITIONAL TESTS:    Imaging Personally Reviewed:  [ ] YES  [ ] NO    Consultant(s) Notes Reviewed:  [ ] YES  [ ] NO    PHYSICAL EXAM:  GENERAL: NAD, well-groomed, well-developed  HEAD:  Atraumatic, Normocephalic  EYES: EOMI, PERRLA, conjunctiva and sclera clear  ENMT: No tonsillar erythema, exudates, or enlargement; Moist mucous membranes, Good dentition, No lesions  NECK: Supple, No JVD, Normal thyroid  NERVOUS SYSTEM:  Alert & Oriented X3, Good concentration; Motor Strength 5/5 B/L upper and lower extremities; DTRs 2+ intact and symmetric  CHEST/LUNG: Clear to percussion bilaterally; No rales, rhonchi, wheezing, or rubs  HEART: Regular rate and rhythm; No murmurs, rubs, or gallops  ABDOMEN: Soft, Nontender, Nondistended; Bowel sounds present  EXTREMITIES:  2+ Peripheral Pulses, No clubbing, cyanosis, or edema  LYMPH: No lymphadenopathy noted  SKIN: No rashes or lesions    Care Discussed with Consultants/Other Providers [ ] YES  [ ] NO    acetaminophen   Tablet .. 650 milliGRAM(s) Oral every 6 hours PRN Temp greater or equal to 38C (100.4F), Mild Pain (1 - 3)  ALBUTerol/ipratropium for Nebulization 3 milliLiter(s) Nebulizer every 6 hours  amLODIPine   Tablet 2.5 milliGRAM(s) Oral daily  aspirin  chewable 81 milliGRAM(s) Oral daily  azithromycin  IVPB      azithromycin  IVPB 500 milliGRAM(s) IV Intermittent every 24 hours  brimonidine 0.2% Ophthalmic Solution 1 Drop(s) Both EYES three times a day  buDESOnide 160 MICROgram(s)/formoterol 4.5 MICROgram(s) Inhaler 2 Puff(s) Inhalation two times a day  cefTRIAXone   IVPB      cefTRIAXone   IVPB 1 Gram(s) IV Intermittent every 24 hours  ferrous    sulfate 325 milliGRAM(s) Oral daily  gabapentin 400 milliGRAM(s) Oral three times a day  influenza   Vaccine 0.5 milliLiter(s) IntraMuscular once  insulin lispro (HumaLOG) corrective regimen sliding scale   SubCutaneous three times a day before meals  lactulose Syrup 15 Gram(s) Oral <User Schedule>  latanoprost 0.005% Ophthalmic Solution 1 Drop(s) Both EYES at bedtime  methylPREDNISolone sodium succinate Injectable 40 milliGRAM(s) IV Push every 8 hours  pantoprazole    Tablet 40 milliGRAM(s) Oral before breakfast  rivaroxaban 20 milliGRAM(s) Oral every 24 hours  simvastatin 20 milliGRAM(s) Oral at bedtime  sodium chloride 0.9% with potassium chloride 20 mEq/L 1000 milliLiter(s) IV Continuous <Continuous>  tamsulosin 0.4 milliGRAM(s) Oral at bedtime      A/P COPD, PUSHPA, Obesity hypoventilation, HTN, DM, Urinary retention , s/p Supra pubic, paraplegia, OAB    Plan: continue present treatment  continue IV ab for pneumonia.

## 2018-10-07 DIAGNOSIS — Z02.9 ENCOUNTER FOR ADMINISTRATIVE EXAMINATIONS, UNSPECIFIED: ICD-10-CM

## 2018-10-07 LAB
GLUCOSE BLDC GLUCOMTR-MCNC: 123 MG/DL — HIGH (ref 70–99)
GLUCOSE BLDC GLUCOMTR-MCNC: 131 MG/DL — HIGH (ref 70–99)
GLUCOSE BLDC GLUCOMTR-MCNC: 143 MG/DL — HIGH (ref 70–99)
GLUCOSE BLDC GLUCOMTR-MCNC: 148 MG/DL — HIGH (ref 70–99)
GLUCOSE BLDC GLUCOMTR-MCNC: 176 MG/DL — HIGH (ref 70–99)
GLUCOSE BLDC GLUCOMTR-MCNC: 223 MG/DL — HIGH (ref 70–99)
GLUCOSE BLDC GLUCOMTR-MCNC: 418 MG/DL — HIGH (ref 70–99)

## 2018-10-07 RX ORDER — IBUPROFEN 200 MG
600 TABLET ORAL EVERY 6 HOURS
Qty: 0 | Refills: 0 | Status: COMPLETED | OUTPATIENT
Start: 2018-10-07 | End: 2018-10-10

## 2018-10-07 RX ORDER — IPRATROPIUM/ALBUTEROL SULFATE 18-103MCG
3 AEROSOL WITH ADAPTER (GRAM) INHALATION ONCE
Qty: 0 | Refills: 0 | Status: COMPLETED | OUTPATIENT
Start: 2018-10-07 | End: 2018-10-07

## 2018-10-07 RX ORDER — ACETAMINOPHEN 500 MG
650 TABLET ORAL EVERY 6 HOURS
Qty: 0 | Refills: 0 | Status: COMPLETED | OUTPATIENT
Start: 2018-10-07 | End: 2018-10-10

## 2018-10-07 RX ADMIN — BRIMONIDINE TARTRATE 1 DROP(S): 2 SOLUTION/ DROPS OPHTHALMIC at 21:24

## 2018-10-07 RX ADMIN — Medication 3 MILLILITER(S): at 14:29

## 2018-10-07 RX ADMIN — Medication 325 MILLIGRAM(S): at 11:33

## 2018-10-07 RX ADMIN — DEXTROSE MONOHYDRATE, SODIUM CHLORIDE, AND POTASSIUM CHLORIDE 75 MILLILITER(S): 50; .745; 4.5 INJECTION, SOLUTION INTRAVENOUS at 05:31

## 2018-10-07 RX ADMIN — BRIMONIDINE TARTRATE 1 DROP(S): 2 SOLUTION/ DROPS OPHTHALMIC at 05:30

## 2018-10-07 RX ADMIN — GABAPENTIN 400 MILLIGRAM(S): 400 CAPSULE ORAL at 05:30

## 2018-10-07 RX ADMIN — Medication 40 MILLIGRAM(S): at 13:14

## 2018-10-07 RX ADMIN — Medication 650 MILLIGRAM(S): at 12:00

## 2018-10-07 RX ADMIN — Medication 3 MILLILITER(S): at 03:30

## 2018-10-07 RX ADMIN — BUDESONIDE AND FORMOTEROL FUMARATE DIHYDRATE 2 PUFF(S): 160; 4.5 AEROSOL RESPIRATORY (INHALATION) at 11:33

## 2018-10-07 RX ADMIN — BRIMONIDINE TARTRATE 1 DROP(S): 2 SOLUTION/ DROPS OPHTHALMIC at 13:14

## 2018-10-07 RX ADMIN — RIVAROXABAN 20 MILLIGRAM(S): KIT at 17:14

## 2018-10-07 RX ADMIN — GABAPENTIN 400 MILLIGRAM(S): 400 CAPSULE ORAL at 13:14

## 2018-10-07 RX ADMIN — Medication 40 MILLIGRAM(S): at 21:25

## 2018-10-07 RX ADMIN — SIMVASTATIN 20 MILLIGRAM(S): 20 TABLET, FILM COATED ORAL at 21:24

## 2018-10-07 RX ADMIN — Medication 40 MILLIGRAM(S): at 05:30

## 2018-10-07 RX ADMIN — PANTOPRAZOLE SODIUM 40 MILLIGRAM(S): 20 TABLET, DELAYED RELEASE ORAL at 05:30

## 2018-10-07 RX ADMIN — Medication 3 MILLILITER(S): at 18:17

## 2018-10-07 RX ADMIN — GABAPENTIN 400 MILLIGRAM(S): 400 CAPSULE ORAL at 21:24

## 2018-10-07 RX ADMIN — LATANOPROST 1 DROP(S): 0.05 SOLUTION/ DROPS OPHTHALMIC; TOPICAL at 21:25

## 2018-10-07 RX ADMIN — Medication 81 MILLIGRAM(S): at 11:33

## 2018-10-07 RX ADMIN — AMLODIPINE BESYLATE 2.5 MILLIGRAM(S): 2.5 TABLET ORAL at 05:30

## 2018-10-07 RX ADMIN — Medication 600 MILLIGRAM(S): at 23:59

## 2018-10-07 RX ADMIN — AZITHROMYCIN 250 MILLIGRAM(S): 500 TABLET, FILM COATED ORAL at 05:29

## 2018-10-07 RX ADMIN — Medication 600 MILLIGRAM(S): at 19:05

## 2018-10-07 RX ADMIN — Medication 600 MILLIGRAM(S): at 23:29

## 2018-10-07 RX ADMIN — Medication 3 MILLILITER(S): at 09:36

## 2018-10-07 RX ADMIN — Medication 650 MILLIGRAM(S): at 11:35

## 2018-10-07 RX ADMIN — BUDESONIDE AND FORMOTEROL FUMARATE DIHYDRATE 2 PUFF(S): 160; 4.5 AEROSOL RESPIRATORY (INHALATION) at 21:24

## 2018-10-07 RX ADMIN — TAMSULOSIN HYDROCHLORIDE 0.4 MILLIGRAM(S): 0.4 CAPSULE ORAL at 21:24

## 2018-10-07 RX ADMIN — Medication 600 MILLIGRAM(S): at 18:29

## 2018-10-07 RX ADMIN — Medication 3 MILLILITER(S): at 20:10

## 2018-10-07 RX ADMIN — CEFTRIAXONE 100 GRAM(S): 500 INJECTION, POWDER, FOR SOLUTION INTRAMUSCULAR; INTRAVENOUS at 06:53

## 2018-10-07 NOTE — PROGRESS NOTE ADULT - PROBLEM SELECTOR PLAN 1
VBG  7.36/69/50\  on PE patient has B/l decreased breath sound   f/u chest Xray, RVP, procalcitonin level  continue with solumedrol 40 mg IV @ 8 hrs   Duoneb, Symbicort  Rocephin and Azithromycin  ID-Dr. Montoya  no episode of fever after friday evening

## 2018-10-07 NOTE — PROGRESS NOTE ADULT - SUBJECTIVE AND OBJECTIVE BOX
PGY 1 Note discussed with supervising resident and primary attending    Patient is a 58y old  Female who presents with a chief complaint of SOB (06 Oct 2018 16:50)      INTERVAL HPI/OVERNIGHT EVENTS: offers no new complaints; current symptoms resolving    MEDICATIONS  (STANDING):  ALBUTerol/ipratropium for Nebulization 3 milliLiter(s) Nebulizer every 6 hours  amLODIPine   Tablet 2.5 milliGRAM(s) Oral daily  aspirin  chewable 81 milliGRAM(s) Oral daily  azithromycin  IVPB      azithromycin  IVPB 500 milliGRAM(s) IV Intermittent every 24 hours  brimonidine 0.2% Ophthalmic Solution 1 Drop(s) Both EYES three times a day  buDESOnide 160 MICROgram(s)/formoterol 4.5 MICROgram(s) Inhaler 2 Puff(s) Inhalation two times a day  cefTRIAXone   IVPB      cefTRIAXone   IVPB 1 Gram(s) IV Intermittent every 24 hours  ferrous    sulfate 325 milliGRAM(s) Oral daily  gabapentin 400 milliGRAM(s) Oral three times a day  influenza   Vaccine 0.5 milliLiter(s) IntraMuscular once  insulin lispro (HumaLOG) corrective regimen sliding scale   SubCutaneous three times a day before meals  lactulose Syrup 15 Gram(s) Oral <User Schedule>  latanoprost 0.005% Ophthalmic Solution 1 Drop(s) Both EYES at bedtime  methylPREDNISolone sodium succinate Injectable 40 milliGRAM(s) IV Push every 8 hours  pantoprazole    Tablet 40 milliGRAM(s) Oral before breakfast  rivaroxaban 20 milliGRAM(s) Oral every 24 hours  simvastatin 20 milliGRAM(s) Oral at bedtime  sodium chloride 0.9% with potassium chloride 20 mEq/L 1000 milliLiter(s) (75 mL/Hr) IV Continuous <Continuous>  tamsulosin 0.4 milliGRAM(s) Oral at bedtime    MEDICATIONS  (PRN):  acetaminophen   Tablet .. 650 milliGRAM(s) Oral every 6 hours PRN Temp greater or equal to 38C (100.4F), Mild Pain (1 - 3)      __________________________________________________  REVIEW OF SYSTEMS:    CONSTITUTIONAL: No fever,   EYES: no acute visual disturbances  NECK: No pain or stiffness  RESPIRATORY: No cough; No shortness of breath  CARDIOVASCULAR: No chest pain, no palpitations  GASTROINTESTINAL: No pain. No nausea or vomiting; No diarrhea   NEUROLOGICAL: No headache or numbness, no tremors  MUSCULOSKELETAL: No joint pain, no muscle pain  GENITOURINARY: no dysuria, no frequency, no hesitancy  PSYCHIATRY: no depression , no anxiety  ALL OTHER  ROS negative        Vital Signs Last 24 Hrs  T(C): 36.7 (07 Oct 2018 07:29), Max: 37.2 (06 Oct 2018 20:13)  T(F): 98 (07 Oct 2018 07:29), Max: 99 (06 Oct 2018 20:13)  HR: 81 (07 Oct 2018 07:29) (81 - 101)  BP: 121/66 (07 Oct 2018 07:29) (118/63 - 135/69)  BP(mean): --  RR: 18 (07 Oct 2018 07:29) (16 - 20)  SpO2: 95% (07 Oct 2018 07:29) (90% - 100%)    ________________________________________________  PHYSICAL EXAM:  GENERAL: NAD  HEENT: Normocephalic;  conjunctivae and sclerae clear; moist mucous membranes;   NECK : supple  CHEST/LUNG: Clear to auscultation bilaterally with good air entry   HEART: S1 S2  regular; no murmurs, gallops or rubs  ABDOMEN: Soft, Nontender, Nondistended; Bowel sounds present  EXTREMITIES: no cyanosis; no edema; no calf tenderness  SKIN: warm and dry; no rash  NERVOUS SYSTEM:  Awake and alert; Oriented  to place, person and time ; no new deficits    _________________________________________________  LABS:                        8.5    13.2  )-----------( 365      ( 06 Oct 2018 10:53 )             29.7     10-06    141  |  101  |  16  ----------------------------<  124<H>  4.1   |  36<H>  |  0.53    Ca    9.2      06 Oct 2018 10:53  Phos  3.6     10-  Mg     1.9     10-        Urinalysis Basic - ( 05 Oct 2018 13:06 )    Color: Pink / Appearance: very cloudy / S.010 / pH: x  Gluc: x / Ketone: Negative  / Bili: Negative / Urobili: Negative   Blood: x / Protein: 100 / Nitrite: Negative   Leuk Esterase: Moderate / RBC: >50 /HPF / WBC 11-25 /HPF   Sq Epi: x / Non Sq Epi: Moderate /HPF / Bacteria: TNTC /HPF      CAPILLARY BLOOD GLUCOSE      POCT Blood Glucose.: 223 mg/dL (06 Oct 2018 21:55)  POCT Blood Glucose.: 123 mg/dL (06 Oct 2018 16:55)  POCT Blood Glucose.: 122 mg/dL (06 Oct 2018 11:44)        RADIOLOGY & ADDITIONAL TESTS:    Imaging Personally Reviewed:  YES/NO    Consultant(s) Notes Reviewed:   YES/ No    Care Discussed with Consultants :     Plan of care was discussed with patient and /or primary care giver; all questions and concerns were addressed and care was aligned with patient's wishes.

## 2018-10-07 NOTE — PROGRESS NOTE ADULT - PROBLEM SELECTOR PLAN 5
Pt was seen by urology   they tried to change suprapubic catether but was not removed due to resistence  Pt is passing urine as well and has drainage from catheter  Dressing placed around catheter to look for any leaking as no leaking seen when evaluated

## 2018-10-07 NOTE — PROGRESS NOTE ADULT - PROBLEM SELECTOR PLAN 7
pt has sacral decub with multiple draining sinus   wound care consult noted  Surgery evaluated the patient and doesn't recommend surgical debridement due to patient's clinical condition  Need re-evaluation from surgery

## 2018-10-07 NOTE — PROGRESS NOTE ADULT - PROBLEM SELECTOR PLAN 2
Patient has H/o Pulmonary embolism   on Xarelto 20 mg daily   - Pt anticoagulation restarted after clearance from urology

## 2018-10-07 NOTE — PROGRESS NOTE ADULT - PROBLEM SELECTOR PLAN 8
IMPROVE VTE Individual Risk Assessment          RISK                                                          Points  [  ] Previous VTE                                                3  [  ] Thrombophilia                                             2  [ x ] Lower limb paralysis                                   2        (unable to hold up >15 seconds)    [  ] Current Cancer                                             2         (within 6 months)  [ x ] Immobilization > 24 hrs                              1  [  ] ICU/CCU stay > 24 hours                             1  [ x ] Age > 60                                                         1    IMPROVE VTE Score: VTE score 4   Lovenox 20mg daily   Protonix for GI prophylaxis

## 2018-10-07 NOTE — PROGRESS NOTE ADULT - PROBLEM SELECTOR PLAN 9
Pt is from [facility] but doesn't want to go back there upon discharge   -Please consult case management so placement work can be done along with making patient stable for discharge

## 2018-10-07 NOTE — PROGRESS NOTE ADULT - SUBJECTIVE AND OBJECTIVE BOX
Patient is a 58y old  Female who presents with a chief complaint of SOB (05 Oct 2018 11:16)      Vital Signs Last 24 Hrs  T(C): 36.7 (07 Oct 2018 07:29), Max: 37.2 (06 Oct 2018 20:13)  T(F): 98 (07 Oct 2018 07:29), Max: 99 (06 Oct 2018 20:13)  HR: 81 (07 Oct 2018 07:29) (81 - 101)  BP: 121/66 (07 Oct 2018 07:29) (118/63 - 135/69)  BP(mean): --  RR: 18 (07 Oct 2018 07:29) (16 - 20)  SpO2: 95% (07 Oct 2018 07:29) (90% - 100%)    LABS:                                   8.5    13.2  )-----------( 365      ( 06 Oct 2018 10:53 )             29.7   10-06    141  |  101  |  16  ----------------------------<  124<H>  4.1   |  36<H>  |  0.53    Ca    9.2      06 Oct 2018 10:53  Phos  3.6     10-06  Mg     1.9     10-06          REVIEW OF SYSTEMS:  CONSTITUTIONAL: No fever, weight loss, or fatigue  EYES: No eye pain, visual disturbances, or discharge  ENMT:  No difficulty hearing, tinnitus, vertigo; No sinus or throat pain  NECK: No pain or stiffness  BREASTS: No pain, masses, or nipple discharge  RESPIRATORY: No cough, wheezing, chills or hemoptysis; No shortness of breath  CARDIOVASCULAR: No chest pain, palpitations, dizziness, or leg swelling  GASTROINTESTINAL: No abdominal or epigastric pain. No nausea, vomiting, or hematemesis; No diarrhea or constipation. No melena or hematochezia.  GENITOURINARY: No dysuria, frequency, hematuria, or incontinence  NEUROLOGICAL: No headaches, memory loss, loss of strength, numbness, or tremors  SKIN: No itching, burning, rashes, or lesions   LYMPH NODES: No enlarged glands  ENDOCRINE: No heat or cold intolerance; No hair loss  MUSCULOSKELETAL: No joint pain or swelling; No muscle, back, or extremity pain  PSYCHIATRIC: No depression, anxiety, mood swings, or difficulty sleeping  HEME/LYMPH: No easy bruising, or bleeding gums  ALLERY AND IMMUNOLOGIC: No hives or eczema    RADIOLOGY & ADDITIONAL TESTS:    Imaging Personally Reviewed:  [ ] YES  [ ] NO    Consultant(s) Notes Reviewed:  [ ] YES  [ ] NO    PHYSICAL EXAM:  GENERAL: NAD, well-groomed, well-developed  HEAD:  Atraumatic, Normocephalic  EYES: EOMI, PERRLA, conjunctiva and sclera clear  ENMT: No tonsillar erythema, exudates, or enlargement; Moist mucous membranes, Good dentition, No lesions  NECK: Supple, No JVD, Normal thyroid  NERVOUS SYSTEM:  Alert & Oriented X3, Good concentration; Motor Strength 5/5 B/L upper and lower extremities; DTRs 2+ intact and symmetric  CHEST/LUNG: Clear to percussion bilaterally; No rales, rhonchi, wheezing, or rubs  HEART: Regular rate and rhythm; No murmurs, rubs, or gallops  ABDOMEN: Soft, Nontender, Nondistended; Bowel sounds present  EXTREMITIES:  2+ Peripheral Pulses, No clubbing, cyanosis, or edema  LYMPH: No lymphadenopathy noted  SKIN: No rashes or lesions    Care Discussed with Consultants/Other Providers [ ] YES  [ ] NO    acetaminophen   Tablet .. 650 milliGRAM(s) Oral every 6 hours PRN Temp greater or equal to 38C (100.4F), Mild Pain (1 - 3)  ALBUTerol/ipratropium for Nebulization 3 milliLiter(s) Nebulizer every 6 hours  amLODIPine   Tablet 2.5 milliGRAM(s) Oral daily  aspirin  chewable 81 milliGRAM(s) Oral daily  azithromycin  IVPB      azithromycin  IVPB 500 milliGRAM(s) IV Intermittent every 24 hours  brimonidine 0.2% Ophthalmic Solution 1 Drop(s) Both EYES three times a day  buDESOnide 160 MICROgram(s)/formoterol 4.5 MICROgram(s) Inhaler 2 Puff(s) Inhalation two times a day  cefTRIAXone   IVPB      cefTRIAXone   IVPB 1 Gram(s) IV Intermittent every 24 hours  ferrous    sulfate 325 milliGRAM(s) Oral daily  gabapentin 400 milliGRAM(s) Oral three times a day  influenza   Vaccine 0.5 milliLiter(s) IntraMuscular once  insulin lispro (HumaLOG) corrective regimen sliding scale   SubCutaneous three times a day before meals  lactulose Syrup 15 Gram(s) Oral <User Schedule>  latanoprost 0.005% Ophthalmic Solution 1 Drop(s) Both EYES at bedtime  methylPREDNISolone sodium succinate Injectable 40 milliGRAM(s) IV Push every 8 hours  pantoprazole    Tablet 40 milliGRAM(s) Oral before breakfast  rivaroxaban 20 milliGRAM(s) Oral every 24 hours  simvastatin 20 milliGRAM(s) Oral at bedtime  sodium chloride 0.9% with potassium chloride 20 mEq/L 1000 milliLiter(s) IV Continuous <Continuous>  tamsulosin 0.4 milliGRAM(s) Oral at bedtime      A/P COPD, PUSHPA, Obesity hypoventilation, HTN, DM, Urinary retention , s/p Supra pubic, paraplegia, OAB    Plan: continue present treatment  continue IV ab for now, pulm consult Dr Chung  Urology follow up  discharge planning, wants to change rehab center.

## 2018-10-07 NOTE — PROGRESS NOTE ADULT - ASSESSMENT
58 year old female, bed bound from [facility] with PMHx of  DM, COPD on home O2, PUSHPA (not on CPAP,) chronic urinary retention s/p suprapubic cath, PE on Xarelto, paraplegic ( unknown etiology ) presents from  with  difficulty breathing. As per patient she was having  difficulty breathing since 2 days with non productive cough, she used inhalers multiple times with no significant improved in her breathing, As per NH staff patient was saturating at 70;s on nasal canula and subsequently pt was sent to hospital. Patient denies any fever, chills, chest pain, nausea, vomiting, diarrhea, abdominal pain. Patient was suprapubic catheter which was leaking form the sides. Patient also has multiple draining sinus around buttock area with purulent discharge. Patient was recently discharged for COPD exacerbation on isaiah steroid therapy.     on admission patient was afebrile, HD stable, tachycardiac Hr 104, EKG sinus tachycardia, RR 22, O2 sat 95 at 3 l nasal canula, Trop t1 neg   S/p IV solumedrol, Duoneb and Levofloxacin in ED

## 2018-10-07 NOTE — PROGRESS NOTE ADULT - SUBJECTIVE AND OBJECTIVE BOX
Suprapubic catheter exchanged under sterile conditions and attached to closed bag drainage  As per the patient, a consult from the Ophthalmology department is requested.    We shall continue to follow this patient with you.  The catheter should be exchanged every 4-6 weeks.

## 2018-10-08 DIAGNOSIS — D64.9 ANEMIA, UNSPECIFIED: ICD-10-CM

## 2018-10-08 LAB
ANION GAP SERPL CALC-SCNC: 3 MMOL/L — LOW (ref 5–17)
BUN SERPL-MCNC: 16 MG/DL — SIGNIFICANT CHANGE UP (ref 7–18)
CALCIUM SERPL-MCNC: 9.3 MG/DL — SIGNIFICANT CHANGE UP (ref 8.4–10.5)
CHLORIDE SERPL-SCNC: 100 MMOL/L — SIGNIFICANT CHANGE UP (ref 96–108)
CO2 SERPL-SCNC: 38 MMOL/L — HIGH (ref 22–31)
CREAT SERPL-MCNC: 0.38 MG/DL — LOW (ref 0.5–1.3)
GLUCOSE BLDC GLUCOMTR-MCNC: 110 MG/DL — HIGH (ref 70–99)
GLUCOSE BLDC GLUCOMTR-MCNC: 119 MG/DL — HIGH (ref 70–99)
GLUCOSE BLDC GLUCOMTR-MCNC: 153 MG/DL — HIGH (ref 70–99)
GLUCOSE BLDC GLUCOMTR-MCNC: 170 MG/DL — HIGH (ref 70–99)
GLUCOSE BLDC GLUCOMTR-MCNC: 181 MG/DL — HIGH (ref 70–99)
GLUCOSE SERPL-MCNC: 118 MG/DL — HIGH (ref 70–99)
HCT VFR BLD CALC: 28.4 % — LOW (ref 34.5–45)
HGB BLD-MCNC: 7.9 G/DL — LOW (ref 11.5–15.5)
MCHC RBC-ENTMCNC: 23.9 PG — LOW (ref 27–34)
MCHC RBC-ENTMCNC: 27.8 GM/DL — LOW (ref 32–36)
MCV RBC AUTO: 86 FL — SIGNIFICANT CHANGE UP (ref 80–100)
PLATELET # BLD AUTO: 368 K/UL — SIGNIFICANT CHANGE UP (ref 150–400)
POTASSIUM SERPL-MCNC: 4.5 MMOL/L — SIGNIFICANT CHANGE UP (ref 3.5–5.3)
POTASSIUM SERPL-SCNC: 4.5 MMOL/L — SIGNIFICANT CHANGE UP (ref 3.5–5.3)
RBC # BLD: 3.3 M/UL — LOW (ref 3.8–5.2)
RBC # FLD: 19.2 % — HIGH (ref 10.3–14.5)
SODIUM SERPL-SCNC: 141 MMOL/L — SIGNIFICANT CHANGE UP (ref 135–145)
WBC # BLD: 11.2 K/UL — HIGH (ref 3.8–10.5)
WBC # FLD AUTO: 11.2 K/UL — HIGH (ref 3.8–10.5)

## 2018-10-08 RX ADMIN — CEFTRIAXONE 100 GRAM(S): 500 INJECTION, POWDER, FOR SOLUTION INTRAMUSCULAR; INTRAVENOUS at 05:38

## 2018-10-08 RX ADMIN — Medication 1: at 12:08

## 2018-10-08 RX ADMIN — BUDESONIDE AND FORMOTEROL FUMARATE DIHYDRATE 2 PUFF(S): 160; 4.5 AEROSOL RESPIRATORY (INHALATION) at 21:20

## 2018-10-08 RX ADMIN — Medication 81 MILLIGRAM(S): at 12:08

## 2018-10-08 RX ADMIN — Medication 650 MILLIGRAM(S): at 15:29

## 2018-10-08 RX ADMIN — Medication 3 MILLILITER(S): at 03:07

## 2018-10-08 RX ADMIN — Medication 600 MILLIGRAM(S): at 17:26

## 2018-10-08 RX ADMIN — Medication 600 MILLIGRAM(S): at 05:43

## 2018-10-08 RX ADMIN — BRIMONIDINE TARTRATE 1 DROP(S): 2 SOLUTION/ DROPS OPHTHALMIC at 05:45

## 2018-10-08 RX ADMIN — AZITHROMYCIN 250 MILLIGRAM(S): 500 TABLET, FILM COATED ORAL at 07:27

## 2018-10-08 RX ADMIN — GABAPENTIN 400 MILLIGRAM(S): 400 CAPSULE ORAL at 15:18

## 2018-10-08 RX ADMIN — SIMVASTATIN 20 MILLIGRAM(S): 20 TABLET, FILM COATED ORAL at 21:17

## 2018-10-08 RX ADMIN — PANTOPRAZOLE SODIUM 40 MILLIGRAM(S): 20 TABLET, DELAYED RELEASE ORAL at 05:52

## 2018-10-08 RX ADMIN — Medication 3 MILLILITER(S): at 21:07

## 2018-10-08 RX ADMIN — Medication 325 MILLIGRAM(S): at 17:26

## 2018-10-08 RX ADMIN — Medication 650 MILLIGRAM(S): at 16:00

## 2018-10-08 RX ADMIN — AMLODIPINE BESYLATE 2.5 MILLIGRAM(S): 2.5 TABLET ORAL at 05:44

## 2018-10-08 RX ADMIN — Medication 3 MILLILITER(S): at 09:30

## 2018-10-08 RX ADMIN — RIVAROXABAN 20 MILLIGRAM(S): KIT at 17:28

## 2018-10-08 RX ADMIN — Medication 600 MILLIGRAM(S): at 13:00

## 2018-10-08 RX ADMIN — Medication 600 MILLIGRAM(S): at 18:00

## 2018-10-08 RX ADMIN — Medication 600 MILLIGRAM(S): at 12:28

## 2018-10-08 RX ADMIN — Medication 40 MILLIGRAM(S): at 17:27

## 2018-10-08 RX ADMIN — TAMSULOSIN HYDROCHLORIDE 0.4 MILLIGRAM(S): 0.4 CAPSULE ORAL at 21:17

## 2018-10-08 RX ADMIN — Medication 40 MILLIGRAM(S): at 05:48

## 2018-10-08 RX ADMIN — Medication 1: at 08:58

## 2018-10-08 RX ADMIN — BUDESONIDE AND FORMOTEROL FUMARATE DIHYDRATE 2 PUFF(S): 160; 4.5 AEROSOL RESPIRATORY (INHALATION) at 12:07

## 2018-10-08 RX ADMIN — LATANOPROST 1 DROP(S): 0.05 SOLUTION/ DROPS OPHTHALMIC; TOPICAL at 21:17

## 2018-10-08 RX ADMIN — BRIMONIDINE TARTRATE 1 DROP(S): 2 SOLUTION/ DROPS OPHTHALMIC at 15:18

## 2018-10-08 RX ADMIN — GABAPENTIN 400 MILLIGRAM(S): 400 CAPSULE ORAL at 05:43

## 2018-10-08 RX ADMIN — GABAPENTIN 400 MILLIGRAM(S): 400 CAPSULE ORAL at 21:20

## 2018-10-08 RX ADMIN — BRIMONIDINE TARTRATE 1 DROP(S): 2 SOLUTION/ DROPS OPHTHALMIC at 21:18

## 2018-10-08 NOTE — PROGRESS NOTE ADULT - SUBJECTIVE AND OBJECTIVE BOX
Time of Visit:  Patient seen and examined.     MEDICATIONS  (STANDING):  ALBUTerol/ipratropium for Nebulization 3 milliLiter(s) Nebulizer every 6 hours  amLODIPine   Tablet 2.5 milliGRAM(s) Oral daily  aspirin  chewable 81 milliGRAM(s) Oral daily  azithromycin  IVPB      azithromycin  IVPB 500 milliGRAM(s) IV Intermittent every 24 hours  brimonidine 0.2% Ophthalmic Solution 1 Drop(s) Both EYES three times a day  buDESOnide 160 MICROgram(s)/formoterol 4.5 MICROgram(s) Inhaler 2 Puff(s) Inhalation two times a day  cefTRIAXone   IVPB      cefTRIAXone   IVPB 1 Gram(s) IV Intermittent every 24 hours  ferrous    sulfate 325 milliGRAM(s) Oral daily  gabapentin 400 milliGRAM(s) Oral three times a day  ibuprofen  Tablet. 600 milliGRAM(s) Oral every 6 hours  influenza   Vaccine 0.5 milliLiter(s) IntraMuscular once  insulin lispro (HumaLOG) corrective regimen sliding scale   SubCutaneous three times a day before meals  lactulose Syrup 15 Gram(s) Oral <User Schedule>  latanoprost 0.005% Ophthalmic Solution 1 Drop(s) Both EYES at bedtime  pantoprazole    Tablet 40 milliGRAM(s) Oral before breakfast  rivaroxaban 20 milliGRAM(s) Oral every 24 hours  simvastatin 20 milliGRAM(s) Oral at bedtime  tamsulosin 0.4 milliGRAM(s) Oral at bedtime      MEDICATIONS  (PRN):  acetaminophen   Tablet .. 650 milliGRAM(s) Oral every 6 hours PRN Temp greater or equal to 38C (100.4F), Mild Pain (1 - 3)  acetaminophen   Tablet .. 650 milliGRAM(s) Oral every 6 hours PRN Moderate Pain (4 - 6)       Medications up to date at time of exam.      PHYSICAL EXAMINATION:  Patient has no new complaints.  GENERAL: The patient is a well-developed, well-nourished, in no apparent distress.     Vital Signs Last 24 Hrs  T(C): 36.8 (08 Oct 2018 20:37), Max: 37 (08 Oct 2018 11:20)  T(F): 98.2 (08 Oct 2018 20:37), Max: 98.6 (08 Oct 2018 11:20)  HR: 90 (08 Oct 2018 20:37) (66 - 90)  BP: 124/60 (08 Oct 2018 20:37) (112/56 - 137/61)  BP(mean): --  RR: 18 (08 Oct 2018 20:37) (17 - 18)  SpO2: 97% (08 Oct 2018 20:37) (95% - 100%)   (if applicable)    Chest Tube (if applicable)    HEENT: Head is normocephalic and atraumatic. Extraocular muscles are intact. Mucous membranes are moist.     NECK: Supple, no palpable adenopathy.    LUNGS: Clear to auscultation, no wheezing, rales, or rhonchi.    HEART: Regular rate and rhythm without murmur.    ABDOMEN: Soft, nontender, and nondistended.  No hepatosplenomegaly is noted.    : No painful voiding, no pelvic pain    EXTREMITIES: ++ edema. b/l    NEUROLOGIC: Awake, alert, oriented, grossly intact    SKIN: Warm, dry, good turgor.      LABS:                        7.9    11.2  )-----------( 368      ( 08 Oct 2018 06:50 )             28.4     10-    141  |  100  |  16  ----------------------------<  118<H>  4.5   |  38<H>  |  0.38<L>    Ca    9.3      08 Oct 2018 06:50            MICROBIOLOGY: (if applicable)    RADIOLOGY & ADDITIONAL STUDIES:  EKG:   CXR:  ECHO:    IMPRESSION: 58y Female PAST MEDICAL & SURGICAL HISTORY:  Urinary retention  CKD (chronic kidney disease)  DM (diabetes mellitus)  HTN (hypertension)  COPD (chronic obstructive pulmonary disease)  Morbid obesity  Diabetes  Overactive bladder  Hemorrhoids  Emphysema  Suprapubic catheter  S/P   History of appendectomy  S/P cholecystectomy   p/w         IMP: This is a 58 yr old Back woman with morbid obesity and prior mentioned medical condition presented to ER from NH with progressive SOB for 3 days not improving with treatment. She was admitted with acute ex of copd. now improved. She was dx with mago but not on CPAP/BIPAP.  Clinically improved. + PE on xeralto.  She is complaining of left calf pain while on xeralto. pat has multiple admission to [facility].             RECOMMENDATIONS:    -continue xeralto  -f/u doppler  pending  -wt loss  -consider referring for wt loss reduction surgery as out patient  -supra pubic cath changes as per urology  -will need out pat pulmo f/u for sleep study and PAP devise  -please obtain most recent echo report from [facility] Time of Visit:  Patient seen and examined.     MEDICATIONS  (STANDING):  ALBUTerol/ipratropium for Nebulization 3 milliLiter(s) Nebulizer every 6 hours  amLODIPine   Tablet 2.5 milliGRAM(s) Oral daily  aspirin  chewable 81 milliGRAM(s) Oral daily  azithromycin  IVPB      azithromycin  IVPB 500 milliGRAM(s) IV Intermittent every 24 hours  brimonidine 0.2% Ophthalmic Solution 1 Drop(s) Both EYES three times a day  buDESOnide 160 MICROgram(s)/formoterol 4.5 MICROgram(s) Inhaler 2 Puff(s) Inhalation two times a day  cefTRIAXone   IVPB      cefTRIAXone   IVPB 1 Gram(s) IV Intermittent every 24 hours  ferrous    sulfate 325 milliGRAM(s) Oral daily  gabapentin 400 milliGRAM(s) Oral three times a day  ibuprofen  Tablet. 600 milliGRAM(s) Oral every 6 hours  influenza   Vaccine 0.5 milliLiter(s) IntraMuscular once  insulin lispro (HumaLOG) corrective regimen sliding scale   SubCutaneous three times a day before meals  lactulose Syrup 15 Gram(s) Oral <User Schedule>  latanoprost 0.005% Ophthalmic Solution 1 Drop(s) Both EYES at bedtime  pantoprazole    Tablet 40 milliGRAM(s) Oral before breakfast  rivaroxaban 20 milliGRAM(s) Oral every 24 hours  simvastatin 20 milliGRAM(s) Oral at bedtime  tamsulosin 0.4 milliGRAM(s) Oral at bedtime      MEDICATIONS  (PRN):  acetaminophen   Tablet .. 650 milliGRAM(s) Oral every 6 hours PRN Temp greater or equal to 38C (100.4F), Mild Pain (1 - 3)  acetaminophen   Tablet .. 650 milliGRAM(s) Oral every 6 hours PRN Moderate Pain (4 - 6)       Medications up to date at time of exam.      PHYSICAL EXAMINATION:  Patient has no new complaints.  GENERAL: The patient is a well-developed, well-nourished, in no apparent distress.     Vital Signs Last 24 Hrs  T(C): 36.8 (08 Oct 2018 20:37), Max: 37 (08 Oct 2018 11:20)  T(F): 98.2 (08 Oct 2018 20:37), Max: 98.6 (08 Oct 2018 11:20)  HR: 90 (08 Oct 2018 20:37) (66 - 90)  BP: 124/60 (08 Oct 2018 20:37) (112/56 - 137/61)  BP(mean): --  RR: 18 (08 Oct 2018 20:37) (17 - 18)  SpO2: 97% (08 Oct 2018 20:37) (95% - 100%)   (if applicable)    Chest Tube (if applicable)    HEENT: Head is normocephalic and atraumatic. Extraocular muscles are intact. Mucous membranes are moist.     NECK: Supple, no palpable adenopathy.    LUNGS: Clear to auscultation, no wheezing, rales, or rhonchi.    HEART: Regular rate and rhythm without murmur.    ABDOMEN: Soft, nontender, and nondistended.  No hepatosplenomegaly is noted.    : No painful voiding, no pelvic pain    EXTREMITIES: ++ edema. b/l    NEUROLOGIC: Awake, alert, oriented, grossly intact    SKIN: Warm, dry, good turgor.      LABS:                        7.9    11.2  )-----------( 368      ( 08 Oct 2018 06:50 )             28.4     10-    141  |  100  |  16  ----------------------------<  118<H>  4.5   |  38<H>  |  0.38<L>    Ca    9.3      08 Oct 2018 06:50            MICROBIOLOGY: (if applicable)    RADIOLOGY & ADDITIONAL STUDIES:  EKG:   CXR:  ECHO:    IMPRESSION: 58y Female PAST MEDICAL & SURGICAL HISTORY:  Urinary retention  CKD (chronic kidney disease)  DM (diabetes mellitus)  HTN (hypertension)  COPD (chronic obstructive pulmonary disease)  Morbid obesity  Diabetes  Overactive bladder  Hemorrhoids  Emphysema  Suprapubic catheter  S/P   History of appendectomy  S/P cholecystectomy   p/w         IMP: This is a 58 yr old Back woman with morbid obesity and prior mentioned medical condition presented to ER from NH with progressive SOB for 3 days not improving with treatment. She was admitted with acute ex of copd. now improved. She was dx with mago but not on CPAP/BIPAP.  Clinically improved. + PE on xeralto.  She is complaining of left calf pain while on xeralto. pat has multiple admission to [facility].             RECOMMENDATIONS:    -continue xeralto  -f/u doppler  pending  -wt loss  -consider referring for wt loss reduction surgery as out patient  -supra pubic cath changes as per urology  -will need out pat pulmo f/u for sleep study and PAP devise  -please obtain most recent echo report from [facility]    Agree with above assessment and plan as transcribed.

## 2018-10-08 NOTE — CONSULT NOTE ADULT - SUBJECTIVE AND OBJECTIVE BOX
HPI:  58 year old female, bed bound from [facility] with PMHx of  DM, COPD on home O2, PUSHPA (not on CPAP,) chronic urinary retention s/p suprapubic cath, PE on Xarelto, paraplegic ( unknown etiology ) presents with  difficulty breathing x 2 days with non productive cough, she used inhalers multiple times with no significant improved in her breathing,  As per NH staff patient was saturating at 70;s on nasal canula and subsequently pt was sent to hospital.  Patient denies any fever, chills, chest pain, nausea, vomiting, diarrhea, abdominal pain. Patient states suprapubic catheter which was leaking form the sides. Patient also has multiple draining sinus around buttock area with purulent discharge. Patient was recently discharged for COPD exacerbation on isaiah steroid therapy. (05 Oct 2018 06:23)    Patient is seen and examined at bedside for urology consult. Consult called by medical team due to reported hematuria and patient complaints of leakage from around SPT. Patient is unsure when the suprapubic catheter was placed, but states it was within the past year, she is also unsure if the catheter has been changed in the past. She states that she frequently has problems with it leaking and that it is usually temporarily resolved with irrigation. She also states that she noticed hematuria when she does not drink enough water. Patient is a poor historian.     PAST MEDICAL & SURGICAL HISTORY:  Urinary retention  CKD (chronic kidney disease)  DM (diabetes mellitus)  HTN (hypertension)  COPD (chronic obstructive pulmonary disease)  Morbid obesity  Diabetes  Overactive bladder  Hemorrhoids  Emphysema  Suprapubic catheter  S/P   History of appendectomy  S/P cholecystectomy    Review of Systems: Contained within HPI    MEDICATIONS  (STANDING):  ALBUTerol/ipratropium for Nebulization 3 milliLiter(s) Nebulizer every 6 hours  amLODIPine   Tablet 2.5 milliGRAM(s) Oral daily  aspirin  chewable 81 milliGRAM(s) Oral daily  azithromycin  IVPB      brimonidine 0.2% Ophthalmic Solution 1 Drop(s) Both EYES three times a day  buDESOnide 160 MICROgram(s)/formoterol 4.5 MICROgram(s) Inhaler 2 Puff(s) Inhalation two times a day  cefTRIAXone   IVPB      ferrous    sulfate 325 milliGRAM(s) Oral daily  gabapentin 400 milliGRAM(s) Oral three times a day  influenza   Vaccine 0.5 milliLiter(s) IntraMuscular once  insulin lispro (HumaLOG) corrective regimen sliding scale   SubCutaneous three times a day before meals  lactulose Syrup 15 Gram(s) Oral <User Schedule>  latanoprost 0.005% Ophthalmic Solution 1 Drop(s) Both EYES at bedtime  methylPREDNISolone sodium succinate Injectable 40 milliGRAM(s) IV Push every 8 hours  pantoprazole    Tablet 40 milliGRAM(s) Oral before breakfast  simvastatin 20 milliGRAM(s) Oral at bedtime  tamsulosin 0.4 milliGRAM(s) Oral at bedtime    Allergies    Cheese (Pruritus)  oxycodone (Other (Moderate))  peanuts (Unknown)  penicillin (Rash)  seafood (Hives)  strawberry (Pruritus)  Tomatoes (Other)    FAMILY HISTORY:  Family history of stroke (Father)    Vital Signs Last 24 Hrs  T(C): 37.3 (05 Oct 2018 07:40), Max: 37.3 (05 Oct 2018 07:40)  T(F): 99.1 (05 Oct 2018 07:40), Max: 99.1 (05 Oct 2018 07:40)  HR: 100 (05 Oct 2018 07:40) (95 - 112)  BP: 107/69 (05 Oct 2018 07:40) (103/57 - 160/83)  RR: 18 (05 Oct 2018 07:40) (18 - 22)  SpO2: 90% (05 Oct 2018 07:40) (90% - 100%)    Physical Exam:    General:  Appears stated age, well-groomed, well-nourished, no distress  Eyes: EOMI  HENT:  WNL, no JVD  Chest: nasal cannula in place, mild increased work of breathing, +coughing  Abdomen: Obese, soft  : SPT in place with clear yellow urine draining from catheter at time of exam. Urine present in collection bag is dark and tea colored likely due to dehydration, not hematuria. No active drainage from around SPT at present.  Skin: multiple sinus tracks of buttock area without sign of fluctuant collection.   Neuro:  Alert, oriented to time, place and person   Psych: normal affect    LABS:                        8.4    10.7  )-----------( 376      ( 05 Oct 2018 10:52 )             30.4     10-05    142  |  102  |  16  ----------------------------<  133<H>  4.6   |  38<H>  |  0.46<L>    Ca    8.9      05 Oct 2018 00:39    TPro  7.3  /  Alb  2.5<L>  /  TBili  0.2  /  DBili  x   /  AST  28  /  ALT  14  /  AlkPhos  79  10-05
58 year old female, bed bound from [facility] with PMHx of  DM, COPD on home O2, PUSHPA (not on CPAP,) chronic urinary retention s/p suprapubic cath, PE on Xarelto, paraplegic ( unknown etiology ) presents from  with  difficulty breathing. As per patient she was having  difficulty breathing since 2 days with non productive cough, she used inhalers multiple times with no significant improved in her breathing,  As per NH staff patient was saturating at 70;s on nasal canula and subsequently pt was sent to hospital.  and subsequently pt was sent to hospital. Patient denies any fever, chills, chest pain, nausea, vomiting, diarrhea, abdominal pain. Patient was suprapubic catheter which was leaking form the sides.   CAlled to evaluate Patient with multiple draining sinus around buttock area with purulent discharge. This is reportedly a longstanding wound. PT denies pain or trauma to area. Denies change in BM. Pt unsure if prior surgery in this area.    ICU Vital Signs Last 24 Hrs  T(C): 37.3 (05 Oct 2018 07:40), Max: 37.3 (05 Oct 2018 07:40)  T(F): 99.1 (05 Oct 2018 07:40), Max: 99.1 (05 Oct 2018 07:40)  HR: 100 (05 Oct 2018 07:40) (95 - 112)  BP: 107/69 (05 Oct 2018 07:40) (103/57 - 160/83)  BP(mean): --  ABP: --  ABP(mean): --  RR: 18 (05 Oct 2018 07:40) (18 - 22)  SpO2: 90% (05 Oct 2018 07:40) (90% - 100%)    Alert, tacchypnic, sob. resting in bed with head elevated.  with some difficulty, pt was turned and wound examiuned.  multiple areas of sinuses noted bilateral gluteus, no fluctuance noted. no purulence was able to be expressed from sinuses at this time. nT. no bleed.  small seropurulent drainage noted on buttolk dressing.                          8.4    10.7  )-----------( 376      ( 05 Oct 2018 10:52 )             30.4     10-05    142  |  102  |  16  ----------------------------<  133<H>  4.6   |  38<H>  |  0.46<L>    Ca    8.9      05 Oct 2018 00:39    TPro  7.3  /  Alb  2.5<L>  /  TBili  0.2  /  DBili  x   /  AST  28  /  ALT  14  /  AlkPhos  79  10-05
GI INITIAL CONSULT    HPI: 58F w/stated PMH p/w SOB and was found to have iron deficiency anemia. Pt's anemia is chronic in nature. Pt does not report any overt GI bleeding; no melena, hematochezia or hematemesis. States that she had an EGD and a colonoscopy in [facility] earlier this year, but is unsure what the results were at that time.    PMH: morbid obesity, PE on AC, COPD, DM2, HTN, urinary retention s/p suprapubic catheter placement  PSH: appendectomy, , cholecystectomy    Meds: MEDICATIONS  (STANDING):  ALBUTerol/ipratropium for Nebulization 3 milliLiter(s) Nebulizer every 6 hours  amLODIPine   Tablet 2.5 milliGRAM(s) Oral daily  aspirin  chewable 81 milliGRAM(s) Oral daily  azithromycin  IVPB      azithromycin  IVPB 500 milliGRAM(s) IV Intermittent every 24 hours  brimonidine 0.2% Ophthalmic Solution 1 Drop(s) Both EYES three times a day  buDESOnide 160 MICROgram(s)/formoterol 4.5 MICROgram(s) Inhaler 2 Puff(s) Inhalation two times a day  cefTRIAXone   IVPB      cefTRIAXone   IVPB 1 Gram(s) IV Intermittent every 24 hours  ferrous    sulfate 325 milliGRAM(s) Oral daily  gabapentin 400 milliGRAM(s) Oral three times a day  ibuprofen  Tablet. 600 milliGRAM(s) Oral every 6 hours  influenza   Vaccine 0.5 milliLiter(s) IntraMuscular once  insulin lispro (HumaLOG) corrective regimen sliding scale   SubCutaneous three times a day before meals  lactulose Syrup 15 Gram(s) Oral <User Schedule>  latanoprost 0.005% Ophthalmic Solution 1 Drop(s) Both EYES at bedtime  pantoprazole    Tablet 40 milliGRAM(s) Oral before breakfast  rivaroxaban 20 milliGRAM(s) Oral every 24 hours  simvastatin 20 milliGRAM(s) Oral at bedtime  tamsulosin 0.4 milliGRAM(s) Oral at bedtime    SH: lives in SNF  FH: noncontributory  ROS:  CONSTITUTIONAL: No fever, weight loss, or fatigue  EYES: Reports chronically worsening eye sight  ENT:  No difficulty hearing, tinnitus, vertigo; No sinus or throat pain  NECK: No pain or stiffness  RESPIRATORY: No cough, wheezing, chills or hemoptysis, shortness of Breath  CARDIOVASCULAR: No chest pain, palpitations, passing out, dizziness, or leg swelling  GASTROINTESTINAL: see HPI  GENITOURINARY: No dysuria, frequency, hematuria, or incontinence  NEUROLOGICAL: No headaches, memory loss, loss of strength, numbness, or tremors  SKIN: No itching, burning, rashes, or lesions   MUSCULOSKELETAL: No arthralgia, myalgia, or back pain.     Vitals: T(C): 36.9 (10-08-18 @ 15:30)  T(F): 98.4 (10-08-18 @ 15:30), Max: 98.6 (10-08-18 @ 11:20)  HR: 80 (10-08-18 @ 15:30) (66 - 90)  BP: 137/61 (10-08-18 @ 15:30) (112/56 - 137/61)    Gen: NAD  CVS: S1/S2  Chest: coarse BS B/L  Abd: S/distended/tympanetic/mildly diffusely tender                        7.9    11.2  )-----------( 368      ( 08 Oct 2018 06:50 )             28.4   10-08    141  |  100  |  16  ----------------------------<  118<H>  4.5   |  38<H>  |  0.38<L>    Ca    9.3      08 Oct 2018 06:50
Time of visit:    CHIEF COMPLAINT: Patient is a 58y old  Female who presents with a chief complaint of SOB (07 Oct 2018 12:37)      HPI:  58 year old female, bed bound from [facility] with PMHx of  DM, COPD on home O2, PUSHPA (not on CPAP,) chronic urinary retention s/p suprapubic cath, PE on Xarelto, paraplegic ( unknown etiology ) presents from  with  difficulty breathing. As per patient she was having  difficulty breathing since 2 days with non productive cough, she used inhalers multiple times with no significant improved in her breathing,  As per NH staff patient was saturating at 70;s on nasal canula and subsequently pt was sent to hospital.  and subsequently pt was sent to hospital. Patient denies any fever, chills, chest pain, nausea, vomiting, diarrhea, abdominal pain. Patient was suprapubic catheter which was leaking form the sides. Patient also has multiple draining sinus around buttock area with purulent discharge. Patient was recently discharged for COPD exacerbation on isaiah steroid therapy. (05 Oct 2018 06:23)   Patient seen and examined.     PAST MEDICAL & SURGICAL HISTORY:  Urinary retention  CKD (chronic kidney disease)  DM (diabetes mellitus)  HTN (hypertension)  COPD (chronic obstructive pulmonary disease)  Morbid obesity  Diabetes  Overactive bladder  Hemorrhoids  Emphysema  Suprapubic catheter  S/P   History of appendectomy  S/P cholecystectomy      Allergies    Cheese (Pruritus)  oxycodone (Other (Moderate))  peanuts (Unknown)  penicillin (Rash)  seafood (Hives)  strawberry (Pruritus)  Tomatoes (Other)    Intolerances        MEDICATIONS  (STANDING):  ALBUTerol/ipratropium for Nebulization 3 milliLiter(s) Nebulizer every 6 hours  amLODIPine   Tablet 2.5 milliGRAM(s) Oral daily  aspirin  chewable 81 milliGRAM(s) Oral daily  azithromycin  IVPB      azithromycin  IVPB 500 milliGRAM(s) IV Intermittent every 24 hours  brimonidine 0.2% Ophthalmic Solution 1 Drop(s) Both EYES three times a day  buDESOnide 160 MICROgram(s)/formoterol 4.5 MICROgram(s) Inhaler 2 Puff(s) Inhalation two times a day  cefTRIAXone   IVPB      cefTRIAXone   IVPB 1 Gram(s) IV Intermittent every 24 hours  ferrous    sulfate 325 milliGRAM(s) Oral daily  gabapentin 400 milliGRAM(s) Oral three times a day  ibuprofen  Tablet. 600 milliGRAM(s) Oral every 6 hours  influenza   Vaccine 0.5 milliLiter(s) IntraMuscular once  insulin lispro (HumaLOG) corrective regimen sliding scale   SubCutaneous three times a day before meals  lactulose Syrup 15 Gram(s) Oral <User Schedule>  latanoprost 0.005% Ophthalmic Solution 1 Drop(s) Both EYES at bedtime  methylPREDNISolone sodium succinate Injectable 40 milliGRAM(s) IV Push every 8 hours  pantoprazole    Tablet 40 milliGRAM(s) Oral before breakfast  rivaroxaban 20 milliGRAM(s) Oral every 24 hours  simvastatin 20 milliGRAM(s) Oral at bedtime  tamsulosin 0.4 milliGRAM(s) Oral at bedtime      MEDICATIONS  (PRN):  acetaminophen   Tablet .. 650 milliGRAM(s) Oral every 6 hours PRN Temp greater or equal to 38C (100.4F), Mild Pain (1 - 3)  acetaminophen   Tablet .. 650 milliGRAM(s) Oral every 6 hours PRN Moderate Pain (4 - 6)   Medications up to date at time of exam.    Medications up to date at time of exam.    FAMILY HISTORY:  Family history of stroke (Father)      SOCIAL HISTORY  Smoking History: [   ] smoking/smoke exposure, [  x ] former smoker  Living Condition: [   ] apartment, [   ] private house  Work History:   Travel History: denies recent travel  Illicit Substance Use: denies  Alcohol Use: denies    REVIEW OF SYSTEMS:    CONSTITUTIONAL:  denies fevers, chills, sweats, weight loss    HEENT:  denies diplopia or blurred vision, sore throat or runny nose.    CARDIOVASCULAR:  denies pressure, squeezing, tightness, or heaviness about the chest; no palpitations.    RESPIRATORY:  denies SOB, cough, DUVAL, wheezing.    GASTROINTESTINAL:  denies abdominal pain, nausea, vomiting or diarrhea.    GENITOURINARY: denies dysuria, frequency or urgency.    NEUROLOGIC:  denies numbness, tingling, seizures or weakness.    PSYCHIATRIC:  denies disorder of thought or mood.    MSK: denies swelling, redness      PHYSICAL EXAMINATION:    GENERAL: The patient is a well-developed, well-nourished, in no apparent distress.     Vital Signs Last 24 Hrs  T(C): 37.1 (07 Oct 2018 18:57), Max: 37.2 (06 Oct 2018 20:13)  T(F): 98.8 (07 Oct 2018 18:57), Max: 99 (06 Oct 2018 20:13)  HR: 107 (07 Oct 2018 18:57) (81 - 107)  BP: 126/57 (07 Oct 2018 18:57) (119/63 - 135/69)  BP(mean): --  RR: 18 (07 Oct 2018 18:57) (18 - 20)  SpO2: 94% (07 Oct 2018 18:57) (90% - 95%)   (if applicable)    Chest Tube (if applicable)    HEENT: Head is normocephalic and atraumatic. Extraocular muscles are intact. Mucous membranes are moist.     NECK: Supple, no palpable adenopathy.    LUNGS: Clear to auscultation, no wheezing, rales, or rhonchi.    HEART: Regular rate and rhythm without murmur.    ABDOMEN: Soft, nontender, and nondistended.  No hepatosplenomegaly is noted. + suprapubic cath    RENAL: No difficulty voiding, no pelvic pain    EXTREMITIES: or +  edema. b/l lower ext with chronic skin changes    NEUROLOGIC: Awake, alert, oriented, grossly intact    SKIN: Warm, dry, good turgor.      LABS:                        8.5    13.2  )-----------( 365      ( 06 Oct 2018 10:53 )             29.7     10-    141  |  101  |  16  ----------------------------<  124<H>  4.1   |  36<H>  |  0.53    Ca    9.2      06 Oct 2018 10:53  Phos  3.6     10-  Mg     1.9     10-                  Serum Pro-Brain Natriuretic Peptide: 336 pg/mL (10-05-18 @ 00:39)      Procalcitonin, Serum: 0.04 ng/mL (10-05-18 @ 19:55)    2decho:< from: Transthoracic Echocardiogram (10.04.16 @ 11:34) >  Ejection Fraction Visual Estimate: >55 %    ------------------------------------------------------------------------  OBSERVATIONS:  Mitral Valve: Mitral annular calcification, and calcified  mitral leaflets with normal diastolic opening. Minimal  mitral regurgitation.  Aortic Root: Normal aortic root.  Aortic Valve: Aortic valve leaflet morphology not well  visualized, opens normally.  Left Atrium: Normal left atrium.  Left Ventricle: Endocardium not well visualized; grossly  normal left ventricular function. Normal left ventricular  internal dimensions and wall thicknesses.  Right Heart: Normal right atrium. Poorly imaged right  ventricle. Probably normal right ventricular size and  systolic function. There is trace tricuspid regurgitation.  There is trace pulmonic regurgitation.  Pericardium/PleuraNormal pericardium with no pericardial  effusion.  Hemodynamic: RA Pressure is 10 mm Hg. RVS Pressure is 31 mm  Hg.  ------------------------------------------------------------------------  CONCLUSIONS:  1. Mitral annular calcification, and calcified mitral  leaflets with normal diastolic opening. Minimal mitral  regurgitation.  2. Normal left ventricular internal dimensions and wall  thicknesses.  3. Endocardium not well visualized; grossly normal left  ventricular function.  4. Poorly imaged right ventricle. Probably normal right  ventricularsize and systolic function.      < end of copied text >    MICROBIOLOGY: (if applicable)    RADIOLOGY & ADDITIONAL STUDIES:  EKG:   CXR:< from: Xray Chest 1 View- PORTABLE-Urgent (10.05.18 @ 01:43) >  PROCEDURE DATE:  10/05/2018          INTERPRETATION:  CLINICAL STATEMENT: Follow-up chest pain.    TECHNIQUE: AP view of the chest.    COMPARISON: 2018    FINDINGS/  IMPRESSION:  Increased interstitial lung markings without significant change. No gross   new consolidation or pleural effusion    Heart size cannot be accurately assessed in this projection, but appear   enlarged.        < end of copied text >    ECHO:    IMPRESSION: 58y Female PAST MEDICAL & SURGICAL HISTORY:  Urinary retention  CKD (chronic kidney disease)  DM (diabetes mellitus)  HTN (hypertension)  COPD (chronic obstructive pulmonary disease)  Morbid obesity  Diabetes  Overactive bladder  Hemorrhoids  Emphysema  Suprapubic catheter  S/P   History of appendectomy  S/P cholecystectomy   p/w       IMP: This is a 58 yr old Back woman with morbid obesity and prior mentioned medical condition presented to ER from NH with progressive SOB for 3 days not improving with treatment. She was admitted with acute ex of copd. now improved. She was dx with pushpa but not on CPAP/BIPAP.  Clinically improved. + PE on xeralto.  She is complaining of left calf pain while on xeralto. pat has multiple admission to [facility].             RECOMMENDATIONS:    -continue xeralto  -start to taper solumedrol  -wt loss  -consider referring for wt loss reduction surgery as out patient  -supra pubic cath changes as per urology  -will need out pat pulmo f/u for sleep study and PAP devise  -please obtain most recent echo report from [facility]

## 2018-10-08 NOTE — PROGRESS NOTE ADULT - PROBLEM SELECTOR PLAN 1
-Patient has COPD, on NC currently  -Pulmonology consulted, recommended to taper steroids,  -Currently she is on Solumedrol IV q 8hrs, will taper down.  -Was diagnosed in past with PUSHPA  -will need out pat pulmo f/u for sleep study and PAP devise Patient is noted to have anemia  -Hb dropped to 7.9 thus morning, no obvious melena or bleeding  -Anemia panel: Iron deficiency anemia  -GI consulted Dr. Henley

## 2018-10-08 NOTE — PROGRESS NOTE ADULT - ASSESSMENT
58 year old female, bed bound from [facility] with PMHx of  DM, COPD on home O2, PUSHPA (not on CPAP,) chronic urinary retention s/p suprapubic cath, PE on Xarelto, paraplegic ( unknown etiology ) presents from  with  difficulty breathing. As per patient she was having  difficulty breathing since 2 days with non productive cough, she used inhalers multiple times with no significant improved in her breathing, As per NH staff patient was saturating at 70;s on nasal canula and subsequently pt was sent to hospital. Patient denies any fever, chills, chest pain, nausea, vomiting, diarrhea, abdominal pain. Patient was suprapubic catheter which was leaking form the sides. Patient also has multiple draining sinus around buttock area with purulent discharge. Patient was recently discharged for COPD exacerbation on isaiah steroid therapy.     Patient admitted for COPD exacerbation.

## 2018-10-08 NOTE — CONSULT NOTE ADULT - ASSESSMENT
58 year old female with chronic urinary retention and history of Suprapubic catheter placement c/o leakage from around SPT site and hematuria. Admitted with SOB/ PNA. PMH PE on Xarelto, COPD, DM2, HTN, bedbound
58F w/multiple medical comorbidities has iron deficiency anemia and abd pain.  -check CT A/P w/IV/PO contrast given pain and bloating  -check Hb daily and transfuse if <7  -agree with iron supplementation  -would obtain records from [facility]  -if pt did not really have any procedures at that hospital then she will need an EGD and a colonoscopy on this admission as RACHID in a 58-y.o. F is concerning for occult GI bleeding
copd  pna  multiple gluteal sinuses bilaterally, likely longstanding based on appearance and history  no evidence of purulent collection or fascitis presently  these likely developed over time with hx or repeated abcesses in past dur to pressure ulcerations.  Pt is in poor respiratory status presently with copd and pna., and any potential surgical intervention would pose very high risk vs benefit for wound that does not appear to have collection presently.    suggest local wound care contiune with frequent position changes being optimal to relieve gluteal pressure, mepilex dressings, changes prn,  ideal wound care will include whirlpool baths with warm sopay water, which might possible be available at her rehab facility if proper safety measures can be in place,  would consider possible surgical exploration of wounds if clinical condition improves for anesthesia, or if evidence of collection developes or wound appearance worsens.  pt on abx  will follow

## 2018-10-08 NOTE — PROGRESS NOTE ADULT - PROBLEM SELECTOR PLAN 9
Pt is from [facility] but doesn't want to go back there upon discharge   -Please consult case management so placement work can be done along with making patient stable for discharge IMPROVE VTE Individual Risk Assessment          RISK                                                          Points  [  ] Previous VTE                                                3  [  ] Thrombophilia                                             2  [ x ] Lower limb paralysis                                   2        (unable to hold up >15 seconds)    [  ] Current Cancer                                             2         (within 6 months)  [ x ] Immobilization > 24 hrs                              1  [  ] ICU/CCU stay > 24 hours                             1  [ x ] Age > 60                                                         1    IMPROVE VTE Score: VTE score 4   Lovenox 20mg daily   Protonix for GI prophylaxis

## 2018-10-08 NOTE — PROGRESS NOTE ADULT - PROBLEM SELECTOR PLAN 7
pt has sacral decub with multiple draining sinus   wound care consult noted  Surgery evaluated the patient and doesn't recommend surgical debridement due to patient's clinical condition  Need re-evaluation from surgery c/w sliding scale   diabetic diet

## 2018-10-08 NOTE — PROGRESS NOTE ADULT - PROBLEM SELECTOR PLAN 5
Pt was seen by urology   they tried to change suprapubic catether but was not removed due to resistence  Pt is passing urine as well and has drainage from catheter  Dressing placed around catheter to look for any leaking as no leaking seen when evaluated currently stable

## 2018-10-08 NOTE — PROGRESS NOTE ADULT - SUBJECTIVE AND OBJECTIVE BOX
PGY 1 Note discussed with supervising resident and primary attending    Patient is a 58y old  Female who presents with a chief complaint of SOB (07 Oct 2018 19:11)      INTERVAL HPI/OVERNIGHT EVENTS: no events noted overnight.    MEDICATIONS  (STANDING):  ALBUTerol/ipratropium for Nebulization 3 milliLiter(s) Nebulizer every 6 hours  amLODIPine   Tablet 2.5 milliGRAM(s) Oral daily  aspirin  chewable 81 milliGRAM(s) Oral daily  azithromycin  IVPB      azithromycin  IVPB 500 milliGRAM(s) IV Intermittent every 24 hours  brimonidine 0.2% Ophthalmic Solution 1 Drop(s) Both EYES three times a day  buDESOnide 160 MICROgram(s)/formoterol 4.5 MICROgram(s) Inhaler 2 Puff(s) Inhalation two times a day  cefTRIAXone   IVPB      cefTRIAXone   IVPB 1 Gram(s) IV Intermittent every 24 hours  ferrous    sulfate 325 milliGRAM(s) Oral daily  gabapentin 400 milliGRAM(s) Oral three times a day  ibuprofen  Tablet. 600 milliGRAM(s) Oral every 6 hours  influenza   Vaccine 0.5 milliLiter(s) IntraMuscular once  insulin lispro (HumaLOG) corrective regimen sliding scale   SubCutaneous three times a day before meals  lactulose Syrup 15 Gram(s) Oral <User Schedule>  latanoprost 0.005% Ophthalmic Solution 1 Drop(s) Both EYES at bedtime  methylPREDNISolone sodium succinate Injectable 40 milliGRAM(s) IV Push every 8 hours  pantoprazole    Tablet 40 milliGRAM(s) Oral before breakfast  rivaroxaban 20 milliGRAM(s) Oral every 24 hours  simvastatin 20 milliGRAM(s) Oral at bedtime  tamsulosin 0.4 milliGRAM(s) Oral at bedtime    MEDICATIONS  (PRN):  acetaminophen   Tablet .. 650 milliGRAM(s) Oral every 6 hours PRN Temp greater or equal to 38C (100.4F), Mild Pain (1 - 3)  acetaminophen   Tablet .. 650 milliGRAM(s) Oral every 6 hours PRN Moderate Pain (4 - 6)      __________________________________________________  REVIEW OF SYSTEMS:    CONSTITUTIONAL: No fever,   EYES: no acute visual disturbances  NECK: No pain or stiffness  RESPIRATORY: No cough; No shortness of breath  CARDIOVASCULAR: No chest pain, no palpitations  GASTROINTESTINAL: No pain. No nausea or vomiting; No diarrhea   NEUROLOGICAL: No headache or numbness, no tremors  MUSCULOSKELETAL: No joint pain, no muscle pain  GENITOURINARY: no dysuria, no frequency, no hesitancy  PSYCHIATRY: no depression , no anxiety  ALL OTHER  ROS negative        Vital Signs Last 24 Hrs  T(C): 36.9 (08 Oct 2018 07:30), Max: 37.1 (07 Oct 2018 18:57)  T(F): 98.5 (08 Oct 2018 07:30), Max: 98.8 (07 Oct 2018 18:57)  HR: 75 (08 Oct 2018 07:30) (66 - 107)  BP: 112/56 (08 Oct 2018 07:30) (112/56 - 126/57)  BP(mean): --  RR: 18 (08 Oct 2018 07:30) (17 - 18)  SpO2: 100% (08 Oct 2018 07:30) (94% - 100%)    ________________________________________________  PHYSICAL EXAM:  GENERAL: NAD  HEENT: Normocephalic;  conjunctivae and sclerae clear; moist mucous membranes;   NECK : supple  CHEST/LUNG: Clear to auscultation bilaterally with good air entry   HEART: S1 S2  regular; no murmurs, gallops or rubs  ABDOMEN: Soft, Nontender, Nondistended; Bowel sounds present  EXTREMITIES: no cyanosis; no edema; no calf tenderness  SKIN: warm and dry; no rash  NERVOUS SYSTEM:  Awake and alert; Oriented  to place, person and time ; no new deficits    _________________________________________________  LABS:                        7.9    11.2  )-----------( 368      ( 08 Oct 2018 06:50 )             28.4     10-08    141  |  100  |  16  ----------------------------<  118<H>  4.5   |  38<H>  |  0.38<L>    Ca    9.3      08 Oct 2018 06:50  Phos  3.6     10-06  Mg     1.9     10-06          CAPILLARY BLOOD GLUCOSE      POCT Blood Glucose.: 181 mg/dL (08 Oct 2018 08:43)  POCT Blood Glucose.: 170 mg/dL (08 Oct 2018 07:34)  POCT Blood Glucose.: 176 mg/dL (07 Oct 2018 21:00)  POCT Blood Glucose.: 143 mg/dL (07 Oct 2018 16:35)  POCT Blood Glucose.: 148 mg/dL (07 Oct 2018 11:28)  POCT Blood Glucose.: 418 mg/dL (07 Oct 2018 11:27)        RADIOLOGY & ADDITIONAL TESTS:    Imaging Personally Reviewed:  YES    Consultant(s) Notes Reviewed:   YES    Care Discussed with Consultants : YES     Plan of care was discussed with patient and /or primary care giver; all questions and concerns were addressed and care was aligned with patient's wishes.

## 2018-10-08 NOTE — PROGRESS NOTE ADULT - PROBLEM SELECTOR PLAN 2
Patient has H/o Pulmonary embolism   on Xarelto 20 mg daily   - Pt anticoagulation restarted after clearance from urology -Patient has COPD, on NC currently  -Pulmonology consulted, recommended to taper steroids,  -Currently she is on Solumedrol IV q 8hrs, will taper down.  -Was diagnosed in past with PUSHPA  -will need out pat pulmo f/u for sleep study and PAP devise

## 2018-10-08 NOTE — PROGRESS NOTE ADULT - PROBLEM SELECTOR PLAN 3
Pt on amlodipine 2.5 mg daily Patient has H/o Pulmonary embolism   on Xarelto 20 mg daily   - Pt anticoagulation restarted after clearance from urology

## 2018-10-08 NOTE — PROGRESS NOTE ADULT - PROBLEM SELECTOR PLAN 6
c/w sliding scale   diabetic diet Pt was seen by urology   they tried to change suprapubic catether but was not removed due to resistence  Pt is passing urine as well and has drainage from catheter  Dressing placed around catheter to look for any leaking as no leaking seen when evaluated

## 2018-10-08 NOTE — PROGRESS NOTE ADULT - PROBLEM SELECTOR PLAN 8
IMPROVE VTE Individual Risk Assessment          RISK                                                          Points  [  ] Previous VTE                                                3  [  ] Thrombophilia                                             2  [ x ] Lower limb paralysis                                   2        (unable to hold up >15 seconds)    [  ] Current Cancer                                             2         (within 6 months)  [ x ] Immobilization > 24 hrs                              1  [  ] ICU/CCU stay > 24 hours                             1  [ x ] Age > 60                                                         1    IMPROVE VTE Score: VTE score 4   Lovenox 20mg daily   Protonix for GI prophylaxis pt has sacral decub with multiple draining sinus   wound care consult noted  Surgery evaluated the patient and doesn't recommend surgical debridement due to patient's clinical condition  Need re-evaluation from surgery

## 2018-10-08 NOTE — PROGRESS NOTE ADULT - PROBLEM SELECTOR PLAN 10
Pt is from [facility] [place] but doesn't want to go back there upon discharge   -Please consult case management so placement work can be done along with making patient stable for discharge

## 2018-10-09 LAB
ANION GAP SERPL CALC-SCNC: 4 MMOL/L — LOW (ref 5–17)
BUN SERPL-MCNC: 24 MG/DL — HIGH (ref 7–18)
CALCIUM SERPL-MCNC: 8.8 MG/DL — SIGNIFICANT CHANGE UP (ref 8.4–10.5)
CHLORIDE SERPL-SCNC: 102 MMOL/L — SIGNIFICANT CHANGE UP (ref 96–108)
CO2 SERPL-SCNC: 38 MMOL/L — HIGH (ref 22–31)
CREAT SERPL-MCNC: 0.42 MG/DL — LOW (ref 0.5–1.3)
GLUCOSE BLDC GLUCOMTR-MCNC: 118 MG/DL — HIGH (ref 70–99)
GLUCOSE BLDC GLUCOMTR-MCNC: 155 MG/DL — HIGH (ref 70–99)
GLUCOSE BLDC GLUCOMTR-MCNC: 159 MG/DL — HIGH (ref 70–99)
GLUCOSE BLDC GLUCOMTR-MCNC: 160 MG/DL — HIGH (ref 70–99)
GLUCOSE BLDC GLUCOMTR-MCNC: 240 MG/DL — HIGH (ref 70–99)
GLUCOSE SERPL-MCNC: 123 MG/DL — HIGH (ref 70–99)
HCT VFR BLD CALC: 30.2 % — LOW (ref 34.5–45)
HGB BLD-MCNC: 8.3 G/DL — LOW (ref 11.5–15.5)
MCHC RBC-ENTMCNC: 23.6 PG — LOW (ref 27–34)
MCHC RBC-ENTMCNC: 27.4 GM/DL — LOW (ref 32–36)
MCV RBC AUTO: 86.3 FL — SIGNIFICANT CHANGE UP (ref 80–100)
PLATELET # BLD AUTO: 351 K/UL — SIGNIFICANT CHANGE UP (ref 150–400)
POTASSIUM SERPL-MCNC: 3.9 MMOL/L — SIGNIFICANT CHANGE UP (ref 3.5–5.3)
POTASSIUM SERPL-SCNC: 3.9 MMOL/L — SIGNIFICANT CHANGE UP (ref 3.5–5.3)
RBC # BLD: 3.5 M/UL — LOW (ref 3.8–5.2)
RBC # FLD: 18.8 % — HIGH (ref 10.3–14.5)
SODIUM SERPL-SCNC: 144 MMOL/L — SIGNIFICANT CHANGE UP (ref 135–145)
WBC # BLD: 10.7 K/UL — HIGH (ref 3.8–10.5)
WBC # FLD AUTO: 10.7 K/UL — HIGH (ref 3.8–10.5)

## 2018-10-09 PROCEDURE — 93970 EXTREMITY STUDY: CPT | Mod: 26

## 2018-10-09 RX ADMIN — Medication 81 MILLIGRAM(S): at 11:47

## 2018-10-09 RX ADMIN — Medication 600 MILLIGRAM(S): at 01:00

## 2018-10-09 RX ADMIN — Medication 600 MILLIGRAM(S): at 13:29

## 2018-10-09 RX ADMIN — Medication 325 MILLIGRAM(S): at 11:47

## 2018-10-09 RX ADMIN — BUDESONIDE AND FORMOTEROL FUMARATE DIHYDRATE 2 PUFF(S): 160; 4.5 AEROSOL RESPIRATORY (INHALATION) at 10:23

## 2018-10-09 RX ADMIN — GABAPENTIN 400 MILLIGRAM(S): 400 CAPSULE ORAL at 05:42

## 2018-10-09 RX ADMIN — GABAPENTIN 400 MILLIGRAM(S): 400 CAPSULE ORAL at 13:30

## 2018-10-09 RX ADMIN — Medication 3 MILLILITER(S): at 08:50

## 2018-10-09 RX ADMIN — Medication 650 MILLIGRAM(S): at 22:30

## 2018-10-09 RX ADMIN — TAMSULOSIN HYDROCHLORIDE 0.4 MILLIGRAM(S): 0.4 CAPSULE ORAL at 21:31

## 2018-10-09 RX ADMIN — BRIMONIDINE TARTRATE 1 DROP(S): 2 SOLUTION/ DROPS OPHTHALMIC at 21:31

## 2018-10-09 RX ADMIN — PANTOPRAZOLE SODIUM 40 MILLIGRAM(S): 20 TABLET, DELAYED RELEASE ORAL at 06:46

## 2018-10-09 RX ADMIN — Medication 600 MILLIGRAM(S): at 00:15

## 2018-10-09 RX ADMIN — AMLODIPINE BESYLATE 2.5 MILLIGRAM(S): 2.5 TABLET ORAL at 05:42

## 2018-10-09 RX ADMIN — BRIMONIDINE TARTRATE 1 DROP(S): 2 SOLUTION/ DROPS OPHTHALMIC at 13:30

## 2018-10-09 RX ADMIN — Medication 3 MILLILITER(S): at 21:01

## 2018-10-09 RX ADMIN — Medication 1: at 11:48

## 2018-10-09 RX ADMIN — RIVAROXABAN 20 MILLIGRAM(S): KIT at 17:18

## 2018-10-09 RX ADMIN — SIMVASTATIN 20 MILLIGRAM(S): 20 TABLET, FILM COATED ORAL at 21:31

## 2018-10-09 RX ADMIN — Medication 600 MILLIGRAM(S): at 06:15

## 2018-10-09 RX ADMIN — BUDESONIDE AND FORMOTEROL FUMARATE DIHYDRATE 2 PUFF(S): 160; 4.5 AEROSOL RESPIRATORY (INHALATION) at 21:31

## 2018-10-09 RX ADMIN — Medication 600 MILLIGRAM(S): at 17:18

## 2018-10-09 RX ADMIN — Medication 3 MILLILITER(S): at 14:55

## 2018-10-09 RX ADMIN — Medication 600 MILLIGRAM(S): at 05:42

## 2018-10-09 RX ADMIN — Medication 3 MILLILITER(S): at 03:02

## 2018-10-09 RX ADMIN — Medication 1: at 17:18

## 2018-10-09 RX ADMIN — LATANOPROST 1 DROP(S): 0.05 SOLUTION/ DROPS OPHTHALMIC; TOPICAL at 21:53

## 2018-10-09 RX ADMIN — CEFTRIAXONE 100 GRAM(S): 500 INJECTION, POWDER, FOR SOLUTION INTRAMUSCULAR; INTRAVENOUS at 06:48

## 2018-10-09 RX ADMIN — GABAPENTIN 400 MILLIGRAM(S): 400 CAPSULE ORAL at 21:31

## 2018-10-09 RX ADMIN — BRIMONIDINE TARTRATE 1 DROP(S): 2 SOLUTION/ DROPS OPHTHALMIC at 05:44

## 2018-10-09 RX ADMIN — Medication 600 MILLIGRAM(S): at 18:44

## 2018-10-09 RX ADMIN — Medication 600 MILLIGRAM(S): at 11:54

## 2018-10-09 RX ADMIN — Medication 650 MILLIGRAM(S): at 21:53

## 2018-10-09 RX ADMIN — Medication 40 MILLIGRAM(S): at 11:48

## 2018-10-09 RX ADMIN — AZITHROMYCIN 250 MILLIGRAM(S): 500 TABLET, FILM COATED ORAL at 05:43

## 2018-10-09 RX ADMIN — INFLUENZA VIRUS VACCINE 0.5 MILLILITER(S): 15; 15; 15; 15 SUSPENSION INTRAMUSCULAR at 11:48

## 2018-10-09 NOTE — PROGRESS NOTE ADULT - PROBLEM SELECTOR PLAN 9
Pt is from [facility] [place] but doesn't want to go back there upon discharge   -Please consult case management so placement work can be done along with making patient stable for discharge

## 2018-10-09 NOTE — PROGRESS NOTE ADULT - SUBJECTIVE AND OBJECTIVE BOX
PGY 1 Note discussed with supervising resident and primary attending    Patient is a 58y old  Female who presents with a chief complaint of SOB (09 Oct 2018 11:08)      INTERVAL HPI/OVERNIGHT EVENTS: no events noted overnight.    MEDICATIONS  (STANDING):  ALBUTerol/ipratropium for Nebulization 3 milliLiter(s) Nebulizer every 6 hours  amLODIPine   Tablet 2.5 milliGRAM(s) Oral daily  aspirin  chewable 81 milliGRAM(s) Oral daily  azithromycin  IVPB      azithromycin  IVPB 500 milliGRAM(s) IV Intermittent every 24 hours  brimonidine 0.2% Ophthalmic Solution 1 Drop(s) Both EYES three times a day  buDESOnide 160 MICROgram(s)/formoterol 4.5 MICROgram(s) Inhaler 2 Puff(s) Inhalation two times a day  cefTRIAXone   IVPB      cefTRIAXone   IVPB 1 Gram(s) IV Intermittent every 24 hours  ferrous    sulfate 325 milliGRAM(s) Oral daily  gabapentin 400 milliGRAM(s) Oral three times a day  ibuprofen  Tablet. 600 milliGRAM(s) Oral every 6 hours  insulin lispro (HumaLOG) corrective regimen sliding scale   SubCutaneous three times a day before meals  lactulose Syrup 15 Gram(s) Oral <User Schedule>  latanoprost 0.005% Ophthalmic Solution 1 Drop(s) Both EYES at bedtime  pantoprazole    Tablet 40 milliGRAM(s) Oral before breakfast  predniSONE   Tablet   Oral   predniSONE   Tablet 40 milliGRAM(s) Oral daily  rivaroxaban 20 milliGRAM(s) Oral every 24 hours  simvastatin 20 milliGRAM(s) Oral at bedtime  tamsulosin 0.4 milliGRAM(s) Oral at bedtime    MEDICATIONS  (PRN):  acetaminophen   Tablet .. 650 milliGRAM(s) Oral every 6 hours PRN Temp greater or equal to 38C (100.4F), Mild Pain (1 - 3)  acetaminophen   Tablet .. 650 milliGRAM(s) Oral every 6 hours PRN Moderate Pain (4 - 6)      __________________________________________________  REVIEW OF SYSTEMS:    CONSTITUTIONAL: No fever,   EYES: no acute visual disturbances  NECK: No pain or stiffness  RESPIRATORY: No cough; No shortness of breath  CARDIOVASCULAR: No chest pain, no palpitations  GASTROINTESTINAL: No pain. No nausea or vomiting; No diarrhea   NEUROLOGICAL: No headache or numbness, no tremors  MUSCULOSKELETAL: No joint pain, no muscle pain  GENITOURINARY: no dysuria, no frequency, no hesitancy  PSYCHIATRY: no depression , no anxiety  ALL OTHER  ROS negative        Vital Signs Last 24 Hrs  T(C): 36.8 (09 Oct 2018 11:26), Max: 36.9 (08 Oct 2018 23:45)  T(F): 98.2 (09 Oct 2018 11:26), Max: 98.5 (08 Oct 2018 23:45)  HR: 104 (09 Oct 2018 11:26) (90 - 104)  BP: 109/56 (09 Oct 2018 11:26) (100/59 - 134/64)  BP(mean): --  RR: 18 (09 Oct 2018 11:26) (18 - 18)  SpO2: 98% (09 Oct 2018 11:26) (97% - 100%)    ________________________________________________  PHYSICAL EXAM:  GENERAL: NAD. obese   HEENT: Normocephalic;  conjunctivae and sclerae clear; moist mucous membranes;   NECK : supple  CHEST/LUNG: Clear to auscultation bilaterally with good air entry   HEART: S1 S2  regular; no murmurs, gallops or rubs  ABDOMEN: Soft, Nontender, Nondistended; Bowel sounds present  EXTREMITIES: no cyanosis; no edema; no calf tenderness  SKIN: warm and dry; sacral decubiti   NERVOUS SYSTEM:  Awake and alert; Oriented  to place, person and time ; no new deficits    _________________________________________________  LABS:                        8.3    10.7  )-----------( 351      ( 09 Oct 2018 06:43 )             30.2     10-09    144  |  102  |  24<H>  ----------------------------<  123<H>  3.9   |  38<H>  |  0.42<L>    Ca    8.8      09 Oct 2018 06:43          CAPILLARY BLOOD GLUCOSE      POCT Blood Glucose.: 155 mg/dL (09 Oct 2018 11:42)  POCT Blood Glucose.: 240 mg/dL (09 Oct 2018 11:34)  POCT Blood Glucose.: 118 mg/dL (09 Oct 2018 07:48)  POCT Blood Glucose.: 119 mg/dL (08 Oct 2018 20:54)  POCT Blood Glucose.: 110 mg/dL (08 Oct 2018 16:48)        RADIOLOGY & ADDITIONAL TESTS:    Imaging Personally Reviewed:  YES    Consultant(s) Notes Reviewed:   YES    Care Discussed with Consultants : YES     Plan of care was discussed with patient and /or primary care giver; all questions and concerns were addressed and care was aligned with patient's wishes.

## 2018-10-09 NOTE — PROGRESS NOTE ADULT - PROBLEM SELECTOR PLAN 2
-Patient has COPD, on NC currently  -C/w steroids  -Was diagnosed in past with PUSHPA  -will need out pat pulmo f/u for sleep study and PAP devise

## 2018-10-09 NOTE — PROGRESS NOTE ADULT - SUBJECTIVE AND OBJECTIVE BOX
Time of Visit:  Patient seen and examined.     MEDICATIONS  (STANDING):  ALBUTerol/ipratropium for Nebulization 3 milliLiter(s) Nebulizer every 6 hours  amLODIPine   Tablet 2.5 milliGRAM(s) Oral daily  aspirin  chewable 81 milliGRAM(s) Oral daily  azithromycin  IVPB      azithromycin  IVPB 500 milliGRAM(s) IV Intermittent every 24 hours  brimonidine 0.2% Ophthalmic Solution 1 Drop(s) Both EYES three times a day  buDESOnide 160 MICROgram(s)/formoterol 4.5 MICROgram(s) Inhaler 2 Puff(s) Inhalation two times a day  cefTRIAXone   IVPB      cefTRIAXone   IVPB 1 Gram(s) IV Intermittent every 24 hours  ferrous    sulfate 325 milliGRAM(s) Oral daily  gabapentin 400 milliGRAM(s) Oral three times a day  ibuprofen  Tablet. 600 milliGRAM(s) Oral every 6 hours  insulin lispro (HumaLOG) corrective regimen sliding scale   SubCutaneous three times a day before meals  lactulose Syrup 15 Gram(s) Oral <User Schedule>  latanoprost 0.005% Ophthalmic Solution 1 Drop(s) Both EYES at bedtime  pantoprazole    Tablet 40 milliGRAM(s) Oral before breakfast  predniSONE   Tablet   Oral   predniSONE   Tablet 40 milliGRAM(s) Oral daily  rivaroxaban 20 milliGRAM(s) Oral every 24 hours  simvastatin 20 milliGRAM(s) Oral at bedtime  tamsulosin 0.4 milliGRAM(s) Oral at bedtime      MEDICATIONS  (PRN):  acetaminophen   Tablet .. 650 milliGRAM(s) Oral every 6 hours PRN Temp greater or equal to 38C (100.4F), Mild Pain (1 - 3)  acetaminophen   Tablet .. 650 milliGRAM(s) Oral every 6 hours PRN Moderate Pain (4 - 6)       Medications up to date at time of exam.    ROS: No fever, chills, cough, congestion. Verbalized of SOB on exertion.   PHYSICAL EXAMINATION:    Vital Signs Last 24 Hrs  T(C): 36.8 (09 Oct 2018 11:26), Max: 36.9 (08 Oct 2018 15:30)  T(F): 98.2 (09 Oct 2018 11:26), Max: 98.5 (08 Oct 2018 23:45)  HR: 104 (09 Oct 2018 11:26) (80 - 104)  BP: 109/56 (09 Oct 2018 11:26) (100/59 - 137/61)  BP(mean): --  RR: 18 (09 Oct 2018 11:26) (18 - 18)  SpO2: 98% (09 Oct 2018 11:26) (97% - 100%)   (if applicable)    General: Alert and oriented. No acute distress. Morbid Obesity.    HEENT: Head is normocephalic and atraumatic. Extraocular muscles are intact. Moist mucosa.     NECK: Supple, no palpable adenopathy.    LUNGS: Clear to auscultation, no wheezing, rales, or rhonchi. No use of accessory muscle.     HEART: S1 S2 Regular rate and no click/ rub.     ABDOMEN: Soft, nontender, and nondistended.  No hepatosplenomegaly is noted. Active bowel sounds.     NEUROLOGIC: Awake, alert, oriented.     SKIN: Warm and moist. Non diaphoretic.       LABS:                        8.3    10.7  )-----------( 351      ( 09 Oct 2018 06:43 )             30.2     10    144  |  102  |  24<H>  ----------------------------<  123<H>  3.9   |  38<H>  |  0.42<L>    Ca    8.8      09 Oct 2018 06:43    RADIOLOGY & ADDITIONAL STUDIES:  EKG:   CXR: < from: Xray Chest 1 View- PORTABLE-Urgent (10.05.18 @ 01:43) >  INTERPRETATION:  CLINICAL STATEMENT: Follow-up chest pain.    TECHNIQUE: AP view of the chest.    COMPARISON: 2018    FINDINGS/  IMPRESSION:  Increased interstitial lung markings without significant change. No gross   new consolidation or pleural effusion    Heart size cannot be accurately assessed in this projection, but appear   enlarged.     PAST MEDICAL & SURGICAL HISTORY:  Urinary retention  CKD (chronic kidney disease)  DM (diabetes mellitus)  HTN (hypertension)  COPD (chronic obstructive pulmonary disease)  Morbid obesity  Diabetes  Overactive bladder  Hemorrhoids  Emphysema  Suprapubic catheter  S/P   History of appendectomy  S/P cholecystectomy         Impression; 57 Y/O Female with prior mentioned multiple chronic conditions. Presented with progressive SOB x 3 days di not improved with Respiratory Tx from NH. She was admitted with Acute Excerbation of COPD and had multiple admissions to [facility]. Has Dx of PUSHPA but not on NIPPV. BLD Cx x 1 and RVP negative.         Suggestion:   Oxygen supplementation NC .   Continue DuoNeb Q 6 hours. Budesonide twice Daily.  She needs out pat pulmo f/u for sleep study and PAP devise  Please obtain most recent echo report from [facility].  Reinforced weight loss management outpatient.    Continue Prednisone 40 mg daily taper to 30 mg after 2 days , then 20 mg x 2 days then 10 mg x 2 days.    Continue antibiotic.   Continue Rivaroxaban 20 mg .  Reinforced compliance to daily medications, verbalized that she will comply due to she is staying now in NH. Time of Visit:  Patient seen and examined.     MEDICATIONS  (STANDING):  ALBUTerol/ipratropium for Nebulization 3 milliLiter(s) Nebulizer every 6 hours  amLODIPine   Tablet 2.5 milliGRAM(s) Oral daily  aspirin  chewable 81 milliGRAM(s) Oral daily  azithromycin  IVPB      azithromycin  IVPB 500 milliGRAM(s) IV Intermittent every 24 hours  brimonidine 0.2% Ophthalmic Solution 1 Drop(s) Both EYES three times a day  buDESOnide 160 MICROgram(s)/formoterol 4.5 MICROgram(s) Inhaler 2 Puff(s) Inhalation two times a day  cefTRIAXone   IVPB      cefTRIAXone   IVPB 1 Gram(s) IV Intermittent every 24 hours  ferrous    sulfate 325 milliGRAM(s) Oral daily  gabapentin 400 milliGRAM(s) Oral three times a day  ibuprofen  Tablet. 600 milliGRAM(s) Oral every 6 hours  insulin lispro (HumaLOG) corrective regimen sliding scale   SubCutaneous three times a day before meals  lactulose Syrup 15 Gram(s) Oral <User Schedule>  latanoprost 0.005% Ophthalmic Solution 1 Drop(s) Both EYES at bedtime  pantoprazole    Tablet 40 milliGRAM(s) Oral before breakfast  predniSONE   Tablet   Oral   predniSONE   Tablet 40 milliGRAM(s) Oral daily  rivaroxaban 20 milliGRAM(s) Oral every 24 hours  simvastatin 20 milliGRAM(s) Oral at bedtime  tamsulosin 0.4 milliGRAM(s) Oral at bedtime      MEDICATIONS  (PRN):  acetaminophen   Tablet .. 650 milliGRAM(s) Oral every 6 hours PRN Temp greater or equal to 38C (100.4F), Mild Pain (1 - 3)  acetaminophen   Tablet .. 650 milliGRAM(s) Oral every 6 hours PRN Moderate Pain (4 - 6)       Medications up to date at time of exam.    ROS: No fever, chills, cough, congestion. Verbalized of SOB on exertion.   PHYSICAL EXAMINATION:    Vital Signs Last 24 Hrs  T(C): 36.8 (09 Oct 2018 11:26), Max: 36.9 (08 Oct 2018 15:30)  T(F): 98.2 (09 Oct 2018 11:26), Max: 98.5 (08 Oct 2018 23:45)  HR: 104 (09 Oct 2018 11:26) (80 - 104)  BP: 109/56 (09 Oct 2018 11:26) (100/59 - 137/61)  BP(mean): --  RR: 18 (09 Oct 2018 11:26) (18 - 18)  SpO2: 98% (09 Oct 2018 11:26) (97% - 100%)   (if applicable)    General: Alert and oriented. No acute distress. Morbid Obesity.    HEENT: Head is normocephalic and atraumatic. Extraocular muscles are intact. Moist mucosa.     NECK: Supple, no palpable adenopathy.    LUNGS: Clear to auscultation, no wheezing, rales, or rhonchi. No use of accessory muscle.     HEART: S1 S2 Regular rate and no click/ rub.     ABDOMEN: Soft, nontender, and nondistended.  No hepatosplenomegaly is noted. Active bowel sounds.     NEUROLOGIC: Awake, alert, oriented.     SKIN: Warm and moist. Non diaphoretic.       LABS:                        8.3    10.7  )-----------( 351      ( 09 Oct 2018 06:43 )             30.2     10    144  |  102  |  24<H>  ----------------------------<  123<H>  3.9   |  38<H>  |  0.42<L>    Ca    8.8      09 Oct 2018 06:43    RADIOLOGY & ADDITIONAL STUDIES:  EKG:   CXR: < from: Xray Chest 1 View- PORTABLE-Urgent (10.05.18 @ 01:43) >  INTERPRETATION:  CLINICAL STATEMENT: Follow-up chest pain.    TECHNIQUE: AP view of the chest.    COMPARISON: 2018    FINDINGS/  IMPRESSION:  Increased interstitial lung markings without significant change. No gross   new consolidation or pleural effusion    Heart size cannot be accurately assessed in this projection, but appear   enlarged.     PAST MEDICAL & SURGICAL HISTORY:  Urinary retention  CKD (chronic kidney disease)  DM (diabetes mellitus)  HTN (hypertension)  COPD (chronic obstructive pulmonary disease)  Morbid obesity  Diabetes  Overactive bladder  Hemorrhoids  Emphysema  Suprapubic catheter  S/P   History of appendectomy  S/P cholecystectomy         Impression; 59 Y/O Female with prior mentioned multiple chronic conditions. Presented with progressive SOB x 3 days di not improved with Respiratory Tx from NH. She was admitted with Acute Excerbation of COPD and had multiple admissions to [facility]. Has Dx of PUSHPA but not on NIPPV. BLD Cx x 1 and RVP negative.         Suggestion:   Oxygen supplementation NC .   Continue DuoNeb Q 6 hours. Budesonide twice Daily.  She needs out pat pulmo f/u for sleep study and PAP devise  Please obtain most recent echo report from [facility].  Reinforced weight loss management outpatient.    Continue Prednisone 40 mg daily taper to 30 mg after 2 days , then 20 mg x 2 days then 10 mg x 2 days.    Continue antibiotic.   Continue Rivaroxaban 20 mg .  Reinforced compliance to daily medications, verbalized that she will comply due to she is staying now in NH.       Agree with above assessment and plan as transcribed.

## 2018-10-09 NOTE — PROGRESS NOTE ADULT - ASSESSMENT
58 year old female, bed bound from [facility] with PMHx of  DM, COPD on home O2, PUSHPA (not on CPAP,) chronic urinary retention s/p suprapubic cath, PE on Xarelto, paraplegic ( unknown etiology ) presents from  with  difficulty breathing. As per patient she was having  difficulty breathing since 2 days with non productive cough, she used inhalers multiple times with no significant improved in her breathing, As per NH staff patient was saturating at 70;s on nasal canula and subsequently pt was sent to hospital. Patient denies any fever, chills, chest pain, nausea, vomiting, diarrhea, abdominal pain. Patient was suprapubic catheter which was leaking form the sides. Patient also has multiple draining sinus around buttock area with purulent discharge. Patient was recently discharged for COPD exacerbation on isaiah steroid therapy.     Patient admitted for COPD exacerbation.

## 2018-10-09 NOTE — PROGRESS NOTE ADULT - PROBLEM SELECTOR PLAN 4
Patient has H/o Pulmonary embolism   on Xarelto 20 mg daily Patient has H/o Pulmonary embolism   on Xarelto 20 mg daily  -patient c/o leg pain b/l, awaiting doppler

## 2018-10-09 NOTE — PROGRESS NOTE ADULT - PROBLEM SELECTOR PLAN 1
Patient is noted to have anemia  -hb stable at 8.3  -Anemia panel: Iron deficiency anemia  -Records from [facility]: Sigmoidoscopy from 8/13/2018 : No mass lesions, diverticulosis, internal hemorrhoids.   -EGD: 7/10/2017: Mild gastritis  -Plan is for out-patient colonoscopy  -DC planning: If no intervention from surgery then likely tomorrow, awaiting rehab placement.

## 2018-10-09 NOTE — PROGRESS NOTE ADULT - PROBLEM SELECTOR PLAN 3
pt has sacral decub with multiple draining sinus   wound care consult noted  Need re-evaluation from surgery.  -Will consult surgery.

## 2018-10-10 ENCOUNTER — TRANSCRIPTION ENCOUNTER (OUTPATIENT)
Age: 58
End: 2018-10-10

## 2018-10-10 LAB
ANION GAP SERPL CALC-SCNC: 3 MMOL/L — LOW (ref 5–17)
BUN SERPL-MCNC: 21 MG/DL — HIGH (ref 7–18)
CALCIUM SERPL-MCNC: 8.8 MG/DL — SIGNIFICANT CHANGE UP (ref 8.4–10.5)
CHLORIDE SERPL-SCNC: 99 MMOL/L — SIGNIFICANT CHANGE UP (ref 96–108)
CO2 SERPL-SCNC: 38 MMOL/L — HIGH (ref 22–31)
CREAT SERPL-MCNC: 0.38 MG/DL — LOW (ref 0.5–1.3)
GLUCOSE BLDC GLUCOMTR-MCNC: 118 MG/DL — HIGH (ref 70–99)
GLUCOSE BLDC GLUCOMTR-MCNC: 122 MG/DL — HIGH (ref 70–99)
GLUCOSE BLDC GLUCOMTR-MCNC: 131 MG/DL — HIGH (ref 70–99)
GLUCOSE BLDC GLUCOMTR-MCNC: 195 MG/DL — HIGH (ref 70–99)
GLUCOSE SERPL-MCNC: 121 MG/DL — HIGH (ref 70–99)
HCT VFR BLD CALC: 29.6 % — LOW (ref 34.5–45)
HGB BLD-MCNC: 8 G/DL — LOW (ref 11.5–15.5)
MCHC RBC-ENTMCNC: 23 PG — LOW (ref 27–34)
MCHC RBC-ENTMCNC: 27.1 GM/DL — LOW (ref 32–36)
MCV RBC AUTO: 84.9 FL — SIGNIFICANT CHANGE UP (ref 80–100)
PLATELET # BLD AUTO: 372 K/UL — SIGNIFICANT CHANGE UP (ref 150–400)
POTASSIUM SERPL-MCNC: 3.8 MMOL/L — SIGNIFICANT CHANGE UP (ref 3.5–5.3)
POTASSIUM SERPL-SCNC: 3.8 MMOL/L — SIGNIFICANT CHANGE UP (ref 3.5–5.3)
RBC # BLD: 3.49 M/UL — LOW (ref 3.8–5.2)
RBC # FLD: 19.4 % — HIGH (ref 10.3–14.5)
SODIUM SERPL-SCNC: 140 MMOL/L — SIGNIFICANT CHANGE UP (ref 135–145)
WBC # BLD: 12.6 K/UL — HIGH (ref 3.8–10.5)
WBC # FLD AUTO: 12.6 K/UL — HIGH (ref 3.8–10.5)

## 2018-10-10 PROCEDURE — 99231 SBSQ HOSP IP/OBS SF/LOW 25: CPT

## 2018-10-10 PROCEDURE — 74177 CT ABD & PELVIS W/CONTRAST: CPT | Mod: 26

## 2018-10-10 RX ORDER — IBUPROFEN 200 MG
600 TABLET ORAL EVERY 6 HOURS
Qty: 0 | Refills: 0 | Status: DISCONTINUED | OUTPATIENT
Start: 2018-10-10 | End: 2018-10-12

## 2018-10-10 RX ADMIN — Medication 600 MILLIGRAM(S): at 12:29

## 2018-10-10 RX ADMIN — BRIMONIDINE TARTRATE 1 DROP(S): 2 SOLUTION/ DROPS OPHTHALMIC at 05:49

## 2018-10-10 RX ADMIN — CEFTRIAXONE 100 GRAM(S): 500 INJECTION, POWDER, FOR SOLUTION INTRAMUSCULAR; INTRAVENOUS at 05:50

## 2018-10-10 RX ADMIN — AMLODIPINE BESYLATE 2.5 MILLIGRAM(S): 2.5 TABLET ORAL at 05:48

## 2018-10-10 RX ADMIN — Medication 650 MILLIGRAM(S): at 18:27

## 2018-10-10 RX ADMIN — Medication 600 MILLIGRAM(S): at 05:48

## 2018-10-10 RX ADMIN — Medication 81 MILLIGRAM(S): at 12:30

## 2018-10-10 RX ADMIN — Medication 1: at 12:30

## 2018-10-10 RX ADMIN — PANTOPRAZOLE SODIUM 40 MILLIGRAM(S): 20 TABLET, DELAYED RELEASE ORAL at 06:09

## 2018-10-10 RX ADMIN — Medication 3 MILLILITER(S): at 14:34

## 2018-10-10 RX ADMIN — Medication 40 MILLIGRAM(S): at 05:49

## 2018-10-10 RX ADMIN — GABAPENTIN 400 MILLIGRAM(S): 400 CAPSULE ORAL at 05:48

## 2018-10-10 RX ADMIN — GABAPENTIN 400 MILLIGRAM(S): 400 CAPSULE ORAL at 21:51

## 2018-10-10 RX ADMIN — SIMVASTATIN 20 MILLIGRAM(S): 20 TABLET, FILM COATED ORAL at 21:51

## 2018-10-10 RX ADMIN — Medication 600 MILLIGRAM(S): at 13:18

## 2018-10-10 RX ADMIN — Medication 3 MILLILITER(S): at 20:13

## 2018-10-10 RX ADMIN — Medication 600 MILLIGRAM(S): at 23:38

## 2018-10-10 RX ADMIN — TAMSULOSIN HYDROCHLORIDE 0.4 MILLIGRAM(S): 0.4 CAPSULE ORAL at 21:51

## 2018-10-10 RX ADMIN — BUDESONIDE AND FORMOTEROL FUMARATE DIHYDRATE 2 PUFF(S): 160; 4.5 AEROSOL RESPIRATORY (INHALATION) at 10:03

## 2018-10-10 RX ADMIN — AZITHROMYCIN 250 MILLIGRAM(S): 500 TABLET, FILM COATED ORAL at 05:50

## 2018-10-10 RX ADMIN — RIVAROXABAN 20 MILLIGRAM(S): KIT at 17:09

## 2018-10-10 RX ADMIN — BRIMONIDINE TARTRATE 1 DROP(S): 2 SOLUTION/ DROPS OPHTHALMIC at 21:51

## 2018-10-10 RX ADMIN — Medication 3 MILLILITER(S): at 08:14

## 2018-10-10 RX ADMIN — Medication 3 MILLILITER(S): at 02:27

## 2018-10-10 RX ADMIN — Medication 325 MILLIGRAM(S): at 12:30

## 2018-10-10 RX ADMIN — LATANOPROST 1 DROP(S): 0.05 SOLUTION/ DROPS OPHTHALMIC; TOPICAL at 21:51

## 2018-10-10 RX ADMIN — BUDESONIDE AND FORMOTEROL FUMARATE DIHYDRATE 2 PUFF(S): 160; 4.5 AEROSOL RESPIRATORY (INHALATION) at 21:52

## 2018-10-10 RX ADMIN — BRIMONIDINE TARTRATE 1 DROP(S): 2 SOLUTION/ DROPS OPHTHALMIC at 13:16

## 2018-10-10 RX ADMIN — Medication 650 MILLIGRAM(S): at 17:10

## 2018-10-10 RX ADMIN — GABAPENTIN 400 MILLIGRAM(S): 400 CAPSULE ORAL at 13:15

## 2018-10-10 NOTE — PROGRESS NOTE ADULT - SUBJECTIVE AND OBJECTIVE BOX
PGY 1 Note discussed with supervising resident and primary attending    Patient is a 58y old  Female who presents with a chief complaint of SOB (09 Oct 2018 15:39)      INTERVAL HPI/OVERNIGHT EVENTS: no events noted overnight.    MEDICATIONS  (STANDING):  ALBUTerol/ipratropium for Nebulization 3 milliLiter(s) Nebulizer every 6 hours  amLODIPine   Tablet 2.5 milliGRAM(s) Oral daily  aspirin  chewable 81 milliGRAM(s) Oral daily  azithromycin  IVPB      azithromycin  IVPB 500 milliGRAM(s) IV Intermittent every 24 hours  brimonidine 0.2% Ophthalmic Solution 1 Drop(s) Both EYES three times a day  buDESOnide 160 MICROgram(s)/formoterol 4.5 MICROgram(s) Inhaler 2 Puff(s) Inhalation two times a day  cefTRIAXone   IVPB      cefTRIAXone   IVPB 1 Gram(s) IV Intermittent every 24 hours  ferrous    sulfate 325 milliGRAM(s) Oral daily  gabapentin 400 milliGRAM(s) Oral three times a day  insulin lispro (HumaLOG) corrective regimen sliding scale   SubCutaneous three times a day before meals  lactulose Syrup 15 Gram(s) Oral <User Schedule>  latanoprost 0.005% Ophthalmic Solution 1 Drop(s) Both EYES at bedtime  pantoprazole    Tablet 40 milliGRAM(s) Oral before breakfast  predniSONE   Tablet   Oral   predniSONE   Tablet 40 milliGRAM(s) Oral daily  rivaroxaban 20 milliGRAM(s) Oral every 24 hours  simvastatin 20 milliGRAM(s) Oral at bedtime  tamsulosin 0.4 milliGRAM(s) Oral at bedtime    MEDICATIONS  (PRN):  acetaminophen   Tablet .. 650 milliGRAM(s) Oral every 6 hours PRN Temp greater or equal to 38C (100.4F), Mild Pain (1 - 3)  acetaminophen   Tablet .. 650 milliGRAM(s) Oral every 6 hours PRN Moderate Pain (4 - 6)      __________________________________________________  REVIEW OF SYSTEMS:    CONSTITUTIONAL: No fever,   EYES: no acute visual disturbances  NECK: No pain or stiffness  RESPIRATORY: No cough; No shortness of breath  CARDIOVASCULAR: No chest pain, no palpitations  GASTROINTESTINAL: No pain. No nausea or vomiting; No diarrhea   NEUROLOGICAL: No headache or numbness, no tremors  MUSCULOSKELETAL: No joint pain, no muscle pain  GENITOURINARY: no dysuria, no frequency, no hesitancy  PSYCHIATRY: no depression , no anxiety  ALL OTHER  ROS negative        Vital Signs Last 24 Hrs  T(C): 36.5 (10 Oct 2018 11:15), Max: 37.3 (09 Oct 2018 23:38)  T(F): 97.7 (10 Oct 2018 11:15), Max: 99.2 (09 Oct 2018 23:38)  HR: 101 (10 Oct 2018 11:15) (85 - 108)  BP: 120/54 (10 Oct 2018 11:15) (105/56 - 137/64)  BP(mean): --  RR: 18 (10 Oct 2018 11:15) (18 - 18)  SpO2: 96% (10 Oct 2018 11:15) (95% - 98%)    ________________________________________________  PHYSICAL EXAM:  GENERAL: NAD, morbidly obese  HEENT: Normocephalic;  conjunctivae and sclerae clear; moist mucous membranes;   NECK : supple  CHEST/LUNG: Clear to auscultation bilaterally with good air entry   HEART: S1 S2  regular; no murmurs, gallops or rubs  ABDOMEN: Soft, Nontender, Nondistended; Bowel sounds present  EXTREMITIES: no cyanosis; no edema; no calf tenderness  SKIN: warm and dry; no rash has sacral decubiti with draining sinuses  NERVOUS SYSTEM:  Awake and alert; Oriented  to place, person and time ; no new deficits    _________________________________________________  LABS:                        8.0    12.6  )-----------( 372      ( 10 Oct 2018 08:25 )             29.6     10-10    140  |  99  |  21<H>  ----------------------------<  121<H>  3.8   |  38<H>  |  0.38<L>    Ca    8.8      10 Oct 2018 08:25          CAPILLARY BLOOD GLUCOSE      POCT Blood Glucose.: 195 mg/dL (10 Oct 2018 11:37)  POCT Blood Glucose.: 122 mg/dL (10 Oct 2018 08:04)  POCT Blood Glucose.: 160 mg/dL (09 Oct 2018 20:56)  POCT Blood Glucose.: 159 mg/dL (09 Oct 2018 16:47)        RADIOLOGY & ADDITIONAL TESTS:    Imaging Personally Reviewed:  YES    Consultant(s) Notes Reviewed:   YES    Care Discussed with Consultants : YES     Plan of care was discussed with patient and /or primary care giver; all questions and concerns were addressed and care was aligned with patient's wishes.

## 2018-10-10 NOTE — DISCHARGE NOTE ADULT - PATIENT PORTAL LINK FT
You can access the [facility] Patient Portal, offered by [facility], by registering with the following website: [email or web address]

## 2018-10-10 NOTE — PROGRESS NOTE ADULT - PROBLEM SELECTOR PLAN 3
pt has sacral decub with multiple draining sinus   wound care consult noted  Need re-evaluation from surgery, re-consulted today.

## 2018-10-10 NOTE — PROGRESS NOTE ADULT - PROBLEM SELECTOR PLAN 1
Patient was noted to have anemia  -hb stable at 8  -Anemia panel: Iron deficiency anemia  -Records from [facility]: Sigmoidoscopy from 8/13/2018 : No mass lesions, diverticulosis, internal hemorrhoids.   -EGD: 7/10/2017: Mild gastritis  -Plan is for out-patient colonoscopy  -DC planning: Patient is from long term rehab, wants to change it, needs placement.  informed.

## 2018-10-10 NOTE — PROGRESS NOTE ADULT - SUBJECTIVE AND OBJECTIVE BOX
Time of Visit:  Patient seen and examined.     MEDICATIONS  (STANDING):  ALBUTerol/ipratropium for Nebulization 3 milliLiter(s) Nebulizer every 6 hours  amLODIPine   Tablet 2.5 milliGRAM(s) Oral daily  aspirin  chewable 81 milliGRAM(s) Oral daily  azithromycin  IVPB      azithromycin  IVPB 500 milliGRAM(s) IV Intermittent every 24 hours  brimonidine 0.2% Ophthalmic Solution 1 Drop(s) Both EYES three times a day  buDESOnide 160 MICROgram(s)/formoterol 4.5 MICROgram(s) Inhaler 2 Puff(s) Inhalation two times a day  cefTRIAXone   IVPB      cefTRIAXone   IVPB 1 Gram(s) IV Intermittent every 24 hours  ferrous    sulfate 325 milliGRAM(s) Oral daily  gabapentin 400 milliGRAM(s) Oral three times a day  insulin lispro (HumaLOG) corrective regimen sliding scale   SubCutaneous three times a day before meals  lactulose Syrup 15 Gram(s) Oral <User Schedule>  latanoprost 0.005% Ophthalmic Solution 1 Drop(s) Both EYES at bedtime  pantoprazole    Tablet 40 milliGRAM(s) Oral before breakfast  predniSONE   Tablet   Oral   predniSONE   Tablet 40 milliGRAM(s) Oral daily  rivaroxaban 20 milliGRAM(s) Oral every 24 hours  simvastatin 20 milliGRAM(s) Oral at bedtime  tamsulosin 0.4 milliGRAM(s) Oral at bedtime      MEDICATIONS  (PRN):  acetaminophen   Tablet .. 650 milliGRAM(s) Oral every 6 hours PRN Temp greater or equal to 38C (100.4F), Mild Pain (1 - 3)  acetaminophen   Tablet .. 650 milliGRAM(s) Oral every 6 hours PRN Moderate Pain (4 - 6)       Medications up to date at time of exam.    ROS: No fever, chills, cough, congestion. Verbalized of episode of SOB , per patient last night but none on exam.  PHYSICAL EXAMINATION:    Vital Signs Last 24 Hrs  T(C): 36.5 (10 Oct 2018 11:15), Max: 37.3 (09 Oct 2018 23:38)  T(F): 97.7 (10 Oct 2018 11:15), Max: 99.2 (09 Oct 2018 23:38)  HR: 101 (10 Oct 2018 11:15) (85 - 108)  BP: 120/54 (10 Oct 2018 11:15) (105/56 - 137/64)  BP(mean): --  RR: 18 (10 Oct 2018 11:15) (18 - 18)  SpO2: 96% (10 Oct 2018 11:15) (95% - 98%)   (if applicable)    General: Alert and oriented. No acute distress. Morbid Obesity. Able to answer question with no SOB.    HEENT: Head is normocephalic and atraumatic. Extraocular muscles are intact. Moist mucosa.     NECK: Supple, no palpable adenopathy.    LUNGS: Clear to auscultation B/L. No wheezing, rales, or rhonchi. No use of accessory muscle.     HEART: S1 S2 Regular rate and no click/ rub.     ABDOMEN: Soft, nontender, and nondistended.  No hepatosplenomegaly is noted. Active bowel sounds.     NEUROLOGIC: Awake, alert, oriented.     SKIN: Warm and moist. Non diaphoretic.     LABS:                        8.0    12.6  )-----------( 372      ( 10 Oct 2018 08:25 )             29.6     10-10    140  |  99  |  21<H>  ----------------------------<  121<H>  3.8   |  38<H>  |  0.38<L>    Ca    8.8      10 Oct 2018 08:25    RADIOLOGY & ADDITIONAL STUDIES:  EKG:   CXR: < from: Xray Chest 1 View- PORTABLE-Urgent (10.05.18 @ 01:43) >    INTERPRETATION:  CLINICAL STATEMENT: Follow-up chest pain.    TECHNIQUE: AP view of the chest.    COMPARISON: 2018    FINDINGS/  IMPRESSION:  Increased interstitial lung markings without significant change. No gross   new consolidation or pleural effusion    Heart size cannot be accurately assessed in this projection, but appear   enlarged.      IMPRESSION: 58y Female PAST MEDICAL & SURGICAL HISTORY:  Urinary retention  CKD (chronic kidney disease)  DM (diabetes mellitus)  HTN (hypertension)  COPD (chronic obstructive pulmonary disease)  Morbid obesity  Diabetes  Overactive bladder  Hemorrhoids  Emphysema  Suprapubic catheter  S/P   History of appendectomy  S/P cholecystectomy     Impression; 57 Y/O Female with prior mentioned multiple chronic conditions. Presented with progressive SOB x 3 days di not improved with Respiratory Tx from NH. She was admitted with Acute Excerbation of COPD and had multiple admissions to [facility]. Has Dx of PUSHPA but not on NIPPV. BLD Cx x 1 and RVP negative.         Suggestion:   Oxygen supplementation NC .   Continue DuoNeb Q 6 hours. Budesonide twice Daily.  She needs out pat pulmo f/u for sleep study and PAP devise  Please obtain most recent echo report from [facility].  Reinforced weight loss management outpatient.    Continue Prednisone 40 mg daily taper to 30 mg after 2 days , then 20 mg x 2 days then 10 mg x 2 days.    Continue antibiotic.   Continue Rivaroxaban 20 mg . Time of Visit:  Patient seen and examined.     MEDICATIONS  (STANDING):  ALBUTerol/ipratropium for Nebulization 3 milliLiter(s) Nebulizer every 6 hours  amLODIPine   Tablet 2.5 milliGRAM(s) Oral daily  aspirin  chewable 81 milliGRAM(s) Oral daily  azithromycin  IVPB      azithromycin  IVPB 500 milliGRAM(s) IV Intermittent every 24 hours  brimonidine 0.2% Ophthalmic Solution 1 Drop(s) Both EYES three times a day  buDESOnide 160 MICROgram(s)/formoterol 4.5 MICROgram(s) Inhaler 2 Puff(s) Inhalation two times a day  cefTRIAXone   IVPB      cefTRIAXone   IVPB 1 Gram(s) IV Intermittent every 24 hours  ferrous    sulfate 325 milliGRAM(s) Oral daily  gabapentin 400 milliGRAM(s) Oral three times a day  insulin lispro (HumaLOG) corrective regimen sliding scale   SubCutaneous three times a day before meals  lactulose Syrup 15 Gram(s) Oral <User Schedule>  latanoprost 0.005% Ophthalmic Solution 1 Drop(s) Both EYES at bedtime  pantoprazole    Tablet 40 milliGRAM(s) Oral before breakfast  predniSONE   Tablet   Oral   predniSONE   Tablet 40 milliGRAM(s) Oral daily  rivaroxaban 20 milliGRAM(s) Oral every 24 hours  simvastatin 20 milliGRAM(s) Oral at bedtime  tamsulosin 0.4 milliGRAM(s) Oral at bedtime      MEDICATIONS  (PRN):  acetaminophen   Tablet .. 650 milliGRAM(s) Oral every 6 hours PRN Temp greater or equal to 38C (100.4F), Mild Pain (1 - 3)  acetaminophen   Tablet .. 650 milliGRAM(s) Oral every 6 hours PRN Moderate Pain (4 - 6)       Medications up to date at time of exam.    ROS: No fever, chills, cough, congestion. Verbalized of episode of SOB , per patient last night but none on exam.  PHYSICAL EXAMINATION:    Vital Signs Last 24 Hrs  T(C): 36.5 (10 Oct 2018 11:15), Max: 37.3 (09 Oct 2018 23:38)  T(F): 97.7 (10 Oct 2018 11:15), Max: 99.2 (09 Oct 2018 23:38)  HR: 101 (10 Oct 2018 11:15) (85 - 108)  BP: 120/54 (10 Oct 2018 11:15) (105/56 - 137/64)  BP(mean): --  RR: 18 (10 Oct 2018 11:15) (18 - 18)  SpO2: 96% (10 Oct 2018 11:15) (95% - 98%)   (if applicable)    General: Alert and oriented. No acute distress. Morbid Obesity. Able to answer question with no SOB.    HEENT: Head is normocephalic and atraumatic. Extraocular muscles are intact. Moist mucosa.     NECK: Supple, no palpable adenopathy.    LUNGS: Clear to auscultation B/L. No wheezing, rales, or rhonchi. No use of accessory muscle.     HEART: S1 S2 Regular rate and no click/ rub.     ABDOMEN: Soft, nontender, and nondistended.  No hepatosplenomegaly is noted. Active bowel sounds.     NEUROLOGIC: Awake, alert, oriented.     SKIN: Warm and moist. Non diaphoretic.     LABS:                        8.0    12.6  )-----------( 372      ( 10 Oct 2018 08:25 )             29.6     10-10    140  |  99  |  21<H>  ----------------------------<  121<H>  3.8   |  38<H>  |  0.38<L>    Ca    8.8      10 Oct 2018 08:25    RADIOLOGY & ADDITIONAL STUDIES:  EKG:   CXR: < from: Xray Chest 1 View- PORTABLE-Urgent (10.05.18 @ 01:43) >    INTERPRETATION:  CLINICAL STATEMENT: Follow-up chest pain.    TECHNIQUE: AP view of the chest.    COMPARISON: 2018    FINDINGS/  IMPRESSION:  Increased interstitial lung markings without significant change. No gross   new consolidation or pleural effusion    Heart size cannot be accurately assessed in this projection, but appear   enlarged.      IMPRESSION: 58y Female PAST MEDICAL & SURGICAL HISTORY:  Urinary retention  CKD (chronic kidney disease)  DM (diabetes mellitus)  HTN (hypertension)  COPD (chronic obstructive pulmonary disease)  Morbid obesity  Diabetes  Overactive bladder  Hemorrhoids  Emphysema  Suprapubic catheter  S/P   History of appendectomy  S/P cholecystectomy     Impression; 57 Y/O Female with prior mentioned multiple chronic conditions. Presented with progressive SOB x 3 days di not improved with Respiratory Tx from NH. She was admitted with Acute Excerbation of COPD and had multiple admissions to [facility]. Has Dx of PUSHPA but not on NIPPV. BLD Cx x 1 and RVP negative.         Suggestion:   Oxygen supplementation NC .   Continue DuoNeb Q 6 hours. Budesonide twice Daily.  She needs out pat pulmo f/u for sleep study and PAP devise  [facility].  Reinforced weight loss management outpatient.    Continue Prednisone 40 mg daily taper to 30 mg after 2 days , then 20 mg x 2 days then 10 mg x 2 days.    Continue antibiotic.   Continue Rivaroxaban 20 mg .      Agree with above assessment and plan as transcribed.

## 2018-10-10 NOTE — PROGRESS NOTE ADULT - PROBLEM SELECTOR PLAN 4
Patient has H/o Pulmonary embolism   on Xarelto 20 mg daily  -patient c/o leg pain b/l, venous doppler from 10/09/18 negative for DVT

## 2018-10-10 NOTE — PROGRESS NOTE ADULT - SUBJECTIVE AND OBJECTIVE BOX
Pt s- new complaints  ICU Vital Signs Last 24 Hrs  T(C): 37 (10 Oct 2018 15:30), Max: 37.3 (09 Oct 2018 23:38)  T(F): 98.6 (10 Oct 2018 15:30), Max: 99.2 (09 Oct 2018 23:38)  HR: 96 (10 Oct 2018 15:30) (85 - 108)  BP: 122/62 (10 Oct 2018 15:30) (105/56 - 137/64)  BP(mean): --  ABP: --  ABP(mean): --  RR: 18 (10 Oct 2018 15:30) (18 - 18)  SpO2: 94% (10 Oct 2018 15:30) (94% - 98%)    Pt examined with Dr Barksdale  Upper gluteal wound with multiple sinuses. No purulence expressed. No fluctuance. No active bleed.  No crepitus.    Dr Barksdale discussed with patient the nature of this wound, and the risks of surgical intervention considering her comorbidities. Desicion was made that Surgical intervention poses too high a risk versus benefit, and pt will continue with perineal care, frequent position changes and local wound care as needed.  Pt may follow up with her pcp and may consider wound care or Dermatology consultation as Outpatient.

## 2018-10-10 NOTE — DISCHARGE NOTE ADULT - NSTOBACCOHOTLINE_GEN_A_CS
[facility] Smokers Quitline ([id number]) [facility] Smokers Quitline ([phone])

## 2018-10-10 NOTE — CHART NOTE - NSCHARTNOTEFT_GEN_A_CORE
I received a page that the patient was reporting chest pain and SOB. When I went to examine the patient, she reported that the pain had improved but she had been having some pain in the center of her chest and when it happened it made her short of breath. She denied any SOB and said the pain was better. Lungs were cta b/l. EKG was done and showed sinus rhythm () with some premature supraventricular contractions, but no evidence of ischemia or wave changes. No intervention was done at this time. Will continue to monitor patient. Thank you.

## 2018-10-10 NOTE — DISCHARGE NOTE ADULT - CARE PLAN
Principal Discharge DX:	COPD (chronic obstructive pulmonary disease)  Goal:	To prevent future recurrences  Assessment and plan of treatment:	You came in worsening shortness of breath and cough, was diagnosed to have COPD exacerbation. Pulmonology was following recommended antibiotics, steroids, inhalers  and supplemental oxygen. You also obstructive sleep apnea, recommended to get out-patient sleep study as you may benefit from PAP device. Weight loss is strongly recommended. Please follow up with your primary medical doctor and pulmonologist as out patient and continue taking your medications as prescribed.  Secondary Diagnosis:	Anemia  Assessment and plan of treatment:	You were noted to have low hemoglobin, anemia panel was sent which showed iron deficiency anemia.   Gastroenterologist was consulted, records from [facility] were obtained which showed EGD and Sigmoidoscopy with no abnormalities. Gastroenterologist recommended repeat colonoscopy which can be done out-patient. Please follow up with your primary medical doctor and gastroenterologists as out-patient.  Secondary Diagnosis:	Decubitus ulcers  Assessment and plan of treatment:	You have multiple ulcers with draining sinuses likely secondary to repeated pressure ulcers. Surgery was consulted on admission, recommended surgical exploration once respiratory status was more stable. Consulted again  recommended......  Secondary Diagnosis:	Pulmonary embolism  Assessment and plan of treatment:	You have hx of pulmonary embolism, anticoagulants blood thinners were continued. You also complained of leg pain, US venous was done which came normal. Please continue taking your medications. Follow up with your primary Principal Discharge DX:	COPD (chronic obstructive pulmonary disease)  Goal:	To prevent future recurrences  Assessment and plan of treatment:	You came in worsening shortness of breath and cough, was diagnosed to have COPD exacerbation. Pulmonology was following recommended antibiotics, steroids, inhalers  and supplemental oxygen. You also obstructive sleep apnea, recommended to get out-patient sleep study as you may benefit from PAP device. Weight loss is strongly recommended. Please follow up with your primary medical doctor and pulmonologist as out patient and continue taking your medications as prescribed.  Secondary Diagnosis:	Anemia  Assessment and plan of treatment:	You were noted to have low hemoglobin, anemia panel was sent which showed iron deficiency anemia.   Gastroenterologist was consulted, records from [facility] were obtained which showed EGD and Sigmoidoscopy with no abnormalities. Gastroenterologist recommended repeat colonoscopy which can be done out-patient. Please follow up with your primary medical doctor and gastroenterologists as out-patient.  Secondary Diagnosis:	Decubitus ulcers  Assessment and plan of treatment:	You have multiple ulcers with draining sinuses likely secondary to repeated pressure ulcers. Surgery was consulted on admission, recommended surgical exploration once respiratory status was more stable. Consulted again  recommended......  Secondary Diagnosis:	Pulmonary embolism  Assessment and plan of treatment:	You have hx of pulmonary embolism, anticoagulants blood thinners were continued. You also complained of leg pain, US venous was done which came normal. Please continue taking your medications. Follow up with your primary  Secondary Diagnosis:	Morbid obesity  Secondary Diagnosis:	Suprapubic catheter  Secondary Diagnosis:	Hidradenitis  Assessment and plan of treatment:	Has hidradenitis  lateral to the perianal area bilaterally that extends for about 5x4 cm on each side. Multiple sinuses draining. Presently no active infection, has multiple sinuses.  Surgerical team is not recommend surgery, given likely poor outcomes of wound healing 2/2 comorbidities.

## 2018-10-10 NOTE — DISCHARGE NOTE ADULT - CARE PROVIDER_API CALL
Nigel Torres), Internal Medicine  [address] [place], [place] [address]  Phone: [phone]  Fax: [phone]

## 2018-10-10 NOTE — PROGRESS NOTE ADULT - PROBLEM SELECTOR PLAN 9
Pt is from [facility] [place] but doesn't want to go back there upon discharge   -Please consult case management so placement work can be done along with making patient stable for discharge

## 2018-10-10 NOTE — DISCHARGE NOTE ADULT - HOSPITAL COURSE
58 year old female, bed bound from [facility] with PMHx of  DM, COPD on home O2, PUSHPA (not on CPAP,) chronic urinary retention s/p suprapubic cath, PE on Xarelto, paraplegic ( unknown etiology ) presents from  with  difficulty breathing. As per patient she was having  difficulty breathing since 2 days with non productive cough, she used inhalers multiple times with no significant improved in her breathing,  As per NH staff patient was saturating at 70;s on nasal canula and subsequently pt was sent to hospital.  and subsequently pt was sent to hospital. Patient denies any fever, chills, chest pain, nausea, vomiting, diarrhea, abdominal pain. Patient was suprapubic catheter which was leaking form the sides. Patient also has multiple draining sinus around buttock area with purulent discharge. Patient was recently discharged for COPD exacerbation on isaiah steroid therapy. 58 year old female, bed bound from [facility] with PMHx of  DM, COPD on home O2, PUSHPA (not on CPAP,) chronic urinary retention s/p suprapubic cath, PE on Xarelto, paraplegic ( unknown etiology ) presented   with  difficulty breathing. As per patient she was having  difficulty breathing since 2 days with non productive cough, she used inhalers multiple times with no significant improved in her breathing,  As per NH staff patient was saturating at 70;s on nasal canula and subsequently pt was sent to hospital.  Patient denied any fever, chills, chest pain, nausea, vomiting, diarrhea, abdominal pain. Patient was suprapubic catheter which was leaking form the sides.     Patient also has multiple draining sinus around buttock area with purulent discharge. Surgery was consulted, recommended local wound care, and suggested that can get surgical exploration once more stable for anesthesia.   Patient also had hematuria on admission, urology was consulted, supra pubic catheter was changed in hospital and recommended to get catheter should be exchanged every 4-6 weeks.  Pulmonology was consulted, treated patient for COPD exacerbation on steroids and oxygen. Also recommended weight loss surgery as outpatient, will need out pat pulmo f/u for sleep study and PAP devise. Supplemental oxygen was continued which is she is already on from NH. Patient also had hx of PE, AC was continued. Dopplers were done which came normal, no signs of DVT.  Patient also had a drop in her Hb, anemia panel showed iron deficiency. GI consulted, records from [facility] were obtained, EGD and Colonoscopy was normal, no bleeding source was identified. CT scan abdomen showed diverticulosis. GI recommended repeat colonoscopy, plan to do it as out-patient.    Given patient's improved clinical status and current hemodynamic stability, decision was made to discharge. Discussed with attending  Please refer to patient's complete medical chart with documents for a full hospital course, for this is only a brief summary.

## 2018-10-10 NOTE — DISCHARGE NOTE ADULT - MEDICATION SUMMARY - MEDICATIONS TO TAKE
I will START or STAY ON the medications listed below when I get home from the hospital:    predniSONE  -- 30 milligram(s) by mouth once a day for 1 day, then 20mg by mouth once a day for 2 days, then 10mg by mouth once a day for 2 days then discontinue.   -- Indication: For COPD (chronic obstructive pulmonary disease)    ibuprofen 600 mg oral tablet  -- 1 tab(s) by mouth every 6 hours, As needed, Mild Pain (1 - 3), Moderate Pain (4 - 6)  -- Indication: For Pain    acetaminophen 325 mg oral tablet  -- 2 tab(s) by mouth every 6 hours, As needed, Mild Pain (1 - 3)  -- Indication: For Pain    aspirin 81 mg oral tablet, chewable  -- 1 tab(s) by mouth once a day  -- Indication: For Cad    tamsulosin 0.4 mg oral capsule  -- 1 cap(s) by mouth once a day (at bedtime)  -- Indication: For Urinary retention    rivaroxaban 20 mg oral tablet  -- 1 tab(s) by mouth every 24 hours  -- Indication: For Pulmonary embolism    gabapentin 400 mg oral capsule  -- 1 cap(s) by mouth 3 times a day  -- Indication: For neuropathy    HumaLOG 100 units/mL injectable solution  -- 1 Unit(s) if Glucose 151 - 200  2 Unit(s) if Glucose 201 - 250  3 Unit(s) if Glucose 251 - 300  4 Unit(s) if Glucose 301 - 350  5 Unit(s) if Glucose 351 - 400  6 Unit(s) if Glucose GREATER THAN 400  -- Indication: For DM (diabetes mellitus)    simvastatin 20 mg oral tablet  -- 1 tab(s) by mouth once a day (at bedtime)  -- Indication: For Hyperlipidemia    ipratropium-albuterol 0.5 mg-2.5 mg/3 mLinhalation solution  -- 3 milliliter(s) inhaled every 6 hours  -- Indication: For COPD (chronic obstructive pulmonary disease)    budesonide-formoterol 160 mcg-4.5 mcg/inh inhalation aerosol  -- 2 puff(s) inhaled 2 times a day  -- Indication: For COPD (chronic obstructive pulmonary disease)    amLODIPine 2.5 mg oral tablet  -- 1 tab(s) by mouth once a day  -- Indication: For HTN (hypertension)    ferrous sulfate 325 mg (65 mg elemental iron) oral tablet  -- 1 tab(s) by mouth 3 times a day  -- Indication: For Anemia    docusate sodium 100 mg oral capsule  -- 1 cap(s) by mouth once a day  -- Indication: For COnstipation    lactulose 10 g/15 mL oral syrup  -- 15 milliliter(s) by mouth every other day  -- Indication: For COnstipation    montelukast 10 mg oral tablet  -- 1 tab(s) by mouth once a day  -- Indication: For COPD (chronic obstructive pulmonary disease)    latanoprost 0.005% ophthalmic solution  -- 1 drop(s) to each affected eye once a day (at bedtime)  -- Indication: For glaucoma    brimonidine 0.2% ophthalmic solution  -- 1 drop(s) in the right eye 3 times a day  -- Indication: For glaucoma    pantoprazole 40 mg oral delayed release tablet  -- 1 tab(s) by mouth once a day (before a meal)  -- Indication: For gerd

## 2018-10-10 NOTE — PROGRESS NOTE ADULT - ATTENDING COMMENTS
pt examined.  Has hidradenitis  lateral to the perianal area bilaterally that extends for about 5x4 cm on each side. Multiple sinuses draining.  She is morbidly obese and has extreme difficulty even turning in bed.  A wide excision of the area would result in disaster as normally it would take several months to heal. She is unable to turn or would not be able to take care of the wound.  Lack of mobility will be a big problem for healing  Presently no active infection, has multiple sinuses as typical hdradenitis have  She understands and presently does not recommend surgery

## 2018-10-10 NOTE — PROGRESS NOTE ADULT - ASSESSMENT
58 year old female, bed bound from [facility] with PMHx of  DM, COPD on home O2, PUSHPA (not on CPAP,) chronic urinary retention s/p suprapubic cath, PE on Xarelto, paraplegic ( unknown etiology ) presents from  with  difficulty breathing. As per patient she was having  difficulty breathing since 2 days with non productive cough, she used inhalers multiple times with no significant improved in her breathing, As per NH staff patient was saturating at 70;s on nasal canula and subsequently pt was sent to hospital. Patient denies any fever, chills, chest pain, nausea, vomiting, diarrhea, abdominal pain. Patient was suprapubic catheter which was leaking form the sides. Patient also has multiple draining sinus around buttock area with purulent discharge. Patient was recently discharged for COPD exacerbation on isaiah steroid therapy.     Patient admitted for COPD exacerbation.

## 2018-10-10 NOTE — DISCHARGE NOTE ADULT - PLAN OF CARE
To prevent future recurrences You came in worsening shortness of breath and cough, was diagnosed to have COPD exacerbation. Pulmonology was following recommended antibiotics, steroids, inhalers  and supplemental oxygen. You also obstructive sleep apnea, recommended to get out-patient sleep study as you may benefit from PAP device. Weight loss is strongly recommended. Please follow up with your primary medical doctor and pulmonologist as out patient and continue taking your medications as prescribed. You were noted to have low hemoglobin, anemia panel was sent which showed iron deficiency anemia.   Gastroenterologist was consulted, records from [facility] were obtained which showed EGD and Sigmoidoscopy with no abnormalities. Gastroenterologist recommended repeat colonoscopy which can be done out-patient. Please follow up with your primary medical doctor and gastroenterologists as out-patient. You have multiple ulcers with draining sinuses likely secondary to repeated pressure ulcers. Surgery was consulted on admission, recommended surgical exploration once respiratory status was more stable. Consulted again  recommended...... You have hx of pulmonary embolism, anticoagulants blood thinners were continued. You also complained of leg pain, US venous was done which came normal. Please continue taking your medications. Follow up with your primary Has hidradenitis  lateral to the perianal area bilaterally that extends for about 5x4 cm on each side. Multiple sinuses draining. Presently no active infection, has multiple sinuses.  Surgerical team is not recommend surgery, given likely poor outcomes of wound healing 2/2 comorbidities.

## 2018-10-11 DIAGNOSIS — L73.2 HIDRADENITIS SUPPURATIVA: ICD-10-CM

## 2018-10-11 LAB
CULTURE RESULTS: SIGNIFICANT CHANGE UP
GLUCOSE BLDC GLUCOMTR-MCNC: 131 MG/DL — HIGH (ref 70–99)
GLUCOSE BLDC GLUCOMTR-MCNC: 169 MG/DL — HIGH (ref 70–99)
GLUCOSE BLDC GLUCOMTR-MCNC: 172 MG/DL — HIGH (ref 70–99)
GLUCOSE BLDC GLUCOMTR-MCNC: 179 MG/DL — HIGH (ref 70–99)
SPECIMEN SOURCE: SIGNIFICANT CHANGE UP

## 2018-10-11 RX ORDER — POLYETHYLENE GLYCOL 3350 17 G/17G
17 POWDER, FOR SOLUTION ORAL ONCE
Qty: 0 | Refills: 0 | Status: COMPLETED | OUTPATIENT
Start: 2018-10-11 | End: 2018-10-11

## 2018-10-11 RX ORDER — SENNA PLUS 8.6 MG/1
1 TABLET ORAL ONCE
Qty: 0 | Refills: 0 | Status: COMPLETED | OUTPATIENT
Start: 2018-10-11 | End: 2018-10-11

## 2018-10-11 RX ORDER — DOCUSATE SODIUM 100 MG
100 CAPSULE ORAL DAILY
Qty: 0 | Refills: 0 | Status: DISCONTINUED | OUTPATIENT
Start: 2018-10-11 | End: 2018-10-12

## 2018-10-11 RX ADMIN — BUDESONIDE AND FORMOTEROL FUMARATE DIHYDRATE 2 PUFF(S): 160; 4.5 AEROSOL RESPIRATORY (INHALATION) at 10:36

## 2018-10-11 RX ADMIN — Medication 3 MILLILITER(S): at 14:59

## 2018-10-11 RX ADMIN — Medication 600 MILLIGRAM(S): at 00:09

## 2018-10-11 RX ADMIN — Medication 1: at 13:35

## 2018-10-11 RX ADMIN — PANTOPRAZOLE SODIUM 40 MILLIGRAM(S): 20 TABLET, DELAYED RELEASE ORAL at 06:03

## 2018-10-11 RX ADMIN — BRIMONIDINE TARTRATE 1 DROP(S): 2 SOLUTION/ DROPS OPHTHALMIC at 21:11

## 2018-10-11 RX ADMIN — POLYETHYLENE GLYCOL 3350 17 GRAM(S): 17 POWDER, FOR SOLUTION ORAL at 10:31

## 2018-10-11 RX ADMIN — AZITHROMYCIN 250 MILLIGRAM(S): 500 TABLET, FILM COATED ORAL at 06:04

## 2018-10-11 RX ADMIN — Medication 40 MILLIGRAM(S): at 06:03

## 2018-10-11 RX ADMIN — Medication 1: at 17:11

## 2018-10-11 RX ADMIN — GABAPENTIN 400 MILLIGRAM(S): 400 CAPSULE ORAL at 13:35

## 2018-10-11 RX ADMIN — GABAPENTIN 400 MILLIGRAM(S): 400 CAPSULE ORAL at 21:12

## 2018-10-11 RX ADMIN — Medication 100 MILLIGRAM(S): at 11:01

## 2018-10-11 RX ADMIN — BRIMONIDINE TARTRATE 1 DROP(S): 2 SOLUTION/ DROPS OPHTHALMIC at 06:04

## 2018-10-11 RX ADMIN — GABAPENTIN 400 MILLIGRAM(S): 400 CAPSULE ORAL at 06:03

## 2018-10-11 RX ADMIN — Medication 600 MILLIGRAM(S): at 21:16

## 2018-10-11 RX ADMIN — RIVAROXABAN 20 MILLIGRAM(S): KIT at 17:10

## 2018-10-11 RX ADMIN — Medication 325 MILLIGRAM(S): at 11:01

## 2018-10-11 RX ADMIN — BRIMONIDINE TARTRATE 1 DROP(S): 2 SOLUTION/ DROPS OPHTHALMIC at 13:35

## 2018-10-11 RX ADMIN — SENNA PLUS 1 TABLET(S): 8.6 TABLET ORAL at 10:36

## 2018-10-11 RX ADMIN — TAMSULOSIN HYDROCHLORIDE 0.4 MILLIGRAM(S): 0.4 CAPSULE ORAL at 21:11

## 2018-10-11 RX ADMIN — BUDESONIDE AND FORMOTEROL FUMARATE DIHYDRATE 2 PUFF(S): 160; 4.5 AEROSOL RESPIRATORY (INHALATION) at 21:12

## 2018-10-11 RX ADMIN — LATANOPROST 1 DROP(S): 0.05 SOLUTION/ DROPS OPHTHALMIC; TOPICAL at 21:13

## 2018-10-11 RX ADMIN — Medication 81 MILLIGRAM(S): at 11:00

## 2018-10-11 RX ADMIN — AMLODIPINE BESYLATE 2.5 MILLIGRAM(S): 2.5 TABLET ORAL at 06:03

## 2018-10-11 RX ADMIN — Medication 3 MILLILITER(S): at 10:01

## 2018-10-11 RX ADMIN — SIMVASTATIN 20 MILLIGRAM(S): 20 TABLET, FILM COATED ORAL at 21:11

## 2018-10-11 RX ADMIN — Medication 3 MILLILITER(S): at 20:10

## 2018-10-11 RX ADMIN — Medication 3 MILLILITER(S): at 10:05

## 2018-10-11 RX ADMIN — CEFTRIAXONE 100 GRAM(S): 500 INJECTION, POWDER, FOR SOLUTION INTRAMUSCULAR; INTRAVENOUS at 06:03

## 2018-10-11 NOTE — PROGRESS NOTE ADULT - SUBJECTIVE AND OBJECTIVE BOX
PGY 1 Note discussed with supervising resident and primary attending    Patient is a 58y old  Female who presents with a chief complaint of SOB (10 Oct 2018 18:05)      INTERVAL HPI/OVERNIGHT EVENTS: no events noted overnight.    MEDICATIONS  (STANDING):  ALBUTerol/ipratropium for Nebulization 3 milliLiter(s) Nebulizer every 6 hours  amLODIPine   Tablet 2.5 milliGRAM(s) Oral daily  aspirin  chewable 81 milliGRAM(s) Oral daily  azithromycin  IVPB      azithromycin  IVPB 500 milliGRAM(s) IV Intermittent every 24 hours  brimonidine 0.2% Ophthalmic Solution 1 Drop(s) Both EYES three times a day  buDESOnide 160 MICROgram(s)/formoterol 4.5 MICROgram(s) Inhaler 2 Puff(s) Inhalation two times a day  cefTRIAXone   IVPB      cefTRIAXone   IVPB 1 Gram(s) IV Intermittent every 24 hours  docusate sodium 100 milliGRAM(s) Oral daily  ferrous    sulfate 325 milliGRAM(s) Oral daily  gabapentin 400 milliGRAM(s) Oral three times a day  insulin lispro (HumaLOG) corrective regimen sliding scale   SubCutaneous three times a day before meals  lactulose Syrup 15 Gram(s) Oral <User Schedule>  latanoprost 0.005% Ophthalmic Solution 1 Drop(s) Both EYES at bedtime  pantoprazole    Tablet 40 milliGRAM(s) Oral before breakfast  predniSONE   Tablet   Oral   rivaroxaban 20 milliGRAM(s) Oral every 24 hours  simvastatin 20 milliGRAM(s) Oral at bedtime  tamsulosin 0.4 milliGRAM(s) Oral at bedtime    MEDICATIONS  (PRN):  acetaminophen   Tablet .. 650 milliGRAM(s) Oral every 6 hours PRN Temp greater or equal to 38C (100.4F), Mild Pain (1 - 3)  ibuprofen  Tablet. 600 milliGRAM(s) Oral every 6 hours PRN Mild Pain (1 - 3), Moderate Pain (4 - 6)      __________________________________________________  REVIEW OF SYSTEMS:    CONSTITUTIONAL: No fever,   EYES: no acute visual disturbances  NECK: No pain or stiffness  RESPIRATORY: No cough; No shortness of breath  CARDIOVASCULAR: No chest pain, no palpitations  GASTROINTESTINAL: No pain. No nausea or vomiting; No diarrhea   NEUROLOGICAL: No headache or numbness, no tremors  MUSCULOSKELETAL: No joint pain, no muscle pain  GENITOURINARY: no dysuria, no frequency, no hesitancy  PSYCHIATRY: no depression , no anxiety  ALL OTHER  ROS negative        Vital Signs Last 24 Hrs  T(C): 37 (11 Oct 2018 07:30), Max: 37.2 (10 Oct 2018 23:33)  T(F): 98.6 (11 Oct 2018 07:30), Max: 98.9 (10 Oct 2018 23:33)  HR: 96 (11 Oct 2018 07:30) (88 - 102)  BP: 130/63 (11 Oct 2018 07:30) (117/66 - 131/55)  BP(mean): --  RR: 18 (11 Oct 2018 07:30) (18 - 18)  SpO2: 97% (11 Oct 2018 07:30) (94% - 97%)    ________________________________________________  PHYSICAL EXAM:  GENERAL: NAD, obese  HEENT: Normocephalic;  conjunctivae and sclerae clear; moist mucous membranes;   NECK : supple  CHEST/LUNG: Clear to auscultation bilaterally with good air entry   HEART: S1 S2  regular; no murmurs, gallops or rubs  ABDOMEN: Soft, Nontender, Nondistended; Bowel sounds present  EXTREMITIES: no cyanosis; no edema; no calf tenderness  SKIN: warm and dry; no rash, sacral sinuses  NERVOUS SYSTEM:  Awake and alert; Oriented  to place, person and time ; no new deficits    _________________________________________________  LABS:                        8.0    12.6  )-----------( 372      ( 10 Oct 2018 08:25 )             29.6     10-10    140  |  99  |  21<H>  ----------------------------<  121<H>  3.8   |  38<H>  |  0.38<L>    Ca    8.8      10 Oct 2018 08:25          CAPILLARY BLOOD GLUCOSE      POCT Blood Glucose.: 131 mg/dL (11 Oct 2018 07:48)  POCT Blood Glucose.: 118 mg/dL (10 Oct 2018 21:35)  POCT Blood Glucose.: 131 mg/dL (10 Oct 2018 16:36)  POCT Blood Glucose.: 195 mg/dL (10 Oct 2018 11:37)        RADIOLOGY & ADDITIONAL TESTS:    Imaging Personally Reviewed:  YES    Consultant(s) Notes Reviewed:   YES    Care Discussed with Consultants : YES     Plan of care was discussed with patient and /or primary care giver; all questions and concerns were addressed and care was aligned with patient's wishes.

## 2018-10-11 NOTE — PROGRESS NOTE ADULT - PROBLEM SELECTOR PLAN 3
pt has sacral decub with multiple draining sinus   wound care consult noted  Surgery reconsulted: not a good candidate for surgery.

## 2018-10-11 NOTE — PROGRESS NOTE ADULT - SUBJECTIVE AND OBJECTIVE BOX
Time of Visit:  Patient seen and examined.     MEDICATIONS  (STANDING):  ALBUTerol/ipratropium for Nebulization 3 milliLiter(s) Nebulizer every 6 hours  amLODIPine   Tablet 2.5 milliGRAM(s) Oral daily  aspirin  chewable 81 milliGRAM(s) Oral daily  azithromycin  IVPB      azithromycin  IVPB 500 milliGRAM(s) IV Intermittent every 24 hours  brimonidine 0.2% Ophthalmic Solution 1 Drop(s) Both EYES three times a day  buDESOnide 160 MICROgram(s)/formoterol 4.5 MICROgram(s) Inhaler 2 Puff(s) Inhalation two times a day  cefTRIAXone   IVPB      cefTRIAXone   IVPB 1 Gram(s) IV Intermittent every 24 hours  docusate sodium 100 milliGRAM(s) Oral daily  ferrous    sulfate 325 milliGRAM(s) Oral daily  gabapentin 400 milliGRAM(s) Oral three times a day  insulin lispro (HumaLOG) corrective regimen sliding scale   SubCutaneous three times a day before meals  lactulose Syrup 15 Gram(s) Oral <User Schedule>  latanoprost 0.005% Ophthalmic Solution 1 Drop(s) Both EYES at bedtime  pantoprazole    Tablet 40 milliGRAM(s) Oral before breakfast  predniSONE   Tablet   Oral   rivaroxaban 20 milliGRAM(s) Oral every 24 hours  simvastatin 20 milliGRAM(s) Oral at bedtime  tamsulosin 0.4 milliGRAM(s) Oral at bedtime      MEDICATIONS  (PRN):  acetaminophen   Tablet .. 650 milliGRAM(s) Oral every 6 hours PRN Temp greater or equal to 38C (100.4F), Mild Pain (1 - 3)  ibuprofen  Tablet. 600 milliGRAM(s) Oral every 6 hours PRN Mild Pain (1 - 3), Moderate Pain (4 - 6)       Medications up to date at time of exam.    ROS; No fever, chills, cough, congestion, SOB.  PHYSICAL EXAMINATION:  Vital Signs Last 24 Hrs  T(C): 36.6 (11 Oct 2018 11:39), Max: 37.2 (10 Oct 2018 23:33)  T(F): 97.8 (11 Oct 2018 11:39), Max: 98.9 (10 Oct 2018 23:33)  HR: 107 (11 Oct 2018 11:39) (88 - 107)  BP: 157/80 (11 Oct 2018 11:39) (117/66 - 157/80)  BP(mean): --  RR: 18 (11 Oct 2018 11:39) (18 - 18)  SpO2: 100% (11 Oct 2018 11:39) (94% - 100%)   (if applicable)    General: Alert and oriented. Able to answer question with no SOB. No acute distress. Morbid Obesity.    HEENT: Head is normocephalic and atraumatic. Extraocular muscles are intact. Moist mucosa.     NECK: Supple, no palpable adenopathy.    LUNGS: Clear to auscultation B/L. No wheezing, rales, or rhonchi. No use of accessory muscle.     HEART: S1 S2 Regular rate and no click/ rub.     ABDOMEN: Soft, nontender, and nondistended.  Active bowel sounds.     NEUROLOGIC: Awake, alert, oriented.     SKIN: Warm and moist. Non diaphoretic.           LABS:                        8.0    12.6  )-----------( 372      ( 10 Oct 2018 08:25 )             29.6     10-10    140  |  99  |  21<H>  ----------------------------<  121<H>  3.8   |  38<H>  |  0.38<L>    Ca    8.8      10 Oct 2018 08:25    RADIOLOGY & ADDITIONAL STUDIES:  EKG:   CXR: < from: Xray Chest 1 View- PORTABLE-Urgent (10.05.18 @ 01:43) >  PROCEDURE DATE:  10/05/2018          INTERPRETATION:  CLINICAL STATEMENT: Follow-up chest pain.    TECHNIQUE: AP view of the chest.    COMPARISON: 2018    FINDINGS/  IMPRESSION:  Increased interstitial lung markings without significant change. No gross   new consolidation or pleural effusion    Heart size cannot be accurately assessed in this projection, but appear   enlarged.          IMPRESSION: 58y Female PAST MEDICAL & SURGICAL HISTORY:  Urinary retention  CKD (chronic kidney disease)  DM (diabetes mellitus)  HTN (hypertension)  COPD (chronic obstructive pulmonary disease)  Morbid obesity  Diabetes  Overactive bladder  Hemorrhoids  Emphysema  Suprapubic catheter  S/P   History of appendectomy  S/P cholecystectomy     Impression; 57 Y/O Female with prior mentioned multiple chronic conditions. Presented with progressive SOB x 3 days di not improved with Respiratory Tx from NH. She was admitted with Acute Exacerbation of COPD and had multiple admissions to [facility]. Has Dx of PUSHPA but not on NIPPV. BLD Cx x 1 and RVP negative.         Suggestion:   Oxygen supplementation NC . O2 saturation 92% room air, with Oxygen , O2 saturation 96% NC.   Continue DuoNeb Q 6 hours. Budesonide twice Daily.  She needs out patient pulmonary ff/up for sleep study and PAP devise  Please obtain most recent echo report from [facility].  Reinforced weight loss management outpatient.    Continue Prednisone 40 mg daily taper to 30 mg after 2 days , then 20 mg x 2 days then 10 mg x 2 days.    Continue antibiotic.   Continue Rivaroxaban 20 mg . Time of Visit:  Patient seen and examined.     MEDICATIONS  (STANDING):  ALBUTerol/ipratropium for Nebulization 3 milliLiter(s) Nebulizer every 6 hours  amLODIPine   Tablet 2.5 milliGRAM(s) Oral daily  aspirin  chewable 81 milliGRAM(s) Oral daily  azithromycin  IVPB      azithromycin  IVPB 500 milliGRAM(s) IV Intermittent every 24 hours  brimonidine 0.2% Ophthalmic Solution 1 Drop(s) Both EYES three times a day  buDESOnide 160 MICROgram(s)/formoterol 4.5 MICROgram(s) Inhaler 2 Puff(s) Inhalation two times a day  cefTRIAXone   IVPB      cefTRIAXone   IVPB 1 Gram(s) IV Intermittent every 24 hours  docusate sodium 100 milliGRAM(s) Oral daily  ferrous    sulfate 325 milliGRAM(s) Oral daily  gabapentin 400 milliGRAM(s) Oral three times a day  insulin lispro (HumaLOG) corrective regimen sliding scale   SubCutaneous three times a day before meals  lactulose Syrup 15 Gram(s) Oral <User Schedule>  latanoprost 0.005% Ophthalmic Solution 1 Drop(s) Both EYES at bedtime  pantoprazole    Tablet 40 milliGRAM(s) Oral before breakfast  predniSONE   Tablet   Oral   rivaroxaban 20 milliGRAM(s) Oral every 24 hours  simvastatin 20 milliGRAM(s) Oral at bedtime  tamsulosin 0.4 milliGRAM(s) Oral at bedtime      MEDICATIONS  (PRN):  acetaminophen   Tablet .. 650 milliGRAM(s) Oral every 6 hours PRN Temp greater or equal to 38C (100.4F), Mild Pain (1 - 3)  ibuprofen  Tablet. 600 milliGRAM(s) Oral every 6 hours PRN Mild Pain (1 - 3), Moderate Pain (4 - 6)       Medications up to date at time of exam.    ROS; No fever, chills, cough, congestion, SOB.  PHYSICAL EXAMINATION:  Vital Signs Last 24 Hrs  T(C): 36.6 (11 Oct 2018 11:39), Max: 37.2 (10 Oct 2018 23:33)  T(F): 97.8 (11 Oct 2018 11:39), Max: 98.9 (10 Oct 2018 23:33)  HR: 107 (11 Oct 2018 11:39) (88 - 107)  BP: 157/80 (11 Oct 2018 11:39) (117/66 - 157/80)  BP(mean): --  RR: 18 (11 Oct 2018 11:39) (18 - 18)  SpO2: 100% (11 Oct 2018 11:39) (94% - 100%)   (if applicable)    General: Alert and oriented. Able to answer question with no SOB. No acute distress. Morbid Obesity.    HEENT: Head is normocephalic and atraumatic. Extraocular muscles are intact. Moist mucosa.     NECK: Supple, no palpable adenopathy.    LUNGS: Clear to auscultation B/L. No wheezing, rales, or rhonchi. No use of accessory muscle.     HEART: S1 S2 Regular rate and no click/ rub.     ABDOMEN: Soft, nontender, and nondistended.  Active bowel sounds.     NEUROLOGIC: Awake, alert, oriented.     SKIN: Warm and moist. Non diaphoretic.           LABS:                        8.0    12.6  )-----------( 372      ( 10 Oct 2018 08:25 )             29.6     10-10    140  |  99  |  21<H>  ----------------------------<  121<H>  3.8   |  38<H>  |  0.38<L>    Ca    8.8      10 Oct 2018 08:25    RADIOLOGY & ADDITIONAL STUDIES:  EKG:   CXR: < from: Xray Chest 1 View- PORTABLE-Urgent (10.05.18 @ 01:43) >  PROCEDURE DATE:  10/05/2018          INTERPRETATION:  CLINICAL STATEMENT: Follow-up chest pain.    TECHNIQUE: AP view of the chest.    COMPARISON: 2018    FINDINGS/  IMPRESSION:  Increased interstitial lung markings without significant change. No gross   new consolidation or pleural effusion    Heart size cannot be accurately assessed in this projection, but appear   enlarged.          IMPRESSION: 58y Female PAST MEDICAL & SURGICAL HISTORY:  Urinary retention  CKD (chronic kidney disease)  DM (diabetes mellitus)  HTN (hypertension)  COPD (chronic obstructive pulmonary disease)  Morbid obesity  Diabetes  Overactive bladder  Hemorrhoids  Emphysema  Suprapubic catheter  S/P   History of appendectomy  S/P cholecystectomy     Impression; 57 Y/O Female with prior mentioned multiple chronic conditions. Presented with progressive SOB x 3 days di not improved with Respiratory Tx from NH. She was admitted with Acute Exacerbation of COPD and had multiple admissions to [facility]. Has Dx of PUSHPA but not on NIPPV. BLD Cx x 1 and RVP negative.         Suggestion:   Oxygen supplementation NC . O2 saturation 92% room air, with Oxygen , O2 saturation 96% NC.   Continue DuoNeb Q 6 hours. Budesonide twice Daily.  She needs out patient pulmonary ff/up for sleep study and PAP devise  Please obtain most recent echo report from [facility].  Reinforced weight loss management outpatient.    Continue Prednisone 40 mg daily taper to 30 mg after 2 days , then 20 mg x 2 days then 10 mg x 2 days.    Continue antibiotic.   Continue Rivaroxaban 20 mg .    Agree with above assessment and plan as transcribed.

## 2018-10-11 NOTE — PROGRESS NOTE ADULT - PROBLEM SELECTOR PLAN 1
Patient was noted to have anemia  -hb stable at 8  -Anemia panel: Iron deficiency anemia  -Records from [facility]: Sigmoidoscopy from 8/13/2018 : No mass lesions, diverticulosis, internal hemorrhoids.   -EGD: 7/10/2017: Mild gastritis  -Plan is for out-patient colonoscopy  -DC planning: Patient is from long term rehab, wants to change it, needs placement.  informed.

## 2018-10-11 NOTE — PROGRESS NOTE ADULT - PROBLEM SELECTOR PLAN 9
Pt is from [facility] but doesn't want to go back there upon discharge   -Please consult case management so placement work can be done along with making patient stable for discharge

## 2018-10-11 NOTE — PROGRESS NOTE ADULT - ASSESSMENT
58 year old female, bed bound from [facility] with PMHx of  DM, COPD on home O2, PUSHPA (not on CPAP,) chronic urinary retention s/p suprapubic cath, PE on Xarelto, paraplegic ( unknown etiology ) presents from  with  difficulty breathing. As per patient she was having  difficulty breathing since 2 days with non productive cough, she used inhalers multiple times with no significant improved in her breathing, As per NH staff patient was saturating at 70;s on nasal canula and subsequently pt was sent to hospital. Patient denies any fever, chills, chest pain, nausea, vomiting, diarrhea, abdominal pain. Patient was suprapubic catheter which was leaking form the sides. Patient also has multiple draining sinus around buttock area with purulent discharge. Patient was recently discharged for COPD exacerbation on isaiah steroid therapy.     Patient admitted for COPD exacerbation.  Medically stable for discharge. Awaiting placement

## 2018-10-12 VITALS
SYSTOLIC BLOOD PRESSURE: 151 MMHG | TEMPERATURE: 98 F | OXYGEN SATURATION: 97 % | RESPIRATION RATE: 18 BRPM | HEART RATE: 104 BPM | DIASTOLIC BLOOD PRESSURE: 72 MMHG

## 2018-10-12 LAB
GLUCOSE BLDC GLUCOMTR-MCNC: 179 MG/DL — HIGH (ref 70–99)
GLUCOSE BLDC GLUCOMTR-MCNC: 196 MG/DL — HIGH (ref 70–99)

## 2018-10-12 RX ORDER — BUDESONIDE AND FORMOTEROL FUMARATE DIHYDRATE 160; 4.5 UG/1; UG/1
2 AEROSOL RESPIRATORY (INHALATION)
Qty: 0 | Refills: 0 | COMMUNITY
Start: 2018-10-12

## 2018-10-12 RX ORDER — BUDESONIDE AND FORMOTEROL FUMARATE DIHYDRATE 160; 4.5 UG/1; UG/1
1 AEROSOL RESPIRATORY (INHALATION)
Qty: 0 | Refills: 0 | COMMUNITY
Start: 2018-10-12

## 2018-10-12 RX ORDER — LEVALBUTEROL 1.25 MG/.5ML
3 SOLUTION, CONCENTRATE RESPIRATORY (INHALATION)
Qty: 0 | Refills: 0 | COMMUNITY

## 2018-10-12 RX ORDER — AMLODIPINE BESYLATE 2.5 MG/1
1 TABLET ORAL
Qty: 0 | Refills: 0 | COMMUNITY
Start: 2018-10-12

## 2018-10-12 RX ORDER — PANTOPRAZOLE SODIUM 20 MG/1
1 TABLET, DELAYED RELEASE ORAL
Qty: 0 | Refills: 0 | COMMUNITY
Start: 2018-10-12

## 2018-10-12 RX ORDER — FERROUS SULFATE 325(65) MG
1 TABLET ORAL
Qty: 0 | Refills: 0 | COMMUNITY
Start: 2018-10-12

## 2018-10-12 RX ORDER — LATANOPROST 0.05 MG/ML
1 SOLUTION/ DROPS OPHTHALMIC; TOPICAL
Qty: 0 | Refills: 0 | COMMUNITY
Start: 2018-10-12

## 2018-10-12 RX ORDER — INSULIN LISPRO 100/ML
1 VIAL (ML) SUBCUTANEOUS
Qty: 0 | Refills: 0 | COMMUNITY

## 2018-10-12 RX ORDER — DOCUSATE SODIUM 100 MG
1 CAPSULE ORAL
Qty: 0 | Refills: 0 | COMMUNITY
Start: 2018-10-12

## 2018-10-12 RX ORDER — MONTELUKAST 4 MG/1
1 TABLET, CHEWABLE ORAL
Qty: 0 | Refills: 0 | COMMUNITY

## 2018-10-12 RX ORDER — IBUPROFEN 200 MG
1 TABLET ORAL
Qty: 0 | Refills: 0 | COMMUNITY
Start: 2018-10-12

## 2018-10-12 RX ORDER — INSULIN LISPRO 100/ML
0 VIAL (ML) SUBCUTANEOUS
Qty: 0 | Refills: 0 | COMMUNITY

## 2018-10-12 RX ORDER — FERROUS SULFATE 325(65) MG
1 TABLET ORAL
Qty: 0 | Refills: 0 | COMMUNITY

## 2018-10-12 RX ORDER — NYSTATIN CREAM 100000 [USP'U]/G
1 CREAM TOPICAL THREE TIMES A DAY
Qty: 0 | Refills: 0 | Status: DISCONTINUED | OUTPATIENT
Start: 2018-10-12 | End: 2018-10-12

## 2018-10-12 RX ADMIN — Medication 3 MILLILITER(S): at 14:59

## 2018-10-12 RX ADMIN — Medication 1: at 12:31

## 2018-10-12 RX ADMIN — AMLODIPINE BESYLATE 2.5 MILLIGRAM(S): 2.5 TABLET ORAL at 06:28

## 2018-10-12 RX ADMIN — BRIMONIDINE TARTRATE 1 DROP(S): 2 SOLUTION/ DROPS OPHTHALMIC at 13:56

## 2018-10-12 RX ADMIN — BUDESONIDE AND FORMOTEROL FUMARATE DIHYDRATE 2 PUFF(S): 160; 4.5 AEROSOL RESPIRATORY (INHALATION) at 12:31

## 2018-10-12 RX ADMIN — Medication 600 MILLIGRAM(S): at 12:37

## 2018-10-12 RX ADMIN — AZITHROMYCIN 250 MILLIGRAM(S): 500 TABLET, FILM COATED ORAL at 06:30

## 2018-10-12 RX ADMIN — PANTOPRAZOLE SODIUM 40 MILLIGRAM(S): 20 TABLET, DELAYED RELEASE ORAL at 06:28

## 2018-10-12 RX ADMIN — Medication 1: at 08:40

## 2018-10-12 RX ADMIN — Medication 600 MILLIGRAM(S): at 03:57

## 2018-10-12 RX ADMIN — Medication 100 MILLIGRAM(S): at 12:31

## 2018-10-12 RX ADMIN — CEFTRIAXONE 100 GRAM(S): 500 INJECTION, POWDER, FOR SOLUTION INTRAMUSCULAR; INTRAVENOUS at 06:30

## 2018-10-12 RX ADMIN — GABAPENTIN 400 MILLIGRAM(S): 400 CAPSULE ORAL at 06:28

## 2018-10-12 RX ADMIN — Medication 600 MILLIGRAM(S): at 14:13

## 2018-10-12 RX ADMIN — Medication 3 MILLILITER(S): at 08:59

## 2018-10-12 RX ADMIN — Medication 325 MILLIGRAM(S): at 12:31

## 2018-10-12 RX ADMIN — GABAPENTIN 400 MILLIGRAM(S): 400 CAPSULE ORAL at 13:56

## 2018-10-12 RX ADMIN — Medication 600 MILLIGRAM(S): at 04:30

## 2018-10-12 RX ADMIN — Medication 81 MILLIGRAM(S): at 12:31

## 2018-10-12 RX ADMIN — Medication 3 MILLILITER(S): at 03:18

## 2018-10-12 RX ADMIN — Medication 30 MILLIGRAM(S): at 06:28

## 2018-10-12 RX ADMIN — BRIMONIDINE TARTRATE 1 DROP(S): 2 SOLUTION/ DROPS OPHTHALMIC at 06:28

## 2018-10-12 NOTE — PROGRESS NOTE ADULT - PROBLEM/PLAN-8
DISPLAY PLAN FREE TEXT
- - -

## 2018-10-12 NOTE — PROGRESS NOTE ADULT - PROBLEM SELECTOR PLAN 2
-Patient has COPD, on NC currently at baseline 4l  -C/w steroids  -Was diagnosed in past with PUSHPA  -will need out pat pulmo f/u for sleep study and cPAP devise

## 2018-10-12 NOTE — DIETITIAN INITIAL EVALUATION ADULT. - PERTINENT LABORATORY DATA
10-10 Na140 mmol/L Glu 121 mg/dL<H> K+ 3.8 mmol/L Cr  0.38 mg/dL<L> BUN 21 mg/dL<H> 10-06 Phos 3.6 mg/dL 10-05 [id number] 5.1 % 10-05 Chol 109 mg/dL LDL 60 mg/dL HDL 36 mg/dL<L> Trig 63 mg/dL

## 2018-10-12 NOTE — PROGRESS NOTE ADULT - PROBLEM SELECTOR PLAN 3
pt has sacral decub with multiple draining sinus   wound care consult noted  Surgery reconsulted: not a good candidate for surgery.  continue with local care   apply layers of ABD pads and secure with tape b.i.d. PRN

## 2018-10-12 NOTE — DIETITIAN INITIAL EVALUATION ADULT. - PROBLEM SELECTOR PLAN 1
patient came with SOB and non productive cough   afebrile, HD stable, leucocytosis 12K   RR 22, O2 sat 95 at 3 l nasal canula, Trop t1 neg  VBG  7.36/69/50\  on PE patient has B/l decreased breath sound   f/u chest Xray, RVP, procalcitonin level  continue with solumedrol 40 mg IV @ 8 hrs   Duoneb, Symbicort  Rocephin and Azithromycin

## 2018-10-12 NOTE — PROGRESS NOTE ADULT - SUBJECTIVE AND OBJECTIVE BOX
NP Note discussed with  Primary Attending    Patient is a 58y old  Female who presents with a chief complaint of SOB (11 Oct 2018 11:48)      INTERVAL HPI/OVERNIGHT EVENTS: no new complaints    MEDICATIONS  (STANDING):  ALBUTerol/ipratropium for Nebulization 3 milliLiter(s) Nebulizer every 6 hours  amLODIPine   Tablet 2.5 milliGRAM(s) Oral daily  aspirin  chewable 81 milliGRAM(s) Oral daily  brimonidine 0.2% Ophthalmic Solution 1 Drop(s) Both EYES three times a day  buDESOnide 160 MICROgram(s)/formoterol 4.5 MICROgram(s) Inhaler 2 Puff(s) Inhalation two times a day  docusate sodium 100 milliGRAM(s) Oral daily  ferrous    sulfate 325 milliGRAM(s) Oral daily  gabapentin 400 milliGRAM(s) Oral three times a day  insulin lispro (HumaLOG) corrective regimen sliding scale   SubCutaneous three times a day before meals  lactulose Syrup 15 Gram(s) Oral <User Schedule>  latanoprost 0.005% Ophthalmic Solution 1 Drop(s) Both EYES at bedtime  pantoprazole    Tablet 40 milliGRAM(s) Oral before breakfast  predniSONE   Tablet   Oral   predniSONE   Tablet 30 milliGRAM(s) Oral daily  rivaroxaban 20 milliGRAM(s) Oral every 24 hours  simvastatin 20 milliGRAM(s) Oral at bedtime  tamsulosin 0.4 milliGRAM(s) Oral at bedtime    MEDICATIONS  (PRN):  acetaminophen   Tablet .. 650 milliGRAM(s) Oral every 6 hours PRN Temp greater or equal to 38C (100.4F), Mild Pain (1 - 3)  ibuprofen  Tablet. 600 milliGRAM(s) Oral every 6 hours PRN Mild Pain (1 - 3), Moderate Pain (4 - 6)      __________________________________________________  REVIEW OF SYSTEMS:    CONSTITUTIONAL: No fever,   EYES: no acute visual disturbances  NECK: No pain or stiffness  RESPIRATORY: No cough; No shortness of breath  CARDIOVASCULAR: No chest pain, no palpitations  GASTROINTESTINAL: No pain. No nausea or vomiting; No diarrhea   NEUROLOGICAL: No headache or numbness, no tremors  MUSCULOSKELETAL: No joint pain, no muscle pain  GENITOURINARY: no dysuria, no frequency, no hesitancy  PSYCHIATRY: no depression , no anxiety  ALL OTHER  ROS negative        Vital Signs Last 24 Hrs  T(C): 36.9 (12 Oct 2018 05:15), Max: 37.3 (11 Oct 2018 15:54)  T(F): 98.4 (12 Oct 2018 05:15), Max: 99.1 (11 Oct 2018 15:54)  HR: 102 (12 Oct 2018 05:15) (96 - 107)  BP: 119/56 (12 Oct 2018 05:15) (1/- - 157/80)  BP(mean): --  RR: 18 (12 Oct 2018 05:15) (18 - 18)  SpO2: 94% (12 Oct 2018 05:15) (94% - 100%)    ________________________________________________  PHYSICAL EXAM:  GENERAL: NAD  HEENT: Normocephalic;  conjunctivae and sclerae clear; moist mucous membranes;   NECK : supple  CHEST/LUNG: Clear to auscultation bilaterally with good air entry   HEART: S1 S2  regular; no murmurs, gallops or rubs  ABDOMEN: Soft, Nontender, Nondistended; Bowel sounds present  EXTREMITIES: no cyanosis; no edema; no calf tenderness  SKIN: warm and dry; no rash  NERVOUS SYSTEM:  Awake and alert; Oriented  to place, person and time ; no new deficits    _________________________________________________  LABS:              CAPILLARY BLOOD GLUCOSE      POCT Blood Glucose.: 179 mg/dL (12 Oct 2018 08:10)  POCT Blood Glucose.: 172 mg/dL (11 Oct 2018 16:52)  POCT Blood Glucose.: 179 mg/dL (11 Oct 2018 13:32)  POCT Blood Glucose.: 169 mg/dL (11 Oct 2018 11:48)        RADIOLOGY & ADDITIONAL TESTS:    Imaging  Reviewed:  YES/NO    Consultant(s) Notes Reviewed:   YES/ No      Plan of care was discussed with patient and /or primary care giver; all questions and concerns were addressed NP Note discussed with  Primary Attending    Patient is a 58y old  Female who presents with a chief complaint of SOB (11 Oct 2018 11:48) Admitted with copd exacerbation now stable.      INTERVAL HPI/OVERNIGHT EVENTS: no new complaints, had +bm s/p laxatives.    MEDICATIONS  (STANDING):  ALBUTerol/ipratropium for Nebulization 3 milliLiter(s) Nebulizer every 6 hours  amLODIPine   Tablet 2.5 milliGRAM(s) Oral daily  aspirin  chewable 81 milliGRAM(s) Oral daily  brimonidine 0.2% Ophthalmic Solution 1 Drop(s) Both EYES three times a day  buDESOnide 160 MICROgram(s)/formoterol 4.5 MICROgram(s) Inhaler 2 Puff(s) Inhalation two times a day  docusate sodium 100 milliGRAM(s) Oral daily  ferrous    sulfate 325 milliGRAM(s) Oral daily  gabapentin 400 milliGRAM(s) Oral three times a day  insulin lispro (HumaLOG) corrective regimen sliding scale   SubCutaneous three times a day before meals  lactulose Syrup 15 Gram(s) Oral <User Schedule>  latanoprost 0.005% Ophthalmic Solution 1 Drop(s) Both EYES at bedtime  pantoprazole    Tablet 40 milliGRAM(s) Oral before breakfast  predniSONE   Tablet   Oral   predniSONE   Tablet 30 milliGRAM(s) Oral daily  rivaroxaban 20 milliGRAM(s) Oral every 24 hours  simvastatin 20 milliGRAM(s) Oral at bedtime  tamsulosin 0.4 milliGRAM(s) Oral at bedtime    MEDICATIONS  (PRN):  acetaminophen   Tablet .. 650 milliGRAM(s) Oral every 6 hours PRN Temp greater or equal to 38C (100.4F), Mild Pain (1 - 3)  ibuprofen  Tablet. 600 milliGRAM(s) Oral every 6 hours PRN Mild Pain (1 - 3), Moderate Pain (4 - 6)      __________________________________________________  REVIEW OF SYSTEMS:    CONSTITUTIONAL: No fever,   EYES: no acute visual disturbances  NECK: No pain or stiffness  RESPIRATORY: No cough; No shortness of breath  CARDIOVASCULAR: No chest pain, no palpitations  GASTROINTESTINAL: No pain. No nausea or vomiting; No diarrhea   NEUROLOGICAL: No headache or numbness, no tremors  MUSCULOSKELETAL: No joint pain, no muscle pain  GENITOURINARY: no dysuria, no frequency, no hesitancy  PSYCHIATRY: no depression , no anxiety  ALL OTHER  ROS negative        Vital Signs Last 24 Hrs  T(C): 36.9 (12 Oct 2018 05:15), Max: 37.3 (11 Oct 2018 15:54)  T(F): 98.4 (12 Oct 2018 05:15), Max: 99.1 (11 Oct 2018 15:54)  HR: 102 (12 Oct 2018 05:15) (96 - 107)  BP: 119/56 (12 Oct 2018 05:15) (1/- - 157/80)  BP(mean): --  RR: 18 (12 Oct 2018 05:15) (18 - 18)  SpO2: 94% (12 Oct 2018 05:15) (94% - 100%)    ________________________________________________  PHYSICAL EXAM:  GENERAL: NAD, morbidely obese female  HEENT: Normocephalic;  conjunctivae and sclerae clear; moist mucous membranes;   NECK : supple  CHEST/LUNG: Clear to auscultation bilaterally with good air entry , no wheeze  HEART: S1 S2  regular; no murmurs, gallops or rubs  ABDOMEN: Soft, Nontender, Nondistended; Bowel sounds present +suprapubic cath in place  EXTREMITIES: no cyanosis; no edema; no calf tenderness  SKIN: warm and dry; no rash, gluteal hidradenitis   NERVOUS SYSTEM:  Awake and alert; Oriented  to place, person and time ; no new deficits    _________________________________________________  LABS:              CAPILLARY BLOOD GLUCOSE      POCT Blood Glucose.: 179 mg/dL (12 Oct 2018 08:10)  POCT Blood Glucose.: 172 mg/dL (11 Oct 2018 16:52)  POCT Blood Glucose.: 179 mg/dL (11 Oct 2018 13:32)  POCT Blood Glucose.: 169 mg/dL (11 Oct 2018 11:48)        RADIOLOGY & ADDITIONAL TESTS:    Imaging  Reviewed:  YES  Consultant(s) Notes Reviewed:   YES      Plan of care was discussed with patient; all questions and concerns were addressed

## 2018-10-12 NOTE — PROGRESS NOTE ADULT - PROBLEM SELECTOR PLAN 1
2/2 RACHID  -Records from [facility]: Sigmoidoscopy from 8/13/2018 : No mass lesions, diverticulosis, internal hemorrhoids.   -EGD: 7/10/2017: Mild gastritis  -Plan is for out-patient colonoscopy

## 2018-10-12 NOTE — CHART NOTE - NSCHARTNOTEFT_GEN_A_CORE
Upon Nutritional Assessment by the Registered Dietitian your patient was determined to meet criteria / has evidence of the following diagnosis/diagnoses:          [ ]  Mild Protein Calorie Malnutrition        [ ]  Moderate Protein Calorie Malnutrition        [ ] Severe Protein Calorie Malnutrition        [ ] Unspecified Protein Calorie Malnutrition        [ ] Underweight / BMI <19        [ x] Morbid Obesity / BMI > 40      Findings as based on:  •  Comprehensive nutrition assessment and consultation  •  Calorie counts (nutrient intake analysis)  •  Food acceptance and intake status from observations by staff  •  Follow up  •  Patient education  •  Intervention secondary to interdisciplinary rounds  •   concerns      Treatment:    The following diet has been recommended:      PROVIDER Section:     By signing this assessment you are acknowledging and agree with the diagnosis/diagnoses assigned by the Registered Dietitian    Comments:  suggest to add DM restrictions to DASH/TLC diet given DM history, declined oral supplements

## 2018-10-12 NOTE — DIETITIAN INITIAL EVALUATION ADULT. - FACTORS AFF FOOD INTAKE
Zoroastrian/ethnic/cultural/personal food preferences/allergic to fish only , not other food items mentioned.

## 2018-10-12 NOTE — PROGRESS NOTE ADULT - SUBJECTIVE AND OBJECTIVE BOX
Time of Visit:  Patient seen and examined.     MEDICATIONS  (STANDING):  ALBUTerol/ipratropium for Nebulization 3 milliLiter(s) Nebulizer every 6 hours  amLODIPine   Tablet 2.5 milliGRAM(s) Oral daily  aspirin  chewable 81 milliGRAM(s) Oral daily  brimonidine 0.2% Ophthalmic Solution 1 Drop(s) Both EYES three times a day  buDESOnide 160 MICROgram(s)/formoterol 4.5 MICROgram(s) Inhaler 2 Puff(s) Inhalation two times a day  docusate sodium 100 milliGRAM(s) Oral daily  ferrous    sulfate 325 milliGRAM(s) Oral daily  gabapentin 400 milliGRAM(s) Oral three times a day  insulin lispro (HumaLOG) corrective regimen sliding scale   SubCutaneous three times a day before meals  lactulose Syrup 15 Gram(s) Oral <User Schedule>  latanoprost 0.005% Ophthalmic Solution 1 Drop(s) Both EYES at bedtime  pantoprazole    Tablet 40 milliGRAM(s) Oral before breakfast  predniSONE   Tablet   Oral   predniSONE   Tablet 30 milliGRAM(s) Oral daily  rivaroxaban 20 milliGRAM(s) Oral every 24 hours  simvastatin 20 milliGRAM(s) Oral at bedtime  tamsulosin 0.4 milliGRAM(s) Oral at bedtime      MEDICATIONS  (PRN):  acetaminophen   Tablet .. 650 milliGRAM(s) Oral every 6 hours PRN Temp greater or equal to 38C (100.4F), Mild Pain (1 - 3)  ibuprofen  Tablet. 600 milliGRAM(s) Oral every 6 hours PRN Mild Pain (1 - 3), Moderate Pain (4 - 6)       Medications up to date at time of exam.    ROS: No fever, chills, SOB, cough, congestion.  PHYSICAL EXAMINATION:  Vital Signs Last 24 Hrs  T(C): 37.4 (12 Oct 2018 13:41), Max: 37.4 (12 Oct 2018 13:41)  T(F): 99.4 (12 Oct 2018 13:41), Max: 99.4 (12 Oct 2018 13:41)  HR: 100 (12 Oct 2018 13:41) (96 - 105)  BP: 132/66 (12 Oct 2018 13:41) (1/- - 132/66)  BP(mean): --  RR: 18 (12 Oct 2018 13:41) (18 - 18)  SpO2: 97% (12 Oct 2018 13:41) (94% - 98%)   (if applicable)    General: Alert and oriented. No acute distress. Able to answer question with no SOB. Morbid Obesity.    HEENT: Head is normocephalic and atraumatic. No nasal tenderness. Extraocular muscles are intact. Moist mucosa.     NECK: Supple, no palpable adenopathy.    LUNGS: Clear to auscultation B/L. No wheezing, rales, or rhonchi. No use of accessory muscle.     HEART: S1 S2 Regular rate and no click/ rub.     ABDOMEN: Soft, nontender, and nondistended.  Active bowel sounds.     NEUROLOGIC: Awake, alert, oriented.     SKIN: Warm and moist. Non diaphoretic.     LABS:            RADIOLOGY & ADDITIONAL STUDIES:  EKG:   CXR: < from: Xray Chest 1 View- PORTABLE-Urgent (10.05.18 @ 01:43) >  EXAM:  XR CHEST PORTABLE URGENT 1V                            PROCEDURE DATE:  10/05/2018          INTERPRETATION:  CLINICAL STATEMENT: Follow-up chest pain.    TECHNIQUE: AP view of the chest.    COMPARISON: 2018    FINDINGS/  IMPRESSION:  Increased interstitial lung markings without significant change. No gross   new consolidation or pleural effusion    Heart size cannot be accurately assessed in this projection, but appear   enlarged.    < end of copied text >    ECHO:    IMPRESSION: 58y Female PAST MEDICAL & SURGICAL HISTORY:  Urinary retention  CKD (chronic kidney disease)  DM (diabetes mellitus)  HTN (hypertension)  COPD (chronic obstructive pulmonary disease)  Morbid obesity  Diabetes  Overactive bladder  Hemorrhoids  Emphysema  Suprapubic catheter  S/P   History of appendectomy  S/P cholecystectomy     Impression; 57 Y/O Female with prior mentioned multiple chronic conditions. Presented with progressive SOB x 3 days di not improved with Respiratory Tx from NH. She was admitted with Acute Exacerbation of COPD and had multiple admissions to [facility]. Has Dx of PUSHPA but not on NIPPV. BLD Cx x 1 and RVP negative.         Suggestion:   Oxygen supplementation NC . O2 saturation 94% room air, with Oxygen , O2 saturation 97% NC.   Continue DuoNeb Q 6 hours. Budesonide twice Daily.  She needs out patient pulmonary ff/up for sleep study and PAP devise  Please obtain most recent echo report from [facility].  Reinforced weight loss management outpatient.    Continue Prednisone 40 mg daily taper to 30 mg after 2 days , then 20 mg x 2 days then 10 mg x 2 days.    Continue antibiotic.   Continue Rivaroxaban 20 mg . Time of Visit:  Patient seen and examined.     MEDICATIONS  (STANDING):  ALBUTerol/ipratropium for Nebulization 3 milliLiter(s) Nebulizer every 6 hours  amLODIPine   Tablet 2.5 milliGRAM(s) Oral daily  aspirin  chewable 81 milliGRAM(s) Oral daily  brimonidine 0.2% Ophthalmic Solution 1 Drop(s) Both EYES three times a day  buDESOnide 160 MICROgram(s)/formoterol 4.5 MICROgram(s) Inhaler 2 Puff(s) Inhalation two times a day  docusate sodium 100 milliGRAM(s) Oral daily  ferrous    sulfate 325 milliGRAM(s) Oral daily  gabapentin 400 milliGRAM(s) Oral three times a day  insulin lispro (HumaLOG) corrective regimen sliding scale   SubCutaneous three times a day before meals  lactulose Syrup 15 Gram(s) Oral <User Schedule>  latanoprost 0.005% Ophthalmic Solution 1 Drop(s) Both EYES at bedtime  pantoprazole    Tablet 40 milliGRAM(s) Oral before breakfast  predniSONE   Tablet   Oral   predniSONE   Tablet 30 milliGRAM(s) Oral daily  rivaroxaban 20 milliGRAM(s) Oral every 24 hours  simvastatin 20 milliGRAM(s) Oral at bedtime  tamsulosin 0.4 milliGRAM(s) Oral at bedtime      MEDICATIONS  (PRN):  acetaminophen   Tablet .. 650 milliGRAM(s) Oral every 6 hours PRN Temp greater or equal to 38C (100.4F), Mild Pain (1 - 3)  ibuprofen  Tablet. 600 milliGRAM(s) Oral every 6 hours PRN Mild Pain (1 - 3), Moderate Pain (4 - 6)       Medications up to date at time of exam.    ROS: No fever, chills, SOB, cough, congestion.  PHYSICAL EXAMINATION:  Vital Signs Last 24 Hrs  T(C): 37.4 (12 Oct 2018 13:41), Max: 37.4 (12 Oct 2018 13:41)  T(F): 99.4 (12 Oct 2018 13:41), Max: 99.4 (12 Oct 2018 13:41)  HR: 100 (12 Oct 2018 13:41) (96 - 105)  BP: 132/66 (12 Oct 2018 13:41) (1/- - 132/66)  BP(mean): --  RR: 18 (12 Oct 2018 13:41) (18 - 18)  SpO2: 97% (12 Oct 2018 13:41) (94% - 98%)   (if applicable)    General: Alert and oriented. No acute distress. Able to answer question with no SOB. Morbid Obesity.    HEENT: Head is normocephalic and atraumatic. No nasal tenderness. Extraocular muscles are intact. Moist mucosa.     NECK: Supple, no palpable adenopathy.    LUNGS: Clear to auscultation B/L. No wheezing, rales, or rhonchi. No use of accessory muscle.     HEART: S1 S2 Regular rate and no click/ rub.     ABDOMEN: Soft, nontender, and nondistended.  Active bowel sounds.     NEUROLOGIC: Awake, alert, oriented.     SKIN: Warm and moist. Non diaphoretic.     LABS:            RADIOLOGY & ADDITIONAL STUDIES:  EKG:   CXR: < from: Xray Chest 1 View- PORTABLE-Urgent (10.05.18 @ 01:43) >  EXAM:  XR CHEST PORTABLE URGENT 1V                            PROCEDURE DATE:  10/05/2018          INTERPRETATION:  CLINICAL STATEMENT: Follow-up chest pain.    TECHNIQUE: AP view of the chest.    COMPARISON: 2018    FINDINGS/  IMPRESSION:  Increased interstitial lung markings without significant change. No gross   new consolidation or pleural effusion    Heart size cannot be accurately assessed in this projection, but appear   enlarged.    < end of copied text >    ECHO:    IMPRESSION: 58y Female PAST MEDICAL & SURGICAL HISTORY:  Urinary retention  CKD (chronic kidney disease)  DM (diabetes mellitus)  HTN (hypertension)  COPD (chronic obstructive pulmonary disease)  Morbid obesity  Diabetes  Overactive bladder  Hemorrhoids  Emphysema  Suprapubic catheter  S/P   History of appendectomy  S/P cholecystectomy     Impression; 59 Y/O Female with prior mentioned multiple chronic conditions. Presented with progressive SOB x 3 days di not improved with Respiratory Tx from NH. She was admitted with Acute Exacerbation of COPD and had [facility] admissions to [facility]. Has Dx of PUSHPA but not on NIPPV. BLD Cx x 1 and RVP negative.         Suggestion:   Oxygen supplementation NC . O2 saturation 94% room air, with Oxygen , O2 saturation 97% NC.   Continue DuoNeb Q 6 hours. Budesonide twice Daily.  She needs out patient pulmonary ff/up for sleep study and PAP devise  Please obtain most recent echo report from [facility].  Reinforced weight loss management outpatient.    Continue Prednisone 40 mg daily taper to 30 mg after 2 days , then 20 mg x 2 days then 10 mg x 2 days.    Continue antibiotic.   Continue Rivaroxaban 20 mg .  Agree with above assessment and plan as transcribed.

## 2018-10-12 NOTE — PROGRESS NOTE ADULT - PROBLEM SELECTOR PLAN 4
Patient has H/o Pulmonary embolism   on Xarelto 20 mg daily  -patient c/o leg pain b/l, venous doppler from 10/09/18 negative for DVT  c/w xarelto

## 2018-10-12 NOTE — PROGRESS NOTE ADULT - PROBLEM SELECTOR PLAN 8
Pt is from [facility] but doesn't want to go back there upon discharge   - SW follow up- pending acceptance. Medically stable for discharge to NH.

## 2018-10-25 ENCOUNTER — INPATIENT (INPATIENT)
Facility: HOSPITAL | Age: 58
LOS: 7 days | Discharge: NURSING, REHAB OR LONG-TERM CARE | DRG: 189 | End: 2018-11-02
Attending: HOSPITALIST | Admitting: HOSPITALIST
Payer: MEDICARE

## 2018-10-25 VITALS
SYSTOLIC BLOOD PRESSURE: 131 MMHG | OXYGEN SATURATION: 70 % | HEIGHT: 65 IN | RESPIRATION RATE: 33 BRPM | WEIGHT: 199.96 LBS | DIASTOLIC BLOOD PRESSURE: 70 MMHG | TEMPERATURE: 98 F | HEART RATE: 114 BPM

## 2018-10-25 DIAGNOSIS — Z98.891 HISTORY OF UTERINE SCAR FROM PREVIOUS SURGERY: Chronic | ICD-10-CM

## 2018-10-25 DIAGNOSIS — Z90.49 ACQUIRED ABSENCE OF OTHER SPECIFIED PARTS OF DIGESTIVE TRACT: Chronic | ICD-10-CM

## 2018-10-25 DIAGNOSIS — Z93.59 OTHER CYSTOSTOMY STATUS: Chronic | ICD-10-CM

## 2018-10-25 LAB
ALBUMIN SERPL ELPH-MCNC: 2.4 G/DL — LOW (ref 3.5–5)
ALP SERPL-CCNC: 82 U/L — SIGNIFICANT CHANGE UP (ref 40–120)
ALT FLD-CCNC: 11 U/L DA — SIGNIFICANT CHANGE UP (ref 10–60)
ANION GAP SERPL CALC-SCNC: 3 MMOL/L — LOW (ref 5–17)
APTT BLD: 41.1 SEC — HIGH (ref 27.5–37.4)
AST SERPL-CCNC: 16 U/L — SIGNIFICANT CHANGE UP (ref 10–40)
BASE EXCESS BLDA CALC-SCNC: 15.7 MMOL/L — HIGH (ref -2–2)
BASE EXCESS BLDA CALC-SCNC: 16.3 MMOL/L — HIGH (ref -2–2)
BASOPHILS # BLD AUTO: 0.1 K/UL — SIGNIFICANT CHANGE UP (ref 0–0.2)
BASOPHILS NFR BLD AUTO: 0.7 % — SIGNIFICANT CHANGE UP (ref 0–2)
BILIRUB SERPL-MCNC: <0.1 MG/DL — LOW (ref 0.2–1.2)
BLOOD GAS COMMENTS ARTERIAL: SIGNIFICANT CHANGE UP
BLOOD GAS COMMENTS ARTERIAL: SIGNIFICANT CHANGE UP
BUN SERPL-MCNC: 12 MG/DL — SIGNIFICANT CHANGE UP (ref 7–18)
CALCIUM SERPL-MCNC: 9.2 MG/DL — SIGNIFICANT CHANGE UP (ref 8.4–10.5)
CHLORIDE SERPL-SCNC: 97 MMOL/L — SIGNIFICANT CHANGE UP (ref 96–108)
CO2 SERPL-SCNC: 41 MMOL/L — HIGH (ref 22–31)
CREAT SERPL-MCNC: 0.39 MG/DL — LOW (ref 0.5–1.3)
EOSINOPHIL # BLD AUTO: 0 K/UL — SIGNIFICANT CHANGE UP (ref 0–0.5)
EOSINOPHIL NFR BLD AUTO: 0.3 % — SIGNIFICANT CHANGE UP (ref 0–6)
GLUCOSE SERPL-MCNC: 134 MG/DL — HIGH (ref 70–99)
HCG SERPL-ACNC: <1 MIU/ML — SIGNIFICANT CHANGE UP
HCO3 BLDA-SCNC: 43 MMOL/L — HIGH (ref 23–27)
HCO3 BLDA-SCNC: 45 MMOL/L — HIGH (ref 23–27)
HCT VFR BLD CALC: 29.3 % — LOW (ref 34.5–45)
HGB BLD-MCNC: 8.1 G/DL — LOW (ref 11.5–15.5)
HOROWITZ INDEX BLDA+IHG-RTO: 100 — SIGNIFICANT CHANGE UP
HOROWITZ INDEX BLDA+IHG-RTO: 60 — SIGNIFICANT CHANGE UP
INR BLD: 1.88 RATIO — HIGH (ref 0.88–1.16)
LACTATE SERPL-SCNC: 0.8 MMOL/L — SIGNIFICANT CHANGE UP (ref 0.7–2)
LYMPHOCYTES # BLD AUTO: 1.1 K/UL — SIGNIFICANT CHANGE UP (ref 1–3.3)
LYMPHOCYTES # BLD AUTO: 8.3 % — LOW (ref 13–44)
MCHC RBC-ENTMCNC: 24 PG — LOW (ref 27–34)
MCHC RBC-ENTMCNC: 27.6 GM/DL — LOW (ref 32–36)
MCV RBC AUTO: 87 FL — SIGNIFICANT CHANGE UP (ref 80–100)
MONOCYTES # BLD AUTO: 0.2 K/UL — SIGNIFICANT CHANGE UP (ref 0–0.9)
MONOCYTES NFR BLD AUTO: 1.2 % — LOW (ref 2–14)
NEUTROPHILS # BLD AUTO: 11.8 K/UL — HIGH (ref 1.8–7.4)
NEUTROPHILS NFR BLD AUTO: 89.6 % — HIGH (ref 43–77)
NT-PROBNP SERPL-SCNC: 551 PG/ML — HIGH (ref 0–125)
PCO2 BLDA: 83 MMHG — CRITICAL HIGH (ref 32–46)
PCO2 BLDA: 96 MMHG — CRITICAL HIGH (ref 32–46)
PH BLDA: 7.29 — LOW (ref 7.35–7.45)
PH BLDA: 7.34 — LOW (ref 7.35–7.45)
PLATELET # BLD AUTO: 327 K/UL — SIGNIFICANT CHANGE UP (ref 150–400)
PO2 BLDA: 70 MMHG — LOW (ref 74–108)
PO2 BLDA: 91 MMHG — SIGNIFICANT CHANGE UP (ref 74–108)
POTASSIUM SERPL-MCNC: 4.2 MMOL/L — SIGNIFICANT CHANGE UP (ref 3.5–5.3)
POTASSIUM SERPL-SCNC: 4.2 MMOL/L — SIGNIFICANT CHANGE UP (ref 3.5–5.3)
PROT SERPL-MCNC: 7.6 G/DL — SIGNIFICANT CHANGE UP (ref 6–8.3)
PROTHROM AB SERPL-ACNC: 20.8 SEC — HIGH (ref 9.8–12.7)
RBC # BLD: 3.36 M/UL — LOW (ref 3.8–5.2)
RBC # FLD: 20.2 % — HIGH (ref 10.3–14.5)
SAO2 % BLDA: 94 % — SIGNIFICANT CHANGE UP (ref 92–96)
SAO2 % BLDA: 97 % — HIGH (ref 92–96)
SODIUM SERPL-SCNC: 141 MMOL/L — SIGNIFICANT CHANGE UP (ref 135–145)
TROPONIN I SERPL-MCNC: <0.015 NG/ML — SIGNIFICANT CHANGE UP (ref 0–0.04)
TSH SERPL-MCNC: 0.48 UU/ML — SIGNIFICANT CHANGE UP (ref 0.34–4.82)
WBC # BLD: 13.2 K/UL — HIGH (ref 3.8–10.5)
WBC # FLD AUTO: 13.2 K/UL — HIGH (ref 3.8–10.5)

## 2018-10-25 PROCEDURE — 93010 ELECTROCARDIOGRAM REPORT: CPT

## 2018-10-25 PROCEDURE — 99291 CRITICAL CARE FIRST HOUR: CPT

## 2018-10-25 PROCEDURE — 71045 X-RAY EXAM CHEST 1 VIEW: CPT | Mod: 26

## 2018-10-25 PROCEDURE — 99223 1ST HOSP IP/OBS HIGH 75: CPT | Mod: AI

## 2018-10-25 RX ORDER — FUROSEMIDE 40 MG
40 TABLET ORAL ONCE
Qty: 0 | Refills: 0 | Status: COMPLETED | OUTPATIENT
Start: 2018-10-25 | End: 2018-10-25

## 2018-10-25 RX ORDER — IPRATROPIUM/ALBUTEROL SULFATE 18-103MCG
3 AEROSOL WITH ADAPTER (GRAM) INHALATION
Qty: 0 | Refills: 0 | Status: COMPLETED | OUTPATIENT
Start: 2018-10-25 | End: 2018-10-25

## 2018-10-25 RX ADMIN — Medication 125 MILLIGRAM(S): at 23:07

## 2018-10-25 RX ADMIN — Medication 3 MILLILITER(S): at 23:07

## 2018-10-25 NOTE — H&P ADULT - PROBLEM SELECTOR PLAN 8
IMPROVE VTE Individual Risk Assessment          RISK                                                          Points  [  ] Previous VTE                                                3  [  ] Thrombophilia                                             2  [  ] Lower limb paralysis                                   2        (unable to hold up >15 seconds)    [  ] Current Cancer                                             2         (within 6 months)  [ x ] Immobilization > 24 hrs                              1  [  ] ICU/CCU stay > 24 hours                             1  [  ] Age > 60                                                         1    IMPROVE VTE Score: 1  will hold anticoagulation for rectal bleed.  will continue with protonix Not on CPAP at NH.  will need polysomnography as outpatient.

## 2018-10-25 NOTE — ED ADULT NURSE NOTE - OBJECTIVE STATEMENT
Patient brought in from nursing home for shortness of breath, hsu catheter in place Patient brought in from nursing home for shortness of breath, suprapubic catheter in place

## 2018-10-25 NOTE — H&P ADULT - ATTENDING COMMENTS
Patient seen and examined by me 10/25/18; case was discussed with the admitting resident    ROS: as in the HPI; all other ROS negative    SH and family history as above    Vital Signs Last 24 Hrs  T(C): 37.1 (26 Oct 2018 06:30), Max: 37.6 (26 Oct 2018 00:07)  T(F): 98.8 (26 Oct 2018 06:30), Max: 99.6 (26 Oct 2018 00:07)  HR: 91 (26 Oct 2018 06:30) (91 - 114)  BP: 130/64 (26 Oct 2018 06:30) (130/64 - 137/72)  BP(mean): --  RR: 22 (26 Oct 2018 06:30) (22 - 33)  SpO2: 98% (26 Oct 2018 06:30) (70% - 98%)    GEN: intermittently somnolent, on Bipap   HEENT- normocephalic; mouth moist  CVS- S1S2+  LUNGS- clear to auscultation; no wheezing, poor air exchange   ABD: Soft , nontender, nondistended, Bowel sounds are present  EXTREMITY: no calf tenderness, no cyanosis, no edema  NEURO: AAOx3; non focal neurologic exam; cranial nerves grossly intact  PSYCH: normal affect and behavior  BACK: no swelling or mass;   VASCULAR: ++ distal peripheral pulses  SKIN: warm and dry.       Labs Reviewed:                         8.1    13.2  )-----------( 327      ( 25 Oct 2018 21:39 )             29.3     10-25    141  |  97  |  12  ----------------------------<  134<H>  4.2   |  41<H>  |  0.39<L>    Ca    9.2      25 Oct 2018 21:39    TPro  7.6  /  Alb  2.4<L>  /  TBili  <0.1<L>  /  DBili  x   /  AST  16  /  ALT  11  /  AlkPhos  82  10-25    CARDIAC MARKERS ( 25 Oct 2018 21:39 )  <0.015 ng/mL / x     / x     / x     / x          Urinalysis Basic - ( 26 Oct 2018 01:21 )    Color: Yellow / Appearance: Clear / S.015 / pH: x  Gluc: x / Ketone: Negative  / Bili: Negative / Urobili: Negative   Blood: x / Protein: 100 / Nitrite: Positive   Leuk Esterase: Moderate / RBC: 25-50 /HPF / WBC 11-25 /HPF   Sq Epi: x / Non Sq Epi: Few /HPF / Bacteria: Moderate /HPF      PT/INR - ( 25 Oct 2018 21:39 )   PT: 20.8 sec;   INR: 1.88 ratio         PTT - ( 25 Oct 2018 21:39 )  PTT:41.1 sec  BNP: Serum Pro-Brain Natriuretic Peptide: 551 pg/mL (10-25 @ 21:39)    MEDICATIONS  (STANDING):  ALBUTerol/ipratropium for Nebulization 3 milliLiter(s) Nebulizer every 6 hours  amLODIPine   Tablet 2.5 milliGRAM(s) Oral daily  gabapentin 400 milliGRAM(s) Oral three times a day  methylPREDNISolone sodium succinate Injectable 40 milliGRAM(s) IV Push every 8 hours  pantoprazole    Tablet 40 milliGRAM(s) Oral before breakfast  simvastatin 20 milliGRAM(s) Oral at bedtime  tamsulosin 0.4 milliGRAM(s) Oral at bedtime    CXR reviewed- edema, cardiomegaly similar to prior     EKG pending     57 y/o F with adv COPD admitted with acute on chronic hypoxemic/hypercapnic respiratory failure, multiple recent admissions, and BRBPR on Xarelto.     1.acute on chronic hypoxemic/hypercapnic respiratory failure- patient showing some improvement with continuous bipap, but condition remains gaurded. RVP negative. IV steroids, scheduled duonebs and budesonide. Check echo. On xarelto for previous PE.     2.Encephalopathy- 2/2 hypercapnia, improving     3.Acute blood loss anemia- BRBPR seen on exam, will hold xarelto for now and monitor for further bleeding. Sigmoidoscopy noted from [facility] in records reportedly negative.     Plan of care discussed with patient ;  all questions and concerns were addressed.

## 2018-10-25 NOTE — CONSULT NOTE ADULT - ASSESSMENT
58 year old morbidly obese female, bed bound from Nursing Home with PMHx of  DM, COPD on home O2, PUSHPA (not on CPAP,) chronic urinary retention s/p suprapubic cath, PE on Xarelto,  sent from ED for respiratory distress and SOB.    In ED, patient was tachycardic to 114/min, RR- 33/min, 70% suppl O2. EKG-  sinus tachycardia with PACS @ 119 BPM , no ST T wave changes. cardiac enzymes x1- negative,  CXR s/o bilateral infiltrates unchanged from previous CXR (awaiting official report). Labs pertinent for WBC-13.2 , h/h of 8.1/29.3, INR- 1.88, Hco3- 41, ABG initial s/o 7.29/ 96/ 45/ 97% on NRB, repeat  ABG after being on BIPAP for 1 hour pH, Arterial: 7.34  pH, Blood: x     /  pCO2: 83    /  pO2: 70    / HCO3: 43    / Base Excess: 15.7  /  SaO2: 94. s/p Duoneb x3 and solumedrol 125 in ED.     ICU was consulted for hypoxic and hypercapnic respiratory  failure, since pt is hemodynamically stable on BIPAP and has used BIPAP in the past, hence being downgraded to telemetry floor for further management.

## 2018-10-25 NOTE — ED PROVIDER NOTE - PROGRESS NOTE DETAILS
seen and evaluated by MICU, deemed stable for floor, improving mental status on bipap, will admit to telemetry.

## 2018-10-25 NOTE — H&P ADULT - NSHPPHYSICALEXAM_GEN_ALL_CORE
GENERAL: morbidly obese female, lying in bed, with BIPAP, not in respiratory distress, able to communicate well.  HEAD:  Atraumatic, Normocephalic  EYES:  PERRLA, conjunctiva and sclera clear  ENMT: Dry mucous membranes  NECK: Supple  NERVOUS SYSTEM:  Alert & Oriented X2  CHEST/LUNG: bilateral air entry+ with occasional rhonchi  HEART: Regular rate and rhythm; No murmurs  ABDOMEN: Soft, obese, distended; Bowel sounds difficult to appreciate, SPC catheter +  EXTREMITIES:  2+ Peripheral Pulses, non pitting pedal edema

## 2018-10-25 NOTE — ED PROVIDER NOTE - OBJECTIVE STATEMENT
58 F brought from facility for shortness of breath, hypoxic by EMS to 70% on RA, full code per paperwork.  Patient lethargic, cannot give further hx.

## 2018-10-25 NOTE — CONSULT NOTE ADULT - SUBJECTIVE AND OBJECTIVE BOX
Patient is a 58y old  Female who presents with a chief complaint of Sob and hypoxia (25 Oct 2018 23:52)      HPI: 58 year old morbidly obese female, bed bound from Nursing Home with PMHx of  DM, COPD on home O2, PUSHPA (not on CPAP,) chronic urinary retention s/p suprapubic cath, PE on Xarelto, paraplegic ( unknown etiology ) sent from ED for respiratory distress and SOB. Pt reports having SOB and so was sent for further management. Pt denies any cough, fever, chest pain , palpitations, dizziness, diaphoresis, dizziness. Pt was in respiratory distress on admission, was placed on NRB, given respiratory acidosis, pt was placed on BIPAP after which pts clinical condition improved and able to communicate well.  Pt was recently discharged from [facility] after managing for COPD exacerbation, was discharged on tapering dose of steroids.     In ED, patient was tachycardic to 114/min, RR- 33/min, 70% suppl O2. EKG-  sinus tachycardia with PACS @ 119 BPM , no ST T wave changes. cardiac enzymes x1- negative,  CXR s/o bilateral infiltrates unchanged from previous CXR (awaiting official report). Labs pertinent for WBC-13.2 , h/h of 8.1/29.3, INR- 1.88, Hco3- 41, ABG initial s/o 7.29/ 96/ 45/ 97% on NRB, repeat  ABG after being on BIPAP for 1 hour pH, Arterial: 7.34  pH, Blood: x     /  pCO2: 83    /  pO2: 70    / HCO3: 43    / Base Excess: 15.7  /  SaO2: 94. s/p Duoneb x3 and solumedrol 125 in ED.     ICU was consulted for hypoxic and hypercapnic respiratory  failure, since pt is hemodynamically stable on BIPAP and has used BIPAP in the past, hence being downgraded to telemetry floor for further management.       T(C): 37.6 (10-26-18 @ 00:07), Max: 37.6 (10-26-18 @ 00:07)  HR: 108 (10-26-18 @ 00:07) (103 - 114)  BP: 137/72 (10-26-18 @ 00:07) (131/70 - 137/72)  RR: 26 (10-26-18 @ 00:07) (26 - 33)  SpO2: 98% (10-26-18 @ 00:07) (70% - 98%)  Wt(kg): --  I&O's Summary      PAST MEDICAL & SURGICAL HISTORY:  Urinary retention  CKD (chronic kidney disease)  DM (diabetes mellitus)  HTN (hypertension)  COPD (chronic obstructive pulmonary disease)  Morbid obesity  Diabetes  Overactive bladder  Hemorrhoids  Emphysema  Suprapubic catheter  S/P   History of appendectomy  S/P cholecystectomy      SOCIAL HISTORY  Alcohol:  Tobacco:  Illicit substance use:      FAMILY HISTORY:      LABS:                        8.1    13.2  )-----------( 327      ( 25 Oct 2018 21:39 )             29.3     10-25    141  |  97  |  12  ----------------------------<  134<H>  4.2   |  41<H>  |  0.39<L>    Ca    9.2      25 Oct 2018 21:39    TPro  7.6  /  Alb  2.4<L>  /  TBili  <0.1<L>  /  DBili  x   /  AST  16  /  ALT  11  /  AlkPhos  82  10-25    PT/INR - ( 25 Oct 2018 21:39 )   PT: 20.8 sec;   INR: 1.88 ratio         PTT - ( 25 Oct 2018 21:39 )  PTT:41.1 sec    CAPILLARY BLOOD GLUCOSE        ABG - ( 25 Oct 2018 22:13 )  pH, Arterial: 7.34  pH, Blood: x     /  pCO2: 83    /  pO2: 70    / HCO3: 43    / Base Excess: 15.7  /  SaO2: 94                      MEDICATIONS  (STANDING):    MEDICATIONS  (PRN):      REVIEW OF SYSTEMS:  ROS negative for fever, chest pain, cough, rash, headache, dizziness, diaphoresis, palpitations, abdominal pain, weakness, numbness, tingling of extremities.    RADIOLOGY & ADDITIONAL TESTS:    Imaging Personally Reviewed:  [ x] YES  [ ] NO      PHYSICAL EXAM:  GENERAL: morbidly obese female, lying in bed, with BIPAP, not in respiratory distress, able to communicate well.  HEAD:  Atraumatic, Normocephalic  EYES:  PERRLA, conjunctiva and sclera clear  ENMT: Dry mucous membranes  NECK: Supple  NERVOUS SYSTEM:  Alert & Oriented X2  CHEST/LUNG: bilateral air entry+ with occasional rhonchi  HEART: Regular rate and rhythm; No murmurs  ABDOMEN: Soft, obese, distended; Bowel sounds difficult to appreciate, SPC catheter +  EXTREMITIES:  2+ Peripheral Pulses, non pitting pedal edema Patient is a 58y old  Female who presents with a chief complaint of Sob and hypoxia (25 Oct 2018 23:52)      HPI: 58 year old morbidly obese female, bed bound from Nursing Home with PMHx of  DM, COPD on home O2, PUSHPA (not on CPAP,) chronic urinary retention s/p suprapubic cath, PE on Xarelto,  sent from ED for respiratory distress and SOB. Pt reports having SOB and so was sent for further management. Pt denies any cough, fever, chest pain , palpitations, dizziness, diaphoresis, dizziness. Pt was in respiratory distress on admission, was placed on NRB, given respiratory acidosis, pt was placed on BIPAP after which pts clinical condition improved and able to communicate well.  Pt was recently discharged from [facility] after managing for COPD exacerbation, was discharged on tapering dose of steroids.     In ED, patient was tachycardic to 114/min, RR- 33/min, 70% suppl O2. EKG-  sinus tachycardia with PACS @ 119 BPM , no ST T wave changes. cardiac enzymes x1- negative,  CXR s/o bilateral infiltrates unchanged from previous CXR (awaiting official report). Labs pertinent for WBC-13.2 , h/h of 8.1/29.3, INR- 1.88, Hco3- 41, ABG initial s/o 7.29/ 96/ 45/ 97% on NRB, repeat  ABG after being on BIPAP for 1 hour pH, Arterial: 7.34  pH, Blood: x     /  pCO2: 83    /  pO2: 70    / HCO3: 43    / Base Excess: 15.7  /  SaO2: 94. s/p Duoneb x3 and solumedrol 125 in ED.     ICU was consulted for hypoxic and hypercapnic respiratory  failure, since pt is hemodynamically stable on BIPAP and has used BIPAP in the past, hence being downgraded to telemetry floor for further management.       T(C): 37.6 (10-26-18 @ 00:07), Max: 37.6 (10-26-18 @ 00:07)  HR: 108 (10-26-18 @ 00:07) (103 - 114)  BP: 137/72 (10-26-18 @ 00:07) (131/70 - 137/72)  RR: 26 (10-26-18 @ 00:07) (26 - 33)  SpO2: 98% (10-26-18 @ 00:07) (70% - 98%)  Wt(kg): --  I&O's Summary      PAST MEDICAL & SURGICAL HISTORY:  Urinary retention  CKD (chronic kidney disease)  DM (diabetes mellitus)  HTN (hypertension)  COPD (chronic obstructive pulmonary disease)  Morbid obesity  Diabetes  Overactive bladder  Hemorrhoids  Emphysema  Suprapubic catheter  S/P   History of appendectomy  S/P cholecystectomy      SOCIAL HISTORY  Alcohol:  Tobacco:  Illicit substance use:      FAMILY HISTORY:      LABS:                        8.1    13.2  )-----------( 327      ( 25 Oct 2018 21:39 )             29.3     10-25    141  |  97  |  12  ----------------------------<  134<H>  4.2   |  41<H>  |  0.39<L>    Ca    9.2      25 Oct 2018 21:39    TPro  7.6  /  Alb  2.4<L>  /  TBili  <0.1<L>  /  DBili  x   /  AST  16  /  ALT  11  /  AlkPhos  82  10-25    PT/INR - ( 25 Oct 2018 21:39 )   PT: 20.8 sec;   INR: 1.88 ratio         PTT - ( 25 Oct 2018 21:39 )  PTT:41.1 sec    CAPILLARY BLOOD GLUCOSE        ABG - ( 25 Oct 2018 22:13 )  pH, Arterial: 7.34  pH, Blood: x     /  pCO2: 83    /  pO2: 70    / HCO3: 43    / Base Excess: 15.7  /  SaO2: 94                      MEDICATIONS  (STANDING):    MEDICATIONS  (PRN):      REVIEW OF SYSTEMS:  ROS negative for fever, chest pain, cough, rash, headache, dizziness, diaphoresis, palpitations, abdominal pain, weakness, numbness, tingling of extremities.    RADIOLOGY & ADDITIONAL TESTS:    Imaging Personally Reviewed:  [ x] YES  [ ] NO      PHYSICAL EXAM:  GENERAL: morbidly obese female, lying in bed, with BIPAP, not in respiratory distress, able to communicate well.  HEAD:  Atraumatic, Normocephalic  EYES:  PERRLA, conjunctiva and sclera clear  ENMT: Dry mucous membranes  NECK: Supple  NERVOUS SYSTEM:  Alert & Oriented X2  CHEST/LUNG: bilateral air entry+ with occasional rhonchi  HEART: Regular rate and rhythm; No murmurs  ABDOMEN: Soft, obese, distended; Bowel sounds difficult to appreciate, SPC catheter +  EXTREMITIES:  2+ Peripheral Pulses, non pitting pedal edema

## 2018-10-25 NOTE — CONSULT NOTE ADULT - PROBLEM SELECTOR RECOMMENDATION 2
likely secondary to bronchitis   - xray chest negative for consolidation, s/o  bilateral infiltrates unchanged from previous CXR (awaiting official report).  - afebrile with mild leucocytosis to 13.2  s/p duoneb inhalation and solumedrol 125 mg in ED  - continue BIPAP support,  duonebs every 6 hours.   - recommend to start  solu-medrol 40mg IV every 8 hours  - f/u RVP, blood cultures and sputum cultures.

## 2018-10-25 NOTE — ED ADULT NURSE NOTE - NSIMPLEMENTINTERV_GEN_ALL_ED
Implemented All Universal Safety Interventions:  [place] to call system. Call bell, personal items and telephone within reach. Instruct patient to call for assistance. Room bathroom lighting operational. Non-slip footwear when patient is off stretcher. Physically safe environment: no spills, clutter or unnecessary equipment. Stretcher in lowest position, wheels locked, appropriate side rails in place.

## 2018-10-25 NOTE — H&P ADULT - PROBLEM SELECTOR PLAN 3
likely secondary to bronchitis   - xray chest negative for consolidation, s/o  bilateral infiltrates unchanged from previous CXR (awaiting official report).  - afebrile with mild leucocytosis to 13.2 (was on prednisone)  s/p duoneb inhalation and solumedrol 125 mg in ED  - continue BIPAP support,  duonebs every 6 hours.   - started on solu-medrol 40mg IV every 8 hours  -follow blood cultures and sputum cultures.   RVP negative

## 2018-10-25 NOTE — H&P ADULT - PROBLEM SELECTOR PLAN 5
continue home medications HbA1c level of 5.1  will continue with sliding scale as patient is on insulin

## 2018-10-25 NOTE — H&P ADULT - PROBLEM SELECTOR PLAN 4
HbA1c level of 5.1  will continue with sliding scale as patient is on insulin UA positive   no fever noted   will start on levaquin for UTI and COPD exacerbation   follow Urine and blood culture

## 2018-10-25 NOTE — H&P ADULT - ASSESSMENT
58 year old morbidly obese female, bed bound from Nursing Home with PMHx of  DM, COPD on home O2, PUSHPA (not on CPAP,) chronic urinary retention s/p suprapubic cath, PE on Xarelto,  sent from ED for respiratory distress and SOB.    In ED, patient was tachycardic to 114/min, RR- 33/min, 70% suppl O2. EKG-  sinus tachycardia with PACS @ 119 BPM , no ST T wave changes. cardiac enzymes x1- negative,  CXR s/o bilateral infiltrates unchanged from previous CXR (awaiting official report). Labs pertinent for WBC-13.2 , h/h of 8.1/29.3, INR- 1.88, Hco3- 41, ABG initial s/o 7.29/ 96/ 45/ 97% on NRB, repeat  ABG after being on BIPAP for 1 hour pH, Arterial: 7.34  pH, Blood: x     /  pCO2: 83    /  pO2: 70    / HCO3: 43    / Base Excess: 15.7  /  SaO2: 94. s/p Duoneb x3 and solumedrol 125 in ED.   1 Episode of blood mixed with stool noted in ED. No similar previous history.      ICU was consulted for acute respiratory failure but as patient was improving on BIPAP was downgraded to floor.

## 2018-10-25 NOTE — H&P ADULT - PROBLEM SELECTOR PLAN 6
H/o Pulmonary embolism   on Xarelto 20 mg daily- will hold for now because of rectal bleeding  -patient c/o leg pain b/l, venous doppler from 10/09/18 negative for DVT. continue home medications

## 2018-10-25 NOTE — H&P ADULT - NSHPLABSRESULTS_GEN_ALL_CORE
Vital Signs Last 24 Hrs  T(C): 37.6 (26 Oct 2018 00:07), Max: 37.6 (26 Oct 2018 00:07)  T(F): 99.6 (26 Oct 2018 00:07), Max: 99.6 (26 Oct 2018 00:07)  HR: 108 (26 Oct 2018 00:07) (103 - 114)  BP: 137/72 (26 Oct 2018 00:07) (131/70 - 137/72)  BP(mean): --  RR: 26 (26 Oct 2018 00:07) (26 - 33)  SpO2: 98% (26 Oct 2018 00:07) (70% - 98%)                            8.1    13.2  )-----------( 327      ( 25 Oct 2018 21:39 )             29.3       10    141  |  97  |  12  ----------------------------<  134<H>  4.2   |  41<H>  |  0.39<L>    Ca    9.2      25 Oct 2018 21:39    TPro  7.6  /  Alb  2.4<L>  /  TBili  <0.1<L>  /  DBili  x   /  AST  16  /  ALT  11  /  AlkPhos  82  10          ABG - ( 25 Oct 2018 22:13 )  pH, Arterial: 7.34  pH, Blood: x     /  pCO2: 83    /  pO2: 70    / HCO3: 43    / Base Excess: 15.7  /  SaO2: 94          Urinalysis Basic - ( 26 Oct 2018 01:21 )    Color: Yellow / Appearance: Clear / S.015 / pH: x  Gluc: x / Ketone: Negative  / Bili: Negative / Urobili: Negative   Blood: x / Protein: 100 / Nitrite: Positive   Leuk Esterase: Moderate / RBC: 25-50 /HPF / WBC 11-25 /HPF   Sq Epi: x / Non Sq Epi: Few /HPF / Bacteria: Moderate /HPF        PT/INR - ( 25 Oct 2018 21:39 )   PT: 20.8 sec;   INR: 1.88 ratio         PTT - ( 25 Oct 2018 21:39 )  PTT:41.1 sec    Lactate Trend  10-25 @ 21:39 Lactate:0.8       CARDIAC MARKERS ( 25 Oct 2018 21:39 )  <0.015 ng/mL / x     / x     / x     / x

## 2018-10-25 NOTE — H&P ADULT - PROBLEM SELECTOR PLAN 2
bright red blood noted mixed with stool   patient is on xarelto and aspirin - will hold for now   GI consult.   follow CBC in AM   transfuse as needed bright red blood noted mixed with stool   patient is on xarelto and aspirin - will hold for now   GI consult.   follow CBC in AM   transfuse as needed  Spoke to GI Dr. Henley over phone - as patient had recent work up - if Hb stays stable - anticoagulation can be restarted.

## 2018-10-25 NOTE — H&P ADULT - PROBLEM SELECTOR PLAN 7
Not on CPAP at NH.  will need polysomnography as outpatient. H/o Pulmonary embolism   on Xarelto 20 mg daily- will hold for now because of rectal bleeding  -patient c/o leg pain b/l, venous doppler from 10/09/18 negative for DVT.

## 2018-10-25 NOTE — H&P ADULT - PROBLEM SELECTOR PLAN 1
patient presented with SOB, found to have hypoxic and hypercapneic respiratory failure likely secondary to COPD exacerbation  Initial ABG s/o 7.29/ 96/ 45/ 97% on NRB, repeat  ABG after being on BIPAP for 1 hour pH, Arterial: 7.34  pH, Blood: x     /  pCO2: 83    /  pO2: 70    / HCO3: 43    / Base Excess: 15.7  /  SaO2: 94.   RVP negative   will continue with BIPAP for now - wean as tolerated in AM   will keep NPO as on BIPAP  stat dose of solumedrol and lasix was given in ED   will continue with solumdrol and duoneb  will do repeat ABG in AM

## 2018-10-25 NOTE — H&P ADULT - PROBLEM SELECTOR PLAN 9
IMPROVE VTE Individual Risk Assessment          RISK                                                          Points  [  ] Previous VTE                                                3  [  ] Thrombophilia                                             2  [  ] Lower limb paralysis                                   2        (unable to hold up >15 seconds)    [  ] Current Cancer                                             2         (within 6 months)  [ x ] Immobilization > 24 hrs                              1  [  ] ICU/CCU stay > 24 hours                             1  [  ] Age > 60                                                         1    IMPROVE VTE Score: 1  will hold anticoagulation for rectal bleed.  will continue with protonix

## 2018-10-25 NOTE — H&P ADULT - HISTORY OF PRESENT ILLNESS
58 /F old morbidly obese, bed bound from Nursing Home with PMHx of  DM, COPD on home O2, PUSHPA (not on CPAP,) chronic urinary retention s/p suprapubic cath, PE on Xarelto,  sent from ED for respiratory distress and SOB. Pt reports having SOB and so was sent for further management. Pt denies any cough, fever, chest pain , palpitations, dizziness, diaphoresis, dizziness. Pt was in respiratory distress on admission, was placed on NRB, given respiratory acidosis, pt was placed on BIPAP after which pts clinical condition improved and able to communicate well.  Pt was recently discharged from [facility] after managing for COPD exacerbation, was discharged on tapering dose of steroids.     In ED, patient was tachycardic to 114/min, RR- 33/min, 70% suppl O2. EKG-  sinus tachycardia with PACS @ 119 BPM , no ST T wave changes. cardiac enzymes x1- negative,  CXR s/o bilateral infiltrates unchanged from previous CXR (awaiting official report). Labs pertinent for WBC-13.2 , h/h of 8.1/29.3, INR- 1.88, Hco3- 41, ABG initial s/o 7.29/ 96/ 45/ 97% on NRB, repeat  ABG after being on BIPAP for 1 hour pH, Arterial: 7.34  pH, Blood: x     /  pCO2: 83    /  pO2: 70    / HCO3: 43    / Base Excess: 15.7  /  SaO2: 94. s/p Duoneb x3 and solumedrol 125 in ED.     ICU was consulted for hypoxic and hypercapnic respiratory  failure, since pt is hemodynamically stable on BIPAP and has used BIPAP in the past, hence being downgraded to telemetry floor for further management.

## 2018-10-25 NOTE — ED ADULT NURSE NOTE - ED STAT RN HANDOFF DETAILS
Patient received from YVROSE Sánchez admitted to telemetry with NSR on BIPAP A&O 1-2 with forgetful. Luna to bedside drainage with cloudy ave urine. Decubi X 2 sacral and hip. B/L lower extremities with edema noted. NO bed assigned as yet continue to monitor patient. Patient received from YVROSE Sánchez admitted to telemetry with NSR on BIPAP A&O 1-2 with forgetful. Luna to bedside drainage with cloudy ave urine. Decubi X 2 sacral and right upper thigh. B/L lower extremities with edema noted. Patient with under breast rash ans abdominal fold rash cream applied. Patient not assigned to bed yet continue to monitor patient.

## 2018-10-26 DIAGNOSIS — E11.9 TYPE 2 DIABETES MELLITUS WITHOUT COMPLICATIONS: ICD-10-CM

## 2018-10-26 DIAGNOSIS — R09.89 OTHER SPECIFIED SYMPTOMS AND SIGNS INVOLVING THE CIRCULATORY AND RESPIRATORY SYSTEMS: ICD-10-CM

## 2018-10-26 DIAGNOSIS — N32.81 OVERACTIVE BLADDER: ICD-10-CM

## 2018-10-26 DIAGNOSIS — K62.5 HEMORRHAGE OF ANUS AND RECTUM: ICD-10-CM

## 2018-10-26 DIAGNOSIS — J44.9 CHRONIC OBSTRUCTIVE PULMONARY DISEASE, UNSPECIFIED: ICD-10-CM

## 2018-10-26 DIAGNOSIS — J96.21 ACUTE AND CHRONIC RESPIRATORY FAILURE WITH HYPOXIA: ICD-10-CM

## 2018-10-26 DIAGNOSIS — G47.33 OBSTRUCTIVE SLEEP APNEA (ADULT) (PEDIATRIC): ICD-10-CM

## 2018-10-26 DIAGNOSIS — I10 ESSENTIAL (PRIMARY) HYPERTENSION: ICD-10-CM

## 2018-10-26 DIAGNOSIS — Z29.9 ENCOUNTER FOR PROPHYLACTIC MEASURES, UNSPECIFIED: ICD-10-CM

## 2018-10-26 DIAGNOSIS — N39.0 URINARY TRACT INFECTION, SITE NOT SPECIFIED: ICD-10-CM

## 2018-10-26 DIAGNOSIS — I26.99 OTHER PULMONARY EMBOLISM WITHOUT ACUTE COR PULMONALE: ICD-10-CM

## 2018-10-26 LAB
ANION GAP SERPL CALC-SCNC: 1 MMOL/L — LOW (ref 5–17)
APPEARANCE UR: CLEAR — SIGNIFICANT CHANGE UP
BASE EXCESS BLDA CALC-SCNC: 18.7 MMOL/L — HIGH (ref -2–2)
BASOPHILS # BLD AUTO: 0 K/UL — SIGNIFICANT CHANGE UP (ref 0–0.2)
BASOPHILS NFR BLD AUTO: 0.2 % — SIGNIFICANT CHANGE UP (ref 0–2)
BILIRUB UR-MCNC: NEGATIVE — SIGNIFICANT CHANGE UP
BLOOD GAS COMMENTS ARTERIAL: SIGNIFICANT CHANGE UP
BUN SERPL-MCNC: 15 MG/DL — SIGNIFICANT CHANGE UP (ref 7–18)
CALCIUM SERPL-MCNC: 9.4 MG/DL — SIGNIFICANT CHANGE UP (ref 8.4–10.5)
CHLORIDE SERPL-SCNC: 98 MMOL/L — SIGNIFICANT CHANGE UP (ref 96–108)
CO2 SERPL-SCNC: 44 MMOL/L — HIGH (ref 22–31)
COLOR SPEC: YELLOW — SIGNIFICANT CHANGE UP
CREAT SERPL-MCNC: 0.34 MG/DL — LOW (ref 0.5–1.3)
DIFF PNL FLD: ABNORMAL
EOSINOPHIL # BLD AUTO: 0 K/UL — SIGNIFICANT CHANGE UP (ref 0–0.5)
EOSINOPHIL NFR BLD AUTO: 0 % — SIGNIFICANT CHANGE UP (ref 0–6)
GLUCOSE SERPL-MCNC: 133 MG/DL — HIGH (ref 70–99)
GLUCOSE UR QL: NEGATIVE — SIGNIFICANT CHANGE UP
HCO3 BLDA-SCNC: 46 MMOL/L — HIGH (ref 23–27)
HCT VFR BLD CALC: 28 % — LOW (ref 34.5–45)
HCT VFR BLD CALC: 28.5 % — LOW (ref 34.5–45)
HGB BLD-MCNC: 7.6 G/DL — LOW (ref 11.5–15.5)
HGB BLD-MCNC: 7.7 G/DL — LOW (ref 11.5–15.5)
HOROWITZ INDEX BLDA+IHG-RTO: 80 — SIGNIFICANT CHANGE UP
KETONES UR-MCNC: NEGATIVE — SIGNIFICANT CHANGE UP
LEUKOCYTE ESTERASE UR-ACNC: ABNORMAL
LYMPHOCYTES # BLD AUTO: 0.5 K/UL — LOW (ref 1–3.3)
LYMPHOCYTES # BLD AUTO: 5 % — LOW (ref 13–44)
MAGNESIUM SERPL-MCNC: 2.1 MG/DL — SIGNIFICANT CHANGE UP (ref 1.6–2.6)
MCHC RBC-ENTMCNC: 23.5 PG — LOW (ref 27–34)
MCHC RBC-ENTMCNC: 23.8 PG — LOW (ref 27–34)
MCHC RBC-ENTMCNC: 26.8 GM/DL — LOW (ref 32–36)
MCHC RBC-ENTMCNC: 27.5 GM/DL — LOW (ref 32–36)
MCV RBC AUTO: 86.4 FL — SIGNIFICANT CHANGE UP (ref 80–100)
MCV RBC AUTO: 87.8 FL — SIGNIFICANT CHANGE UP (ref 80–100)
MONOCYTES # BLD AUTO: 0.1 K/UL — SIGNIFICANT CHANGE UP (ref 0–0.9)
MONOCYTES NFR BLD AUTO: 0.5 % — LOW (ref 2–14)
NEUTROPHILS # BLD AUTO: 9.2 K/UL — HIGH (ref 1.8–7.4)
NEUTROPHILS NFR BLD AUTO: 94.3 % — HIGH (ref 43–77)
NITRITE UR-MCNC: POSITIVE
PCO2 BLDA: 74 MMHG — CRITICAL HIGH (ref 32–46)
PH BLDA: 7.41 — SIGNIFICANT CHANGE UP (ref 7.35–7.45)
PH UR: 6 — SIGNIFICANT CHANGE UP (ref 5–8)
PHOSPHATE SERPL-MCNC: 2.7 MG/DL — SIGNIFICANT CHANGE UP (ref 2.5–4.5)
PLATELET # BLD AUTO: 315 K/UL — SIGNIFICANT CHANGE UP (ref 150–400)
PLATELET # BLD AUTO: 361 K/UL — SIGNIFICANT CHANGE UP (ref 150–400)
PO2 BLDA: 77 MMHG — SIGNIFICANT CHANGE UP (ref 74–108)
POTASSIUM SERPL-MCNC: 3.9 MMOL/L — SIGNIFICANT CHANGE UP (ref 3.5–5.3)
POTASSIUM SERPL-SCNC: 3.9 MMOL/L — SIGNIFICANT CHANGE UP (ref 3.5–5.3)
PROT UR-MCNC: 100
RAPID RVP RESULT: SIGNIFICANT CHANGE UP
RBC # BLD: 3.24 M/UL — LOW (ref 3.8–5.2)
RBC # BLD: 3.25 M/UL — LOW (ref 3.8–5.2)
RBC # FLD: 20 % — HIGH (ref 10.3–14.5)
RBC # FLD: 20.1 % — HIGH (ref 10.3–14.5)
SAO2 % BLDA: 96 % — SIGNIFICANT CHANGE UP (ref 92–96)
SODIUM SERPL-SCNC: 143 MMOL/L — SIGNIFICANT CHANGE UP (ref 135–145)
SP GR SPEC: 1.01 — SIGNIFICANT CHANGE UP (ref 1.01–1.02)
UROBILINOGEN FLD QL: NEGATIVE — SIGNIFICANT CHANGE UP
WBC # BLD: 13.4 K/UL — HIGH (ref 3.8–10.5)
WBC # BLD: 9.8 K/UL — SIGNIFICANT CHANGE UP (ref 3.8–10.5)
WBC # FLD AUTO: 13.4 K/UL — HIGH (ref 3.8–10.5)
WBC # FLD AUTO: 9.8 K/UL — SIGNIFICANT CHANGE UP (ref 3.8–10.5)

## 2018-10-26 PROCEDURE — 99233 SBSQ HOSP IP/OBS HIGH 50: CPT | Mod: GC

## 2018-10-26 RX ORDER — PANTOPRAZOLE SODIUM 20 MG/1
40 TABLET, DELAYED RELEASE ORAL
Qty: 0 | Refills: 0 | Status: DISCONTINUED | OUTPATIENT
Start: 2018-10-26 | End: 2018-11-02

## 2018-10-26 RX ORDER — FUROSEMIDE 40 MG
40 TABLET ORAL ONCE
Qty: 0 | Refills: 0 | Status: COMPLETED | OUTPATIENT
Start: 2018-10-26 | End: 2018-10-26

## 2018-10-26 RX ORDER — ACETAZOLAMIDE 250 MG/1
250 TABLET ORAL DAILY
Qty: 0 | Refills: 0 | Status: COMPLETED | OUTPATIENT
Start: 2018-10-26 | End: 2018-10-29

## 2018-10-26 RX ORDER — BUDESONIDE, MICRONIZED 100 %
0.5 POWDER (GRAM) MISCELLANEOUS EVERY 12 HOURS
Qty: 0 | Refills: 0 | Status: DISCONTINUED | OUTPATIENT
Start: 2018-10-26 | End: 2018-10-26

## 2018-10-26 RX ORDER — SIMVASTATIN 20 MG/1
20 TABLET, FILM COATED ORAL AT BEDTIME
Qty: 0 | Refills: 0 | Status: DISCONTINUED | OUTPATIENT
Start: 2018-10-26 | End: 2018-10-29

## 2018-10-26 RX ORDER — DEXTROSE 50 % IN WATER 50 %
25 SYRINGE (ML) INTRAVENOUS ONCE
Qty: 0 | Refills: 0 | Status: DISCONTINUED | OUTPATIENT
Start: 2018-10-26 | End: 2018-11-02

## 2018-10-26 RX ORDER — BUDESONIDE AND FORMOTEROL FUMARATE DIHYDRATE 160; 4.5 UG/1; UG/1
2 AEROSOL RESPIRATORY (INHALATION)
Qty: 0 | Refills: 0 | Status: DISCONTINUED | OUTPATIENT
Start: 2018-10-26 | End: 2018-11-02

## 2018-10-26 RX ORDER — FUROSEMIDE 40 MG
40 TABLET ORAL DAILY
Qty: 0 | Refills: 0 | Status: DISCONTINUED | OUTPATIENT
Start: 2018-10-26 | End: 2018-10-26

## 2018-10-26 RX ORDER — GABAPENTIN 400 MG/1
400 CAPSULE ORAL THREE TIMES A DAY
Qty: 0 | Refills: 0 | Status: DISCONTINUED | OUTPATIENT
Start: 2018-10-26 | End: 2018-11-02

## 2018-10-26 RX ORDER — AMLODIPINE BESYLATE 2.5 MG/1
2.5 TABLET ORAL DAILY
Qty: 0 | Refills: 0 | Status: DISCONTINUED | OUTPATIENT
Start: 2018-10-26 | End: 2018-10-26

## 2018-10-26 RX ORDER — SODIUM CHLORIDE 9 MG/ML
1000 INJECTION, SOLUTION INTRAVENOUS
Qty: 0 | Refills: 0 | Status: DISCONTINUED | OUTPATIENT
Start: 2018-10-26 | End: 2018-11-02

## 2018-10-26 RX ORDER — DEXTROSE 50 % IN WATER 50 %
15 SYRINGE (ML) INTRAVENOUS ONCE
Qty: 0 | Refills: 0 | Status: DISCONTINUED | OUTPATIENT
Start: 2018-10-26 | End: 2018-11-02

## 2018-10-26 RX ORDER — DEXTROSE 50 % IN WATER 50 %
12.5 SYRINGE (ML) INTRAVENOUS ONCE
Qty: 0 | Refills: 0 | Status: DISCONTINUED | OUTPATIENT
Start: 2018-10-26 | End: 2018-11-02

## 2018-10-26 RX ORDER — IPRATROPIUM/ALBUTEROL SULFATE 18-103MCG
3 AEROSOL WITH ADAPTER (GRAM) INHALATION EVERY 6 HOURS
Qty: 0 | Refills: 0 | Status: DISCONTINUED | OUTPATIENT
Start: 2018-10-26 | End: 2018-10-26

## 2018-10-26 RX ORDER — TIOTROPIUM BROMIDE 18 UG/1
1 CAPSULE ORAL; RESPIRATORY (INHALATION) DAILY
Qty: 0 | Refills: 0 | Status: DISCONTINUED | OUTPATIENT
Start: 2018-10-26 | End: 2018-10-29

## 2018-10-26 RX ORDER — GLUCAGON INJECTION, SOLUTION 0.5 MG/.1ML
1 INJECTION, SOLUTION SUBCUTANEOUS ONCE
Qty: 0 | Refills: 0 | Status: DISCONTINUED | OUTPATIENT
Start: 2018-10-26 | End: 2018-11-02

## 2018-10-26 RX ORDER — FUROSEMIDE 40 MG
40 TABLET ORAL DAILY
Qty: 0 | Refills: 0 | Status: DISCONTINUED | OUTPATIENT
Start: 2018-10-26 | End: 2018-10-29

## 2018-10-26 RX ORDER — INSULIN LISPRO 100/ML
VIAL (ML) SUBCUTANEOUS
Qty: 0 | Refills: 0 | Status: DISCONTINUED | OUTPATIENT
Start: 2018-10-26 | End: 2018-11-02

## 2018-10-26 RX ORDER — CEFTRIAXONE 500 MG/1
INJECTION, POWDER, FOR SOLUTION INTRAMUSCULAR; INTRAVENOUS
Qty: 0 | Refills: 0 | Status: DISCONTINUED | OUTPATIENT
Start: 2018-10-26 | End: 2018-10-26

## 2018-10-26 RX ORDER — ACETAMINOPHEN 500 MG
650 TABLET ORAL EVERY 6 HOURS
Qty: 0 | Refills: 0 | Status: DISCONTINUED | OUTPATIENT
Start: 2018-10-26 | End: 2018-11-02

## 2018-10-26 RX ORDER — IPRATROPIUM/ALBUTEROL SULFATE 18-103MCG
3 AEROSOL WITH ADAPTER (GRAM) INHALATION EVERY 4 HOURS
Qty: 0 | Refills: 0 | Status: DISCONTINUED | OUTPATIENT
Start: 2018-10-26 | End: 2018-11-02

## 2018-10-26 RX ORDER — TAMSULOSIN HYDROCHLORIDE 0.4 MG/1
0.4 CAPSULE ORAL AT BEDTIME
Qty: 0 | Refills: 0 | Status: DISCONTINUED | OUTPATIENT
Start: 2018-10-26 | End: 2018-11-02

## 2018-10-26 RX ADMIN — GABAPENTIN 400 MILLIGRAM(S): 400 CAPSULE ORAL at 15:14

## 2018-10-26 RX ADMIN — Medication 3 MILLILITER(S): at 08:52

## 2018-10-26 RX ADMIN — Medication 40 MILLIGRAM(S): at 05:46

## 2018-10-26 RX ADMIN — TAMSULOSIN HYDROCHLORIDE 0.4 MILLIGRAM(S): 0.4 CAPSULE ORAL at 23:41

## 2018-10-26 RX ADMIN — SIMVASTATIN 20 MILLIGRAM(S): 20 TABLET, FILM COATED ORAL at 23:50

## 2018-10-26 RX ADMIN — Medication 3 MILLILITER(S): at 19:03

## 2018-10-26 RX ADMIN — Medication 3 MILLILITER(S): at 00:05

## 2018-10-26 RX ADMIN — Medication 40 MILLIGRAM(S): at 15:14

## 2018-10-26 RX ADMIN — Medication 650 MILLIGRAM(S): at 20:54

## 2018-10-26 RX ADMIN — Medication 3 MILLILITER(S): at 15:14

## 2018-10-26 RX ADMIN — GABAPENTIN 400 MILLIGRAM(S): 400 CAPSULE ORAL at 05:46

## 2018-10-26 RX ADMIN — AMLODIPINE BESYLATE 2.5 MILLIGRAM(S): 2.5 TABLET ORAL at 05:46

## 2018-10-26 RX ADMIN — Medication 40 MILLIGRAM(S): at 15:13

## 2018-10-26 RX ADMIN — GABAPENTIN 400 MILLIGRAM(S): 400 CAPSULE ORAL at 23:41

## 2018-10-26 RX ADMIN — Medication 3 MILLILITER(S): at 00:15

## 2018-10-26 RX ADMIN — Medication 40 MILLIGRAM(S): at 23:40

## 2018-10-26 RX ADMIN — PANTOPRAZOLE SODIUM 40 MILLIGRAM(S): 20 TABLET, DELAYED RELEASE ORAL at 08:51

## 2018-10-26 NOTE — PROGRESS NOTE ADULT - PROBLEM SELECTOR PLAN 1
Pt failed weaning from BIPAP.  Repeat ABG on BIPAP today with  improvement.    Dr. Chung, pulmonary called to see patient after failing  weaning process off BIPAP 82% sat on high flow.

## 2018-10-26 NOTE — PROGRESS NOTE ADULT - PROBLEM SELECTOR PLAN 5
UA positive   no fever noted   Continue levaquin for UTI and COPD exacerbation   follow Urine and blood culture

## 2018-10-26 NOTE — PROGRESS NOTE ADULT - PROBLEM SELECTOR PLAN 1
Acute on chronic type 2 respiratory failure most likely secondary to COPD exacerbation with chest congestion. She most likely has PUSHPA and Obesity hypoventilation syndrome on top of her COPD. she denies having Bipap at home.  ABG was drawn in am showing slightly improvement.   Now she is s/p 1 dose of lasix 40 mg IVP which seems to improve her as she is able to saturate higher compared to am off Bipap.   f.u Repeat ABG again off Bipap  Added acetazolamide, symbicort, spiriva  increase duonebs to q 4 hours.

## 2018-10-26 NOTE — PROGRESS NOTE ADULT - ASSESSMENT
58 year old morbidly obese female, bed bound from Nursing Home with PMHx of  DM, COPD on home O2, PUSHPA (not on CPAP,) chronic urinary retention s/p suprapubic cath, PE on Xarelto,  sent from ED for respiratory distress and SOB.

## 2018-10-26 NOTE — CONSULT NOTE ADULT - SUBJECTIVE AND OBJECTIVE BOX
Time of visit:    CHIEF COMPLAINT: Patient is a 58y old  Female who presents with a chief complaint of SOB and hypoxia (26 Oct 2018 13:56)      HPI:  58 /F old morbidly obese, bed bound from Nursing Home with PMHx of  DM, COPD on home O2, PUSHPA (not on CPAP,) chronic urinary retention s/p suprapubic cath, PE on Xarelto,  sent from ED for respiratory distress and SOB. Pt reports having SOB and so was sent for further management. Pt denies any cough, fever, chest pain , palpitations, dizziness, diaphoresis, dizziness. Pt was in respiratory distress on admission, was placed on NRB, given respiratory acidosis, pt was placed on BIPAP after which pts clinical condition improved and able to communicate well.  Pt was recently discharged from [facility] after managing for COPD exacerbation, was discharged on tapering dose of steroids.     In ED, patient was tachycardic to 114/min, RR- 33/min, 70% suppl O2. EKG-  sinus tachycardia with PACS @ 119 BPM , no ST T wave changes. cardiac enzymes x1- negative,  CXR s/o bilateral infiltrates unchanged from previous CXR (awaiting official report). Labs pertinent for WBC-13.2 , h/h of 8.1/29.3, INR- 1.88, Hco3- 41, ABG initial s/o 7.29/ 96/ 45/ 97% on NRB, repeat  ABG after being on BIPAP for 1 hour pH, Arterial: 7.34  pH, Blood: x     /  pCO2: 83    /  pO2: 70    / HCO3: 43    / Base Excess: 15.7  /  SaO2: 94. s/p Duoneb x3 and solumedrol 125 in ED.     ICU was consulted for hypoxic and hypercapnic respiratory  failure, since pt is hemodynamically stable on BIPAP and has used BIPAP in the past, hence being downgraded to telemetry floor for further management. (25 Oct 2018 23:58)   Patient seen and examined.     PAST MEDICAL & SURGICAL HISTORY:  Urinary retention  CKD (chronic kidney disease)  DM (diabetes mellitus)  HTN (hypertension)  COPD (chronic obstructive pulmonary disease)  Morbid obesity  Diabetes  Overactive bladder  Hemorrhoids  Emphysema  Suprapubic catheter  S/P   History of appendectomy  S/P cholecystectomy      Allergies    Cheese (Pruritus)  oxycodone (Other (Moderate))  peanuts (Unknown)  penicillin (Rash)  seafood (Hives)  strawberry (Pruritus)  Tomatoes (Other)    Intolerances        MEDICATIONS  (STANDING):  ALBUTerol/ipratropium for Nebulization 3 milliLiter(s) Nebulizer every 6 hours  amLODIPine   Tablet 2.5 milliGRAM(s) Oral daily  buDESOnide   0.5 milliGRAM(s) Respule 0.5 milliGRAM(s) Inhalation every 12 hours  dextrose 5%. 1000 milliLiter(s) (50 mL/Hr) IV Continuous <Continuous>  dextrose 50% Injectable 12.5 Gram(s) IV Push once  dextrose 50% Injectable 25 Gram(s) IV Push once  dextrose 50% Injectable 25 Gram(s) IV Push once  gabapentin 400 milliGRAM(s) Oral three times a day  insulin lispro (HumaLOG) corrective regimen sliding scale   SubCutaneous three times a day before meals  levoFLOXacin IVPB      methylPREDNISolone sodium succinate Injectable 40 milliGRAM(s) IV Push every 8 hours  pantoprazole    Tablet 40 milliGRAM(s) Oral before breakfast  simvastatin 20 milliGRAM(s) Oral at bedtime  tamsulosin 0.4 milliGRAM(s) Oral at bedtime      MEDICATIONS  (PRN):  dextrose 40% Gel 15 Gram(s) Oral once PRN Blood Glucose LESS THAN 70 milliGRAM(s)/deciLiter  glucagon  Injectable 1 milliGRAM(s) IntraMuscular once PRN Glucose <70 milliGRAM(s)/deciLiter   Medications up to date at time of exam.    Medications up to date at time of exam.    FAMILY HISTORY:  No pertinent family history in first degree relatives      SOCIAL HISTORY  Smoking History: [   ] smoking/smoke exposure, [ x  ] former smoker  Living Condition: [   ] apartment, [   ] private house  Work History:   Travel History: denies recent travel  Illicit Substance Use: denies  Alcohol Use: denies    REVIEW OF SYSTEMS:    CONSTITUTIONAL:  denies fevers, chills, sweats, weight loss    HEENT:  denies diplopia or blurred vision, sore throat or runny nose.    CARDIOVASCULAR:  denies pressure, squeezing, tightness, or heaviness about the chest; no palpitations.    RESPIRATORY:  denies SOB, cough, DUVAL, wheezing.    GASTROINTESTINAL:  denies abdominal pain, nausea, vomiting or diarrhea.    GENITOURINARY: denies dysuria, frequency or urgency.    NEUROLOGIC:  denies numbness, tingling, seizures or weakness.    PSYCHIATRIC:  denies disorder of thought or mood.    MSK: denies swelling, redness      PHYSICAL EXAMINATION:    GENERAL: The patient is a well-developed, well-nourished, in no apparent distress.     Vital Signs Last 24 Hrs  T(C): 37.1 (26 Oct 2018 15:15), Max: 37.6 (26 Oct 2018 00:07)  T(F): 98.8 (26 Oct 2018 15:15), Max: 99.6 (26 Oct 2018 00:07)  HR: 83 (26 Oct 2018 15:15) (83 - 114)  BP: 105/51 (26 Oct 2018 15:15) (105/51 - 137/72)  BP(mean): --  RR: 20 (26 Oct 2018 15:15) (20 - 33)  SpO2: 100% (26 Oct 2018 15:15) (70% - 100%)   (if applicable)    Chest Tube (if applicable)    HEENT: Head is normocephalic and atraumatic. Extraocular muscles are intact. Mucous membranes are moist.  + BiPaP    NECK: Supple, no palpable adenopathy.    LUNGS: Clear to auscultation, no wheezing, rales, or rhonchi.    HEART: Regular rate and rhythm without murmur.    ABDOMEN: Soft, nontender, and nondistended.  No hepatosplenomegaly is noted. + suprapubic cath    RENAL: No difficulty voiding, no pelvic pain    EXTREMITIES: Without any cyanosis, clubbing, rash, lesions or edema.    NEUROLOGIC: Awake, alert, oriented, grossly intact    SKIN: Warm, dry, good turgor.      LABS:                        7.6    9.8   )-----------( 315      ( 26 Oct 2018 10:40 )             28.5     10-26    143  |  98  |  15  ----------------------------<  133<H>  3.9   |  44<H>  |  0.34<L>    Ca    9.4      26 Oct 2018 10:40  Phos  2.7     10-26  Mg     2.1     10-26    TPro  7.6  /  Alb  2.4<L>  /  TBili  <0.1<L>  /  DBili  x   /  AST  16  /  ALT  11  /  AlkPhos  82  10-25    PT/INR - ( 25 Oct 2018 21:39 )   PT: 20.8 sec;   INR: 1.88 ratio         PTT - ( 25 Oct 2018 21:39 )  PTT:41.1 sec  Urinalysis Basic - ( 26 Oct 2018 01:21 )    Color: Yellow / Appearance: Clear / S.015 / pH: x  Gluc: x / Ketone: Negative  / Bili: Negative / Urobili: Negative   Blood: x / Protein: 100 / Nitrite: Positive   Leuk Esterase: Moderate / RBC: 25-50 /HPF / WBC 11-25 /HPF   Sq Epi: x / Non Sq Epi: Few /HPF / Bacteria: Moderate /HPF      ABG - ( 26 Oct 2018 13:56 )  pH, Arterial: 7.41  pH, Blood: x     /  pCO2: 74    /  pO2: 77    / HCO3: 46    / Base Excess: 18.7  /  SaO2: 96                CARDIAC MARKERS ( 25 Oct 2018 21:39 )  <0.015 ng/mL / x     / x     / x     / x            Serum Pro-Brain Natriuretic Peptide: 551 pg/mL (10-25-18 @ 21:39)    Lactate, Blood: 0.8 mmol/L (10-25-18 @ 21:39)        MICROBIOLOGY: (if applicable)    RADIOLOGY & ADDITIONAL STUDIES:  EKG:   CXR:< from: Xray Chest 1 View-PORTABLE IMMEDIATE (10.25.18 @ 21:43) >  INTERPRETATION:  AP semisupine chest on 2018 at 9:35 PM.   Patient is short of breath.    Heart is significantly enlarged.    There are mild central infiltrates possibly congestive in origin.    Chest is similar to .    IMPRESSION: Unchanged chest as above.        < end of copied text >    ECHO:    IMPRESSION: 58y Female PAST MEDICAL & SURGICAL HISTORY:  Urinary retention  CKD (chronic kidney disease)  DM (diabetes mellitus)  HTN (hypertension)  COPD (chronic obstructive pulmonary disease)  Morbid obesity  Diabetes  Overactive bladder  Hemorrhoids  Emphysema  Suprapubic catheter  S/P   History of appendectomy  S/P cholecystectomy   p/w         IMP: This is a 58 yr old Black woman with morbid obesity , pushpa on bipap , with prior mentioned mentioned medical condition presented with progressive worsening of SOB requiring NIPPV support.  Pat was evaluated by icu team and rejected. She is currently comfortable suing BiPaP.   Current cxr is basically unchanged from last cxr. continue BipaP as needed. continue antibx until procalcitonin result.      Sugg:  -continue BiPaP  -duonebs  -continue solumedrol  -check calcitonin  -dvt/gi prophy  -agreed with tele monitor

## 2018-10-26 NOTE — CONSULT NOTE ADULT - ASSESSMENT
58F w/multiple medical comorbidities as well as chronic anemia and mild hematochezia.  -Hb is stable and appears to be at pt's baseline  -hematochezia is likely hemorrhoidal in nature and is exacerbated by AC that is needed for PE  -cont iron supplementation  -check CBC daily and transfuse if Hb <7  -if Hb continues to trend down and pt continues to have hematochezia, will plan for inpt colonoscopy once acute pulmonary issues are resovled

## 2018-10-26 NOTE — ED ADULT NURSE REASSESSMENT NOTE - NS ED NURSE REASSESS COMMENT FT1
Report given to YVROSE [place] [id number]. Pt transported to floor on bed, with monitor on venturi mask. Pt stable during transport.

## 2018-10-26 NOTE — PROGRESS NOTE ADULT - SUBJECTIVE AND OBJECTIVE BOX
NP Note discussed with  Primary Attending    Patient is a 58y old  Female who presents with a chief complaint of SOB and hypoxia (25 Oct 2018 23:58)      INTERVAL HPI/OVERNIGHT EVENTS: no new complaints    MEDICATIONS  (STANDING):  ALBUTerol/ipratropium for Nebulization 3 milliLiter(s) Nebulizer every 6 hours  amLODIPine   Tablet 2.5 milliGRAM(s) Oral daily  buDESOnide   0.5 milliGRAM(s) Respule 0.5 milliGRAM(s) Inhalation every 12 hours  furosemide   Injectable 40 milliGRAM(s) IV Push once  gabapentin 400 milliGRAM(s) Oral three times a day  levoFLOXacin IVPB      methylPREDNISolone sodium succinate Injectable 40 milliGRAM(s) IV Push every 8 hours  pantoprazole    Tablet 40 milliGRAM(s) Oral before breakfast  simvastatin 20 milliGRAM(s) Oral at bedtime  tamsulosin 0.4 milliGRAM(s) Oral at bedtime    MEDICATIONS  (PRN):      __________________________________________________  REVIEW OF SYSTEMS:    CONSTITUTIONAL: No fever,   EYES: no acute visual disturbances  NECK: No pain or stiffness  RESPIRATORY: No cough;  shortness of breath  CARDIOVASCULAR: No chest pain, no palpitations  GASTROINTESTINAL: No pain. No nausea or vomiting; No diarrhea   NEUROLOGICAL: No headache or numbness, no tremors  MUSCULOSKELETAL: No joint pain, no muscle pain  GENITOURINARY: no dysuria, no frequency, no hesitancy  PSYCHIATRY: no depression , no anxiety  ALL OTHER  ROS negative        Vital Signs Last 24 Hrs  T(C): 36.9 (26 Oct 2018 10:58), Max: 37.6 (26 Oct 2018 00:07)  T(F): 98.5 (26 Oct 2018 10:58), Max: 99.6 (26 Oct 2018 00:07)  HR: 86 (26 Oct 2018 12:19) (86 - 114)  BP: 122/61 (26 Oct 2018 10:58) (117/56 - 137/72)  BP(mean): --  RR: 22 (26 Oct 2018 10:58) (20 - 33)  SpO2: 97% (26 Oct 2018 12:19) (70% - 98%)    ________________________________________________  PHYSICAL EXAM:  GENERAL  SOB  HEENT: Normocephalic;  conjunctivae and sclerae clear; moist mucous membranes;   NECK : supple  CHEST/LUNG: posterior scattered wheeze  HEART: S1 S2  regular; no murmurs, gallops or rubs  ABDOMEN: Soft, Nontender, Nondistended; Bowel sounds present  EXTREMITIES: no cyanosis; no edema; no calf tenderness  SKIN: warm and dry; no rash  NERVOUS SYSTEM:  Awake and alert; Oriented  to place, person and time ; no new deficits    _________________________________________________  LABS:                        7.6    9.8   )-----------( 315      ( 26 Oct 2018 10:40 )             28.5     10-26    143  |  98  |  15  ----------------------------<  133<H>  3.9   |  44<H>  |  0.34<L>    Ca    9.4      26 Oct 2018 10:40  Phos  2.7     10-26  Mg     2.1     10-26    TPro  7.6  /  Alb  2.4<L>  /  TBili  <0.1<L>  /  DBili  x   /  AST  16  /  ALT  11  /  AlkPhos  82  10-25    PT/INR - ( 25 Oct 2018 21:39 )   PT: 20.8 sec;   INR: 1.88 ratio         PTT - ( 25 Oct 2018 21:39 )  PTT:41.1 sec  Urinalysis Basic - ( 26 Oct 2018 01:21 )    Color: Yellow / Appearance: Clear / S.015 / pH: x  Gluc: x / Ketone: Negative  / Bili: Negative / Urobili: Negative   Blood: x / Protein: 100 / Nitrite: Positive   Leuk Esterase: Moderate / RBC: 25-50 /HPF / WBC 11-25 /HPF   Sq Epi: x / Non Sq Epi: Few /HPF / Bacteria: Moderate /HPF      CAPILLARY BLOOD GLUCOSE      POCT Blood Glucose.: 153 mg/dL (26 Oct 2018 07:21)        RADIOLOGY & ADDITIONAL TESTS:    Imaging Personally Reviewed:  YES    Consultant(s) Notes Reviewed:   YES    Care Discussed with Consultants :     Plan of care was discussed with patient and /or primary care giver; all questions and concerns were addressed and care was aligned with patient's wishes.

## 2018-10-26 NOTE — CONSULT NOTE ADULT - SUBJECTIVE AND OBJECTIVE BOX
GI INITIAL CONSULT    HPI: 58F w/stated PMH p/w SOB and was found to have anemia and hematochezia. Pt's anemia is chronic in nature. Pt does not report any overt GI bleeding; however, hematochezia was noted by the RN and house staff: small amount of blood mixed with solid stool. Pt had an EGD in  in [facility] that found gastritis. She had a flexible sigmoidoscopy in Aug 2018 in [facility] that showed diverticulosis and hemorrhoids. Pt also on AC for PE.    PMH: morbid obesity, PE on AC, COPD, DM2, HTN, urinary retention s/p suprapubic catheter placement  PSH: appendectomy, , cholecystectomy    Meds: MEDICATIONS  (STANDING):  acetazolamide    Tablet 250 milliGRAM(s) Oral daily  ALBUTerol/ipratropium for Nebulization 3 milliLiter(s) Nebulizer every 4 hours  buDESOnide 160 MICROgram(s)/formoterol 4.5 MICROgram(s) Inhaler 2 Puff(s) Inhalation two times a day  dextrose 5%. 1000 milliLiter(s) (50 mL/Hr) IV Continuous <Continuous>  dextrose 50% Injectable 12.5 Gram(s) IV Push once  dextrose 50% Injectable 25 Gram(s) IV Push once  dextrose 50% Injectable 25 Gram(s) IV Push once  furosemide   Injectable 40 milliGRAM(s) IV Push daily  gabapentin 400 milliGRAM(s) Oral three times a day  insulin lispro (HumaLOG) corrective regimen sliding scale   SubCutaneous three times a day before meals  levoFLOXacin IVPB      methylPREDNISolone sodium succinate Injectable 40 milliGRAM(s) IV Push every 8 hours  pantoprazole    Tablet 40 milliGRAM(s) Oral before breakfast  simvastatin 20 milliGRAM(s) Oral at bedtime  tamsulosin 0.4 milliGRAM(s) Oral at bedtime  tiotropium 18 MICROgram(s) Capsule 1 Capsule(s) Inhalation daily    SH: lives in SNF  FH: noncontributory  ROS:  CONSTITUTIONAL: No fever, weight loss, or fatigue  EYES: Reports chronically worsening eye sight  ENT:  No difficulty hearing, tinnitus, vertigo; No sinus or throat pain  NECK: No pain or stiffness  RESPIRATORY: (+) SOB, (+) wheezing  CARDIOVASCULAR: No chest pain, palpitations, passing out, dizziness, or leg swelling  GASTROINTESTINAL: see HPI  GENITOURINARY: No dysuria, frequency, hematuria, or incontinence  NEUROLOGICAL: No headaches, memory loss, loss of strength, numbness, or tremors  SKIN: No itching, burning, rashes, or lesions   MUSCULOSKELETAL: No arthralgia, myalgia, or back pain.     Vitals: T(C): 37.1 (10-26-18 @ 15:15)  T(F): 98.8 (10-26-18 @ 15:15), Max: 99.6 (10-26-18 @ 00:07)  HR: 89 (10-26-18 @ 17:00) (83 - 114)  BP: 105/51 (10-26-18 @ 15:15) (105/51 - 137/72)    Gen: NAD  CVS: S1/S2  Chest: coarse BS B/L  Abd: S/distended/tympanetic/mildly diffusely tender                        7.6    9.8   )-----------( 315      ( 26 Oct 2018 10:40 )             28.5   10-26    143  |  98  |  15  ----------------------------<  133<H>  3.9   |  44<H>  |  0.34<L>    Ca    9.4      26 Oct 2018 10:40  Phos  2.7     10-26  Mg     2.1     10-26    TPro  7.6  /  Alb  2.4<L>  /  TBili  <0.1<L>  /  DBili  x   /  AST  16  /  ALT  11  /  AlkPhos  82  10-25    CT Abdomen and Pelvis w/ Oral Cont and w/ IV Cont (10.10.18 @ 09:25)  IMPRESSION:   Colonic diverticulosis without CT evidence of acute diverticulitis.  No bowel obstruction.

## 2018-10-26 NOTE — PROGRESS NOTE ADULT - SUBJECTIVE AND OBJECTIVE BOX
Patient is a 58y old  Female who presents with a chief complaint of SOB and hypoxia (26 Oct 2018 16:21)    INTERVAL HPI/OVERNIGHT EVENTS:  Patient was seen and examined at bedside, Still on Bipap  In am, weaning off BiPAP was attempted by Respiratory therapist but she quickly desaturated to 70ies on 6 liter nasal canula and to the low 80ies on 50% FIO2  via high Flow.   She received a dose of Lasix around 3 pm, now she was able to stay off Bipap on high flow but saturating at 88-90%.    Allergies    Cheese (Pruritus)  oxycodone (Other (Moderate))  peanuts (Unknown)  penicillin (Rash)  seafood (Hives)  strawberry (Pruritus)  Tomatoes (Other)  Intolerances    REVIEW OF SYSTEMS:  CONSTITUTIONAL: No fever, weight loss, or fatigue  EYES: No eye pain, visual disturbances, or discharge  RESPIRATORY: shortness of breath  CARDIOVASCULAR: No chest pain, feeling of heart beats  GASTROINTESTINAL: No abdominal or epigastric pain. No nausea, vomiting; No diarrhea or constipation.  GENITOURINARY: urinary frequency  NEUROLOGICAL: No headaches  MUSCULOSKELETAL: No joint pain  PSYCHIATRIC: No depression or anxiety  HEME/LYMPH: No easy bruising, or bleeding gums  ALLERGY AND IMMUNOLOGIC: No hives or eczema    Medications:  acetazolamide    Tablet 250 milliGRAM(s) Oral daily  ALBUTerol/ipratropium for Nebulization 3 milliLiter(s) Nebulizer every 4 hours  buDESOnide 160 MICROgram(s)/formoterol 4.5 MICROgram(s) Inhaler 2 Puff(s) Inhalation two times a day  dextrose 40% Gel 15 Gram(s) Oral once PRN Blood Glucose LESS THAN 70 milliGRAM(s)/deciLiter  dextrose 5%. 1000 milliLiter(s) IV Continuous <Continuous>  dextrose 50% Injectable 12.5 Gram(s) IV Push once  dextrose 50% Injectable 25 Gram(s) IV Push once  dextrose 50% Injectable 25 Gram(s) IV Push once  furosemide   Injectable 40 milliGRAM(s) IV Push daily  gabapentin 400 milliGRAM(s) Oral three times a day  glucagon  Injectable 1 milliGRAM(s) IntraMuscular once PRN Glucose <70 milliGRAM(s)/deciLiter  insulin lispro (HumaLOG) corrective regimen sliding scale   SubCutaneous three times a day before meals  levoFLOXacin IVPB      methylPREDNISolone sodium succinate Injectable 40 milliGRAM(s) IV Push every 8 hours  pantoprazole    Tablet 40 milliGRAM(s) Oral before breakfast  simvastatin 20 milliGRAM(s) Oral at bedtime  tamsulosin 0.4 milliGRAM(s) Oral at bedtime  tiotropium 18 MICROgram(s) Capsule 1 Capsule(s) Inhalation daily      PHYSICAL EXAM:  Vital Signs Last 24 Hrs  T(C): 37.1 (26 Oct 2018 15:15), Max: 37.6 (26 Oct 2018 00:07)  T(F): 98.8 (26 Oct 2018 15:15), Max: 99.6 (26 Oct 2018 00:07)  HR: 89 (26 Oct 2018 17:00) (83 - 114)  BP: 105/51 (26 Oct 2018 15:15) (105/51 - 137/72)  BP(mean): --  RR: 20 (26 Oct 2018 15:15) (20 - 33)  SpO2: 93% (26 Oct 2018 17:00) (70% - 100%)    GENERAL: morbidly obese lady lying in bed, head's up,    HEAD:  Atraumatic, Normocephalic  EYES: EOMI, PERRLA, conjunctiva and sclera clear  ENT: moist mucous membranes  NECK: Supple, No JVD, Normal thyroid  NERVOUS SYSTEM:  Alert & Oriented X3,   CHEST/LUNG: No rales, rhonchi, wheezing, or rubs  HEART: Regular rate and rhythm; No murmurs, rubs, or gallops  ABDOMEN: Soft, Nontender, Nondistended; Bowel sounds present  EXTREMITIES:  2+ Peripheral Pulses, No clubbing, cyanosis, or edema  SKIN: No rashes or lesions    LABS:                        7.6    9.8   )-----------( 315      ( 26 Oct 2018 10:40 )             28.5     10    143  |  98  |  15  ----------------------------<  133<H>  3.9   |  44<H>  |  0.34<L>    Ca    9.4      26 Oct 2018 10:40  Phos  2.7     10-26  Mg     2.1     10-26    TPro  7.6  /  Alb  2.4<L>  /  TBili  <0.1<L>  /  DBili  x   /  AST  16  /  ALT  11  /  Alk Phos  82  10    LIVER FUNCTIONS - ( 25 Oct 2018 21:39 )  Alb: 2.4 g/dL / Pro: 7.6 g/dL / ALK PHOS: 82 U/L / ALT: 11 U/L DA / AST: 16 U/L / GGT: x           PT/INR - ( 25 Oct 2018 21:39 )   PT: 20.8 sec;   INR: 1.88 ratio       PTT - ( 25 Oct 2018 21:39 )  PTT:41.1 sec  CARDIAC MARKERS ( 25 Oct 2018 21:39 )  <0.015 ng/mL / x     / x     / x     / x        Urinalysis Basic - ( 26 Oct 2018 01:21 )    Color: Yellow / Appearance: Clear / S.015 / pH: x  Gluc: x / Ketone: Negative  / Bili: Negative / Urobili: Negative   Blood: x / Protein: 100 / Nitrite: Positive   Leuk Esterase: Moderate / RBC: 25-50 /HPF / WBC 11-25 /HPF   Sq Epi: x / Non Sq Epi: Few /HPF / Bacteria: Moderate /HPF      Culture & Sensitivity:   CAPILLARY BLOOD GLUCOSE      POCT Blood Glucose.: 121 mg/dL (26 Oct 2018 16:55)  POCT Blood Glucose.: 153 mg/dL (26 Oct 2018 07:21)      10-25 @ 07:01  -  10-26 @ 07:00  --------------------------------------------------------  IN: 0 mL / OUT: 250 mL / NET: -250 mL        RADIOLOGY & ADDITIONAL TESTS:  Radiology testing independently reviewed:     Consultant(s) Notes Reviewed:  [ x] YES  [ ] NO    Care Discussed with Consultants/Other Providers [ x] YES  [ ] NO

## 2018-10-26 NOTE — ED ADULT NURSE REASSESSMENT NOTE - NS ED NURSE REASSESS COMMENT FT1
Received pt from YVROSE [place], pt is observed laying in bed, breathing via venti mask, tolerating well. Pt is A&O x3, from [facility] per patient, able to make needs known, denies any distress at this time. Left AC #20Ga and right hand #20Ga in place. Pt has a suprapubic tube in place. No meds to be administered at this time. Rt posterior upper thigh stage 3 with cut-like scratches and sacral ulcer stage 2. Family at bedside. Awaiting bed, nursing monitoring continues.

## 2018-10-27 DIAGNOSIS — I50.31 ACUTE DIASTOLIC (CONGESTIVE) HEART FAILURE: ICD-10-CM

## 2018-10-27 LAB
ANION GAP SERPL CALC-SCNC: 5 MMOL/L — SIGNIFICANT CHANGE UP (ref 5–17)
BUN SERPL-MCNC: 16 MG/DL — SIGNIFICANT CHANGE UP (ref 7–18)
CALCIUM SERPL-MCNC: 9.7 MG/DL — SIGNIFICANT CHANGE UP (ref 8.4–10.5)
CHLORIDE SERPL-SCNC: 92 MMOL/L — LOW (ref 96–108)
CO2 SERPL-SCNC: 44 MMOL/L — HIGH (ref 22–31)
CREAT SERPL-MCNC: 0.46 MG/DL — LOW (ref 0.5–1.3)
CULTURE RESULTS: SIGNIFICANT CHANGE UP
GLUCOSE SERPL-MCNC: 148 MG/DL — HIGH (ref 70–99)
HCT VFR BLD CALC: 31.5 % — LOW (ref 34.5–45)
HGB BLD-MCNC: 8.5 G/DL — LOW (ref 11.5–15.5)
MCHC RBC-ENTMCNC: 23.4 PG — LOW (ref 27–34)
MCHC RBC-ENTMCNC: 27 GM/DL — LOW (ref 32–36)
MCV RBC AUTO: 86.8 FL — SIGNIFICANT CHANGE UP (ref 80–100)
NT-PROBNP SERPL-SCNC: 420 PG/ML — HIGH (ref 0–125)
PLATELET # BLD AUTO: 355 K/UL — SIGNIFICANT CHANGE UP (ref 150–400)
POTASSIUM SERPL-MCNC: 3.5 MMOL/L — SIGNIFICANT CHANGE UP (ref 3.5–5.3)
POTASSIUM SERPL-SCNC: 3.5 MMOL/L — SIGNIFICANT CHANGE UP (ref 3.5–5.3)
PROCALCITONIN SERPL-MCNC: 0.05 NG/ML — SIGNIFICANT CHANGE UP (ref 0.02–0.1)
RBC # BLD: 3.63 M/UL — LOW (ref 3.8–5.2)
RBC # FLD: 19.3 % — HIGH (ref 10.3–14.5)
SODIUM SERPL-SCNC: 141 MMOL/L — SIGNIFICANT CHANGE UP (ref 135–145)
SPECIMEN SOURCE: SIGNIFICANT CHANGE UP
WBC # BLD: 8.7 K/UL — SIGNIFICANT CHANGE UP (ref 3.8–10.5)
WBC # FLD AUTO: 8.7 K/UL — SIGNIFICANT CHANGE UP (ref 3.8–10.5)

## 2018-10-27 PROCEDURE — 71045 X-RAY EXAM CHEST 1 VIEW: CPT | Mod: 26

## 2018-10-27 PROCEDURE — 99233 SBSQ HOSP IP/OBS HIGH 50: CPT | Mod: GC

## 2018-10-27 RX ORDER — ASPIRIN/CALCIUM CARB/MAGNESIUM 324 MG
81 TABLET ORAL DAILY
Qty: 0 | Refills: 0 | Status: DISCONTINUED | OUTPATIENT
Start: 2018-10-27 | End: 2018-11-02

## 2018-10-27 RX ORDER — RIVAROXABAN 15 MG-20MG
20 KIT ORAL EVERY 24 HOURS
Qty: 0 | Refills: 0 | Status: DISCONTINUED | OUTPATIENT
Start: 2018-10-27 | End: 2018-11-02

## 2018-10-27 RX ADMIN — Medication 3 MILLILITER(S): at 03:30

## 2018-10-27 RX ADMIN — Medication 40 MILLIGRAM(S): at 21:57

## 2018-10-27 RX ADMIN — BUDESONIDE AND FORMOTEROL FUMARATE DIHYDRATE 2 PUFF(S): 160; 4.5 AEROSOL RESPIRATORY (INHALATION) at 12:56

## 2018-10-27 RX ADMIN — GABAPENTIN 400 MILLIGRAM(S): 400 CAPSULE ORAL at 21:58

## 2018-10-27 RX ADMIN — Medication 3 MILLILITER(S): at 14:55

## 2018-10-27 RX ADMIN — Medication 1: at 12:14

## 2018-10-27 RX ADMIN — GABAPENTIN 400 MILLIGRAM(S): 400 CAPSULE ORAL at 06:35

## 2018-10-27 RX ADMIN — Medication 40 MILLIGRAM(S): at 06:34

## 2018-10-27 RX ADMIN — TAMSULOSIN HYDROCHLORIDE 0.4 MILLIGRAM(S): 0.4 CAPSULE ORAL at 21:57

## 2018-10-27 RX ADMIN — BUDESONIDE AND FORMOTEROL FUMARATE DIHYDRATE 2 PUFF(S): 160; 4.5 AEROSOL RESPIRATORY (INHALATION) at 21:57

## 2018-10-27 RX ADMIN — Medication 3 MILLILITER(S): at 05:06

## 2018-10-27 RX ADMIN — PANTOPRAZOLE SODIUM 40 MILLIGRAM(S): 20 TABLET, DELAYED RELEASE ORAL at 06:35

## 2018-10-27 RX ADMIN — Medication 3 MILLILITER(S): at 09:47

## 2018-10-27 RX ADMIN — GABAPENTIN 400 MILLIGRAM(S): 400 CAPSULE ORAL at 17:58

## 2018-10-27 RX ADMIN — Medication 650 MILLIGRAM(S): at 07:07

## 2018-10-27 RX ADMIN — Medication 3 MILLILITER(S): at 21:00

## 2018-10-27 RX ADMIN — Medication 3 MILLILITER(S): at 17:10

## 2018-10-27 RX ADMIN — Medication 40 MILLIGRAM(S): at 18:03

## 2018-10-27 RX ADMIN — Medication 650 MILLIGRAM(S): at 06:38

## 2018-10-27 RX ADMIN — SIMVASTATIN 20 MILLIGRAM(S): 20 TABLET, FILM COATED ORAL at 21:58

## 2018-10-27 RX ADMIN — BUDESONIDE AND FORMOTEROL FUMARATE DIHYDRATE 2 PUFF(S): 160; 4.5 AEROSOL RESPIRATORY (INHALATION) at 01:02

## 2018-10-27 RX ADMIN — Medication 3 MILLILITER(S): at 00:04

## 2018-10-27 RX ADMIN — ACETAZOLAMIDE 250 MILLIGRAM(S): 250 TABLET ORAL at 17:57

## 2018-10-27 NOTE — PROGRESS NOTE ADULT - PROBLEM SELECTOR PLAN 4
UA positive   no fever noted   will start on levaquin for UTI and COPD exacerbation   follow Urine culture : contaminated  and blood culture: negative

## 2018-10-27 NOTE — PROGRESS NOTE ADULT - SUBJECTIVE AND OBJECTIVE BOX
PGY 1 Note discussed with supervising resident and primary attending    Patient is a 58y old  Female who presents with a chief complaint of SOB and hypoxia (27 Oct 2018 15:13)      INTERVAL HPI/OVERNIGHT EVENTS:   Patient seen at the bedside. Feeling much better. No new complains      MEDICATIONS  (STANDING):  acetazolamide    Tablet 250 milliGRAM(s) Oral daily  ALBUTerol/ipratropium for Nebulization 3 milliLiter(s) Nebulizer every 4 hours  buDESOnide 160 MICROgram(s)/formoterol 4.5 MICROgram(s) Inhaler 2 Puff(s) Inhalation two times a day  dextrose 5%. 1000 milliLiter(s) (50 mL/Hr) IV Continuous <Continuous>  dextrose 50% Injectable 12.5 Gram(s) IV Push once  dextrose 50% Injectable 25 Gram(s) IV Push once  dextrose 50% Injectable 25 Gram(s) IV Push once  furosemide   Injectable 40 milliGRAM(s) IV Push daily  gabapentin 400 milliGRAM(s) Oral three times a day  insulin lispro (HumaLOG) corrective regimen sliding scale   SubCutaneous three times a day before meals  levoFLOXacin  Tablet 500 milliGRAM(s) Oral every 24 hours  methylPREDNISolone sodium succinate Injectable 40 milliGRAM(s) IV Push every 8 hours  pantoprazole    Tablet 40 milliGRAM(s) Oral before breakfast  simvastatin 20 milliGRAM(s) Oral at bedtime  tamsulosin 0.4 milliGRAM(s) Oral at bedtime  tiotropium 18 MICROgram(s) Capsule 1 Capsule(s) Inhalation daily    MEDICATIONS  (PRN):  acetaminophen   Tablet .. 650 milliGRAM(s) Oral every 6 hours PRN Mild Pain (1 - 3)  dextrose 40% Gel 15 Gram(s) Oral once PRN Blood Glucose LESS THAN 70 milliGRAM(s)/deciLiter  glucagon  Injectable 1 milliGRAM(s) IntraMuscular once PRN Glucose <70 milliGRAM(s)/deciLiter      __________________________________________________  REVIEW OF SYSTEMS:    CONSTITUTIONAL: No fever,   EYES: no acute visual disturbances  NECK: No pain or stiffness  RESPIRATORY: No cough; No shortness of breath  CARDIOVASCULAR: No chest pain, no palpitations  GASTROINTESTINAL: No pain. No nausea or vomiting; No diarrhea   NEUROLOGICAL: No headache or numbness, no tremors  MUSCULOSKELETAL: No joint pain, no muscle pain  GENITOURINARY: no dysuria, no frequency, no hesitancy  PSYCHIATRY: no depression , no anxiety  ALL OTHER  ROS negative        Vital Signs Last 24 Hrs  T(C): 37.3 (27 Oct 2018 15:37), Max: 37.4 (26 Oct 2018 23:50)  T(F): 99.2 (27 Oct 2018 15:37), Max: 99.3 (26 Oct 2018 23:50)  HR: 102 (27 Oct 2018 15:37) (79 - 102)  BP: 126/56 (27 Oct 2018 15:37) (108/64 - 126/63)  BP(mean): --  RR: 18 (27 Oct 2018 15:37) (18 - 20)  SpO2: 91% (27 Oct 2018 15:37) (91% - 100%)    ________________________________________________  PHYSICAL EXAM:  GENERAL: morbidly obese female  HEENT: Normocephalic;  conjunctivae and sclerae clear; moist mucous membranes;   NECK : supple  CHEST/LUNG: Clear to auscultation bilaterally with good air entry   HEART: S1 S2  regular; no murmurs, gallops or rubs  ABDOMEN: Soft, Nontender, Nondistended; Bowel sounds present  EXTREMITIES: no cyanosis; no edema; no calf tenderness  SKIN: warm and dry; no rash  NERVOUS SYSTEM:  Awake and alert; Oriented  to place, person and time ; no new deficits    _________________________________________________  LABS:                        8.5    8.7   )-----------( 355      ( 27 Oct 2018 07:38 )             31.5     10-    141  |  92<L>  |  16  ----------------------------<  148<H>  3.5   |  44<H>  |  0.46<L>    Ca    9.7      27 Oct 2018 07:38  Phos  2.7     10-  Mg     2.1     10-    TPro  7.6  /  Alb  2.4<L>  /  TBili  <0.1<L>  /  DBili  x   /  AST  16  /  ALT  11  /  AlkPhos  82  10-25    PT/INR - ( 25 Oct 2018 21:39 )   PT: 20.8 sec;   INR: 1.88 ratio         PTT - ( 25 Oct 2018 21:39 )  PTT:41.1 sec  Urinalysis Basic - ( 26 Oct 2018 01:21 )    Color: Yellow / Appearance: Clear / S.015 / pH: x  Gluc: x / Ketone: Negative  / Bili: Negative / Urobili: Negative   Blood: x / Protein: 100 / Nitrite: Positive   Leuk Esterase: Moderate / RBC: 25-50 /HPF / WBC 11-25 /HPF   Sq Epi: x / Non Sq Epi: Few /HPF / Bacteria: Moderate /HPF      CAPILLARY BLOOD GLUCOSE      POCT Blood Glucose.: 120 mg/dL (27 Oct 2018 16:39)  POCT Blood Glucose.: 154 mg/dL (27 Oct 2018 11:58)  POCT Blood Glucose.: 189 mg/dL (27 Oct 2018 07:55)        RADIOLOGY & ADDITIONAL TESTS:    Imaging Personally Reviewed:  YES/NO    Consultant(s) Notes Reviewed:   YES/ No    Care Discussed with Consultants :     Plan of care was discussed with patient and /or primary care giver; all questions and concerns were addressed and care was aligned with patient's wishes.

## 2018-10-27 NOTE — PROGRESS NOTE ADULT - PROBLEM SELECTOR PLAN 7
start Xarelto 20 mg daily-   -patient c/o leg pain b/l, venous doppler from 10/09/18 negative for DVT.

## 2018-10-27 NOTE — PROGRESS NOTE ADULT - ASSESSMENT
58 year old morbidly obese female, bed bound from Nursing Home with PMHx of  DM, COPD on home O2, PUSHPA (not on CPAP,) chronic urinary retention s/p suprapubic cath, PE on Xarelto,  sent from ED for respiratory distress and SOB.   ICU was consulted for acute respiratory failure but as patient was improving on BIPAP was downgraded to floor.     She is admitted for    1. Acute on chronic respiratory failure  2. Acute COPD exacerbation  3. acute on chronic  diastolic heart failure  4. Diarrhea  5. UTI  6. Chronic PE  7. Obesity  8. PUSHPA- nocturnal CPAP  9. Anemia- chronic ; also with transient bleeding per rectum- likely hemorrhoids

## 2018-10-27 NOTE — PROGRESS NOTE ADULT - SUBJECTIVE AND OBJECTIVE BOX
MEDICAL ATTENDING NOTE    Patient is a 58y old  Female who presents with a chief complaint of SOB and hypoxia (26 Oct 2018 18:16)      INTERVAL HPI/OVERNIGHT EVENTS: no new complaints    MEDICATIONS  (STANDING):  acetazolamide    Tablet 250 milliGRAM(s) Oral daily  ALBUTerol/ipratropium for Nebulization 3 milliLiter(s) Nebulizer every 4 hours  buDESOnide 160 MICROgram(s)/formoterol 4.5 MICROgram(s) Inhaler 2 Puff(s) Inhalation two times a day  dextrose 5%. 1000 milliLiter(s) (50 mL/Hr) IV Continuous <Continuous>  dextrose 50% Injectable 12.5 Gram(s) IV Push once  dextrose 50% Injectable 25 Gram(s) IV Push once  dextrose 50% Injectable 25 Gram(s) IV Push once  furosemide   Injectable 40 milliGRAM(s) IV Push daily  gabapentin 400 milliGRAM(s) Oral three times a day  insulin lispro (HumaLOG) corrective regimen sliding scale   SubCutaneous three times a day before meals  levoFLOXacin IVPB      levoFLOXacin IVPB 500 milliGRAM(s) IV Intermittent every 24 hours  methylPREDNISolone sodium succinate Injectable 40 milliGRAM(s) IV Push every 8 hours  pantoprazole    Tablet 40 milliGRAM(s) Oral before breakfast  simvastatin 20 milliGRAM(s) Oral at bedtime  tamsulosin 0.4 milliGRAM(s) Oral at bedtime  tiotropium 18 MICROgram(s) Capsule 1 Capsule(s) Inhalation daily    MEDICATIONS  (PRN):  acetaminophen   Tablet .. 650 milliGRAM(s) Oral every 6 hours PRN Mild Pain (1 - 3)  dextrose 40% Gel 15 Gram(s) Oral once PRN Blood Glucose LESS THAN 70 milliGRAM(s)/deciLiter  glucagon  Injectable 1 milliGRAM(s) IntraMuscular once PRN Glucose <70 milliGRAM(s)/deciLiter      __________________________________________________  ----------------------------------------------------------------------------------  REVIEW OF SYSTEMS: no fever, no SOB, No Chest pain; feels well      Vital Signs Last 24 Hrs  T(C): 37.1 (27 Oct 2018 12:02), Max: 37.4 (26 Oct 2018 23:50)  T(F): 98.8 (27 Oct 2018 12:02), Max: 99.3 (26 Oct 2018 23:50)  HR: 90 (27 Oct 2018 12:02) (79 - 101)  BP: 108/64 (27 Oct 2018 12:02) (105/51 - 126/63)  BP(mean): --  RR: 20 (27 Oct 2018 12:02) (18 - 20)  SpO2: 93% (27 Oct 2018 12:02) (91% - 100%)    _________________  PHYSICAL EXAM:  ---------------------------   NAD; Normocephalic;   LUNGS - no wheezing  HEART: S1 S2+   ABDOMEN: Soft, Nontender, non distended  EXTREMITIES: no cyanosis; no edema  NERVOUS SYSTEM:  Awake and alert; no new deficits    _________________________________________________  LABS:                        8.5    8.7   )-----------( 355      ( 27 Oct 2018 07:38 )             31.5     10-27    141  |  92<L>  |  16  ----------------------------<  148<H>  3.5   |  44<H>  |  0.46<L>    Ca    9.7      27 Oct 2018 07:38  Phos  2.7     10-26  Mg     2.1     10-26    TPro  7.6  /  Alb  2.4<L>  /  TBili  <0.1<L>  /  DBili  x   /  AST  16  /  ALT  11  /  AlkPhos  82  10-25    PT/INR - ( 25 Oct 2018 21:39 )   PT: 20.8 sec;   INR: 1.88 ratio         PTT - ( 25 Oct 2018 21:39 )  PTT:41.1 sec  Urinalysis Basic - ( 26 Oct 2018 01:21 )        CAPILLARY BLOOD GLUCOSE      POCT Blood Glucose.: 154 mg/dL (27 Oct 2018 11:58)  POCT Blood Glucose.: 189 mg/dL (27 Oct 2018 07:55)  POCT Blood Glucose.: 121 mg/dL (26 Oct 2018 16:55)            Care Discussed with Consultants :     Plan of care was discussed with patient ; all questions and concerns were addressed and care was aligned with patient's wishes.

## 2018-10-27 NOTE — PROGRESS NOTE ADULT - PROBLEM SELECTOR PLAN 1
Patient is doing ok on saturating to over 92% on supplement O2  -continue with prednisone, duoneb, symbicort  - Monitor O2 saturation

## 2018-10-28 LAB
ALBUMIN SERPL ELPH-MCNC: 2.6 G/DL — LOW (ref 3.5–5)
ALP SERPL-CCNC: 81 U/L — SIGNIFICANT CHANGE UP (ref 40–120)
ALT FLD-CCNC: 20 U/L DA — SIGNIFICANT CHANGE UP (ref 10–60)
ANION GAP SERPL CALC-SCNC: 5 MMOL/L — SIGNIFICANT CHANGE UP (ref 5–17)
AST SERPL-CCNC: 12 U/L — SIGNIFICANT CHANGE UP (ref 10–40)
BASOPHILS # BLD AUTO: 0 K/UL — SIGNIFICANT CHANGE UP (ref 0–0.2)
BASOPHILS NFR BLD AUTO: 0.3 % — SIGNIFICANT CHANGE UP (ref 0–2)
BILIRUB SERPL-MCNC: <0.1 MG/DL — LOW (ref 0.2–1.2)
BUN SERPL-MCNC: 20 MG/DL — HIGH (ref 7–18)
CALCIUM SERPL-MCNC: 9.5 MG/DL — SIGNIFICANT CHANGE UP (ref 8.4–10.5)
CHLORIDE SERPL-SCNC: 95 MMOL/L — LOW (ref 96–108)
CO2 SERPL-SCNC: 39 MMOL/L — HIGH (ref 22–31)
CREAT SERPL-MCNC: 0.5 MG/DL — SIGNIFICANT CHANGE UP (ref 0.5–1.3)
EOSINOPHIL # BLD AUTO: 0 K/UL — SIGNIFICANT CHANGE UP (ref 0–0.5)
EOSINOPHIL NFR BLD AUTO: 0 % — SIGNIFICANT CHANGE UP (ref 0–6)
GLUCOSE SERPL-MCNC: 129 MG/DL — HIGH (ref 70–99)
HCT VFR BLD CALC: 31.9 % — LOW (ref 34.5–45)
HGB BLD-MCNC: 8.7 G/DL — LOW (ref 11.5–15.5)
LYMPHOCYTES # BLD AUTO: 0.9 K/UL — LOW (ref 1–3.3)
LYMPHOCYTES # BLD AUTO: 9.4 % — LOW (ref 13–44)
MAGNESIUM SERPL-MCNC: 2.2 MG/DL — SIGNIFICANT CHANGE UP (ref 1.6–2.6)
MCHC RBC-ENTMCNC: 23.8 PG — LOW (ref 27–34)
MCHC RBC-ENTMCNC: 27.2 GM/DL — LOW (ref 32–36)
MCV RBC AUTO: 87.3 FL — SIGNIFICANT CHANGE UP (ref 80–100)
MONOCYTES # BLD AUTO: 0.4 K/UL — SIGNIFICANT CHANGE UP (ref 0–0.9)
MONOCYTES NFR BLD AUTO: 3.8 % — SIGNIFICANT CHANGE UP (ref 2–14)
NEUTROPHILS # BLD AUTO: 8.6 K/UL — HIGH (ref 1.8–7.4)
NEUTROPHILS NFR BLD AUTO: 86.5 % — HIGH (ref 43–77)
PHOSPHATE SERPL-MCNC: 4.7 MG/DL — HIGH (ref 2.5–4.5)
PLATELET # BLD AUTO: 366 K/UL — SIGNIFICANT CHANGE UP (ref 150–400)
POTASSIUM SERPL-MCNC: 3.8 MMOL/L — SIGNIFICANT CHANGE UP (ref 3.5–5.3)
POTASSIUM SERPL-SCNC: 3.8 MMOL/L — SIGNIFICANT CHANGE UP (ref 3.5–5.3)
PROT SERPL-MCNC: 7.6 G/DL — SIGNIFICANT CHANGE UP (ref 6–8.3)
RBC # BLD: 3.66 M/UL — LOW (ref 3.8–5.2)
RBC # FLD: 19.2 % — HIGH (ref 10.3–14.5)
SODIUM SERPL-SCNC: 139 MMOL/L — SIGNIFICANT CHANGE UP (ref 135–145)
WBC # BLD: 9.9 K/UL — SIGNIFICANT CHANGE UP (ref 3.8–10.5)
WBC # FLD AUTO: 9.9 K/UL — SIGNIFICANT CHANGE UP (ref 3.8–10.5)

## 2018-10-28 PROCEDURE — 99233 SBSQ HOSP IP/OBS HIGH 50: CPT | Mod: GC

## 2018-10-28 RX ADMIN — GABAPENTIN 400 MILLIGRAM(S): 400 CAPSULE ORAL at 06:20

## 2018-10-28 RX ADMIN — GABAPENTIN 400 MILLIGRAM(S): 400 CAPSULE ORAL at 21:33

## 2018-10-28 RX ADMIN — PANTOPRAZOLE SODIUM 40 MILLIGRAM(S): 20 TABLET, DELAYED RELEASE ORAL at 06:20

## 2018-10-28 RX ADMIN — Medication 3 MILLILITER(S): at 05:46

## 2018-10-28 RX ADMIN — Medication 650 MILLIGRAM(S): at 13:52

## 2018-10-28 RX ADMIN — Medication 650 MILLIGRAM(S): at 06:54

## 2018-10-28 RX ADMIN — BUDESONIDE AND FORMOTEROL FUMARATE DIHYDRATE 2 PUFF(S): 160; 4.5 AEROSOL RESPIRATORY (INHALATION) at 21:31

## 2018-10-28 RX ADMIN — Medication 650 MILLIGRAM(S): at 12:37

## 2018-10-28 RX ADMIN — Medication 3 MILLILITER(S): at 21:03

## 2018-10-28 RX ADMIN — Medication 40 MILLIGRAM(S): at 06:20

## 2018-10-28 RX ADMIN — Medication 3 MILLILITER(S): at 14:21

## 2018-10-28 RX ADMIN — Medication 40 MILLIGRAM(S): at 16:04

## 2018-10-28 RX ADMIN — RIVAROXABAN 20 MILLIGRAM(S): KIT at 17:08

## 2018-10-28 RX ADMIN — SIMVASTATIN 20 MILLIGRAM(S): 20 TABLET, FILM COATED ORAL at 21:32

## 2018-10-28 RX ADMIN — Medication 3 MILLILITER(S): at 09:02

## 2018-10-28 RX ADMIN — ACETAZOLAMIDE 250 MILLIGRAM(S): 250 TABLET ORAL at 12:35

## 2018-10-28 RX ADMIN — Medication 650 MILLIGRAM(S): at 06:23

## 2018-10-28 RX ADMIN — Medication 3 MILLILITER(S): at 18:16

## 2018-10-28 RX ADMIN — BUDESONIDE AND FORMOTEROL FUMARATE DIHYDRATE 2 PUFF(S): 160; 4.5 AEROSOL RESPIRATORY (INHALATION) at 12:34

## 2018-10-28 RX ADMIN — TAMSULOSIN HYDROCHLORIDE 0.4 MILLIGRAM(S): 0.4 CAPSULE ORAL at 21:32

## 2018-10-28 RX ADMIN — GABAPENTIN 400 MILLIGRAM(S): 400 CAPSULE ORAL at 13:58

## 2018-10-28 RX ADMIN — Medication 3 MILLILITER(S): at 02:33

## 2018-10-28 RX ADMIN — Medication 40 MILLIGRAM(S): at 21:32

## 2018-10-28 RX ADMIN — Medication 2: at 12:20

## 2018-10-28 RX ADMIN — Medication 650 MILLIGRAM(S): at 23:34

## 2018-10-28 RX ADMIN — Medication 81 MILLIGRAM(S): at 12:35

## 2018-10-28 NOTE — PROGRESS NOTE ADULT - SUBJECTIVE AND OBJECTIVE BOX
MEDICAL ATTENDING NOTE    Patient is a 58y old  Female who presents with a chief complaint of SOB and hypoxia (27 Oct 2018 18:57)      INTERVAL HPI/OVERNIGHT EVENTS: no new complaints    MEDICATIONS  (STANDING):  acetazolamide    Tablet 250 milliGRAM(s) Oral daily  ALBUTerol/ipratropium for Nebulization 3 milliLiter(s) Nebulizer every 4 hours  aspirin  chewable 81 milliGRAM(s) Oral daily  buDESOnide 160 MICROgram(s)/formoterol 4.5 MICROgram(s) Inhaler 2 Puff(s) Inhalation two times a day  dextrose 5%. 1000 milliLiter(s) (50 mL/Hr) IV Continuous <Continuous>  dextrose 50% Injectable 12.5 Gram(s) IV Push once  dextrose 50% Injectable 25 Gram(s) IV Push once  dextrose 50% Injectable 25 Gram(s) IV Push once  furosemide   Injectable 40 milliGRAM(s) IV Push daily  gabapentin 400 milliGRAM(s) Oral three times a day  insulin lispro (HumaLOG) corrective regimen sliding scale   SubCutaneous three times a day before meals  levoFLOXacin  Tablet 500 milliGRAM(s) Oral every 24 hours  methylPREDNISolone sodium succinate Injectable 40 milliGRAM(s) IV Push every 8 hours  pantoprazole    Tablet 40 milliGRAM(s) Oral before breakfast  rivaroxaban 20 milliGRAM(s) Oral every 24 hours  simvastatin 20 milliGRAM(s) Oral at bedtime  tamsulosin 0.4 milliGRAM(s) Oral at bedtime  tiotropium 18 MICROgram(s) Capsule 1 Capsule(s) Inhalation daily    MEDICATIONS  (PRN):  acetaminophen   Tablet .. 650 milliGRAM(s) Oral every 6 hours PRN Mild Pain (1 - 3)  dextrose 40% Gel 15 Gram(s) Oral once PRN Blood Glucose LESS THAN 70 milliGRAM(s)/deciLiter  glucagon  Injectable 1 milliGRAM(s) IntraMuscular once PRN Glucose <70 milliGRAM(s)/deciLiter      __________________________________________________  ----------------------------------------------------------------------------------  REVIEW OF SYSTEMS: no fever, no SOB, No Chest pain; feels well      Vital Signs Last 24 Hrs  T(C): 37 (28 Oct 2018 08:16), Max: 37.3 (27 Oct 2018 15:37)  T(F): 98.6 (28 Oct 2018 08:16), Max: 99.2 (27 Oct 2018 15:37)  HR: 81 (28 Oct 2018 09:05) (74 - 102)  BP: 113/59 (28 Oct 2018 08:16) (108/55 - 131/51)  BP(mean): --  RR: 18 (28 Oct 2018 08:16) (18 - 20)  SpO2: 91% (28 Oct 2018 09:05) (91% - 99%)    _________________  PHYSICAL EXAM:  ---------------------------   NAD; Normocephalic;   LUNGS - no wheezing  HEART: S1 S2+   ABDOMEN: Soft, Nontender, non distended  EXTREMITIES: no cyanosis; no edema  NERVOUS SYSTEM:  Awake and alert; no new deficits    _________________________________________________  LABS:                        8.7    9.9   )-----------( 366      ( 28 Oct 2018 06:57 )             31.9     10-28    139  |  95<L>  |  20<H>  ----------------------------<  129<H>  3.8   |  39<H>  |  0.50    Ca    9.5      28 Oct 2018 06:57  Phos  4.7     10-28  Mg     2.2     10-28    TPro  7.6  /  Alb  2.6<L>  /  TBili  <0.1<L>  /  DBili  x   /  AST  12  /  ALT  20  /  AlkPhos  81  10-28        CAPILLARY BLOOD GLUCOSE      POCT Blood Glucose.: 138 mg/dL (28 Oct 2018 07:46)  POCT Blood Glucose.: 178 mg/dL (27 Oct 2018 21:35)  POCT Blood Glucose.: 120 mg/dL (27 Oct 2018 16:39)  POCT Blood Glucose.: 154 mg/dL (27 Oct 2018 11:58)            Care Discussed with Consultants :     Plan of care was discussed with patient ; all questions and concerns were addressed and care was aligned with patient's wishes.  Patient has been advised to follow up with PMD upon discharge from the hospital.  Discharge plans discussed with nursing staff , case manger and . MEDICAL ATTENDING NOTE    Patient is a 58y old  Female who presents with a chief complaint of SOB and hypoxia (27 Oct 2018 18:57)      INTERVAL HPI/OVERNIGHT EVENTS: no new complaints    MEDICATIONS  (STANDING):  acetazolamide    Tablet 250 milliGRAM(s) Oral daily  ALBUTerol/ipratropium for Nebulization 3 milliLiter(s) Nebulizer every 4 hours  aspirin  chewable 81 milliGRAM(s) Oral daily  buDESOnide 160 MICROgram(s)/formoterol 4.5 MICROgram(s) Inhaler 2 Puff(s) Inhalation two times a day  dextrose 5%. 1000 milliLiter(s) (50 mL/Hr) IV Continuous <Continuous>  dextrose 50% Injectable 12.5 Gram(s) IV Push once  dextrose 50% Injectable 25 Gram(s) IV Push once  dextrose 50% Injectable 25 Gram(s) IV Push once  furosemide   Injectable 40 milliGRAM(s) IV Push daily  gabapentin 400 milliGRAM(s) Oral three times a day  insulin lispro (HumaLOG) corrective regimen sliding scale   SubCutaneous three times a day before meals  levoFLOXacin  Tablet 500 milliGRAM(s) Oral every 24 hours  methylPREDNISolone sodium succinate Injectable 40 milliGRAM(s) IV Push every 8 hours  pantoprazole    Tablet 40 milliGRAM(s) Oral before breakfast  rivaroxaban 20 milliGRAM(s) Oral every 24 hours  simvastatin 20 milliGRAM(s) Oral at bedtime  tamsulosin 0.4 milliGRAM(s) Oral at bedtime  tiotropium 18 MICROgram(s) Capsule 1 Capsule(s) Inhalation daily    MEDICATIONS  (PRN):  acetaminophen   Tablet .. 650 milliGRAM(s) Oral every 6 hours PRN Mild Pain (1 - 3)  dextrose 40% Gel 15 Gram(s) Oral once PRN Blood Glucose LESS THAN 70 milliGRAM(s)/deciLiter  glucagon  Injectable 1 milliGRAM(s) IntraMuscular once PRN Glucose <70 milliGRAM(s)/deciLiter      __________________________________________________  ----------------------------------------------------------------------------------  REVIEW OF SYSTEMS: no fever, no SOB, No Chest pain; feels well      Vital Signs Last 24 Hrs  T(C): 37 (28 Oct 2018 08:16), Max: 37.3 (27 Oct 2018 15:37)  T(F): 98.6 (28 Oct 2018 08:16), Max: 99.2 (27 Oct 2018 15:37)  HR: 81 (28 Oct 2018 09:05) (74 - 102)  BP: 113/59 (28 Oct 2018 08:16) (108/55 - 131/51)  BP(mean): --  RR: 18 (28 Oct 2018 08:16) (18 - 20)  SpO2: 91% (28 Oct 2018 09:05) (91% - 99%)    _________________  PHYSICAL EXAM:  ---------------------------   NAD; Normocephalic;   LUNGS - no wheezing  HEART: S1 S2+   ABDOMEN: Soft, Nontender, non distended; BS+; obese abd  EXTREMITIES: no cyanosis; no edema  NERVOUS SYSTEM:  Awake and alert; no new deficits    _________________________________________________  LABS:                        8.7    9.9   )-----------( 366      ( 28 Oct 2018 06:57 )             31.9     10-28    139  |  95<L>  |  20<H>  ----------------------------<  129<H>  3.8   |  39<H>  |  0.50    Ca    9.5      28 Oct 2018 06:57  Phos  4.7     10-28  Mg     2.2     10-28    TPro  7.6  /  Alb  2.6<L>  /  TBili  <0.1<L>  /  DBili  x   /  AST  12  /  ALT  20  /  AlkPhos  81  10-28        CAPILLARY BLOOD GLUCOSE      POCT Blood Glucose.: 138 mg/dL (28 Oct 2018 07:46)  POCT Blood Glucose.: 178 mg/dL (27 Oct 2018 21:35)  POCT Blood Glucose.: 120 mg/dL (27 Oct 2018 16:39)  POCT Blood Glucose.: 154 mg/dL (27 Oct 2018 11:58)            Care Discussed with Consultants :     Plan of care was discussed with patient ; all questions and concerns were addressed and care was aligned with patient's wishes.  Patient has been advised to follow up with PMD upon discharge from the hospital.  Discharge plans discussed with nursing staff , case manger and .

## 2018-10-28 NOTE — PROGRESS NOTE ADULT - PROBLEM SELECTOR PLAN 1
Continue  Duonebs,  symbicort, spiriva  continue oxygen supplementation Continue  Duonebs,   Continue Symbicort, Spiriva  continue oxygen supplementation

## 2018-10-28 NOTE — PROGRESS NOTE ADULT - PROBLEM SELECTOR PLAN 2
Improved with Lasix.  Continue to monitor Improved with Lasix.  Continue to monitor  continue other maintenance cardiac meds

## 2018-10-28 NOTE — PROGRESS NOTE ADULT - ASSESSMENT
58 year old morbidly obese female, bed bound from Nursing Home with PMHx of  DM, COPD on home O2, PUSHPA (not on CPAP,) chronic urinary retention s/p suprapubic cath, PE on Xarelto,  sent from ED for respiratory distress and SOB.   ICU was consulted for acute respiratory failure but as patient was improving on BIPAP was downgraded to floor.     She is admitted for    1. Acute on chronic respiratory failure  2. Acute COPD exacerbation  3. acute on chronic  diastolic heart failure  4. Diarrhea  5. UTI  6. Chronic PE  7. Obesity  8. PUSHPA- nocturnal CPAP  9. Anemia- chronic ; also with transient bleeding per rectum- likely hemorrhoids 58 year old morbidly obese female, bed bound from Nursing Home with PMHx of  DM, COPD on home O2, PUSHPA (not on CPAP,) chronic urinary retention s/p suprapubic cath, PE on Xarelto,  sent from ED for respiratory distress and SOB.   ICU was consulted for acute respiratory failure but as patient was improving on BIPAP was downgraded to floor.     She is admitted for    1. Acute on chronic respiratory failure  2. Acute COPD exacerbation  3. acute on chronic  diastolic heart failure  4. Diarrhea  5. UTI  6. Chronic PE  7. Obesity  8. PUSHPA- nocturnal CPAP  9. Anemia- chronic ; also with transient bleeding per rectum- likely hemorrhoids  10. Neuropathy

## 2018-10-28 NOTE — PROGRESS NOTE ADULT - PROBLEM SELECTOR PLAN 10
DVT prophylaxis with Xarelto  Monitor for bleeding DVT prophylaxis- therapeutically anticoagulated with Xarelto  Monitor for bleeding

## 2018-10-29 ENCOUNTER — TRANSCRIPTION ENCOUNTER (OUTPATIENT)
Age: 58
End: 2018-10-29

## 2018-10-29 DIAGNOSIS — I48.91 UNSPECIFIED ATRIAL FIBRILLATION: ICD-10-CM

## 2018-10-29 LAB
ALBUMIN SERPL ELPH-MCNC: 2.8 G/DL — LOW (ref 3.5–5)
ALP SERPL-CCNC: 88 U/L — SIGNIFICANT CHANGE UP (ref 40–120)
ALT FLD-CCNC: 58 U/L DA — SIGNIFICANT CHANGE UP (ref 10–60)
ANION GAP SERPL CALC-SCNC: 5 MMOL/L — SIGNIFICANT CHANGE UP (ref 5–17)
AST SERPL-CCNC: 29 U/L — SIGNIFICANT CHANGE UP (ref 10–40)
BASOPHILS # BLD AUTO: 0.1 K/UL — SIGNIFICANT CHANGE UP (ref 0–0.2)
BASOPHILS NFR BLD AUTO: 0.6 % — SIGNIFICANT CHANGE UP (ref 0–2)
BILIRUB SERPL-MCNC: 0.2 MG/DL — SIGNIFICANT CHANGE UP (ref 0.2–1.2)
BUN SERPL-MCNC: 28 MG/DL — HIGH (ref 7–18)
CALCIUM SERPL-MCNC: 9.7 MG/DL — SIGNIFICANT CHANGE UP (ref 8.4–10.5)
CHLORIDE SERPL-SCNC: 97 MMOL/L — SIGNIFICANT CHANGE UP (ref 96–108)
CK MB BLD-MCNC: <11.1 % — HIGH (ref 0–3.5)
CK MB BLD-MCNC: <14.3 % — HIGH (ref 0–3.5)
CK MB CFR SERPL CALC: <1 NG/ML — SIGNIFICANT CHANGE UP (ref 0–3.6)
CK MB CFR SERPL CALC: <1 NG/ML — SIGNIFICANT CHANGE UP (ref 0–3.6)
CK SERPL-CCNC: 7 U/L — LOW (ref 21–215)
CK SERPL-CCNC: 9 U/L — LOW (ref 21–215)
CO2 SERPL-SCNC: 37 MMOL/L — HIGH (ref 22–31)
CREAT SERPL-MCNC: 0.55 MG/DL — SIGNIFICANT CHANGE UP (ref 0.5–1.3)
EOSINOPHIL # BLD AUTO: 0 K/UL — SIGNIFICANT CHANGE UP (ref 0–0.5)
EOSINOPHIL NFR BLD AUTO: 0.1 % — SIGNIFICANT CHANGE UP (ref 0–6)
GLUCOSE SERPL-MCNC: 107 MG/DL — HIGH (ref 70–99)
HCT VFR BLD CALC: 32 % — LOW (ref 34.5–45)
HGB BLD-MCNC: 8.8 G/DL — LOW (ref 11.5–15.5)
LYMPHOCYTES # BLD AUTO: 0.8 K/UL — LOW (ref 1–3.3)
LYMPHOCYTES # BLD AUTO: 6 % — LOW (ref 13–44)
MAGNESIUM SERPL-MCNC: 2.2 MG/DL — SIGNIFICANT CHANGE UP (ref 1.6–2.6)
MCHC RBC-ENTMCNC: 23.7 PG — LOW (ref 27–34)
MCHC RBC-ENTMCNC: 27.5 GM/DL — LOW (ref 32–36)
MCV RBC AUTO: 86.1 FL — SIGNIFICANT CHANGE UP (ref 80–100)
MONOCYTES # BLD AUTO: 0.8 K/UL — SIGNIFICANT CHANGE UP (ref 0–0.9)
MONOCYTES NFR BLD AUTO: 5.6 % — SIGNIFICANT CHANGE UP (ref 2–14)
NEUTROPHILS # BLD AUTO: 11.9 K/UL — HIGH (ref 1.8–7.4)
NEUTROPHILS NFR BLD AUTO: 87.8 % — HIGH (ref 43–77)
PHOSPHATE SERPL-MCNC: 4 MG/DL — SIGNIFICANT CHANGE UP (ref 2.5–4.5)
PLATELET # BLD AUTO: 457 K/UL — HIGH (ref 150–400)
POTASSIUM SERPL-MCNC: 3.6 MMOL/L — SIGNIFICANT CHANGE UP (ref 3.5–5.3)
POTASSIUM SERPL-SCNC: 3.6 MMOL/L — SIGNIFICANT CHANGE UP (ref 3.5–5.3)
PROT SERPL-MCNC: 8.2 G/DL — SIGNIFICANT CHANGE UP (ref 6–8.3)
RBC # BLD: 3.71 M/UL — LOW (ref 3.8–5.2)
RBC # FLD: 20.1 % — HIGH (ref 10.3–14.5)
SODIUM SERPL-SCNC: 139 MMOL/L — SIGNIFICANT CHANGE UP (ref 135–145)
TROPONIN I SERPL-MCNC: <0.015 NG/ML — SIGNIFICANT CHANGE UP (ref 0–0.04)
TROPONIN I SERPL-MCNC: <0.015 NG/ML — SIGNIFICANT CHANGE UP (ref 0–0.04)
WBC # BLD: 13.5 K/UL — HIGH (ref 3.8–10.5)
WBC # FLD AUTO: 13.5 K/UL — HIGH (ref 3.8–10.5)

## 2018-10-29 PROCEDURE — 99222 1ST HOSP IP/OBS MODERATE 55: CPT

## 2018-10-29 PROCEDURE — 99233 SBSQ HOSP IP/OBS HIGH 50: CPT | Mod: GC

## 2018-10-29 RX ORDER — DIGOXIN 250 MCG
0.25 TABLET ORAL ONCE
Qty: 0 | Refills: 0 | Status: COMPLETED | OUTPATIENT
Start: 2018-10-30 | End: 2018-10-30

## 2018-10-29 RX ORDER — METOPROLOL TARTRATE 50 MG
25 TABLET ORAL
Qty: 0 | Refills: 0 | Status: DISCONTINUED | OUTPATIENT
Start: 2018-10-29 | End: 2018-10-29

## 2018-10-29 RX ORDER — FUROSEMIDE 40 MG
40 TABLET ORAL DAILY
Qty: 0 | Refills: 0 | Status: DISCONTINUED | OUTPATIENT
Start: 2018-10-29 | End: 2018-11-02

## 2018-10-29 RX ORDER — SIMVASTATIN 20 MG/1
10 TABLET, FILM COATED ORAL AT BEDTIME
Qty: 0 | Refills: 0 | Status: DISCONTINUED | OUTPATIENT
Start: 2018-10-29 | End: 2018-11-02

## 2018-10-29 RX ORDER — DIGOXIN 250 MCG
0.5 TABLET ORAL ONCE
Qty: 0 | Refills: 0 | Status: COMPLETED | OUTPATIENT
Start: 2018-10-29 | End: 2018-10-29

## 2018-10-29 RX ORDER — METOPROLOL TARTRATE 50 MG
2.5 TABLET ORAL ONCE
Qty: 0 | Refills: 0 | Status: COMPLETED | OUTPATIENT
Start: 2018-10-29 | End: 2018-10-29

## 2018-10-29 RX ADMIN — Medication 81 MILLIGRAM(S): at 11:53

## 2018-10-29 RX ADMIN — Medication 650 MILLIGRAM(S): at 00:14

## 2018-10-29 RX ADMIN — Medication 3 MILLILITER(S): at 05:55

## 2018-10-29 RX ADMIN — RIVAROXABAN 20 MILLIGRAM(S): KIT at 16:52

## 2018-10-29 RX ADMIN — SIMVASTATIN 10 MILLIGRAM(S): 20 TABLET, FILM COATED ORAL at 21:31

## 2018-10-29 RX ADMIN — Medication 650 MILLIGRAM(S): at 08:36

## 2018-10-29 RX ADMIN — TAMSULOSIN HYDROCHLORIDE 0.4 MILLIGRAM(S): 0.4 CAPSULE ORAL at 21:31

## 2018-10-29 RX ADMIN — Medication 2.5 MILLIGRAM(S): at 12:57

## 2018-10-29 RX ADMIN — Medication 2: at 11:53

## 2018-10-29 RX ADMIN — ACETAZOLAMIDE 250 MILLIGRAM(S): 250 TABLET ORAL at 13:03

## 2018-10-29 RX ADMIN — Medication 3 MILLILITER(S): at 14:18

## 2018-10-29 RX ADMIN — BUDESONIDE AND FORMOTEROL FUMARATE DIHYDRATE 2 PUFF(S): 160; 4.5 AEROSOL RESPIRATORY (INHALATION) at 21:32

## 2018-10-29 RX ADMIN — Medication 0.5 MILLIGRAM(S): at 16:51

## 2018-10-29 RX ADMIN — Medication 40 MILLIGRAM(S): at 05:53

## 2018-10-29 RX ADMIN — Medication 3 MILLILITER(S): at 09:02

## 2018-10-29 RX ADMIN — Medication 40 MILLIGRAM(S): at 14:55

## 2018-10-29 RX ADMIN — BUDESONIDE AND FORMOTEROL FUMARATE DIHYDRATE 2 PUFF(S): 160; 4.5 AEROSOL RESPIRATORY (INHALATION) at 11:54

## 2018-10-29 RX ADMIN — Medication 3 MILLILITER(S): at 18:02

## 2018-10-29 RX ADMIN — GABAPENTIN 400 MILLIGRAM(S): 400 CAPSULE ORAL at 13:03

## 2018-10-29 RX ADMIN — Medication 40 MILLIGRAM(S): at 13:38

## 2018-10-29 RX ADMIN — PANTOPRAZOLE SODIUM 40 MILLIGRAM(S): 20 TABLET, DELAYED RELEASE ORAL at 05:57

## 2018-10-29 RX ADMIN — Medication 650 MILLIGRAM(S): at 05:56

## 2018-10-29 RX ADMIN — Medication 3 MILLILITER(S): at 21:00

## 2018-10-29 RX ADMIN — GABAPENTIN 400 MILLIGRAM(S): 400 CAPSULE ORAL at 05:54

## 2018-10-29 RX ADMIN — GABAPENTIN 400 MILLIGRAM(S): 400 CAPSULE ORAL at 21:31

## 2018-10-29 NOTE — ADVANCED PRACTICE NURSE CONSULT - ASSESSMENT
This is a 58yr old female patient admitted for Chronic Respiratory Failure, presenting with the following:  -There is evidence of Hidradenitis Suppurativa to the Bilateral Gluteus and Gluteal Fold areas, with purulent drainage and surrounding tissue edema	  -There is evidence of Incontinence Associated Dermatitis to the Groin area

## 2018-10-29 NOTE — PROGRESS NOTE ADULT - SUBJECTIVE AND OBJECTIVE BOX
Time of Visit:  Patient seen and examined.     MEDICATIONS  (STANDING):  acetazolamide    Tablet 250 milliGRAM(s) Oral daily  ALBUTerol/ipratropium for Nebulization 3 milliLiter(s) Nebulizer every 4 hours  aspirin  chewable 81 milliGRAM(s) Oral daily  buDESOnide 160 MICROgram(s)/formoterol 4.5 MICROgram(s) Inhaler 2 Puff(s) Inhalation two times a day  dextrose 5%. 1000 milliLiter(s) (50 mL/Hr) IV Continuous <Continuous>  dextrose 50% Injectable 12.5 Gram(s) IV Push once  dextrose 50% Injectable 25 Gram(s) IV Push once  dextrose 50% Injectable 25 Gram(s) IV Push once  furosemide   Injectable 40 milliGRAM(s) IV Push daily  gabapentin 400 milliGRAM(s) Oral three times a day  insulin lispro (HumaLOG) corrective regimen sliding scale   SubCutaneous three times a day before meals  levoFLOXacin  Tablet 500 milliGRAM(s) Oral every 24 hours  pantoprazole    Tablet 40 milliGRAM(s) Oral before breakfast  predniSONE   Tablet 40 milliGRAM(s) Oral daily  rivaroxaban 20 milliGRAM(s) Oral every 24 hours  simvastatin 20 milliGRAM(s) Oral at bedtime  tamsulosin 0.4 milliGRAM(s) Oral at bedtime  tiotropium 18 MICROgram(s) Capsule 1 Capsule(s) Inhalation daily      MEDICATIONS  (PRN):  acetaminophen   Tablet .. 650 milliGRAM(s) Oral every 6 hours PRN Mild Pain (1 - 3)  dextrose 40% Gel 15 Gram(s) Oral once PRN Blood Glucose LESS THAN 70 milliGRAM(s)/deciLiter  glucagon  Injectable 1 milliGRAM(s) IntraMuscular once PRN Glucose <70 milliGRAM(s)/deciLiter       Medications up to date at time of exam.    ROS; No fever, chills, SOB, cough, congestion on exam.   PHYSICAL EXAMINATION:  Vital Signs Last 24 Hrs  T(C): 36.4 (29 Oct 2018 07:14), Max: 37.3 (28 Oct 2018 12:18)  T(F): 97.5 (29 Oct 2018 07:14), Max: 99.1 (28 Oct 2018 12:18)  HR: 80 (29 Oct 2018 09:30) (80 - 93)  BP: 123/65 (29 Oct 2018 07:14) (105/62 - 129/65)  BP(mean): --  RR: 18 (29 Oct 2018 07:14) (18 - 18)  SpO2: 95% (29 Oct 2018 09:30) (91% - 97%)   (if applicable)    General; Alert and oriented. No acute distress . Morbid Obese.     HEENT: Head is normocephalic and atraumatic. No nasal tenderness. Extraocular muscles are intact. Moist mucosa.     NECK: Supple, no palpable adenopathy.    LUNGS: Clear to auscultation, no wheezing, rales, or rhonchi. No use of accessory muscle.     HEART: S1 S2 Regular rate and no click/ rub.     ABDOMEN: Soft, nontender, and nondistended.  No hepatosplenomegaly is noted. Active bowel sounds.     EXTREMITIES: Without any cyanosis, clubbing, rash.    NEUROLOGIC: Awake, alert, oriented.     SKIN: Warm and moist. Non diaphoretic.       LABS:                        8.8    13.5  )-----------( 457      ( 29 Oct 2018 08:38 )             32.0     10-29    139  |  97  |  28<H>  ----------------------------<  107<H>  3.6   |  37<H>  |  0.55    Ca    9.7      29 Oct 2018 08:38  Phos  4.0     10-29  Mg     2.2     10-29    TPro  8.2  /  Alb  2.8<L>  /  TBili  0.2  /  DBili  x   /  AST  29  /  ALT  58  /  AlkPhos  88  10-29    Serum Pro-Brain Natriuretic Peptide: 420 pg/mL (10-27-18 @ 07:38)      Procalcitonin, Serum: 0.05 ng/mL (10-27-18 @ 00:42)      MICROBIOLOGY: (if applicable)    RADIOLOGY & ADDITIONAL STUDIES:  EKG:    CXR: < from: Xray Chest 1 View- PORTABLE-Urgent (10.27.18 @ 03:00) >  INTERPRETATION:  CLINICAL STATEMENT: Follow-up chest pain.    TECHNIQUE: AP view of the chest.    COMPARISON: 10/25/2018    FINDINGS/  IMPRESSION:  Increased interstitial lung markings without significant change.   Questionable new mild left perihilar opacities. Follow-up recommended.    No significant pleural effusion.    Heart size cannot be accurately assessed in this projection, but appear   enlarged.        IMPRESSION: 58y Female PAST MEDICAL & SURGICAL HISTORY:  Urinary retention  CKD (chronic kidney disease)  DM (diabetes mellitus)  HTN (hypertension)  COPD (chronic obstructive pulmonary disease)  Morbid obesity  Diabetes  Overactive bladder  Hemorrhoids  Emphysema  Suprapubic catheter  S/P   History of appendectomy  S/P cholecystectomy    Impression; 59 Y/O Female with prior mentioned multiple chronic conditions. Presented with progressive worsening of SOB requiring NIPPV support. She was evaluated by ICU team and rejected. Now in Medicine Unit, on Oxygen supplementation and Bipap at night. RVP, BLD Cx x 2 negative.       Suggestion:  Continue oxygen supplementation NC. Bipap 16/5 FIO2 50% at night.   Continue DuoNeb Q 6 hours. Budesonide twice daily. Tiotropium daily.  On Xarelto Daily .  Continue Prednisone 40 mg daily.   GI prophylactic.   Monitor Hgb/Hct, latest Hgb/Hct 8.8/32. Time of Visit:  Patient seen and examined.     MEDICATIONS  (STANDING):  acetazolamide    Tablet 250 milliGRAM(s) Oral daily  ALBUTerol/ipratropium for Nebulization 3 milliLiter(s) Nebulizer every 4 hours  aspirin  chewable 81 milliGRAM(s) Oral daily  buDESOnide 160 MICROgram(s)/formoterol 4.5 MICROgram(s) Inhaler 2 Puff(s) Inhalation two times a day  dextrose 5%. 1000 milliLiter(s) (50 mL/Hr) IV Continuous <Continuous>  dextrose 50% Injectable 12.5 Gram(s) IV Push once  dextrose 50% Injectable 25 Gram(s) IV Push once  dextrose 50% Injectable 25 Gram(s) IV Push once  furosemide   Injectable 40 milliGRAM(s) IV Push daily  gabapentin 400 milliGRAM(s) Oral three times a day  insulin lispro (HumaLOG) corrective regimen sliding scale   SubCutaneous three times a day before meals  levoFLOXacin  Tablet 500 milliGRAM(s) Oral every 24 hours  pantoprazole    Tablet 40 milliGRAM(s) Oral before breakfast  predniSONE   Tablet 40 milliGRAM(s) Oral daily  rivaroxaban 20 milliGRAM(s) Oral every 24 hours  simvastatin 20 milliGRAM(s) Oral at bedtime  tamsulosin 0.4 milliGRAM(s) Oral at bedtime  tiotropium 18 MICROgram(s) Capsule 1 Capsule(s) Inhalation daily      MEDICATIONS  (PRN):  acetaminophen   Tablet .. 650 milliGRAM(s) Oral every 6 hours PRN Mild Pain (1 - 3)  dextrose 40% Gel 15 Gram(s) Oral once PRN Blood Glucose LESS THAN 70 milliGRAM(s)/deciLiter  glucagon  Injectable 1 milliGRAM(s) IntraMuscular once PRN Glucose <70 milliGRAM(s)/deciLiter       Medications up to date at time of exam.    ROS; No fever, chills, SOB, cough, congestion on exam.   PHYSICAL EXAMINATION:  Vital Signs Last 24 Hrs  T(C): 36.4 (29 Oct 2018 07:14), Max: 37.3 (28 Oct 2018 12:18)  T(F): 97.5 (29 Oct 2018 07:14), Max: 99.1 (28 Oct 2018 12:18)  HR: 80 (29 Oct 2018 09:30) (80 - 93)  BP: 123/65 (29 Oct 2018 07:14) (105/62 - 129/65)  BP(mean): --  RR: 18 (29 Oct 2018 07:14) (18 - 18)  SpO2: 95% (29 Oct 2018 09:30) (91% - 97%)   (if applicable)    General; Alert and oriented. No acute distress . Morbid Obese.     HEENT: Head is normocephalic and atraumatic. No nasal tenderness. Extraocular muscles are intact. Moist mucosa.     NECK: Supple, no palpable adenopathy.    LUNGS: Clear to auscultation, no wheezing, rales, or rhonchi. No use of accessory muscle.     HEART: S1 S2 Regular rate and no click/ rub.     ABDOMEN: Soft, nontender, and nondistended.  No hepatosplenomegaly is noted. Active bowel sounds.     EXTREMITIES: Without any cyanosis, clubbing, rash.    NEUROLOGIC: Awake, alert, oriented.     SKIN: Warm and moist. Non diaphoretic.       LABS:                        8.8    13.5  )-----------( 457      ( 29 Oct 2018 08:38 )             32.0     10-29    139  |  97  |  28<H>  ----------------------------<  107<H>  3.6   |  37<H>  |  0.55    Ca    9.7      29 Oct 2018 08:38  Phos  4.0     10-29  Mg     2.2     10-29    TPro  8.2  /  Alb  2.8<L>  /  TBili  0.2  /  DBili  x   /  AST  29  /  ALT  58  /  AlkPhos  88  10-29    Serum Pro-Brain Natriuretic Peptide: 420 pg/mL (10-27-18 @ 07:38)      Procalcitonin, Serum: 0.05 ng/mL (10-27-18 @ 00:42)      MICROBIOLOGY: (if applicable)    RADIOLOGY & ADDITIONAL STUDIES:  EKG:    CXR: < from: Xray Chest 1 View- PORTABLE-Urgent (10.27.18 @ 03:00) >  INTERPRETATION:  CLINICAL STATEMENT: Follow-up chest pain.    TECHNIQUE: AP view of the chest.    COMPARISON: 10/25/2018    FINDINGS/  IMPRESSION:  Increased interstitial lung markings without significant change.   Questionable new mild left perihilar opacities. Follow-up recommended.    No significant pleural effusion.    Heart size cannot be accurately assessed in this projection, but appear   enlarged.        IMPRESSION: 58y Female PAST MEDICAL & SURGICAL HISTORY:  Urinary retention  CKD (chronic kidney disease)  DM (diabetes mellitus)  HTN (hypertension)  COPD (chronic obstructive pulmonary disease)  Morbid obesity  Diabetes  Overactive bladder  Hemorrhoids  Emphysema  Suprapubic catheter  S/P   History of appendectomy  S/P cholecystectomy    Impression; 59 Y/O Female with prior mentioned multiple chronic conditions. Presented with progressive worsening of SOB requiring NIPPV support. She was evaluated by ICU team and rejected. Now in Medicine Unit, on Oxygen supplementation and Bipap at night. RVP, BLD Cx x 2 negative.       Suggestion:  Continue oxygen supplementation NC. Bipap 16/5 FIO2 50% at night.   Continue DuoNeb Q 6 hours. Budesonide twice daily. Tiotropium daily.  On Xarelto Daily .  Continue Prednisone 40 mg daily.   GI prophylactic.   Monitor Hgb/Hct, latest Hgb/Hct 8.8/32.    Agree with above assessment and plan as transcribed.

## 2018-10-29 NOTE — DISCHARGE NOTE ADULT - HOSPITAL COURSE
58 /F old morbidly obese, bed bound from Nursing Home with PMHx of  DM, COPD on home O2, PUSHPA (not on CPAP,) chronic urinary retention s/p suprapubic cath, PE on Xarelto,  sent from ED for respiratory distress and SOB. Pt reports having SOB and so was sent for further management. Pt denies any cough, fever, chest pain , palpitations, dizziness, diaphoresis, dizziness. Pt was in respiratory distress on admission, was placed on NRB, given respiratory acidosis, pt was placed on BIPAP after which pts clinical condition improved and able to communicate well.  [facility] exacerbation, was discharged on tapering dose of steroids.     In ED, patient was tachycardic to 114/min, RR- 33/min, 70% suppl O2. EKG-  sinus tachycardia with PACS @ 119 BPM , no ST T wave changes. cardiac enzymes x1- negative,  CXR s/o bilateral infiltrates unchanged from previous CXR (awaiting official report). Labs pertinent for WBC-13.2 , h/h of 8.1/29.3, INR- 1.88, Hco3- 41, ABG initial s/o 7.29/ 96/ 45/ 97% on NRB, repeat  ABG after being on BIPAP for 1 hour pH, Arterial: 7.34  pH, Blood: x     /  pCO2: 83    /  pO2: 70    / HCO3: 43    / Base Excess: 15.7  /  SaO2: 94. s/p Duoneb x3 and solumedrol 125 in ED.     ICU was consulted for hypoxic and hypercapnic respiratory  failure, since pt is hemodynamically stable on BIPAP and has used BIPAP in the past, hence being downgraded to telemetry floor for further management.     For Acute on chronic respiratory failure with hypoxia and hypercapnia, patient presented with SOB, found to have hypoxic and hypercapneic respiratory failure likely secondary to COPD exacerbation,  Initial ABG s/o 7.29/ 96/ 45/ 97% on NRB, repeat  ABG after being on BIPAP for 1 hour pH, Arterial: 7.34  pH, Blood: x     /  pCO2: 83    /  pO2: 70    / HCO3: 43    / Base Excess: 15.7  /  SaO2: 94, RVP negative, c/w with BIPAP initially then switched to NC , solumedrol and levoflox, duoneb  For BRBPR (bright red blood per rectum), Plan: bright red blood noted mixed with stool   patient is on xarelto and aspirin - which was initially   for now   GI consult.   follow CBC in AM   transfuse as needed  Spoke to GI Dr. Henley over phone - as patient had recent work up - if Hb stays stable - anticoagulation can be restarted.     Problem/Plan - 3:  ·  Problem: COPD (chronic obstructive pulmonary disease).  Plan: likely secondary to bronchitis   - xray chest negative for consolidation, s/o  bilateral infiltrates unchanged from previous CXR (awaiting official report).  - afebrile with mild leucocytosis to 13.2 (was on prednisone)  s/p duoneb inhalation and solumedrol 125 mg in ED  - continue BIPAP support,  duonebs every 6 hours.   - started on solu-medrol 40mg IV every 8 hours  -follow blood cultures and sputum cultures.   RVP negative.      Problem/Plan - 4:  ·  Problem: UTI (urinary tract infection).  Plan: UA positive   no fever noted   will start on levaquin for UTI and COPD exacerbation   follow Urine and blood culture.      Problem/Plan - 5:  ·  Problem: DM (diabetes mellitus).  Plan: HbA1c level of 5.1  will continue with sliding scale as patient is on insulin.      Problem/Plan - 6:  Problem: HTN (hypertension). Plan: continue home medications.     Problem/Plan - 7:  ·  Problem: Pulmonary embolism.  Plan: H/o Pulmonary embolism   on Xarelto 20 mg daily- will hold for now because of rectal bleeding  -patient c/o leg pain b/l, venous doppler from 10/09/18 negative for DVT.      Problem/Plan - 8:  ·  Problem: PUSHPA (obstructive sleep apnea).  Plan: Not on CPAP at NH.  will need polysomnography as outpatient. 58 /F old morbidly obese, bed bound from Nursing Home with PMHx of  DM, COPD on home O2, PUSHPA (not on CPAP,) chronic urinary retention s/p suprapubic cath, PE on Xarelto,  sent from ED for respiratory distress and SOB. Pt reports having SOB and so was sent for further management. Pt denies any cough, fever, chest pain , palpitations, dizziness, diaphoresis, dizziness. Pt was in respiratory distress on admission, was placed on NRB, given respiratory acidosis, pt was placed on BIPAP after which pts clinical condition improved and able to communicate well.  Pt was recently discharged from [facility] after managing for COPD exacerbation, was discharged on tapering dose of steroids.     In ED, patient was tachycardic to 114/min, RR- 33/min, 70% suppl O2. EKG-  sinus tachycardia with PACS @ 119 BPM , no ST T wave changes. cardiac enzymes x1- negative,  CXR s/o bilateral infiltrates unchanged from previous CXR (awaiting official report). Labs pertinent for WBC-13.2 , h/h of 8.1/29.3, INR- 1.88, Hco3- 41, ABG initial s/o 7.29/ 96/ 45/ 97% on NRB, repeat  ABG after being on BIPAP for 1 hour pH, Arterial: 7.34  pH, Blood: x     /  pCO2: 83    /  pO2: 70    / HCO3: 43    / Base Excess: 15.7  /  SaO2: 94. s/p Duoneb x3 and solumedrol 125 in ED.     ICU was consulted for hypoxic and hypercapnic respiratory  failure, since pt is hemodynamically stable on BIPAP and has used BIPAP in the past, hence being downgraded to telemetry floor for further management.     For Acute on chronic respiratory failure with hypoxia and hypercapnia, patient presented with SOB, found to have hypoxic and hypercapneic respiratory failure likely secondary to COPD exacerbation,  Initial ABG s/o 7.29/ 96/ 45/ 97% on NRB, repeat  ABG after being on BIPAP for 1 hour pH, Arterial: 7.34  pH, Blood: x     /  pCO2: 83    /  pO2: 70    / HCO3: 43    / Base Excess: 15.7  /  SaO2: 94, RVP negative, c/w with BIPAP initially then switched to NC , solumedrol and levoflox, duoneb  For BRBPR (bright red blood per rectum), Plan: bright red blood noted mixed with stool   patient is on xarelto and aspirin - which was initially held for BPR, but restarted as H/H was stable. For  COPD (chronic obstructive pulmonary disease).  Plan: likely secondary to bronchitis   - xray chest negative for consolidation, s/o  bilateral infiltrates unchanged from previous CXR (awaiting official report). afebrile with mild leucocytosis to 13.2 (was on prednisone), s/p duoneb inhalation and solumedrol 125 mg in ED, continue BIPAP support,  duonebs every 6 hours.   - started on solu-medrol 40mg IV every 8 hours. Patient improved clinically. RVP negative.     For UTI (urinary tract infection).  Plan: UA positive, no fever noted, start on levaquin for UTI and COPD exacerbation For DM (diabetes mellitus).  Plan: HbA1c level of 5.1  will continue with sliding scale as patient is on insulin.     FOr  HTN (hypertension). Plan: continue home medications.    For  Pulmonary embolism.  Plan: H/o Pulmonary embolism   on Xarelto 20 mg daily- -patient c/o leg pain b/l, venous doppler from 10/09/18 negative for DVT.     For  PUSHPA (obstructive sleep apnea).  Plan: Not on CPAP at NH.  will need polysomnography as outpatient.     During the hospital course, Patient developed Afib with RVR, Patient was Dig loaded and she converted to sinus rhythm     Patient is medically stable to be discharged   CAse discussed with the attending 58 /F old morbidly obese, bed bound from Nursing Home with PMHx of  DM, COPD on home O2, PUSHPA (not on CPAP,) chronic urinary retention s/p suprapubic cath, PE on Xarelto,  sent from ED for respiratory distress and SOB. Pt reports having SOB and so was sent for further management. Pt denies any cough, fever, chest pain , palpitations, dizziness, diaphoresis, dizziness. Pt was in respiratory distress on admission, was placed on NRB, given respiratory acidosis, pt was placed on BIPAP after which pts clinical condition improved and able to communicate well.  Pt was recently discharged from [facility] after managing for COPD exacerbation, was discharged on tapering dose of steroids.     In ED, patient was tachycardic to 114/min, RR- 33/min, 70% suppl O2. EKG-  sinus tachycardia with PACS @ 119 BPM , no ST T wave changes. cardiac enzymes x1- negative,  CXR s/o bilateral infiltrates unchanged from previous CXR (awaiting official report). Labs pertinent for WBC-13.2 , h/h of 8.1/29.3, INR- 1.88, Hco3- 41, ABG initial s/o 7.29/ 96/ 45/ 97% on NRB, repeat  ABG after being on BIPAP for 1 hour pH, Arterial: 7.34  pH, Blood: x     /  pCO2: 83    /  pO2: 70    / HCO3: 43    / Base Excess: 15.7  /  SaO2: 94. s/p Duoneb x3 and solumedrol 125 in ED.     ICU was consulted for hypoxic and hypercapnic respiratory  failure, since pt is hemodynamically stable on BIPAP and has used BIPAP in the past, hence being downgraded to telemetry floor for further management.     For Acute on chronic respiratory failure with hypoxia and hypercapnia, patient presented with SOB, found to have hypoxic and hypercapneic respiratory failure likely secondary to COPD exacerbation,  Initial ABG s/o 7.29/ 96/ 45/ 97% on NRB, repeat  ABG after being on BIPAP for 1 hour pH, Arterial: 7.34  pH, Blood: x     /  pCO2: 83    /  pO2: 70    / HCO3: 43    / Base Excess: 15.7  /  SaO2: 94, RVP negative, c/w with BIPAP initially then switched to NC , solumedrol and levoflox, duoneb  For BRBPR (bright red blood per rectum), Plan: bright red blood noted mixed with stool   patient is on xarelto and aspirin - which was initially held for BPR, but restarted as H/H was stable. For  COPD (chronic obstructive pulmonary disease).  Plan: likely secondary to bronchitis   - xray chest negative for consolidation, s/o  bilateral infiltrates unchanged from previous CXR (awaiting official report). afebrile with mild leucocytosis to 13.2 (was on prednisone), s/p duoneb inhalation and solumedrol 125 mg in ED, continue BIPAP support,  duonebs every 6 hours.   - started on solu-medrol 40mg IV every 8 hours. Patient improved clinically. RVP negative.     For UTI (urinary tract infection).  Plan: UA positive, no fever noted, start on levaquin for UTI and COPD exacerbation For DM (diabetes mellitus).  Plan: HbA1c level of 5.1  will continue with sliding scale as patient is on insulin.     FOr  HTN (hypertension). Plan: continue home medications.    For  Pulmonary embolism.  Plan: H/o Pulmonary embolism   on Xarelto 20 mg daily- -patient c/o leg pain b/l, venous doppler from 10/09/18 negative for DVT.     For  PUSHPA (obstructive sleep apnea).  Plan: Not on CPAP at NH.  will need polysomnography as outpatient.     During the hospital course, Patient developed Afib with RVR, Patient was Dig loaded and she converted to sinus rhythm     Patient is medically stable to be discharged   Case discussed with the attending

## 2018-10-29 NOTE — ADVANCED PRACTICE NURSE CONSULT - RECOMMEDATIONS
-Clean the all affected areas with warm water, Hibiclens soap, pat dry, and apply skin prep to the surrounding skin  -Apply Antifungal Powder to the Groin areas b.i.d. PRN  -Please consult Surgery for further evaluation of the Bilateral Gluteus  -Until then apply layers of ABD pads and secure with tape b.i.d. PRN  -Elevate/float the patients heels using heel protectors and reposition the patient Q 2hrs using wedges or pillows

## 2018-10-29 NOTE — CONSULT NOTE ADULT - ATTENDING COMMENTS
Thank you for the courtesy of a consultation, please contact me for any additional questions
Patient seen and examined at bedside with resident in the ED at 10.30PM upon consultation request.  This is a 58 year old woman with morbid obesity and PMH as listed above including PUSHPA, COPD presented to the ED with SOB and found to have acute on chronic hypercapnic respiratory failure from COPD exacerbation. She was placed on BIPAP and tolerating.  VS stable. Exam - obese woman resting comfortably in bed and tolerating bi-level support, awake and alert, oriented x3, Lungs- bilateral air entry with mild rhonchi.  LABS reviewed. ABG remarkable for acute on chronic respiratory acidosis which is slowly improving, CXR -cardiomegaly and interstitial infiltrates.  A/P  Acute on chronic hypercapnic respiratory failure from COPD exacerbation and started on bipap therapy and tolerating. Hemodynamically stable.  -Continue bipap therapy, monitor ABG  -Bronchodilators, solu-medrol, supplemental oxygen between bi-level support.  -NPO for now.  -Patient stable for medicine floor at this time.   -Reconsult ICU as needed.

## 2018-10-29 NOTE — DISCHARGE NOTE ADULT - PLAN OF CARE
no SOB You presented with acute on chronic respiratory failure due to COPD exacerbation and CHF exacerbation with HFpEF. You intially received IV steroids, BIPAP, Duoneb and Levaquin. You are being discharged on tapering dose of steroids, You are recommended to take Prednisone 30 mg for 2 day then 20 mg for 2 days, then 10 mg for 2 more days and then discontinue You are recommended to maintain compliance with BIPAP (with inspiratory pressure of 16 and expiratory pressure of 5) at night and with Oxygen supplementation daytime. Also, continue DuoNeb every 6 hours, Budesonide twice daily, Tiotropium daily.  You are recommended to follow up with PCP in 1 week No SOB, medication and Bipap compliance Managed as mentioned above HR<100 During the hospital course you had Afib(new onset). Your HR was initially controlled with Cardizem. Cardiologist was consulted. You received Digoxin loading. You are recommended to take Digoxin 0.125 once daily. You finished Dig loading in the hospital setting. You are recommended to get Digoxin level tested on Nov 1, before your dose of digoxin. Also, you were taking Xarelto for PE. In the setting of Afib you are recommended to continue Xarelto for life time. Follow up with PCP in 1 week Hb<6.5 Your A1c was 5.1. YOu are recommended to take low carb diet, maintain compliance with diet, medications, and regularly check blood glucose level and follow up with PCP in 1 week You were on Xarelto for PE. you are recommended to take medications as advised and follow up with PCP You initially presented with CHF exacerbation with preserved EF and received IV lasix, You are recommended to take PO lasix 40 mg every day and follow up with PCP

## 2018-10-29 NOTE — DISCHARGE NOTE ADULT - CARE PLAN
Principal Discharge DX:	Acute on chronic respiratory failure with hypoxia and hypercapnia  Secondary Diagnosis:	COPD (chronic obstructive pulmonary disease)  Secondary Diagnosis:	Afib  Secondary Diagnosis:	Diabetes  Secondary Diagnosis:	HTN (hypertension)  Secondary Diagnosis:	PUSHPA (obstructive sleep apnea)  Secondary Diagnosis:	Pulmonary embolism Principal Discharge DX:	Acute on chronic respiratory failure with hypoxia and hypercapnia  Goal:	no SOB  Assessment and plan of treatment:	You presented with acute on chronic respiratory failure due to COPD exacerbation and CHF exacerbation with HFpEF. You intially received IV steroids, BIPAP, Duoneb and Levaquin. You are being discharged on tapering dose of steroids, You are recommended to take Prednisone 30 mg for 2 day then 20 mg for 2 days, then 10 mg for 2 more days and then discontinue You are recommended to maintain compliance with BIPAP (with inspiratory pressure of 16 and expiratory pressure of 5) at night and with Oxygen supplementation daytime. Also, continue DuoNeb every 6 hours, Budesonide twice daily, Tiotropium daily.  You are recommended to follow up with PCP in 1 week  Secondary Diagnosis:	COPD (chronic obstructive pulmonary disease)  Goal:	No SOB, medication and Bipap compliance  Assessment and plan of treatment:	Managed as mentioned above  Secondary Diagnosis:	Afib  Goal:	HR<100  Assessment and plan of treatment:	During the hospital course you had Afib(new onset). Your HR was initially controlled with Cardizem. Cardiologist was consulted. You received Digoxin loading. You are recommended to take Digoxin 0.125 once daily. You finished Dig loading in the hospital setting. You are recommended to get Digoxin level tested on Nov 1, before your dose of digoxin. Also, you were taking Xarelto for PE. In the setting of Afib you are recommended to continue Xarelto for life time. Follow up with PCP in 1 week  Secondary Diagnosis:	Diabetes  Goal:	Hb<6.5  Assessment and plan of treatment:	Your A1c was 5.1. YOu are recommended to take low carb diet, maintain compliance with diet, medications, and regularly check blood glucose level and follow up with PCP in 1 week  Secondary Diagnosis:	Pulmonary embolism  Assessment and plan of treatment:	You were on Xarelto for PE. you are recommended to take medications as advised and follow up with PCP  Secondary Diagnosis:	CHF (congestive heart failure), NYHA class I, acute on chronic, diastolic  Assessment and plan of treatment:	You initially presented with CHF exacerbation with preserved EF and received IV lasix, You are recommended to take PO lasix 40 mg every day and follow up with PCP

## 2018-10-29 NOTE — DISCHARGE NOTE ADULT - MEDICATION SUMMARY - MEDICATIONS TO TAKE
I will START or STAY ON the medications listed below when I get home from the hospital:    predniSONE 10 mg oral tablet  -- Take 3 tabs once a day for 3 days, then 2 tabs once a day for 3 days, than 1 tabs once a day for 3 days and then discontinue   -- It is very important that you take or use this exactly as directed.  Do not skip doses or discontinue unless directed by your doctor.  Obtain medical advice before taking any non-prescription drugs as some may affect the action of this medication.  Take with food or milk.    -- Indication: For COPD (chronic obstructive pulmonary disease)    aspirin 81 mg oral tablet, chewable  -- 1 tab(s) by mouth once a day  -- Indication: For CAD    acetaminophen 325 mg oral tablet  -- 2 tab(s) by mouth every 6 hours, As needed, Mild Pain (1 - 3)  -- Indication: For Pain    tamsulosin 0.4 mg oral capsule  -- 1 cap(s) by mouth once a day (at bedtime)  -- Indication: For Overactive bladder    digoxin 125 mcg (0.125 mg) oral tablet  -- 1 tab(s) by mouth once a day  -- Indication: For Afib    rivaroxaban 20 mg oral tablet  -- 1 tab(s) by mouth every 24 hours  -- Indication: For Pulmonary embolism    gabapentin 400 mg oral capsule  -- 1 cap(s) by mouth 3 times a day  -- Indication: For Pain    HumaLOG 100 units/mL injectable solution  -- 1 Unit(s) if Glucose 151 - 200  2 Unit(s) if Glucose 201 - 250  3 Unit(s) if Glucose 251 - 300  4 Unit(s) if Glucose 301 - 350  5 Unit(s) if Glucose 351 - 400  6 Unit(s) if Glucose GREATER THAN 400  -- Indication: For Diabetes    simvastatin 10 mg oral tablet  -- 1 tab(s) by mouth once a day (at bedtime)  -- Indication: For HLD    ipratropium-albuterol 0.5 mg-2.5 mg/3 mLinhalation solution  -- 3 milliliter(s) inhaled every 4 hours  -- Indication: For COPD (chronic obstructive pulmonary disease)    budesonide-formoterol 160 mcg-4.5 mcg/inh inhalation aerosol  --  inhaled   -- Indication: For COPD (chronic obstructive pulmonary disease)    amLODIPine 2.5 mg oral tablet  -- 1 tab(s) by mouth once a day   -- Indication: For HTN (hypertension)    furosemide 40 mg oral tablet  -- 1 tab(s) by mouth once a day  -- Indication: For CHF (congestive heart failure), NYHA class I, acute on chronic, diastolic    ferrous sulfate 325 mg (65 mg elemental iron) oral tablet  -- 1 tab(s) by mouth 3 times a day  -- Indication: For Supplement    docusate sodium 100 mg oral capsule  -- 1 cap(s) by mouth once a day  -- Indication: For Constipation    lactulose 10 g/15 mL oral syrup  -- 15 milliliter(s) by mouth every other day  -- Indication: For Constipation    montelukast 10 mg oral tablet  -- 1 tab(s) by mouth once a day  -- Indication: For COPD (chronic obstructive pulmonary disease)    latanoprost 0.005% ophthalmic solution  -- 1 drop(s) to each affected eye once a day (at bedtime)  -- Indication: For Allergies    brimonidine 0.2% ophthalmic solution  -- 1 drop(s) in the right eye 3 times a day  -- Indication: For Allergies    pantoprazole 40 mg oral delayed release tablet  -- 1 tab(s) by mouth once a day (before a meal)  -- Indication: For GERD

## 2018-10-29 NOTE — CONSULT NOTE ADULT - SUBJECTIVE AND OBJECTIVE BOX
CHIEF COMPLAINT: Shortness of breath    HPI: 57 yo F bedbound from nursing home with DM, COPD on O2, PE on Xarelto, and PUSHPA who presented with dyspnea in the setting of COPD exacerbation; has recently been admitted to [facility] for the same. During this admission patient was treated with BiPAP, IV sterids and nebulizers. Patient developed atrial fibrillation, for which cardiology service was called. Patient     PAST MEDICAL & SURGICAL HISTORY:  As above, also Urinary retention, CKD (chronic kidney disease), Morbid obesity, Hemorrhoids, Suprapubic catheter, History of appendectomy, S/P cholecystectomy    Allergies    Cheese (Pruritus)  oxycodone (Other (Moderate))  peanuts (Unknown)  penicillin (Rash)  seafood (Hives)  strawberry (Pruritus)  Tomatoes (Other)    MEDICATIONS  (STANDING):  ALBUTerol/ipratropium for Nebulization 3 milliLiter(s) Nebulizer every 4 hours  aspirin  chewable 81 milliGRAM(s) Oral daily  buDESOnide 160 MICROgram(s)/formoterol 4.5 MICROgram(s) Inhaler 2 Puff(s) Inhalation two times a day  dextrose 5%. 1000 milliLiter(s) (50 mL/Hr) IV Continuous <Continuous>  dextrose 50% Injectable 12.5 Gram(s) IV Push once  dextrose 50% Injectable 25 Gram(s) IV Push once  dextrose 50% Injectable 25 Gram(s) IV Push once  diltiazem    Tablet 30 milliGRAM(s) Oral every 6 hours  furosemide    Tablet 40 milliGRAM(s) Oral daily  gabapentin 400 milliGRAM(s) Oral three times a day  insulin lispro (HumaLOG) corrective regimen sliding scale   SubCutaneous three times a day before meals  levoFLOXacin  Tablet 500 milliGRAM(s) Oral every 24 hours  pantoprazole    Tablet 40 milliGRAM(s) Oral before breakfast  predniSONE   Tablet 40 milliGRAM(s) Oral daily  rivaroxaban 20 milliGRAM(s) Oral every 24 hours  simvastatin 20 milliGRAM(s) Oral at bedtime  tamsulosin 0.4 milliGRAM(s) Oral at bedtime    MEDICATIONS  (PRN):  acetaminophen   Tablet .. 650 milliGRAM(s) Oral every 6 hours PRN Mild Pain (1 - 3)  dextrose 40% Gel 15 Gram(s) Oral once PRN Blood Glucose LESS THAN 70 milliGRAM(s)/deciLiter  glucagon  Injectable 1 milliGRAM(s) IntraMuscular once PRN Glucose <70 milliGRAM(s)/deciLiter      FAMILY HISTORY:  No pertinent family history in first degree relatives    No family history of premature coronary artery disease or sudden cardiac death    SOCIAL HISTORY:  Smoking-  Alcohol-  Illicit Drug use-    REVIEW OF SYSTEMS:  Constitutional: [ ] fever, [ ]weight loss,  [ ]fatigue  Eyes: [ ] visual changes  Respiratory: [ ]shortness of breath;  [ ] cough, [ ]wheezing, [ ]chills, [ ]hemoptysis  Cardiovascular: [ ] chest pain, [ ]palpitations, [ ]dizziness,  [ ]leg swelling [ ]syncope  Gastrointestinal: [ ] abdominal pain, [ ]nausea, [ ]vomiting,  [ ]diarrhea   Genitourinary: [ ] dysuria, [ ] hematuria  Neurologic: [ ] headaches [ ] tremors  [ ] weakness [ ] lightheadedness  Skin: [ ] itching, [ ]burning, [ ] rashes  Endocrine: [ ] heat or cold intolerance  Musculoskeletal: [ ] joint pain or swelling; [ ] muscle, back, or extremity pain  Psychiatric: [ ] depression, [ ]anxiety, [ ]mood swings, or [ ]difficulty sleeping  Hematologic: [ ] easy bruising, [ ] bleeding gums       [ x] All others negative	  [ ] Unable to obtain    Vital Signs Last 24 Hrs  T(C): 37.2 (29 Oct 2018 11:25), Max: 37.3 (28 Oct 2018 19:11)  T(F): 99 (29 Oct 2018 11:25), Max: 99.1 (28 Oct 2018 19:11)  HR: 125 (29 Oct 2018 13:00) (80 - 125)  BP: 113/71 (29 Oct 2018 13:00) (110/59 - 129/65)  BP(mean): --  RR: 18 (29 Oct 2018 11:25) (18 - 18)  SpO2: 95% (29 Oct 2018 11:25) (91% - 97%)  I&O's Summary    28 Oct 2018 07:01  -  29 Oct 2018 07:00  --------------------------------------------------------  IN: 0 mL / OUT: 2500 mL / NET: -2500 mL        PHYSICAL EXAM:  General: No acute distress  HEENT: EOMI, PERRL  Neck: Supple, No JVD  Lungs: Clear to auscultation bilaterally; No rales or wheezing  Heart: Regular rate and rhythm; No murmurs, rubs, or gallops  Abdomen: Nontender, bowel sounds present  Extremities: No clubbing, cyanosis, or edema  Nervous system:  Alert & Oriented X3, no focal deficits  Psychiatric: Normal affect  Skin: No rashes or lesions      LABS:  10-29    139  |  97  |  28<H>  ----------------------------<  107<H>  3.6   |  37<H>  |  0.55    Ca    9.7      29 Oct 2018 08:38  Phos  4.0     10-29  Mg     2.2     10-29    TPro  8.2  /  Alb  2.8<L>  /  TBili  0.2  /  DBili  x   /  AST  29  /  ALT  58  /  AlkPhos  88  10-29    Creatinine Trend: 0.55<--, 0.50<--, 0.46<--, 0.34<--, 0.39<--, 0.38<--                        8.8    13.5  )-----------( 457      ( 29 Oct 2018 08:38 )             32.0         Lipid Panel:   Cardiac Enzymes: CARDIAC MARKERS ( 29 Oct 2018 13:18 )  <0.015 ng/mL / x     / 7 U/L / x     / <1.0 ng/mL        10-05 [id number] 5.1      RADIOLOGY: < from: Xray Chest 1 View- PORTABLE-Urgent (10.27.18 @ 03:00) >  IMPRESSION:  Increased interstitial lung markings without significant change.   Questionable new mild left perihilar opacities. Follow-up recommended.    No significant pleural effusion.    Heart size cannot be accurately assessed in this projection, but appear   enlarged.    < end of copied text >      ECG [my interpretation]:10/19/2018 @12:29: A fib with RVR @ 122 bpm with occasional PVCs vs. aberrantly conducted complexes    TELEMETRY:    ECHO: < from: Transthoracic Echocardiogram (10.26.18 @ 08:50) >  CONCLUSIONS:  1. Mitral annular calcification. Mild mitral regurgitation.    2. Normal trileaflet aortic valve.  3. Aortic Root: 3.1 cm.  4. Mild left atrial enlargement.  5. Mild concentric left ventricular hypertrophy.  6. Normal Left Ventricular Systolic Function,  (EF = 55 to 60%)  7. Grade II diastolic dysfunction.  8. Normal right atrium.  9. Normal right ventricular size and function.  10. Unable to estimate RVSP.  11. Normal tricuspid valve.  12. There is mild pulmonic regurgitation.  13. Normal pericardium with no pericardial effusion.    < end of copied text > CHIEF COMPLAINT: Shortness of breath    HPI: 59 yo F bedbound from nursing home with DM, COPD on O2, PE on Xarelto, and PUSHPA who presented with dyspnea in the setting of COPD exacerbation; has recently been admitted to [facility] for the same. During this admission patient was treated with BiPAP, IV sterids and nebulizers. Patient developed atrial fibrillation, for which cardiology service was called. Patient felt palpitations when her heart rate was in the 160s and higher; now it is in the 120s-130s and she does not feel palpitations. She reports her dyspnea overall has improved since admission. Denies any chest pain, lightheadedness, or syncope. Has not felt similar palpitations in the past.     PAST MEDICAL & SURGICAL HISTORY:  As above, also Urinary retention, CKD (chronic kidney disease), Morbid obesity, Hemorrhoids, Suprapubic catheter, History of appendectomy, S/P cholecystectomy    Allergies    Cheese (Pruritus)  oxycodone (Other (Moderate))  peanuts (Unknown)  penicillin (Rash)  seafood (Hives)  strawberry (Pruritus)  Tomatoes (Other)    MEDICATIONS  (STANDING):  ALBUTerol/ipratropium for Nebulization 3 milliLiter(s) Nebulizer every 4 hours  aspirin  chewable 81 milliGRAM(s) Oral daily  buDESOnide 160 MICROgram(s)/formoterol 4.5 MICROgram(s) Inhaler 2 Puff(s) Inhalation two times a day  dextrose 5%. 1000 milliLiter(s) (50 mL/Hr) IV Continuous <Continuous>  dextrose 50% Injectable 12.5 Gram(s) IV Push once  dextrose 50% Injectable 25 Gram(s) IV Push once  dextrose 50% Injectable 25 Gram(s) IV Push once  diltiazem    Tablet 30 milliGRAM(s) Oral every 6 hours  furosemide    Tablet 40 milliGRAM(s) Oral daily  gabapentin 400 milliGRAM(s) Oral three times a day  insulin lispro (HumaLOG) corrective regimen sliding scale   SubCutaneous three times a day before meals  levoFLOXacin  Tablet 500 milliGRAM(s) Oral every 24 hours  pantoprazole    Tablet 40 milliGRAM(s) Oral before breakfast  predniSONE   Tablet 40 milliGRAM(s) Oral daily  rivaroxaban 20 milliGRAM(s) Oral every 24 hours  simvastatin 20 milliGRAM(s) Oral at bedtime  tamsulosin 0.4 milliGRAM(s) Oral at bedtime    MEDICATIONS  (PRN):  acetaminophen   Tablet .. 650 milliGRAM(s) Oral every 6 hours PRN Mild Pain (1 - 3)  dextrose 40% Gel 15 Gram(s) Oral once PRN Blood Glucose LESS THAN 70 milliGRAM(s)/deciLiter  glucagon  Injectable 1 milliGRAM(s) IntraMuscular once PRN Glucose <70 milliGRAM(s)/deciLiter      FAMILY HISTORY:  No family history of premature coronary artery disease or sudden cardiac death    SOCIAL HISTORY:  Smoking-quit 30 years ago  Alcohol-denies  Illicit Drug use-denies    REVIEW OF SYSTEMS:  Constitutional: [ ] fever, [ ]weight loss,  [ ]fatigue  Eyes: [ ] visual changes  Respiratory: [ x]shortness of breath;  [ ] cough, [ ]wheezing, [ ]chills, [ ]hemoptysis  Cardiovascular: [ ] chest pain, [x ]palpitations, [ ]dizziness,  [ ]leg swelling [ ]syncope  Gastrointestinal: [ ] abdominal pain, [ ]nausea, [ ]vomiting,  [ ]diarrhea   Genitourinary: [ ] dysuria, [ ] hematuria  Neurologic: [ ] headaches [ ] tremors  [ ] weakness [ ] lightheadedness  Skin: [ ] itching, [ ]burning, [ ] rashes  Endocrine: [ ] heat or cold intolerance  Musculoskeletal: [ ] joint pain or swelling; [ ] muscle, back, or extremity pain  Psychiatric: [ ] depression, [ ]anxiety, [ ]mood swings, or [ ]difficulty sleeping  Hematologic: [ ] easy bruising, [ ] bleeding gums       [ x] All others negative	  [ ] Unable to obtain    Vital Signs Last 24 Hrs  T(C): 37.2 (29 Oct 2018 11:25), Max: 37.3 (28 Oct 2018 19:11)  T(F): 99 (29 Oct 2018 11:25), Max: 99.1 (28 Oct 2018 19:11)  HR: 125 (29 Oct 2018 13:00) (80 - 125)  BP: 113/71 (29 Oct 2018 13:00) (110/59 - 129/65)  BP(mean): --  RR: 18 (29 Oct 2018 11:25) (18 - 18)  SpO2: 95% (29 Oct 2018 11:25) (91% - 97%)  I&O's Summary    28 Oct 2018 07:01  -  29 Oct 2018 07:00  --------------------------------------------------------  IN: 0 mL / OUT: 2500 mL / NET: -2500 mL        PHYSICAL EXAM:  General: No acute distress  HEENT: EOMI, PERRL  Neck: Supple, No JVD  Lungs: Clear to auscultation bilaterally; No rales or wheezing  Heart: Regular rate and rhythm; No murmurs, rubs, or gallops  Abdomen: Nontender, bowel sounds present  Extremities: No clubbing, cyanosis, or edema  Nervous system:  Alert & Oriented X3, no focal deficits  Psychiatric: Normal affect  Skin: No rashes or lesions      LABS:  10-29    139  |  97  |  28<H>  ----------------------------<  107<H>  3.6   |  37<H>  |  0.55    Ca    9.7      29 Oct 2018 08:38  Phos  4.0     10-29  Mg     2.2     10-29    TPro  8.2  /  Alb  2.8<L>  /  TBili  0.2  /  DBili  x   /  AST  29  /  ALT  58  /  AlkPhos  88  10-29    Creatinine Trend: 0.55<--, 0.50<--, 0.46<--, 0.34<--, 0.39<--, 0.38<--                        8.8    13.5  )-----------( 457      ( 29 Oct 2018 08:38 )             32.0         Lipid Panel:   Cardiac Enzymes: CARDIAC MARKERS ( 29 Oct 2018 13:18 )  <0.015 ng/mL / x     / 7 U/L / x     / <1.0 ng/mL        10-05 [id number] 5.1      RADIOLOGY: < from: Xray Chest 1 View- PORTABLE-Urgent (10.27.18 @ 03:00) >  IMPRESSION:  Increased interstitial lung markings without significant change.   Questionable new mild left perihilar opacities. Follow-up recommended.    No significant pleural effusion.    Heart size cannot be accurately assessed in this projection, but appear   enlarged.    < end of copied text >      ECG [my interpretation]:10/19/2018 @12:29: A fib with RVR @ 122 bpm with occasional PVCs vs. aberrantly conducted complexes    TELEMETRY: A fib with RVR (now in the 130s, before was in the 150s and above)    ECHO: < from: Transthoracic Echocardiogram (10.26.18 @ 08:50) >  CONCLUSIONS:  1. Mitral annular calcification. Mild mitral regurgitation.  2. Normal trileaflet aortic valve.  3. Aortic Root: 3.1 cm.  4. Mild left atrial enlargement.  5. Mild concentric left ventricular hypertrophy.  6. Normal Left Ventricular Systolic Function,  (EF = 55 to 60%)  7. Grade II diastolic dysfunction.  8. Normal right atrium.  9. Normal right ventricular size and function.  10. Unable to estimate RVSP.  11. Normal tricuspid valve.  12. There is mild pulmonic regurgitation.  13. Normal pericardium with no pericardial effusion.    < end of copied text >

## 2018-10-29 NOTE — PROGRESS NOTE ADULT - PROBLEM SELECTOR PLAN 5
UA positive   no fever noted   c/w levaquin for UTI and COPD exacerbation   follow Urine culture : contaminated  and blood culture: negative

## 2018-10-29 NOTE — PROGRESS NOTE ADULT - PROBLEM SELECTOR PLAN 2
resolved   -continue with prednisone, duoneb, symbicort  - Monitor O2 saturation resolved   -continue with prednisone, Duoneb, Symbicort  - Monitor O2 saturation

## 2018-10-29 NOTE — DISCHARGE NOTE ADULT - SECONDARY DIAGNOSIS.
PUSHPA (obstructive sleep apnea) Pulmonary embolism COPD (chronic obstructive pulmonary disease) Afib Diabetes HTN (hypertension) CHF (congestive heart failure), NYHA class I, acute on chronic, diastolic

## 2018-10-29 NOTE — CONSULT NOTE ADULT - ASSESSMENT
59 yo F bedbound from nursing home with DM, COPD on O2, PE on Xarelto, and PUSHPA who presented with dyspnea in the setting of COPD exacerbation; now with new-onset atrial fibrillation    1. Atrial fibrillation:  -EF preserved on echo, mild LA dilation, TSH 0.48  -Rate controlled with diltiazem 30mg Q6H  -CHADS2-VASc score is 2 (diabetes, female), consistent with 2.2% annual risk of stroke if off systemic anticoagulation. Patient is already on Xarelto for PE; in light of A fib would continue Xarelto indefinitely      2. HLD: On simvastatin 20mg QHS  **In the setting of diltiazem, simvastatin dosage should not exceed 10mg/day    ***Note that this is a preliminary note and any recommendations should NOT be carried out until this note is finalized. *** 57 yo F bedbound from nursing home with DM, COPD on O2, PE on Xarelto, and PUSHPA who presented with dyspnea in the setting of COPD exacerbation; now with new-onset atrial fibrillation    1. Atrial fibrillation:  -EF preserved on echo, mild LA dilation, TSH 0.48  -CHADS2-VASc score is 2 (diabetes, female), consistent with 2.2% annual risk of stroke if off systemic anticoagulation. Patient is already on Xarelto for PE; in light of A fib would continue Xarelto indefinitely  -Suboptimally rate controlled with diltiazem 30mg Q6H; BP is borderline low so would not increase further  -Recommend IV digoxin loading with 0.5mg IV x1, followed by 0.25mg IV x1 in 8 hours and an additional 0.25mg IV x1 8 hours after that (will receive last IV dose tomorrow afternoon/evening). Then the following morning can start 0.125mg PO daily.  -Check digoxin levels after 4th dose.   -As rate control improves, can wean down on diltiazem    2. HLD: On simvastatin 20mg QHS  **In the setting of diltiazem, simvastatin dosage should not exceed 10mg/day

## 2018-10-29 NOTE — DISCHARGE NOTE ADULT - PATIENT PORTAL LINK FT
You can access the [facility], offered by [facility], by registering with the following website: [email or web address]

## 2018-10-29 NOTE — PROGRESS NOTE ADULT - ASSESSMENT
58 year old morbidly obese female, bed bound from Nursing Home with PMHx of  DM, COPD on home O2, PUSHPA (not on CPAP,) chronic urinary retention s/p suprapubic cath, PE on Xarelto,  sent from ED for respiratory distress and SOB.    Patient initially admitted for A/C resp failure due to COPD exacerbation. Developed Afib today on tele monitoring

## 2018-10-29 NOTE — PROGRESS NOTE ADULT - SUBJECTIVE AND OBJECTIVE BOX
PGY 1 Note discussed with supervising resident and primary attending    Patient is a 58y old  Female who presents with a chief complaint of SOB and hypoxia (29 Oct 2018 15:12)      INTERVAL HPI/OVERNIGHT EVENTS:  Patient seen at the bedside. Patient developed Afib on tele monitoring during the day     MEDICATIONS  (STANDING):  ALBUTerol/ipratropium for Nebulization 3 milliLiter(s) Nebulizer every 4 hours  aspirin  chewable 81 milliGRAM(s) Oral daily  buDESOnide 160 MICROgram(s)/formoterol 4.5 MICROgram(s) Inhaler 2 Puff(s) Inhalation two times a day  dextrose 5%. 1000 milliLiter(s) (50 mL/Hr) IV Continuous <Continuous>  dextrose 50% Injectable 12.5 Gram(s) IV Push once  dextrose 50% Injectable 25 Gram(s) IV Push once  dextrose 50% Injectable 25 Gram(s) IV Push once  diltiazem    Tablet 30 milliGRAM(s) Oral every 6 hours  furosemide    Tablet 40 milliGRAM(s) Oral daily  gabapentin 400 milliGRAM(s) Oral three times a day  insulin lispro (HumaLOG) corrective regimen sliding scale   SubCutaneous three times a day before meals  levoFLOXacin  Tablet 500 milliGRAM(s) Oral every 24 hours  pantoprazole    Tablet 40 milliGRAM(s) Oral before breakfast  predniSONE   Tablet 40 milliGRAM(s) Oral daily  rivaroxaban 20 milliGRAM(s) Oral every 24 hours  simvastatin 10 milliGRAM(s) Oral at bedtime  tamsulosin 0.4 milliGRAM(s) Oral at bedtime    MEDICATIONS  (PRN):  acetaminophen   Tablet .. 650 milliGRAM(s) Oral every 6 hours PRN Mild Pain (1 - 3)  dextrose 40% Gel 15 Gram(s) Oral once PRN Blood Glucose LESS THAN 70 milliGRAM(s)/deciLiter  glucagon  Injectable 1 milliGRAM(s) IntraMuscular once PRN Glucose <70 milliGRAM(s)/deciLiter      __________________________________________________  REVIEW OF SYSTEMS:    CONSTITUTIONAL: No fever,   EYES: no acute visual disturbances  NECK: No pain or stiffness  RESPIRATORY: No cough; No shortness of breath  CARDIOVASCULAR: No chest pain, no palpitations  GASTROINTESTINAL: No pain. No nausea or vomiting; No diarrhea   NEUROLOGICAL: No headache or numbness, no tremors  MUSCULOSKELETAL: No joint pain, no muscle pain  GENITOURINARY: no dysuria, no frequency, no hesitancy  PSYCHIATRY: no depression , no anxiety  ALL OTHER  ROS negative        Vital Signs Last 24 Hrs  T(C): 37.1 (29 Oct 2018 16:17), Max: 37.3 (28 Oct 2018 19:11)  T(F): 98.8 (29 Oct 2018 16:17), Max: 99.1 (28 Oct 2018 19:11)  HR: 92 (29 Oct 2018 16:17) (80 - 125)  BP: 110/59 (29 Oct 2018 16:17) (110/59 - 129/65)  BP(mean): --  RR: 24 (29 Oct 2018 16:17) (18 - 24)  SpO2: 98% (29 Oct 2018 16:17) (91% - 98%)    ________________________________________________  PHYSICAL EXAM:  GENERAL: NAD  HEENT: Normocephalic;  conjunctivae and sclerae clear; moist mucous membranes;   NECK : supple  CHEST/LUNG: Clear to auscultation bilaterally with good air entry   HEART: S1 S2  regular; no murmurs, gallops or rubs  ABDOMEN: Soft, Nontender, Nondistended; Bowel sounds present  EXTREMITIES: no cyanosis; no edema; no calf tenderness  SKIN: warm and dry; no rash  NERVOUS SYSTEM:  Awake and alert; Oriented  to place, person and time ; no new deficits    _________________________________________________  LABS:                        8.8    13.5  )-----------( 457      ( 29 Oct 2018 08:38 )             32.0     10-29    139  |  97  |  28<H>  ----------------------------<  107<H>  3.6   |  37<H>  |  0.55    Ca    9.7      29 Oct 2018 08:38  Phos  4.0     10-29  Mg     2.2     10-29    TPro  8.2  /  Alb  2.8<L>  /  TBili  0.2  /  DBili  x   /  AST  29  /  ALT  58  /  AlkPhos  88  10-29        CAPILLARY BLOOD GLUCOSE      POCT Blood Glucose.: 104 mg/dL (29 Oct 2018 16:49)  POCT Blood Glucose.: 233 mg/dL (29 Oct 2018 11:43)  POCT Blood Glucose.: 134 mg/dL (29 Oct 2018 08:04)  POCT Blood Glucose.: 144 mg/dL (28 Oct 2018 21:04)        RADIOLOGY & ADDITIONAL TESTS:    Imaging Personally Reviewed:  YES/NO    Consultant(s) Notes Reviewed:   YES/ No    Care Discussed with Consultants :     Plan of care was discussed with patient and /or primary care giver; all questions and concerns were addressed and care was aligned with patient's wishes.

## 2018-10-30 LAB
ALBUMIN SERPL ELPH-MCNC: 2.5 G/DL — LOW (ref 3.5–5)
ALP SERPL-CCNC: 90 U/L — SIGNIFICANT CHANGE UP (ref 40–120)
ALT FLD-CCNC: 80 U/L DA — HIGH (ref 10–60)
ANION GAP SERPL CALC-SCNC: 8 MMOL/L — SIGNIFICANT CHANGE UP (ref 5–17)
AST SERPL-CCNC: 25 U/L — SIGNIFICANT CHANGE UP (ref 10–40)
BASOPHILS # BLD AUTO: 0.1 K/UL — SIGNIFICANT CHANGE UP (ref 0–0.2)
BASOPHILS NFR BLD AUTO: 0.4 % — SIGNIFICANT CHANGE UP (ref 0–2)
BILIRUB SERPL-MCNC: 0.2 MG/DL — SIGNIFICANT CHANGE UP (ref 0.2–1.2)
BUN SERPL-MCNC: 28 MG/DL — HIGH (ref 7–18)
CALCIUM SERPL-MCNC: 9.2 MG/DL — SIGNIFICANT CHANGE UP (ref 8.4–10.5)
CHLORIDE SERPL-SCNC: 97 MMOL/L — SIGNIFICANT CHANGE UP (ref 96–108)
CK MB CFR SERPL CALC: <1 NG/ML — SIGNIFICANT CHANGE UP (ref 0–3.6)
CK SERPL-CCNC: <7 U/L — LOW (ref 21–215)
CO2 SERPL-SCNC: 33 MMOL/L — HIGH (ref 22–31)
CREAT SERPL-MCNC: 0.52 MG/DL — SIGNIFICANT CHANGE UP (ref 0.5–1.3)
EOSINOPHIL # BLD AUTO: 0.1 K/UL — SIGNIFICANT CHANGE UP (ref 0–0.5)
EOSINOPHIL NFR BLD AUTO: 0.6 % — SIGNIFICANT CHANGE UP (ref 0–6)
GLUCOSE SERPL-MCNC: 85 MG/DL — SIGNIFICANT CHANGE UP (ref 70–99)
HCT VFR BLD CALC: 31.6 % — LOW (ref 34.5–45)
HGB BLD-MCNC: 8.7 G/DL — LOW (ref 11.5–15.5)
LYMPHOCYTES # BLD AUTO: 1.8 K/UL — SIGNIFICANT CHANGE UP (ref 1–3.3)
LYMPHOCYTES # BLD AUTO: 14.5 % — SIGNIFICANT CHANGE UP (ref 13–44)
MAGNESIUM SERPL-MCNC: 2.5 MG/DL — SIGNIFICANT CHANGE UP (ref 1.6–2.6)
MCHC RBC-ENTMCNC: 23.7 PG — LOW (ref 27–34)
MCHC RBC-ENTMCNC: 27.6 GM/DL — LOW (ref 32–36)
MCV RBC AUTO: 85.8 FL — SIGNIFICANT CHANGE UP (ref 80–100)
MONOCYTES # BLD AUTO: 1 K/UL — HIGH (ref 0–0.9)
MONOCYTES NFR BLD AUTO: 7.9 % — SIGNIFICANT CHANGE UP (ref 2–14)
NEUTROPHILS # BLD AUTO: 9.4 K/UL — HIGH (ref 1.8–7.4)
NEUTROPHILS NFR BLD AUTO: 76.6 % — SIGNIFICANT CHANGE UP (ref 43–77)
PHOSPHATE SERPL-MCNC: 3.3 MG/DL — SIGNIFICANT CHANGE UP (ref 2.5–4.5)
PLATELET # BLD AUTO: 428 K/UL — HIGH (ref 150–400)
POTASSIUM SERPL-MCNC: 3.5 MMOL/L — SIGNIFICANT CHANGE UP (ref 3.5–5.3)
POTASSIUM SERPL-SCNC: 3.5 MMOL/L — SIGNIFICANT CHANGE UP (ref 3.5–5.3)
PROT SERPL-MCNC: 7.5 G/DL — SIGNIFICANT CHANGE UP (ref 6–8.3)
RBC # BLD: 3.68 M/UL — LOW (ref 3.8–5.2)
RBC # FLD: 20.2 % — HIGH (ref 10.3–14.5)
SODIUM SERPL-SCNC: 138 MMOL/L — SIGNIFICANT CHANGE UP (ref 135–145)
TROPONIN I SERPL-MCNC: <0.015 NG/ML — SIGNIFICANT CHANGE UP (ref 0–0.04)
WBC # BLD: 12.3 K/UL — HIGH (ref 3.8–10.5)
WBC # FLD AUTO: 12.3 K/UL — HIGH (ref 3.8–10.5)

## 2018-10-30 PROCEDURE — 99233 SBSQ HOSP IP/OBS HIGH 50: CPT | Mod: GC

## 2018-10-30 PROCEDURE — 99232 SBSQ HOSP IP/OBS MODERATE 35: CPT

## 2018-10-30 RX ORDER — RIVAROXABAN 15 MG-20MG
1 KIT ORAL
Qty: 0 | Refills: 0 | COMMUNITY
Start: 2018-10-30

## 2018-10-30 RX ORDER — GABAPENTIN 400 MG/1
1 CAPSULE ORAL
Qty: 0 | Refills: 0 | COMMUNITY
Start: 2018-10-30

## 2018-10-30 RX ORDER — SENNA PLUS 8.6 MG/1
1 TABLET ORAL DAILY
Qty: 0 | Refills: 0 | Status: DISCONTINUED | OUTPATIENT
Start: 2018-10-30 | End: 2018-11-02

## 2018-10-30 RX ORDER — TAMSULOSIN HYDROCHLORIDE 0.4 MG/1
1 CAPSULE ORAL
Qty: 0 | Refills: 0 | COMMUNITY
Start: 2018-10-30

## 2018-10-30 RX ORDER — PANTOPRAZOLE SODIUM 20 MG/1
1 TABLET, DELAYED RELEASE ORAL
Qty: 0 | Refills: 0 | COMMUNITY
Start: 2018-10-30

## 2018-10-30 RX ORDER — DIGOXIN 250 MCG
1 TABLET ORAL
Qty: 0 | Refills: 0 | COMMUNITY
Start: 2018-10-30

## 2018-10-30 RX ORDER — DOCUSATE SODIUM 100 MG
100 CAPSULE ORAL DAILY
Qty: 0 | Refills: 0 | Status: DISCONTINUED | OUTPATIENT
Start: 2018-10-30 | End: 2018-11-02

## 2018-10-30 RX ORDER — DIGOXIN 250 MCG
0.12 TABLET ORAL DAILY
Qty: 0 | Refills: 0 | Status: DISCONTINUED | OUTPATIENT
Start: 2018-10-31 | End: 2018-11-01

## 2018-10-30 RX ORDER — IPRATROPIUM/ALBUTEROL SULFATE 18-103MCG
3 AEROSOL WITH ADAPTER (GRAM) INHALATION
Qty: 0 | Refills: 0 | COMMUNITY
Start: 2018-10-30

## 2018-10-30 RX ORDER — ASPIRIN/CALCIUM CARB/MAGNESIUM 324 MG
1 TABLET ORAL
Qty: 0 | Refills: 0 | COMMUNITY
Start: 2018-10-30

## 2018-10-30 RX ORDER — AMLODIPINE BESYLATE 2.5 MG/1
1 TABLET ORAL
Qty: 30 | Refills: 0 | OUTPATIENT
Start: 2018-10-30 | End: 2018-11-28

## 2018-10-30 RX ORDER — LATANOPROST 0.05 MG/ML
1 SOLUTION/ DROPS OPHTHALMIC; TOPICAL AT BEDTIME
Qty: 0 | Refills: 0 | Status: DISCONTINUED | OUTPATIENT
Start: 2018-10-30 | End: 2018-11-02

## 2018-10-30 RX ORDER — FUROSEMIDE 40 MG
1 TABLET ORAL
Qty: 0 | Refills: 0 | COMMUNITY
Start: 2018-10-30

## 2018-10-30 RX ORDER — ACETAMINOPHEN 500 MG
2 TABLET ORAL
Qty: 0 | Refills: 0 | COMMUNITY
Start: 2018-10-30

## 2018-10-30 RX ORDER — SIMVASTATIN 20 MG/1
1 TABLET, FILM COATED ORAL
Qty: 0 | Refills: 0 | COMMUNITY
Start: 2018-10-30

## 2018-10-30 RX ORDER — BUDESONIDE AND FORMOTEROL FUMARATE DIHYDRATE 160; 4.5 UG/1; UG/1
0 AEROSOL RESPIRATORY (INHALATION)
Qty: 0 | Refills: 0 | COMMUNITY
Start: 2018-10-30

## 2018-10-30 RX ORDER — POLYETHYLENE GLYCOL 3350 17 G/17G
17 POWDER, FOR SOLUTION ORAL DAILY
Qty: 0 | Refills: 0 | Status: DISCONTINUED | OUTPATIENT
Start: 2018-10-30 | End: 2018-11-02

## 2018-10-30 RX ADMIN — Medication 3 MILLILITER(S): at 09:11

## 2018-10-30 RX ADMIN — Medication 650 MILLIGRAM(S): at 23:40

## 2018-10-30 RX ADMIN — GABAPENTIN 400 MILLIGRAM(S): 400 CAPSULE ORAL at 15:07

## 2018-10-30 RX ADMIN — Medication 3 MILLILITER(S): at 17:00

## 2018-10-30 RX ADMIN — TAMSULOSIN HYDROCHLORIDE 0.4 MILLIGRAM(S): 0.4 CAPSULE ORAL at 22:44

## 2018-10-30 RX ADMIN — BUDESONIDE AND FORMOTEROL FUMARATE DIHYDRATE 2 PUFF(S): 160; 4.5 AEROSOL RESPIRATORY (INHALATION) at 12:14

## 2018-10-30 RX ADMIN — Medication 40 MILLIGRAM(S): at 06:15

## 2018-10-30 RX ADMIN — Medication 3 MILLILITER(S): at 14:44

## 2018-10-30 RX ADMIN — Medication 650 MILLIGRAM(S): at 22:46

## 2018-10-30 RX ADMIN — Medication 3 MILLILITER(S): at 21:00

## 2018-10-30 RX ADMIN — SIMVASTATIN 10 MILLIGRAM(S): 20 TABLET, FILM COATED ORAL at 22:44

## 2018-10-30 RX ADMIN — Medication 650 MILLIGRAM(S): at 07:42

## 2018-10-30 RX ADMIN — Medication 0.25 MILLIGRAM(S): at 12:14

## 2018-10-30 RX ADMIN — BUDESONIDE AND FORMOTEROL FUMARATE DIHYDRATE 2 PUFF(S): 160; 4.5 AEROSOL RESPIRATORY (INHALATION) at 22:44

## 2018-10-30 RX ADMIN — Medication 650 MILLIGRAM(S): at 06:15

## 2018-10-30 RX ADMIN — RIVAROXABAN 20 MILLIGRAM(S): KIT at 17:28

## 2018-10-30 RX ADMIN — Medication 3 MILLILITER(S): at 02:21

## 2018-10-30 RX ADMIN — Medication 81 MILLIGRAM(S): at 12:14

## 2018-10-30 RX ADMIN — Medication 1: at 12:14

## 2018-10-30 RX ADMIN — GABAPENTIN 400 MILLIGRAM(S): 400 CAPSULE ORAL at 06:15

## 2018-10-30 RX ADMIN — Medication 3 MILLILITER(S): at 06:15

## 2018-10-30 RX ADMIN — PANTOPRAZOLE SODIUM 40 MILLIGRAM(S): 20 TABLET, DELAYED RELEASE ORAL at 06:15

## 2018-10-30 RX ADMIN — GABAPENTIN 400 MILLIGRAM(S): 400 CAPSULE ORAL at 22:44

## 2018-10-30 RX ADMIN — Medication 0.25 MILLIGRAM(S): at 01:03

## 2018-10-30 NOTE — CHART NOTE - NSCHARTNOTEFT_GEN_A_CORE
Called to evaluate patient for suspected hidradenitis of sacrum    patient seen at bedside.   Pt is bedbound.   Pt admits to having "boils" under arms since youth.     Pt refused examination and would not allow visualization of wound  In addition, pt states she does not want any surgery done.     Please reconsult surgery when patient is amenable to exam however intervention will be limited as pt refuses surgical intervention.

## 2018-10-30 NOTE — PROGRESS NOTE ADULT - SUBJECTIVE AND OBJECTIVE BOX
Time of Visit:  Patient seen and examined.     MEDICATIONS  (STANDING):  ALBUTerol/ipratropium for Nebulization 3 milliLiter(s) Nebulizer every 4 hours  aspirin  chewable 81 milliGRAM(s) Oral daily  buDESOnide 160 MICROgram(s)/formoterol 4.5 MICROgram(s) Inhaler 2 Puff(s) Inhalation two times a day  dextrose 5%. 1000 milliLiter(s) (50 mL/Hr) IV Continuous <Continuous>  dextrose 50% Injectable 12.5 Gram(s) IV Push once  dextrose 50% Injectable 25 Gram(s) IV Push once  dextrose 50% Injectable 25 Gram(s) IV Push once  furosemide    Tablet 40 milliGRAM(s) Oral daily  gabapentin 400 milliGRAM(s) Oral three times a day  insulin lispro (HumaLOG) corrective regimen sliding scale   SubCutaneous three times a day before meals  levoFLOXacin  Tablet 500 milliGRAM(s) Oral every 24 hours  pantoprazole    Tablet 40 milliGRAM(s) Oral before breakfast  predniSONE   Tablet 40 milliGRAM(s) Oral daily  rivaroxaban 20 milliGRAM(s) Oral every 24 hours  simvastatin 10 milliGRAM(s) Oral at bedtime  tamsulosin 0.4 milliGRAM(s) Oral at bedtime      MEDICATIONS  (PRN):  acetaminophen   Tablet .. 650 milliGRAM(s) Oral every 6 hours PRN Mild Pain (1 - 3)  dextrose 40% Gel 15 Gram(s) Oral once PRN Blood Glucose LESS THAN 70 milliGRAM(s)/deciLiter  glucagon  Injectable 1 milliGRAM(s) IntraMuscular once PRN Glucose <70 milliGRAM(s)/deciLiter       Medications up to date at time of exam.    ROS: No fever, chills, cough, congestion, SOB.  PHYSICAL EXAMINATION:  Vital Signs Last 24 Hrs  T(C): 37 (30 Oct 2018 11:44), Max: 37.1 (29 Oct 2018 16:17)  T(F): 98.6 (30 Oct 2018 11:44), Max: 98.8 (29 Oct 2018 16:17)  HR: 77 (30 Oct 2018 11:44) (76 - 125)  BP: 110/63 (30 Oct 2018 11:44) (90/53 - 113/71)  BP(mean): --  RR: 16 (30 Oct 2018 11:44) (16 - 24)  SpO2: 97% (30 Oct 2018 11:44) (95% - 100%)   (if applicable)    General; Alert and oriented. No acute distress . Morbid Obese. Able to answer question with no SOB.    HEENT: Normocephalic and atraumatic. No nasal tenderness. Extraocular muscles are intact. Moist mucosa.     NECK: Supple, no palpable adenopathy.    LUNGS: Clear to auscultation, no wheezing, rales, or rhonchi. No use of accessory muscle.     HEART: S1 S2 Regular rate and no click/ rub.     ABDOMEN: Soft, nontender, and nondistended.  No hepatosplenomegaly is noted. Active bowel sounds.     EXTREMITIES: Without any cyanosis, clubbing, rash.    NEUROLOGIC: Awake, alert, oriented.     SKIN: Warm and moist. Non diaphoretic.         LABS:                        8.7    12.3  )-----------( 428      ( 30 Oct 2018 07:29 )             31.6     10-30    138  |  97  |  28<H>  ----------------------------<  85  3.5   |  33<H>  |  0.52    Ca    9.2      30 Oct 2018 07:29  Phos  3.3     10-30  Mg     2.5     10-30    TPro  7.5  /  Alb  2.5<L>  /  TBili  0.2  /  DBili  x   /  AST  25  /  ALT  80<H>  /  AlkPhos  90  10-30          CARDIAC MARKERS ( 30 Oct 2018 07:29 )  <0.015 ng/mL / x     / <7 U/L / x     / <1.0 ng/mL  CARDIAC MARKERS ( 29 Oct 2018 20:12 )  <0.015 ng/mL / x     / 9 U/L / x     / <1.0 ng/mL  CARDIAC MARKERS ( 29 Oct 2018 13:18 )  <0.015 ng/mL / x     / 7 U/L / x     / <1.0 ng/mL      RADIOLOGY & ADDITIONAL STUDIES:  EKG:   CXR: < from: Xray Chest 1 View- PORTABLE-Urgent (10.27.18 @ 03:00) >  EXAM:  XR CHEST PORTABLE URGENT 1V                            PROCEDURE DATE:  10/27/2018          INTERPRETATION:  CLINICAL STATEMENT: Follow-up chest pain.    TECHNIQUE: AP view of the chest.    COMPARISON: 10/25/2018    FINDINGS/  IMPRESSION:  Increased interstitial lung markings without significant change.   Questionable new mild left perihilar opacities. Follow-up recommended.    No significant pleural effusion.    Heart size cannot be accurately assessed in this projection, but appear   enlarged.    < end of copied text >    ECHO:    IMPRESSION: 58y Female PAST MEDICAL & SURGICAL HISTORY:  Urinary retention  CKD (chronic kidney disease)  DM (diabetes mellitus)  HTN (hypertension)  COPD (chronic obstructive pulmonary disease)  Morbid obesity  Diabetes  Overactive bladder  Hemorrhoids  Emphysema  Suprapubic catheter  S/P   History of appendectomy  S/P cholecystectomy     Impression; 57 Y/O Female with prior mentioned multiple chronic conditions. Presented with progressive worsening of SOB requiring NIPPV support. She was evaluated by ICU team and rejected. Now in Medicine Unit, on Oxygen supplementation and Bipap at night. RVP, BLD Cx x 2 negative.       Suggestion:  Continue oxygen supplementation NC. Bipap 16/5 FIO2 50% at night. Recommending PAP outpatient, per patient she has portable PAP at home but never used it and had it nursing home but she verbalized that she is using the nursing home PAP on and off at night. Reinforced to comply with Oxygen supplementation daytime and PAP at night.  Continue DuoNeb Q 6 hours. Budesonide twice daily. Tiotropium daily.  On Xarelto Daily .  Continue Prednisone 40 mg daily.   GI prophylactic.   Monitor Hgb/Hct, latest Hgb/Hct 8.7/31.6. Time of Visit:  Patient seen and examined.     MEDICATIONS  (STANDING):  ALBUTerol/ipratropium for Nebulization 3 milliLiter(s) Nebulizer every 4 hours  aspirin  chewable 81 milliGRAM(s) Oral daily  buDESOnide 160 MICROgram(s)/formoterol 4.5 MICROgram(s) Inhaler 2 Puff(s) Inhalation two times a day  dextrose 5%. 1000 milliLiter(s) (50 mL/Hr) IV Continuous <Continuous>  dextrose 50% Injectable 12.5 Gram(s) IV Push once  dextrose 50% Injectable 25 Gram(s) IV Push once  dextrose 50% Injectable 25 Gram(s) IV Push once  furosemide    Tablet 40 milliGRAM(s) Oral daily  gabapentin 400 milliGRAM(s) Oral three times a day  insulin lispro (HumaLOG) corrective regimen sliding scale   SubCutaneous three times a day before meals  levoFLOXacin  Tablet 500 milliGRAM(s) Oral every 24 hours  pantoprazole    Tablet 40 milliGRAM(s) Oral before breakfast  predniSONE   Tablet 40 milliGRAM(s) Oral daily  rivaroxaban 20 milliGRAM(s) Oral every 24 hours  simvastatin 10 milliGRAM(s) Oral at bedtime  tamsulosin 0.4 milliGRAM(s) Oral at bedtime      MEDICATIONS  (PRN):  acetaminophen   Tablet .. 650 milliGRAM(s) Oral every 6 hours PRN Mild Pain (1 - 3)  dextrose 40% Gel 15 Gram(s) Oral once PRN Blood Glucose LESS THAN 70 milliGRAM(s)/deciLiter  glucagon  Injectable 1 milliGRAM(s) IntraMuscular once PRN Glucose <70 milliGRAM(s)/deciLiter       Medications up to date at time of exam.    ROS: No fever, chills, cough, congestion, SOB.  PHYSICAL EXAMINATION:  Vital Signs Last 24 Hrs  T(C): 37 (30 Oct 2018 11:44), Max: 37.1 (29 Oct 2018 16:17)  T(F): 98.6 (30 Oct 2018 11:44), Max: 98.8 (29 Oct 2018 16:17)  HR: 77 (30 Oct 2018 11:44) (76 - 125)  BP: 110/63 (30 Oct 2018 11:44) (90/53 - 113/71)  BP(mean): --  RR: 16 (30 Oct 2018 11:44) (16 - 24)  SpO2: 97% (30 Oct 2018 11:44) (95% - 100%)   (if applicable)    General; Alert and oriented. No acute distress . Morbid Obese. Able to answer question with no SOB.    HEENT: Normocephalic and atraumatic. No nasal tenderness. Extraocular muscles are intact. Moist mucosa.     NECK: Supple, no palpable adenopathy.    LUNGS: Clear to auscultation, no wheezing, rales, or rhonchi. No use of accessory muscle.     HEART: S1 S2 Regular rate and no click/ rub.     ABDOMEN: Soft, nontender, and nondistended.  No hepatosplenomegaly is noted. Active bowel sounds.     EXTREMITIES: Without any cyanosis, clubbing, rash.    NEUROLOGIC: Awake, alert, oriented.     SKIN: Warm and moist. Non diaphoretic.         LABS:                        8.7    12.3  )-----------( 428      ( 30 Oct 2018 07:29 )             31.6     10-30    138  |  97  |  28<H>  ----------------------------<  85  3.5   |  33<H>  |  0.52    Ca    9.2      30 Oct 2018 07:29  Phos  3.3     10-30  Mg     2.5     10-30    TPro  7.5  /  Alb  2.5<L>  /  TBili  0.2  /  DBili  x   /  AST  25  /  ALT  80<H>  /  AlkPhos  90  10-30          CARDIAC MARKERS ( 30 Oct 2018 07:29 )  <0.015 ng/mL / x     / <7 U/L / x     / <1.0 ng/mL  CARDIAC MARKERS ( 29 Oct 2018 20:12 )  <0.015 ng/mL / x     / 9 U/L / x     / <1.0 ng/mL  CARDIAC MARKERS ( 29 Oct 2018 13:18 )  <0.015 ng/mL / x     / 7 U/L / x     / <1.0 ng/mL      RADIOLOGY & ADDITIONAL STUDIES:  EKG:   CXR: < from: Xray Chest 1 View- PORTABLE-Urgent (10.27.18 @ 03:00) >  EXAM:  XR CHEST PORTABLE URGENT 1V                            PROCEDURE DATE:  10/27/2018          INTERPRETATION:  CLINICAL STATEMENT: Follow-up chest pain.    TECHNIQUE: AP view of the chest.    COMPARISON: 10/25/2018    FINDINGS/  IMPRESSION:  Increased interstitial lung markings without significant change.   Questionable new mild left perihilar opacities. Follow-up recommended.    No significant pleural effusion.    Heart size cannot be accurately assessed in this projection, but appear   enlarged.    < end of copied text >    ECHO:    IMPRESSION: 58y Female PAST MEDICAL & SURGICAL HISTORY:  Urinary retention  CKD (chronic kidney disease)  DM (diabetes mellitus)  HTN (hypertension)  COPD (chronic obstructive pulmonary disease)  Morbid obesity  Diabetes  Overactive bladder  Hemorrhoids  Emphysema  Suprapubic catheter  S/P   History of appendectomy  S/P cholecystectomy     Impression; 59 Y/O Female with prior mentioned multiple chronic conditions. Presented with progressive worsening of SOB requiring NIPPV support. pat will benefit from over nite use of PAP device.  She was evaluated by ICU team and rejected. Now in Medicine Unit, on Oxygen supplementation and Bipap at night. RVP, BLD Cx x 2 negative.       Suggestion:  Continue oxygen supplementation NC. Bipap 16/5 FIO2 50% at night. Recommending PAP outpatient, per patient she has portable PAP at home but never used it and had it nursing home but she verbalized that she is using the nursing home PAP on and off at night. Reinforced to comply with Oxygen supplementation daytime and PAP at night.  Continue DuoNeb Q 6 hours. Budesonide twice daily. Tiotropium daily.  On Xarelto Daily .  Continue Prednisone 40 mg daily.   GI prophylactic.   Monitor Hgb/Hct, latest Hgb/Hct 8.7/31.6.     case discussed with house staff and med attend  Agree with above assessment and plan as transcribed.

## 2018-10-30 NOTE — PROGRESS NOTE ADULT - SUBJECTIVE AND OBJECTIVE BOX
PGY 1 Note discussed with supervising resident and primary attending    Patient is a 58y old  Female who presents with a chief complaint of SOB and hypoxia (29 Oct 2018 18:01)      INTERVAL HPI/OVERNIGHT EVENTS:   Patient was seen at the bedside.  No new complains      MEDICATIONS  (STANDING):  ALBUTerol/ipratropium for Nebulization 3 milliLiter(s) Nebulizer every 4 hours  aspirin  chewable 81 milliGRAM(s) Oral daily  buDESOnide 160 MICROgram(s)/formoterol 4.5 MICROgram(s) Inhaler 2 Puff(s) Inhalation two times a day  dextrose 5%. 1000 milliLiter(s) (50 mL/Hr) IV Continuous <Continuous>  dextrose 50% Injectable 12.5 Gram(s) IV Push once  dextrose 50% Injectable 25 Gram(s) IV Push once  dextrose 50% Injectable 25 Gram(s) IV Push once  digoxin  Injectable 0.25 milliGRAM(s) IV Push once  diltiazem    Tablet 30 milliGRAM(s) Oral every 6 hours  furosemide    Tablet 40 milliGRAM(s) Oral daily  gabapentin 400 milliGRAM(s) Oral three times a day  insulin lispro (HumaLOG) corrective regimen sliding scale   SubCutaneous three times a day before meals  levoFLOXacin  Tablet 500 milliGRAM(s) Oral every 24 hours  pantoprazole    Tablet 40 milliGRAM(s) Oral before breakfast  predniSONE   Tablet 40 milliGRAM(s) Oral daily  rivaroxaban 20 milliGRAM(s) Oral every 24 hours  simvastatin 10 milliGRAM(s) Oral at bedtime  tamsulosin 0.4 milliGRAM(s) Oral at bedtime    MEDICATIONS  (PRN):  acetaminophen   Tablet .. 650 milliGRAM(s) Oral every 6 hours PRN Mild Pain (1 - 3)  dextrose 40% Gel 15 Gram(s) Oral once PRN Blood Glucose LESS THAN 70 milliGRAM(s)/deciLiter  glucagon  Injectable 1 milliGRAM(s) IntraMuscular once PRN Glucose <70 milliGRAM(s)/deciLiter      __________________________________________________  REVIEW OF SYSTEMS:    CONSTITUTIONAL: No fever,   EYES: no acute visual disturbances  NECK: No pain or stiffness  RESPIRATORY: No cough; No shortness of breath  CARDIOVASCULAR: No chest pain, no palpitations  GASTROINTESTINAL: No pain. No nausea or vomiting; No diarrhea   NEUROLOGICAL: No headache or numbness, no tremors  MUSCULOSKELETAL: No joint pain, no muscle pain  GENITOURINARY: no dysuria, no frequency, no hesitancy  PSYCHIATRY: no depression , no anxiety  ALL OTHER  ROS negative        Vital Signs Last 24 Hrs  T(C): 36.6 (30 Oct 2018 08:09), Max: 37.2 (29 Oct 2018 11:25)  T(F): 97.8 (30 Oct 2018 08:09), Max: 99 (29 Oct 2018 11:25)  HR: 83 (30 Oct 2018 08:09) (76 - 125)  BP: 101/47 (30 Oct 2018 08:09) (90/53 - 120/61)  BP(mean): --  RR: 16 (30 Oct 2018 08:09) (16 - 24)  SpO2: 97% (30 Oct 2018 08:09) (95% - 100%)    ________________________________________________  PHYSICAL EXAM:  GENERAL: obese AA female   HEENT: Normocephalic;  conjunctivae and sclerae clear; moist mucous membranes;   NECK : supple  CHEST/LUNG: Clear to auscultation bilaterally with good air entry   HEART: S1 S2  regular; no murmurs, gallops or rubs  ABDOMEN: Soft, Nontender, Nondistended; Bowel sounds present  EXTREMITIES: no cyanosis; no edema; no calf tenderness  SKIN: warm and dry; no rash  NERVOUS SYSTEM:  Awake and alert; Oriented  to place, person and time ; no new deficits    _________________________________________________  LABS:                        8.7    12.3  )-----------( 428      ( 30 Oct 2018 07:29 )             31.6     10-30    138  |  97  |  28<H>  ----------------------------<  85  3.5   |  33<H>  |  0.52    Ca    9.2      30 Oct 2018 07:29  Phos  3.3     10-30  Mg     2.5     10-30    TPro  7.5  /  Alb  2.5<L>  /  TBili  0.2  /  DBili  x   /  AST  25  /  ALT  80<H>  /  AlkPhos  90  10-30        CAPILLARY BLOOD GLUCOSE      POCT Blood Glucose.: 190 mg/dL (29 Oct 2018 21:19)  POCT Blood Glucose.: 104 mg/dL (29 Oct 2018 16:49)  POCT Blood Glucose.: 233 mg/dL (29 Oct 2018 11:43)        RADIOLOGY & ADDITIONAL TESTS:    Imaging Personally Reviewed:  YES    Consultant(s) Notes Reviewed:   YES    Care Discussed with Consultants :     Plan of care was discussed with patient and /or primary care giver; all questions and concerns were addressed and care was aligned with patient's wishes.

## 2018-10-30 NOTE — CHART NOTE - NSCHARTNOTEFT_GEN_A_CORE
Paged by RN that spots of blood noted on bedsheet near pt's rectal area c/w hemorrhoid pt suspected of having. No active bleeding noted. Pt in no distress and Hgb stable.

## 2018-10-30 NOTE — PROGRESS NOTE ADULT - ASSESSMENT
57 yo F bedbound from nursing home with DM, COPD on O2, PE on Xarelto, and PUSHPA who presented with dyspnea in the setting of COPD exacerbation; now with new-onset atrial fibrillation    1. Atrial fibrillation:  -EF preserved on echo, mild LA dilation, TSH 0.48  -CHADS2-VASc score is 2 (diabetes, female), consistent with 2.2% annual risk of stroke if off systemic anticoagulation. Patient is already on Xarelto for PE; in light of A fib would continue Xarelto indefinitely  -Recommend IV digoxin loading with 0.5mg IV x1, followed by 0.25mg IV x1 in 8 hours and an additional 0.25mg IV x1 8 hours after that (will receive last IV dose tomorrow afternoon/evening). Then the following morning can start 0.125mg PO daily.  -Check digoxin levels after 4th dose.   -As rate control improves, can wean down on diltiazem; would decrease to 30mg Q8H given borderline low BP    2. HLD: On simvastatin 20mg QHS  **In the setting of diltiazem, simvastatin dosage should not exceed 10mg/day    ***Note that this is a preliminary note and any recommendations should NOT be carried out until this note is finalized. *** 59 yo F bedbound from nursing home with DM, COPD on O2, PE on Xarelto, and PUSHPA who presented with dyspnea in the setting of COPD exacerbation; now with new-onset atrial fibrillation    1. Atrial fibrillation:  -EF preserved on echo, mild LA dilation, TSH 0.48  -CHADS2-VASc score is 2 (diabetes, female), consistent with 2.2% annual risk of stroke if off systemic anticoagulation. Patient is already on Xarelto for PE; in light of A fib would continue Xarelto indefinitely  -Patient is s/p digoxin loading, converted to NSR; can be placed on maintenance digoxin 0.125mg daily. Digoxin level to be checked before digoxin dose on the morning of 11/1.   -Can discontinue diltiazem at this point since patient in NSR and on digoxin     2. HLD: On simvastatin 20mg QHS  **In the setting of diltiazem, simvastatin dosage should not exceed 10mg/day

## 2018-10-30 NOTE — PROGRESS NOTE ADULT - SUBJECTIVE AND OBJECTIVE BOX
PRESENTING CC: Shortness of breath    SUBJ:     In summary, this is a 59 yo F bedbound from nursing home with DM, COPD on O2, PE on Xarelto, and PUSHPA who presented with dyspnea in the setting of COPD exacerbation and was noted to be with A fib with RVR.     Overnight, there were no acute events. Patient is undergoing IV digoxin loading      ----------------------  Description of patient's presenting symptoms from my prior note:    PMH: As above, also Urinary retention, CKD (chronic kidney disease), Morbid obesity, Hemorrhoids, Suprapubic catheter, History of appendectomy, S/P cholecystectomy    Allergies    Cheese (Pruritus)  oxycodone (Other (Moderate))  peanuts (Unknown)  penicillin (Rash)  seafood (Hives)  strawberry (Pruritus)  Tomatoes (Other)    MEDICATIONS  (STANDING):  ALBUTerol/ipratropium for Nebulization 3 milliLiter(s) Nebulizer every 4 hours  aspirin  chewable 81 milliGRAM(s) Oral daily  buDESOnide 160 MICROgram(s)/formoterol 4.5 MICROgram(s) Inhaler 2 Puff(s) Inhalation two times a day  dextrose 5%. 1000 milliLiter(s) (50 mL/Hr) IV Continuous <Continuous>  dextrose 50% Injectable 12.5 Gram(s) IV Push once  dextrose 50% Injectable 25 Gram(s) IV Push once  dextrose 50% Injectable 25 Gram(s) IV Push once  digoxin  Injectable 0.25 milliGRAM(s) IV Push once  diltiazem    Tablet 30 milliGRAM(s) Oral every 6 hours  furosemide    Tablet 40 milliGRAM(s) Oral daily  gabapentin 400 milliGRAM(s) Oral three times a day  insulin lispro (HumaLOG) corrective regimen sliding scale   SubCutaneous three times a day before meals  levoFLOXacin  Tablet 500 milliGRAM(s) Oral every 24 hours  pantoprazole    Tablet 40 milliGRAM(s) Oral before breakfast  predniSONE   Tablet 40 milliGRAM(s) Oral daily  rivaroxaban 20 milliGRAM(s) Oral every 24 hours  simvastatin 10 milliGRAM(s) Oral at bedtime  tamsulosin 0.4 milliGRAM(s) Oral at bedtime    MEDICATIONS  (PRN):  acetaminophen   Tablet .. 650 milliGRAM(s) Oral every 6 hours PRN Mild Pain (1 - 3)  dextrose 40% Gel 15 Gram(s) Oral once PRN Blood Glucose LESS THAN 70 milliGRAM(s)/deciLiter  glucagon  Injectable 1 milliGRAM(s) IntraMuscular once PRN Glucose <70 milliGRAM(s)/deciLiter      FAMILY HISTORY:  No pertinent family history in first degree relatives    Reviewed; no change from my prior note    SOCIAL HISTORY:  Reviewed, no change from my prior note    REVIEW OF SYSTEMS:  Constitutional: [ ] fever, [ ]weight loss,  [ ]fatigue  Eyes: [ ] visual changes  Respiratory: [ ]shortness of breath;  [ ] cough, [ ]wheezing, [ ]chills, [ ]hemoptysis  Cardiovascular: [ ] chest pain, [ ]palpitations, [ ]dizziness,  [ ]leg swelling [ ]syncope  Gastrointestinal: [ ] abdominal pain, [ ]nausea, [ ]vomiting,  [ ]diarrhea   Genitourinary: [ ] dysuria, [ ] hematuria  Neurologic: [ ] headaches [ ] tremors [ ] weakness [ ] lightheadedness  Skin: [ ] itching, [ ]burning, [ ] rashes  Endocrine: [ ] heat or cold intolerance  Musculoskeletal: [ ] joint pain or swelling; [ ] muscle, back, or extremity pain  Psychiatric: [ ] depression, [ ]anxiety, [ ]mood swings, or [ ]difficulty sleeping  Hematologic: [ ] easy bruising, [ ] bleeding gums    [x] All remaining systems negative except as per above.   [  ] Unable to obtain    Vital Signs Last 24 Hrs  T(C): 36.6 (30 Oct 2018 08:09), Max: 37.2 (29 Oct 2018 11:25)  T(F): 97.8 (30 Oct 2018 08:09), Max: 99 (29 Oct 2018 11:25)  HR: 83 (30 Oct 2018 08:09) (76 - 125)  BP: 101/47 (30 Oct 2018 08:09) (90/53 - 120/61)  BP(mean): --  RR: 16 (30 Oct 2018 08:09) (16 - 24)  SpO2: 97% (30 Oct 2018 08:09) (95% - 100%)  I&O's Summary    29 Oct 2018 07:01  -  30 Oct 2018 07:00  --------------------------------------------------------  IN: 429 mL / OUT: 1600 mL / NET: -1171 mL    PHYSICAL EXAM:  General: No acute distress  HEENT: EOMI, PERRL  Neck: Supple, No JVD  Lungs: Clear to auscultation bilaterally; No rales or wheezing  Heart: Regular rate and rhythm; No murmurs, rubs, or gallops  Abdomen: Nontender, bowel sounds present  Extremities: No clubbing, cyanosis, or edema  Nervous system:  Alert & Oriented X3, no focal deficits  Psychiatric: Normal affect  Skin: No rashes or lesions    LABS:  10-30    138  |  97  |  28<H>  ----------------------------<  85  3.5   |  33<H>  |  0.52    Ca    9.2      30 Oct 2018 07:29  Phos  3.3     10-30  Mg     2.5     10-30    TPro  7.5  /  Alb  2.5<L>  /  TBili  0.2  /  DBili  x   /  AST  25  /  ALT  80<H>  /  AlkPhos  90  10-30    Creatinine Trend: 0.52<--, 0.55<--, 0.50<--, 0.46<--, 0.34<--, 0.39<--                        8.7    12.3  )-----------( 428      ( 30 Oct 2018 07:29 )             31.6       Lipid Panel:   Cardiac Enzymes: CARDIAC MARKERS ( 30 Oct 2018 07:29 )  <0.015 ng/mL / x     / <7 U/L / x     / <1.0 ng/mL  CARDIAC MARKERS ( 29 Oct 2018 20:12 )  <0.015 ng/mL / x     / 9 U/L / x     / <1.0 ng/mL  CARDIAC MARKERS ( 29 Oct 2018 13:18 )  <0.015 ng/mL / x     / 7 U/L / x     / <1.0 ng/mL      TELEMETRY: PRESENTING CC: Shortness of breath    SUBJ:     In summary, this is a 57 yo F bedbound from nursing home with DM, COPD on O2, PE on Xarelto, and PUSHPA who presented with dyspnea in the setting of COPD exacerbation and was noted to be with A fib with RVR.     Overnight, there were no acute events; patient converted to NSR on 10/29 at 17:20. Patient is undergoing IV digoxin loading. Denies palpitations, CP, or dyspnea.       ----------------------  Description of patient's presenting symptoms from my prior note:    PMH: As above, also Urinary retention, CKD (chronic kidney disease), Morbid obesity, Hemorrhoids, Suprapubic catheter, History of appendectomy, S/P cholecystectomy    Allergies    Cheese (Pruritus)  oxycodone (Other (Moderate))  peanuts (Unknown)  penicillin (Rash)  seafood (Hives)  strawberry (Pruritus)  Tomatoes (Other)    MEDICATIONS  (STANDING):  ALBUTerol/ipratropium for Nebulization 3 milliLiter(s) Nebulizer every 4 hours  aspirin  chewable 81 milliGRAM(s) Oral daily  buDESOnide 160 MICROgram(s)/formoterol 4.5 MICROgram(s) Inhaler 2 Puff(s) Inhalation two times a day  dextrose 5%. 1000 milliLiter(s) (50 mL/Hr) IV Continuous <Continuous>  dextrose 50% Injectable 12.5 Gram(s) IV Push once  dextrose 50% Injectable 25 Gram(s) IV Push once  dextrose 50% Injectable 25 Gram(s) IV Push once  digoxin  Injectable 0.25 milliGRAM(s) IV Push once  diltiazem    Tablet 30 milliGRAM(s) Oral every 6 hours  furosemide    Tablet 40 milliGRAM(s) Oral daily  gabapentin 400 milliGRAM(s) Oral three times a day  insulin lispro (HumaLOG) corrective regimen sliding scale   SubCutaneous three times a day before meals  levoFLOXacin  Tablet 500 milliGRAM(s) Oral every 24 hours  pantoprazole    Tablet 40 milliGRAM(s) Oral before breakfast  predniSONE   Tablet 40 milliGRAM(s) Oral daily  rivaroxaban 20 milliGRAM(s) Oral every 24 hours  simvastatin 10 milliGRAM(s) Oral at bedtime  tamsulosin 0.4 milliGRAM(s) Oral at bedtime    MEDICATIONS  (PRN):  acetaminophen   Tablet .. 650 milliGRAM(s) Oral every 6 hours PRN Mild Pain (1 - 3)  dextrose 40% Gel 15 Gram(s) Oral once PRN Blood Glucose LESS THAN 70 milliGRAM(s)/deciLiter  glucagon  Injectable 1 milliGRAM(s) IntraMuscular once PRN Glucose <70 milliGRAM(s)/deciLiter      FAMILY HISTORY:  No pertinent family history in first degree relatives    Reviewed; no change from my prior note    SOCIAL HISTORY:  Reviewed, no change from my prior note    REVIEW OF SYSTEMS:  Constitutional: [ ] fever, [ ]weight loss,  [ ]fatigue  Eyes: [ ] visual changes  Respiratory: [ ]shortness of breath;  [ ] cough, [ ]wheezing, [ ]chills, [ ]hemoptysis  Cardiovascular: [ ] chest pain, [ ]palpitations, [ ]dizziness,  [ ]leg swelling [ ]syncope  Gastrointestinal: [ ] abdominal pain, [ ]nausea, [ ]vomiting,  [ ]diarrhea   Genitourinary: [ ] dysuria, [ ] hematuria  Neurologic: [ ] headaches [ ] tremors [ ] weakness [ ] lightheadedness  Skin: [ ] itching, [ ]burning, [ ] rashes  Endocrine: [ ] heat or cold intolerance  Musculoskeletal: [ ] joint pain or swelling; [ ] muscle, back, or extremity pain  Psychiatric: [ ] depression, [ ]anxiety, [ ]mood swings, or [ ]difficulty sleeping  Hematologic: [ ] easy bruising, [ ] bleeding gums    [x] All remaining systems negative except as per above.   [  ] Unable to obtain    Vital Signs Last 24 Hrs  T(C): 36.6 (30 Oct 2018 08:09), Max: 37.2 (29 Oct 2018 11:25)  T(F): 97.8 (30 Oct 2018 08:09), Max: 99 (29 Oct 2018 11:25)  HR: 83 (30 Oct 2018 08:09) (76 - 125)  BP: 101/47 (30 Oct 2018 08:09) (90/53 - 120/61)  BP(mean): --  RR: 16 (30 Oct 2018 08:09) (16 - 24)  SpO2: 97% (30 Oct 2018 08:09) (95% - 100%)  I&O's Summary    29 Oct 2018 07:01  -  30 Oct 2018 07:00  --------------------------------------------------------  IN: 429 mL / OUT: 1600 mL / NET: -1171 mL    PHYSICAL EXAM:  General: No acute distress  HEENT: EOMI, PERRL  Neck: Supple, No JVD  Lungs: Clear to auscultation bilaterally; No rales or wheezing  Heart: Regular rate and rhythm; No murmurs, rubs, or gallops  Abdomen: Nontender, bowel sounds present  Extremities: No clubbing, cyanosis, or edema  Nervous system:  Alert & Oriented X3, no focal deficits  Psychiatric: Normal affect  Skin: No rashes or lesions    LABS:  10-30    138  |  97  |  28<H>  ----------------------------<  85  3.5   |  33<H>  |  0.52    Ca    9.2      30 Oct 2018 07:29  Phos  3.3     10-30  Mg     2.5     10-30    TPro  7.5  /  Alb  2.5<L>  /  TBili  0.2  /  DBili  x   /  AST  25  /  ALT  80<H>  /  AlkPhos  90  10-30    Creatinine Trend: 0.52<--, 0.55<--, 0.50<--, 0.46<--, 0.34<--, 0.39<--                        8.7    12.3  )-----------( 428      ( 30 Oct 2018 07:29 )             31.6       Lipid Panel:   Cardiac Enzymes: CARDIAC MARKERS ( 30 Oct 2018 07:29 )  <0.015 ng/mL / x     / <7 U/L / x     / <1.0 ng/mL  CARDIAC MARKERS ( 29 Oct 2018 20:12 )  <0.015 ng/mL / x     / 9 U/L / x     / <1.0 ng/mL  CARDIAC MARKERS ( 29 Oct 2018 13:18 )  <0.015 ng/mL / x     / 7 U/L / x     / <1.0 ng/mL      TELEMETRY: Conversion to NSR on 10/29, now HR in 90s, NSR

## 2018-10-30 NOTE — PROGRESS NOTE ADULT - PROBLEM SELECTOR PLAN 1
Afib with RVR on tele  HR: controlled now   Dig loading  Digoxin level after 4 th dose tomorrow   Digoxin .125 from tomorrow  Cardizem 30 Q6, wean Cardizem once HR is controlled.   Patient is already on Xarelto  Tele monitoring Afib with RVR on tele  HR: controlled now   Dig loading done   Di.125 from tomorrow  Dig level on Thursday  DC cardizem  Patient is already on Xarelto  Tele monitoring

## 2018-10-31 DIAGNOSIS — L73.2 HIDRADENITIS SUPPURATIVA: ICD-10-CM

## 2018-10-31 PROCEDURE — 99232 SBSQ HOSP IP/OBS MODERATE 35: CPT | Mod: GC

## 2018-10-31 RX ADMIN — Medication 0.12 MILLIGRAM(S): at 06:28

## 2018-10-31 RX ADMIN — Medication 40 MILLIGRAM(S): at 06:28

## 2018-10-31 RX ADMIN — PANTOPRAZOLE SODIUM 40 MILLIGRAM(S): 20 TABLET, DELAYED RELEASE ORAL at 06:28

## 2018-10-31 RX ADMIN — Medication 650 MILLIGRAM(S): at 07:20

## 2018-10-31 RX ADMIN — SIMVASTATIN 10 MILLIGRAM(S): 20 TABLET, FILM COATED ORAL at 22:31

## 2018-10-31 RX ADMIN — Medication 1: at 13:46

## 2018-10-31 RX ADMIN — LATANOPROST 1 DROP(S): 0.05 SOLUTION/ DROPS OPHTHALMIC; TOPICAL at 22:30

## 2018-10-31 RX ADMIN — Medication 3 MILLILITER(S): at 15:10

## 2018-10-31 RX ADMIN — Medication 100 MILLIGRAM(S): at 13:47

## 2018-10-31 RX ADMIN — BUDESONIDE AND FORMOTEROL FUMARATE DIHYDRATE 2 PUFF(S): 160; 4.5 AEROSOL RESPIRATORY (INHALATION) at 22:29

## 2018-10-31 RX ADMIN — GABAPENTIN 400 MILLIGRAM(S): 400 CAPSULE ORAL at 13:47

## 2018-10-31 RX ADMIN — Medication 3 MILLILITER(S): at 06:08

## 2018-10-31 RX ADMIN — Medication 3 MILLILITER(S): at 09:12

## 2018-10-31 RX ADMIN — Medication 81 MILLIGRAM(S): at 13:47

## 2018-10-31 RX ADMIN — SENNA PLUS 1 TABLET(S): 8.6 TABLET ORAL at 13:48

## 2018-10-31 RX ADMIN — RIVAROXABAN 20 MILLIGRAM(S): KIT at 17:26

## 2018-10-31 RX ADMIN — Medication 1: at 17:26

## 2018-10-31 RX ADMIN — TAMSULOSIN HYDROCHLORIDE 0.4 MILLIGRAM(S): 0.4 CAPSULE ORAL at 22:31

## 2018-10-31 RX ADMIN — GABAPENTIN 400 MILLIGRAM(S): 400 CAPSULE ORAL at 06:28

## 2018-10-31 RX ADMIN — BUDESONIDE AND FORMOTEROL FUMARATE DIHYDRATE 2 PUFF(S): 160; 4.5 AEROSOL RESPIRATORY (INHALATION) at 13:48

## 2018-10-31 RX ADMIN — Medication 650 MILLIGRAM(S): at 06:32

## 2018-10-31 RX ADMIN — GABAPENTIN 400 MILLIGRAM(S): 400 CAPSULE ORAL at 22:29

## 2018-10-31 RX ADMIN — Medication 3 MILLILITER(S): at 21:18

## 2018-10-31 NOTE — PROGRESS NOTE ADULT - PROBLEM SELECTOR PLAN 4
surgery consult  - its not infected, no fever, no wbc count,   - wound care surgery consult  - its not infected, no fever, no wbc count,   - Continue local wound care

## 2018-10-31 NOTE — PROGRESS NOTE ADULT - SUBJECTIVE AND OBJECTIVE BOX
PGY 1 Note discussed with supervising resident and primary attending    Patient is a 58y old  Female who presents with a chief complaint of SOB and hypoxia (31 Oct 2018 13:55)      INTERVAL HPI/OVERNIGHT EVENTS:   Patient is seen at the bedside. No new complains     MEDICATIONS  (STANDING):  ALBUTerol/ipratropium for Nebulization 3 milliLiter(s) Nebulizer every 4 hours  aspirin  chewable 81 milliGRAM(s) Oral daily  buDESOnide 160 MICROgram(s)/formoterol 4.5 MICROgram(s) Inhaler 2 Puff(s) Inhalation two times a day  dextrose 5%. 1000 milliLiter(s) (50 mL/Hr) IV Continuous <Continuous>  dextrose 50% Injectable 12.5 Gram(s) IV Push once  dextrose 50% Injectable 25 Gram(s) IV Push once  dextrose 50% Injectable 25 Gram(s) IV Push once  digoxin     Tablet 0.125 milliGRAM(s) Oral daily  docusate sodium 100 milliGRAM(s) Oral daily  furosemide    Tablet 40 milliGRAM(s) Oral daily  gabapentin 400 milliGRAM(s) Oral three times a day  insulin lispro (HumaLOG) corrective regimen sliding scale   SubCutaneous three times a day before meals  latanoprost 0.005% Ophthalmic Solution 1 Drop(s) Both EYES at bedtime  levoFLOXacin  Tablet 500 milliGRAM(s) Oral every 24 hours  pantoprazole    Tablet 40 milliGRAM(s) Oral before breakfast  polyethylene glycol 3350 17 Gram(s) Oral daily  predniSONE   Tablet 40 milliGRAM(s) Oral daily  rivaroxaban 20 milliGRAM(s) Oral every 24 hours  senna 1 Tablet(s) Oral daily  simvastatin 10 milliGRAM(s) Oral at bedtime  tamsulosin 0.4 milliGRAM(s) Oral at bedtime    MEDICATIONS  (PRN):  acetaminophen   Tablet .. 650 milliGRAM(s) Oral every 6 hours PRN Mild Pain (1 - 3)  dextrose 40% Gel 15 Gram(s) Oral once PRN Blood Glucose LESS THAN 70 milliGRAM(s)/deciLiter  glucagon  Injectable 1 milliGRAM(s) IntraMuscular once PRN Glucose <70 milliGRAM(s)/deciLiter      __________________________________________________  REVIEW OF SYSTEMS:    CONSTITUTIONAL: No fever,   EYES: no acute visual disturbances  NECK: No pain or stiffness  RESPIRATORY: No cough; No shortness of breath  CARDIOVASCULAR: No chest pain, no palpitations  GASTROINTESTINAL: No pain. No nausea or vomiting; No diarrhea   NEUROLOGICAL: No headache or numbness, no tremors  MUSCULOSKELETAL: No joint pain, no muscle pain  GENITOURINARY: no dysuria, no frequency, no hesitancy  PSYCHIATRY: no depression , no anxiety  ALL OTHER  ROS negative        Vital Signs Last 24 Hrs  T(C): 36.3 (31 Oct 2018 11:06), Max: 37.4 (30 Oct 2018 15:37)  T(F): 97.3 (31 Oct 2018 11:06), Max: 99.3 (30 Oct 2018 15:37)  HR: 98 (31 Oct 2018 11:06) (73 - 98)  BP: 102/60 (31 Oct 2018 11:06) (100/59 - 169/88)  BP(mean): --  RR: 18 (31 Oct 2018 11:06) (16 - 18)  SpO2: 97% (31 Oct 2018 11:06) (96% - 100%)    ________________________________________________  PHYSICAL EXAM:  GENERAL: morbidly obese female. bedbound from last few years   HEENT: Normocephalic;  conjunctivae and sclerae clear; moist mucous membranes;   NECK : supple  CHEST/LUNG: Clear to auscultation bilaterally with good air entry   HEART: S1 S2  regular; no murmurs, gallops or rubs  ABDOMEN: Soft, Nontender, Nondistended; Bowel sounds present  EXTREMITIES: no cyanosis; no edema; no calf tenderness  SKIN: warm and dry; no rash  NERVOUS SYSTEM:  Awake and alert; Oriented  to place, person and time ; no new deficits    _________________________________________________  LABS:                        8.7    12.3  )-----------( 428      ( 30 Oct 2018 07:29 )             31.6     10-30    138  |  97  |  28<H>  ----------------------------<  85  3.5   |  33<H>  |  0.52    Ca    9.2      30 Oct 2018 07:29  Phos  3.3     10-30  Mg     2.5     10-30    TPro  7.5  /  Alb  2.5<L>  /  TBili  0.2  /  DBili  x   /  AST  25  /  ALT  80<H>  /  AlkPhos  90  10-30        CAPILLARY BLOOD GLUCOSE      POCT Blood Glucose.: 179 mg/dL (31 Oct 2018 13:46)  POCT Blood Glucose.: 238 mg/dL (31 Oct 2018 11:46)  POCT Blood Glucose.: 130 mg/dL (31 Oct 2018 08:06)  POCT Blood Glucose.: 123 mg/dL (30 Oct 2018 20:53)  POCT Blood Glucose.: 141 mg/dL (30 Oct 2018 16:31)        RADIOLOGY & ADDITIONAL TESTS:        Care Discussed with Consultants :     Plan of care was discussed with patient and /or primary care giver; all questions and concerns were addressed and care was aligned with patient's wishes.

## 2018-10-31 NOTE — CONSULT NOTE ADULT - PROBLEM SELECTOR RECOMMENDATION 9
Recommend weight loss  DM control  Local wound care  Out patient f/u with Dr. Traylor for elective wide excision of hidradenitis after weight loss
patient presented with SOB, found to have hypoxic and hypercapneic respiratory failure secondary to COPD exacerbation  Initial ABG s/o 7.29/ 96/ 45/ 97% on NRB, repeat  ABG after being on BIPAP for 1 hour pH, Arterial: 7.34  pH, Blood: x     /  pCO2: 83    /  pO2: 70    / HCO3: 43    / Base Excess: 15.7  /  SaO2: 94.   continue with BIPAP support, wean as tolerated  serial ABGs.  recommend to continue BIPAP overnight.

## 2018-10-31 NOTE — PROGRESS NOTE ADULT - PROBLEM SELECTOR PLAN 5
UA positive   no fever noted   c/w levaquin for UTI and COPD exacerbation   follow Urine culture : contaminated  and blood culture: negative UA positive   no fever noted   c/w Levaquin for UTI and COPD exacerbation - stop after today's dose  follow Urine culture : contaminated  and blood culture: negative

## 2018-10-31 NOTE — CONSULT NOTE ADULT - SUBJECTIVE AND OBJECTIVE BOX
Patient is a 58y old  Female who presents with a chief complaint of SOB and hypoxia (30 Oct 2018 11:43)      HPI  Called see and eval 58y.o. Female w/PMH significant for morbid obesity, DM, COPD for lower back wound. Pt sent from NH for SOB and hypoxia. Pt was noted to have purulent drainage from her back. Consulted by wound care team, diagnosed with hidradenitis suppurativa. Per resident, pt is not bedbound due to medical condition. Pt states she has had boils under her arms in her youth. Currently resting comfortably. Denies fever, chills, feculent discharge from back.    PAST MEDICAL & SURGICAL HISTORY:  Urinary retention  CKD (chronic kidney disease)  DM (diabetes mellitus)  HTN (hypertension)  COPD (chronic obstructive pulmonary disease)  Morbid obesity  Diabetes  Overactive bladder  Hemorrhoids  Emphysema  Suprapubic catheter  S/P   History of appendectomy  S/P cholecystectomy      MEDICATIONS  (STANDING):  ALBUTerol/ipratropium for Nebulization 3 milliLiter(s) Nebulizer every 4 hours  aspirin  chewable 81 milliGRAM(s) Oral daily  buDESOnide 160 MICROgram(s)/formoterol 4.5 MICROgram(s) Inhaler 2 Puff(s) Inhalation two times a day  dextrose 5%. 1000 milliLiter(s) (50 mL/Hr) IV Continuous <Continuous>  dextrose 50% Injectable 12.5 Gram(s) IV Push once  dextrose 50% Injectable 25 Gram(s) IV Push once  dextrose 50% Injectable 25 Gram(s) IV Push once  digoxin     Tablet 0.125 milliGRAM(s) Oral daily  docusate sodium 100 milliGRAM(s) Oral daily  furosemide    Tablet 40 milliGRAM(s) Oral daily  gabapentin 400 milliGRAM(s) Oral three times a day  insulin lispro (HumaLOG) corrective regimen sliding scale   SubCutaneous three times a day before meals  latanoprost 0.005% Ophthalmic Solution 1 Drop(s) Both EYES at bedtime  levoFLOXacin  Tablet 500 milliGRAM(s) Oral every 24 hours  pantoprazole    Tablet 40 milliGRAM(s) Oral before breakfast  polyethylene glycol 3350 17 Gram(s) Oral daily  predniSONE   Tablet 40 milliGRAM(s) Oral daily  rivaroxaban 20 milliGRAM(s) Oral every 24 hours  senna 1 Tablet(s) Oral daily  simvastatin 10 milliGRAM(s) Oral at bedtime  tamsulosin 0.4 milliGRAM(s) Oral at bedtime    MEDICATIONS  (PRN):  acetaminophen   Tablet .. 650 milliGRAM(s) Oral every 6 hours PRN Mild Pain (1 - 3)  dextrose 40% Gel 15 Gram(s) Oral once PRN Blood Glucose LESS THAN 70 milliGRAM(s)/deciLiter  glucagon  Injectable 1 milliGRAM(s) IntraMuscular once PRN Glucose <70 milliGRAM(s)/deciLiter      Allergies    Cheese (Pruritus)  oxycodone (Other (Moderate))  peanuts (Unknown)  penicillin (Rash)  seafood (Hives)  strawberry (Pruritus)  Tomatoes (Other)    Intolerances        Vital Signs Last 24 Hrs  T(C): 36.3 (31 Oct 2018 11:06), Max: 37.4 (30 Oct 2018 15:37)  T(F): 97.3 (31 Oct 2018 11:06), Max: 99.3 (30 Oct 2018 15:37)  HR: 98 (31 Oct 2018 11:06) (73 - 98)  BP: 102/60 (31 Oct 2018 11:06) (100/59 - 169/88)  BP(mean): --  RR: 18 (31 Oct 2018 11:06) (16 - 18)  SpO2: 97% (31 Oct 2018 11:06) (96% - 100%)    Physical:  Gen: A&Ox3. NAD. Morbidly obese  Back: Sacral area covered with cream. Sinuses noted in pilonidal, b/l gluteal fold, R greater trochanteric regions. Mild purulent, sanguinous fluid expressed on palpation. No active drainage.    LABS:                        8.7    12.3  )-----------( 428      ( 30 Oct 2018 07:29 )             31.6              10-30    138  |  97  |  28<H>  ----------------------------<  85  3.5   |  33<H>  |  0.52    Ca    9.2      30 Oct 2018 07:29  Phos  3.3     10-30  Mg     2.5     10-30    TPro  7.5  /  Alb  2.5<L>  /  TBili  0.2  /  DBili  x   /  AST  25  /  ALT  80<H>  /  AlkPhos  90  10-30              RADIOLOGY & ADDITIONAL STUDIES:

## 2018-10-31 NOTE — PROGRESS NOTE ADULT - ASSESSMENT
58 year old morbidly obese female, bed bound from Nursing Home with PMHx of  DM, COPD on home O2, PUSHPA (not on CPAP,) chronic urinary retention s/p suprapubic cath, PE on Xarelto,  sent from ED for respiratory distress and SOB.    Patient initially admitted for A/C resp failure due to COPD exacerbation.  HR controlled with Digoxin. DC tele     DC pending bed availability at the NH

## 2018-11-01 ENCOUNTER — OUTPATIENT (OUTPATIENT)
Dept: OUTPATIENT SERVICES | Facility: HOSPITAL | Age: 58
LOS: 1 days | End: 2018-11-01

## 2018-11-01 DIAGNOSIS — Z98.891 HISTORY OF UTERINE SCAR FROM PREVIOUS SURGERY: Chronic | ICD-10-CM

## 2018-11-01 DIAGNOSIS — Z93.59 OTHER CYSTOSTOMY STATUS: Chronic | ICD-10-CM

## 2018-11-01 DIAGNOSIS — Z90.49 ACQUIRED ABSENCE OF OTHER SPECIFIED PARTS OF DIGESTIVE TRACT: Chronic | ICD-10-CM

## 2018-11-01 LAB
ALBUMIN SERPL ELPH-MCNC: 2.4 G/DL — LOW (ref 3.5–5)
ALP SERPL-CCNC: 85 U/L — SIGNIFICANT CHANGE UP (ref 40–120)
ALT FLD-CCNC: 42 U/L DA — SIGNIFICANT CHANGE UP (ref 10–60)
ANION GAP SERPL CALC-SCNC: 4 MMOL/L — LOW (ref 5–17)
AST SERPL-CCNC: 4 U/L — LOW (ref 10–40)
BASOPHILS # BLD AUTO: 0.1 K/UL — SIGNIFICANT CHANGE UP (ref 0–0.2)
BASOPHILS NFR BLD AUTO: 0.5 % — SIGNIFICANT CHANGE UP (ref 0–2)
BILIRUB SERPL-MCNC: 0.1 MG/DL — LOW (ref 0.2–1.2)
BUN SERPL-MCNC: 31 MG/DL — HIGH (ref 7–18)
CALCIUM SERPL-MCNC: 8.9 MG/DL — SIGNIFICANT CHANGE UP (ref 8.4–10.5)
CHLORIDE SERPL-SCNC: 103 MMOL/L — SIGNIFICANT CHANGE UP (ref 96–108)
CO2 SERPL-SCNC: 36 MMOL/L — HIGH (ref 22–31)
CREAT SERPL-MCNC: 0.44 MG/DL — LOW (ref 0.5–1.3)
DIGOXIN SERPL-MCNC: 0.5 NG/ML — LOW (ref 0.8–2)
EOSINOPHIL # BLD AUTO: 0.2 K/UL — SIGNIFICANT CHANGE UP (ref 0–0.5)
EOSINOPHIL NFR BLD AUTO: 1.7 % — SIGNIFICANT CHANGE UP (ref 0–6)
GLUCOSE SERPL-MCNC: 102 MG/DL — HIGH (ref 70–99)
HCT VFR BLD CALC: 32 % — LOW (ref 34.5–45)
HGB BLD-MCNC: 8.8 G/DL — LOW (ref 11.5–15.5)
LYMPHOCYTES # BLD AUTO: 2.8 K/UL — SIGNIFICANT CHANGE UP (ref 1–3.3)
LYMPHOCYTES # BLD AUTO: 22.4 % — SIGNIFICANT CHANGE UP (ref 13–44)
MAGNESIUM SERPL-MCNC: 2.3 MG/DL — SIGNIFICANT CHANGE UP (ref 1.6–2.6)
MCHC RBC-ENTMCNC: 23.5 PG — LOW (ref 27–34)
MCHC RBC-ENTMCNC: 27.6 GM/DL — LOW (ref 32–36)
MCV RBC AUTO: 85.1 FL — SIGNIFICANT CHANGE UP (ref 80–100)
MONOCYTES # BLD AUTO: 0.5 K/UL — SIGNIFICANT CHANGE UP (ref 0–0.9)
MONOCYTES NFR BLD AUTO: 3.9 % — SIGNIFICANT CHANGE UP (ref 2–14)
NEUTROPHILS # BLD AUTO: 8.8 K/UL — HIGH (ref 1.8–7.4)
NEUTROPHILS NFR BLD AUTO: 71.4 % — SIGNIFICANT CHANGE UP (ref 43–77)
PHOSPHATE SERPL-MCNC: 2.9 MG/DL — SIGNIFICANT CHANGE UP (ref 2.5–4.5)
PLATELET # BLD AUTO: 512 K/UL — HIGH (ref 150–400)
POTASSIUM SERPL-MCNC: 3.6 MMOL/L — SIGNIFICANT CHANGE UP (ref 3.5–5.3)
POTASSIUM SERPL-SCNC: 3.6 MMOL/L — SIGNIFICANT CHANGE UP (ref 3.5–5.3)
PROT SERPL-MCNC: 6.9 G/DL — SIGNIFICANT CHANGE UP (ref 6–8.3)
RBC # BLD: 3.76 M/UL — LOW (ref 3.8–5.2)
RBC # FLD: 19.5 % — HIGH (ref 10.3–14.5)
SODIUM SERPL-SCNC: 143 MMOL/L — SIGNIFICANT CHANGE UP (ref 135–145)
WBC # BLD: 12.3 K/UL — HIGH (ref 3.8–10.5)
WBC # FLD AUTO: 12.3 K/UL — HIGH (ref 3.8–10.5)

## 2018-11-01 PROCEDURE — 99232 SBSQ HOSP IP/OBS MODERATE 35: CPT | Mod: GC

## 2018-11-01 RX ORDER — DIGOXIN 250 MCG
0.25 TABLET ORAL DAILY
Qty: 0 | Refills: 0 | Status: DISCONTINUED | OUTPATIENT
Start: 2018-11-02 | End: 2018-11-02

## 2018-11-01 RX ORDER — KETOROLAC TROMETHAMINE 30 MG/ML
15 SYRINGE (ML) INJECTION EVERY 6 HOURS
Qty: 0 | Refills: 0 | Status: DISCONTINUED | OUTPATIENT
Start: 2018-11-01 | End: 2018-11-02

## 2018-11-01 RX ORDER — OXYCODONE AND ACETAMINOPHEN 5; 325 MG/1; MG/1
1 TABLET ORAL EVERY 6 HOURS
Qty: 0 | Refills: 0 | Status: DISCONTINUED | OUTPATIENT
Start: 2018-11-01 | End: 2018-11-01

## 2018-11-01 RX ORDER — DIGOXIN 250 MCG
0.12 TABLET ORAL ONCE
Qty: 0 | Refills: 0 | Status: COMPLETED | OUTPATIENT
Start: 2018-11-01 | End: 2018-11-01

## 2018-11-01 RX ADMIN — Medication 650 MILLIGRAM(S): at 22:47

## 2018-11-01 RX ADMIN — Medication 15 MILLIGRAM(S): at 12:27

## 2018-11-01 RX ADMIN — BUDESONIDE AND FORMOTEROL FUMARATE DIHYDRATE 2 PUFF(S): 160; 4.5 AEROSOL RESPIRATORY (INHALATION) at 21:40

## 2018-11-01 RX ADMIN — Medication 40 MILLIGRAM(S): at 06:48

## 2018-11-01 RX ADMIN — Medication 3 MILLILITER(S): at 10:08

## 2018-11-01 RX ADMIN — Medication 3 MILLILITER(S): at 05:27

## 2018-11-01 RX ADMIN — Medication 0.12 MILLIGRAM(S): at 11:38

## 2018-11-01 RX ADMIN — Medication 100 MILLIGRAM(S): at 11:38

## 2018-11-01 RX ADMIN — Medication 2: at 12:23

## 2018-11-01 RX ADMIN — Medication 1: at 16:47

## 2018-11-01 RX ADMIN — GABAPENTIN 400 MILLIGRAM(S): 400 CAPSULE ORAL at 06:48

## 2018-11-01 RX ADMIN — Medication 3 MILLILITER(S): at 15:08

## 2018-11-01 RX ADMIN — POLYETHYLENE GLYCOL 3350 17 GRAM(S): 17 POWDER, FOR SOLUTION ORAL at 11:39

## 2018-11-01 RX ADMIN — Medication 81 MILLIGRAM(S): at 11:38

## 2018-11-01 RX ADMIN — RIVAROXABAN 20 MILLIGRAM(S): KIT at 18:05

## 2018-11-01 RX ADMIN — PANTOPRAZOLE SODIUM 40 MILLIGRAM(S): 20 TABLET, DELAYED RELEASE ORAL at 06:49

## 2018-11-01 RX ADMIN — Medication 650 MILLIGRAM(S): at 04:25

## 2018-11-01 RX ADMIN — GABAPENTIN 400 MILLIGRAM(S): 400 CAPSULE ORAL at 13:03

## 2018-11-01 RX ADMIN — Medication 3 MILLILITER(S): at 18:14

## 2018-11-01 RX ADMIN — Medication 3 MILLILITER(S): at 21:02

## 2018-11-01 RX ADMIN — Medication 0.12 MILLIGRAM(S): at 06:48

## 2018-11-01 RX ADMIN — Medication 15 MILLIGRAM(S): at 18:09

## 2018-11-01 RX ADMIN — Medication 650 MILLIGRAM(S): at 21:46

## 2018-11-01 RX ADMIN — LATANOPROST 1 DROP(S): 0.05 SOLUTION/ DROPS OPHTHALMIC; TOPICAL at 21:41

## 2018-11-01 RX ADMIN — SIMVASTATIN 10 MILLIGRAM(S): 20 TABLET, FILM COATED ORAL at 21:42

## 2018-11-01 RX ADMIN — Medication 15 MILLIGRAM(S): at 18:32

## 2018-11-01 RX ADMIN — Medication 3 MILLILITER(S): at 02:13

## 2018-11-01 RX ADMIN — BUDESONIDE AND FORMOTEROL FUMARATE DIHYDRATE 2 PUFF(S): 160; 4.5 AEROSOL RESPIRATORY (INHALATION) at 11:40

## 2018-11-01 RX ADMIN — TAMSULOSIN HYDROCHLORIDE 0.4 MILLIGRAM(S): 0.4 CAPSULE ORAL at 21:42

## 2018-11-01 RX ADMIN — GABAPENTIN 400 MILLIGRAM(S): 400 CAPSULE ORAL at 21:42

## 2018-11-01 RX ADMIN — Medication 15 MILLIGRAM(S): at 13:06

## 2018-11-01 RX ADMIN — SENNA PLUS 1 TABLET(S): 8.6 TABLET ORAL at 11:39

## 2018-11-01 RX ADMIN — Medication 650 MILLIGRAM(S): at 03:25

## 2018-11-01 NOTE — DIETITIAN INITIAL EVALUATION ADULT. - NS FNS WEIGHT USED FOR CALC
adjusted adjusted/Ht 5'5, BMI 42.1, ABW= 137.94 Ht 5'5, Current weight 253.5 (on 10/29), 216 lb (10/31)? Weight data from [facility] reviewed, pt weighed 250-260 on recent admission in October 2018. BMI 42.1 based on 253.5 lb, ABW= 137.94 lb/adjusted

## 2018-11-01 NOTE — DIETITIAN INITIAL EVALUATION ADULT. - NUTRITION INTERVENTIONS
food preferences as requested by patient (specify) Pt to follow up team at skilled nursing home facility for possible re-test of food allergies./food preferences as requested by patient (specify)

## 2018-11-01 NOTE — DIETITIAN INITIAL EVALUATION ADULT. - PROBLEM SELECTOR PLAN 7
H/o Pulmonary embolism   on Xarelto 20 mg daily- will hold for now because of rectal bleeding  -patient c/o leg pain b/l, venous doppler from 10/09/18 negative for DVT.

## 2018-11-01 NOTE — DIETITIAN INITIAL EVALUATION ADULT. - PROBLEM SELECTOR PLAN 2
bright red blood noted mixed with stool   patient is on xarelto and aspirin - will hold for now   GI consult.   follow CBC in AM   transfuse as needed  Spoke to GI Dr. Henley over phone - as patient had recent work up - if Hb stays stable - anticoagulation can be restarted.

## 2018-11-01 NOTE — DIETITIAN INITIAL EVALUATION ADULT. - NS AS NUTRI INTERV MEALS SNACK
Diabetic diet/Protein - modified diet/Fat - modified diet/Diets modified for specific foods and ingredients/Energy - modified diet

## 2018-11-01 NOTE — DIETITIAN INITIAL EVALUATION ADULT. - ETIOLOGY
DM, COPD, Chronic respiratory failure Stage II PU and skin wound, BMI 42.1 DM, COPD, Chronic respiratory failure, immobility and bed bound High nutrient demand for wound healing

## 2018-11-01 NOTE — PROGRESS NOTE ADULT - PROBLEM SELECTOR PLAN 5
UA positive   no fever noted   c/w Levaquin for UTI and COPD exacerbation - stop after today's dose  follow Urine culture : contaminated  and blood culture: negative

## 2018-11-01 NOTE — DIETITIAN INITIAL EVALUATION ADULT. - PROBLEM SELECTOR PLAN 4
UA positive   no fever noted   will start on levaquin for UTI and COPD exacerbation   follow Urine and blood culture

## 2018-11-01 NOTE — PROGRESS NOTE ADULT - SUBJECTIVE AND OBJECTIVE BOX
PGY 1 Note discussed with supervising resident and primary attending    Patient is a 58y old  Female who presents with a chief complaint of SOB and hypoxia (31 Oct 2018 15:33)      INTERVAL HPI/OVERNIGHT EVENTS:  Patient seen at the bedside. No new complains, no over night events     MEDICATIONS  (STANDING):  ALBUTerol/ipratropium for Nebulization 3 milliLiter(s) Nebulizer every 4 hours  aspirin  chewable 81 milliGRAM(s) Oral daily  buDESOnide 160 MICROgram(s)/formoterol 4.5 MICROgram(s) Inhaler 2 Puff(s) Inhalation two times a day  dextrose 5%. 1000 milliLiter(s) (50 mL/Hr) IV Continuous <Continuous>  dextrose 50% Injectable 12.5 Gram(s) IV Push once  dextrose 50% Injectable 25 Gram(s) IV Push once  dextrose 50% Injectable 25 Gram(s) IV Push once  digoxin     Tablet 0.125 milliGRAM(s) Oral once  docusate sodium 100 milliGRAM(s) Oral daily  furosemide    Tablet 40 milliGRAM(s) Oral daily  gabapentin 400 milliGRAM(s) Oral three times a day  insulin lispro (HumaLOG) corrective regimen sliding scale   SubCutaneous three times a day before meals  latanoprost 0.005% Ophthalmic Solution 1 Drop(s) Both EYES at bedtime  levoFLOXacin  Tablet 500 milliGRAM(s) Oral every 24 hours  pantoprazole    Tablet 40 milliGRAM(s) Oral before breakfast  polyethylene glycol 3350 17 Gram(s) Oral daily  predniSONE   Tablet 40 milliGRAM(s) Oral daily  rivaroxaban 20 milliGRAM(s) Oral every 24 hours  senna 1 Tablet(s) Oral daily  simvastatin 10 milliGRAM(s) Oral at bedtime  tamsulosin 0.4 milliGRAM(s) Oral at bedtime    MEDICATIONS  (PRN):  acetaminophen   Tablet .. 650 milliGRAM(s) Oral every 6 hours PRN Mild Pain (1 - 3)  dextrose 40% Gel 15 Gram(s) Oral once PRN Blood Glucose LESS THAN 70 milliGRAM(s)/deciLiter  glucagon  Injectable 1 milliGRAM(s) IntraMuscular once PRN Glucose <70 milliGRAM(s)/deciLiter  ketorolac   Injectable 15 milliGRAM(s) IV Push every 6 hours PRN Moderate Pain (4 - 6)      __________________________________________________  REVIEW OF SYSTEMS:    CONSTITUTIONAL: No fever,   EYES: no acute visual disturbances  NECK: No pain or stiffness  RESPIRATORY: No cough; No shortness of breath  CARDIOVASCULAR: No chest pain, no palpitations  GASTROINTESTINAL: No pain. No nausea or vomiting; No diarrhea   NEUROLOGICAL: No headache or numbness, no tremors  MUSCULOSKELETAL: No joint pain, no muscle pain  GENITOURINARY: no dysuria, no frequency, no hesitancy  PSYCHIATRY: no depression , no anxiety  ALL OTHER  ROS negative        Vital Signs Last 24 Hrs  T(C): 36.9 (01 Nov 2018 07:38), Max: 37.5 (31 Oct 2018 16:37)  T(F): 98.4 (01 Nov 2018 07:38), Max: 99.5 (31 Oct 2018 16:37)  HR: 65 (01 Nov 2018 07:38) (65 - 102)  BP: 108/65 (01 Nov 2018 07:38) (102/60 - 118/52)  BP(mean): --  RR: 18 (01 Nov 2018 07:38) (18 - 18)  SpO2: 100% (01 Nov 2018 07:38) (94% - 100%)    ________________________________________________  PHYSICAL EXAM:  GENERAL: obese AAF bedbound from few years for unknown reason with suprapubic cath in place  HEENT: Normocephalic;  conjunctivae and sclerae clear; moist mucous membranes;   NECK : supple  CHEST/LUNG: Clear to auscultation bilaterally with good air entry   HEART: S1 S2  regular; no murmurs, gallops or rubs  ABDOMEN: Soft, Nontender, Nondistended; Bowel sounds present  EXTREMITIES: no cyanosis; no edema; no calf tenderness  SKIN: warm and dry; no rash  NERVOUS SYSTEM:  Awake and alert; Oriented  to place, person and time ; no new deficits    _________________________________________________  LABS:                        8.8    12.3  )-----------( 512      ( 01 Nov 2018 07:30 )             32.0     11-01    143  |  103  |  31<H>  ----------------------------<  102<H>  3.6   |  36<H>  |  0.44<L>    Ca    8.9      01 Nov 2018 07:30  Phos  2.9     11-01  Mg     2.3     11-01    TPro  6.9  /  Alb  2.4<L>  /  TBili  0.1<L>  /  DBili  x   /  AST  4<L>  /  ALT  42  /  AlkPhos  85  11-01        CAPILLARY BLOOD GLUCOSE      POCT Blood Glucose.: 136 mg/dL (01 Nov 2018 07:58)  POCT Blood Glucose.: 126 mg/dL (31 Oct 2018 20:57)  POCT Blood Glucose.: 174 mg/dL (31 Oct 2018 16:47)  POCT Blood Glucose.: 179 mg/dL (31 Oct 2018 13:46)  POCT Blood Glucose.: 238 mg/dL (31 Oct 2018 11:46)        RADIOLOGY & ADDITIONAL TESTS:    Imaging Personally Reviewed:  YES/NO    Consultant(s) Notes Reviewed:   YES/ No    Care Discussed with Consultants :     Plan of care was discussed with patient and /or primary care giver; all questions and concerns were addressed and care was aligned with patient's wishes.

## 2018-11-01 NOTE — DIETITIAN INITIAL EVALUATION ADULT. - OTHER INFO
Nutrition consult performed for LOS. Pt reports good appetite and PO intake PTA and since admission. Pt denies N/V/D/C and any chewing and swallowing difficulties. Pt denies allergies listed on chart besides for fish. Nutrition assessment performed for LOS. Pt admitted from skilled nursing facility and is bedbound. Pt reports good appetite and PO intake PTA and since admission. Pt denies N/V/D/C and any chewing and swallowing difficulties. Pt denies allergies listed on chart besides for fish. Pt has skin wound and stage II Pressure Ulcer. Food preferences obtained. 100% intake per patient report and per flow sheets.

## 2018-11-01 NOTE — PROGRESS NOTE ADULT - PROBLEM SELECTOR PLAN 9
Not on CPAP at NH.  will need polysomnography as outpatient. Not on CPAP at NH.  polysomnography as outpatient recommendation.

## 2018-11-01 NOTE — PROGRESS NOTE ADULT - PROBLEM SELECTOR PLAN 1
HR: controlled now   Dig level: 0.5  Dig dose increased to .25, Dig level on Nov 5 before fifth dose   DC Tele   Patient is already on Xarelto

## 2018-11-01 NOTE — CHART NOTE - NSCHARTNOTEFT_GEN_A_CORE
Upon Nutritional Assessment by the Registered Dietitian your patient was determined to meet criteria / has evidence of the following diagnosis/diagnoses:          [ ]  Mild Protein Calorie Malnutrition        [ ]  Moderate Protein Calorie Malnutrition        [ ] Severe Protein Calorie Malnutrition        [ ] Unspecified Protein Calorie Malnutrition        [ ] Underweight / BMI <19        [ X ] Morbid Obesity / BMI > 40      Findings as based on:  •  Comprehensive nutrition assessment and consultation  •  Calorie counts (nutrient intake analysis)  •  Food acceptance and intake status from observations by staff  •  Follow up  •  Patient education  •  Intervention secondary to interdisciplinary rounds  •   concerns      Treatment:    The following diet has been recommended: change diet to Consistent CHO DASH diet, with Prosource 1 pack bid daily as medically feasible       PROVIDER Section:     By signing this assessment you are acknowledging and agree with the diagnosis/diagnoses assigned by the Registered Dietitian    Comments:

## 2018-11-01 NOTE — PROGRESS NOTE ADULT - PROBLEM SELECTOR PLAN 10
IMPROVE VTE Individual Risk Assessment          RISK                                                          Points  [  ] Previous VTE                                                3  [  ] Thrombophilia                                             2  [  ] Lower limb paralysis                                   2        (unable to hold up >15 seconds)    [  ] Current Cancer                                             2         (within 6 months)  [ x ] Immobilization > 24 hrs                              1  [  ] ICU/CCU stay > 24 hours                             1  [  ] Age > 60                                                         1    IMPROVE VTE Score: 1  will hold anticoagulation for rectal bleed.  will continue with protonix On chronic anticoagulation for AFIB

## 2018-11-01 NOTE — DIETITIAN INITIAL EVALUATION ADULT. - SOURCE
Chart Review/patient Chart Review, pt able to provide information but was in pain, RN informed/patient

## 2018-11-01 NOTE — DIETITIAN INITIAL EVALUATION ADULT. - ADHERENCE
good At skilled nursing facility, No concentrated sweet/low chol/low fat diet, regular thin liquids/good

## 2018-11-01 NOTE — PROGRESS NOTE ADULT - PROBLEM SELECTOR PROBLEM 9
PUSHPA (obstructive sleep apnea)
Prophylactic measure
PUSHPA (obstructive sleep apnea)

## 2018-11-01 NOTE — DIETITIAN INITIAL EVALUATION ADULT. - MD RECOMMEND
modular components other/Change diet order to consistent carbohydrate (no snack)/DASH diet, provide Multivitamin with minerals and vitamin C supplement/modular components

## 2018-11-01 NOTE — CHART NOTE - NSCHARTNOTEFT_GEN_A_CORE
Pt reports c/o chronic back pain not controlled with Tylenol. Ordered PRN percocet regimen. Pt reports c/o chronic back pain not controlled with PO Tylenol. Ordered PRN IV Toradol regimen.

## 2018-11-01 NOTE — PROGRESS NOTE ADULT - SUBJECTIVE AND OBJECTIVE BOX
Patient seen and examined.     MEDICATIONS  (STANDING):  ALBUTerol/ipratropium for Nebulization 3 milliLiter(s) Nebulizer every 4 hours  aspirin  chewable 81 milliGRAM(s) Oral daily  buDESOnide 160 MICROgram(s)/formoterol 4.5 MICROgram(s) Inhaler 2 Puff(s) Inhalation two times a day  dextrose 5%. 1000 milliLiter(s) (50 mL/Hr) IV Continuous <Continuous>  dextrose 50% Injectable 12.5 Gram(s) IV Push once  dextrose 50% Injectable 25 Gram(s) IV Push once  dextrose 50% Injectable 25 Gram(s) IV Push once  docusate sodium 100 milliGRAM(s) Oral daily  furosemide    Tablet 40 milliGRAM(s) Oral daily  gabapentin 400 milliGRAM(s) Oral three times a day  insulin lispro (HumaLOG) corrective regimen sliding scale   SubCutaneous three times a day before meals  latanoprost 0.005% Ophthalmic Solution 1 Drop(s) Both EYES at bedtime  levoFLOXacin  Tablet 500 milliGRAM(s) Oral every 24 hours  pantoprazole    Tablet 40 milliGRAM(s) Oral before breakfast  polyethylene glycol 3350 17 Gram(s) Oral daily  predniSONE   Tablet 40 milliGRAM(s) Oral daily  rivaroxaban 20 milliGRAM(s) Oral every 24 hours  senna 1 Tablet(s) Oral daily  simvastatin 10 milliGRAM(s) Oral at bedtime  tamsulosin 0.4 milliGRAM(s) Oral at bedtime      MEDICATIONS  (PRN):  acetaminophen   Tablet .. 650 milliGRAM(s) Oral every 6 hours PRN Mild Pain (1 - 3)  dextrose 40% Gel 15 Gram(s) Oral once PRN Blood Glucose LESS THAN 70 milliGRAM(s)/deciLiter  glucagon  Injectable 1 milliGRAM(s) IntraMuscular once PRN Glucose <70 milliGRAM(s)/deciLiter  ketorolac   Injectable 15 milliGRAM(s) IV Push every 6 hours PRN Moderate Pain (4 - 6)     Medications up to date at time of exam.    PHYSICAL EXAMINATION:  Patient has no new complaints.  GENERAL: The patient is a well-developed, well-nourished, in no apparent distress.     Vital Signs Last 24 Hrs  T(C): 36.7 (2018 11:42), Max: 37.5 (31 Oct 2018 16:37)  T(F): 98.1 (2018 11:42), Max: 99.5 (31 Oct 2018 16:37)  HR: 102 (2018 11:42) (65 - 102)  BP: 118/56 (2018 11:42) (107/61 - 118/56)  BP(mean): --  RR: 18 (2018 11:42) (18 - 18)  SpO2: 95% (2018 11:42) (94% - 100%)   (if applicable)    Chest Tube (if applicable)    HEENT: Head is normocephalic and atraumatic.     NECK: Supple, no palpable adenopathy.    LUNGS: Clear to auscultation, no wheezing, rales, or rhonchi.    HEART: Regular rate and rhythm without murmur.    ABDOMEN: Soft, nontender, and nondistended.      EXTREMITIES: Without any cyanosis, clubbing, rash, lesions or edema.    NEUROLOGIC: Awake, alert.    SKIN: Warm, dry, good turgor.    +suprapubic hsu    LABS:                        8.8    12.3  )-----------( 512      ( 2018 07:30 )             32.0     11-    143  |  103  |  31<H>  ----------------------------<  102<H>  3.6   |  36<H>  |  0.44<L>    Ca    8.9      2018 07:30  Phos  2.9     11-  Mg     2.3         TPro  6.9  /  Alb  2.4<L>  /  TBili  0.1<L>  /  DBili  x   /  AST  4<L>  /  ALT  42  /  AlkPhos  85                    MICROBIOLOGY: (if applicable)    RADIOLOGY & ADDITIONAL STUDIES:  EKG:   CXR:  ECHO:    IMPRESSION: 58y Female PAST MEDICAL & SURGICAL HISTORY:  Urinary retention  CKD (chronic kidney disease)  DM (diabetes mellitus)  HTN (hypertension)  COPD (chronic obstructive pulmonary disease)  Morbid obesity  Diabetes  Overactive bladder  Hemorrhoids  Emphysema  Suprapubic catheter  S/P   History of appendectomy  S/P cholecystectomy         Impression; 57 Y/O Female with prior mentioned multiple chronic conditions. Presented with progressive worsening of SOB requiring NIPPV support. pat will benefit from nocturnal PAP device.  She was evaluated by ICU team and rejected. Now in Medicine Unit, on Oxygen supplementation and Bipap at night. RVP, BLD Cx x 2 negative.        Suggestion:  Continue oxygen supplementation NC. Bipap 16/5 FIO2 50% at night. Recommending PAP outpatient, per patient she has portable PAP at home but never used it and had it nursing home but she verbalized that she is using the nursing home PAP on and off at night. Reinforced to comply with Oxygen supplementation daytime and PAP at night.  Continue DuoNeb Q 6 hours. Budesonide twice daily. Tiotropium daily.  On Xarelto Daily .  PO steroids, taper  DVT and GI prophylaxis.   No significant change on 10/27 CXR when compared to 10/25  Today is dose 7 of levaquin Patient seen and examined.     MEDICATIONS  (STANDING):  ALBUTerol/ipratropium for Nebulization 3 milliLiter(s) Nebulizer every 4 hours  aspirin  chewable 81 milliGRAM(s) Oral daily  buDESOnide 160 MICROgram(s)/formoterol 4.5 MICROgram(s) Inhaler 2 Puff(s) Inhalation two times a day  dextrose 5%. 1000 milliLiter(s) (50 mL/Hr) IV Continuous <Continuous>  dextrose 50% Injectable 12.5 Gram(s) IV Push once  dextrose 50% Injectable 25 Gram(s) IV Push once  dextrose 50% Injectable 25 Gram(s) IV Push once  docusate sodium 100 milliGRAM(s) Oral daily  furosemide    Tablet 40 milliGRAM(s) Oral daily  gabapentin 400 milliGRAM(s) Oral three times a day  insulin lispro (HumaLOG) corrective regimen sliding scale   SubCutaneous three times a day before meals  latanoprost 0.005% Ophthalmic Solution 1 Drop(s) Both EYES at bedtime  levoFLOXacin  Tablet 500 milliGRAM(s) Oral every 24 hours  pantoprazole    Tablet 40 milliGRAM(s) Oral before breakfast  polyethylene glycol 3350 17 Gram(s) Oral daily  predniSONE   Tablet 40 milliGRAM(s) Oral daily  rivaroxaban 20 milliGRAM(s) Oral every 24 hours  senna 1 Tablet(s) Oral daily  simvastatin 10 milliGRAM(s) Oral at bedtime  tamsulosin 0.4 milliGRAM(s) Oral at bedtime      MEDICATIONS  (PRN):  acetaminophen   Tablet .. 650 milliGRAM(s) Oral every 6 hours PRN Mild Pain (1 - 3)  dextrose 40% Gel 15 Gram(s) Oral once PRN Blood Glucose LESS THAN 70 milliGRAM(s)/deciLiter  glucagon  Injectable 1 milliGRAM(s) IntraMuscular once PRN Glucose <70 milliGRAM(s)/deciLiter  ketorolac   Injectable 15 milliGRAM(s) IV Push every 6 hours PRN Moderate Pain (4 - 6)     Medications up to date at time of exam.    PHYSICAL EXAMINATION:  Patient has no new complaints.  GENERAL: The patient is a well-developed, well-nourished, in no apparent distress.     Vital Signs Last 24 Hrs  T(C): 36.7 (2018 11:42), Max: 37.5 (31 Oct 2018 16:37)  T(F): 98.1 (2018 11:42), Max: 99.5 (31 Oct 2018 16:37)  HR: 102 (2018 11:42) (65 - 102)  BP: 118/56 (2018 11:42) (107/61 - 118/56)  BP(mean): --  RR: 18 (2018 11:42) (18 - 18)  SpO2: 95% (2018 11:42) (94% - 100%)   (if applicable)    Chest Tube (if applicable)    HEENT: Head is normocephalic and atraumatic.     NECK: Supple, no palpable adenopathy.    LUNGS: Clear to auscultation, no wheezing, rales, or rhonchi.    HEART: Regular rate and rhythm without murmur.    ABDOMEN: Soft, nontender, and nondistended.      EXTREMITIES: Without any cyanosis, clubbing, rash, lesions or edema.    NEUROLOGIC: Awake, alert.    SKIN: Warm, dry, good turgor.    +suprapubic hsu    LABS:                        8.8    12.3  )-----------( 512      ( 2018 07:30 )             32.0     11-    143  |  103  |  31<H>  ----------------------------<  102<H>  3.6   |  36<H>  |  0.44<L>    Ca    8.9      2018 07:30  Phos  2.9     11-  Mg     2.3         TPro  6.9  /  Alb  2.4<L>  /  TBili  0.1<L>  /  DBili  x   /  AST  4<L>  /  ALT  42  /  AlkPhos  85                    MICROBIOLOGY: (if applicable)    RADIOLOGY & ADDITIONAL STUDIES:  EKG:   CXR:  ECHO:    IMPRESSION: 58y Female PAST MEDICAL & SURGICAL HISTORY:  Urinary retention  CKD (chronic kidney disease)  DM (diabetes mellitus)  HTN (hypertension)  COPD (chronic obstructive pulmonary disease)  Morbid obesity  Diabetes  Overactive bladder  Hemorrhoids  Emphysema  Suprapubic catheter  S/P   History of appendectomy  S/P cholecystectomy         Impression; 59 Y/O Female with prior mentioned multiple chronic conditions. Presented with progressive worsening of SOB requiring NIPPV support. pat will benefit from nocturnal PAP device.  She was evaluated by ICU team and rejected. Now in Medicine Unit, on Oxygen supplementation and Bipap at night.  Discharge pending bed availability in NH.  + PE on xeralto.         Suggestion:  -Continue oxygen supplementation NC. Bipap 16/5 FIO2 50% at night. Recommending PAP outpatient, per patient she has portable PAP at home but never used it and had it nursing home but she verbalized that she is using the nursing -home PAP on and off at night. Reinforced to comply with Oxygen supplementation daytime and PAP at night.  -Continue DuoNeb Q 6 hours. Budesonide twice daily. Tiotropium daily.  -On Xarelto Daily .  -PO steroids, taper  -DVT and GI prophylaxis.   -No significant change on 10/27 CXR when compared to 10/25

## 2018-11-02 VITALS — OXYGEN SATURATION: 97 % | HEART RATE: 89 BPM

## 2018-11-02 PROCEDURE — 99239 HOSP IP/OBS DSCHRG MGMT >30: CPT

## 2018-11-02 PROCEDURE — 99232 SBSQ HOSP IP/OBS MODERATE 35: CPT

## 2018-11-02 RX ORDER — KETOROLAC TROMETHAMINE 30 MG/ML
15 SYRINGE (ML) INJECTION EVERY 8 HOURS
Qty: 0 | Refills: 0 | Status: DISCONTINUED | OUTPATIENT
Start: 2018-11-02 | End: 2018-11-02

## 2018-11-02 RX ADMIN — Medication 3: at 12:18

## 2018-11-02 RX ADMIN — Medication 0.25 MILLIGRAM(S): at 09:06

## 2018-11-02 RX ADMIN — Medication 650 MILLIGRAM(S): at 10:00

## 2018-11-02 RX ADMIN — Medication 100 MILLIGRAM(S): at 12:18

## 2018-11-02 RX ADMIN — Medication 15 MILLIGRAM(S): at 12:40

## 2018-11-02 RX ADMIN — Medication 40 MILLIGRAM(S): at 06:13

## 2018-11-02 RX ADMIN — Medication 650 MILLIGRAM(S): at 09:05

## 2018-11-02 RX ADMIN — Medication 15 MILLIGRAM(S): at 02:18

## 2018-11-02 RX ADMIN — PANTOPRAZOLE SODIUM 40 MILLIGRAM(S): 20 TABLET, DELAYED RELEASE ORAL at 06:13

## 2018-11-02 RX ADMIN — Medication 15 MILLIGRAM(S): at 02:00

## 2018-11-02 RX ADMIN — GABAPENTIN 400 MILLIGRAM(S): 400 CAPSULE ORAL at 15:49

## 2018-11-02 RX ADMIN — Medication 81 MILLIGRAM(S): at 12:18

## 2018-11-02 RX ADMIN — BUDESONIDE AND FORMOTEROL FUMARATE DIHYDRATE 2 PUFF(S): 160; 4.5 AEROSOL RESPIRATORY (INHALATION) at 09:06

## 2018-11-02 RX ADMIN — Medication 3 MILLILITER(S): at 02:18

## 2018-11-02 RX ADMIN — Medication 3 MILLILITER(S): at 05:56

## 2018-11-02 RX ADMIN — Medication 3 MILLILITER(S): at 09:07

## 2018-11-02 RX ADMIN — Medication 15 MILLIGRAM(S): at 12:24

## 2018-11-02 RX ADMIN — GABAPENTIN 400 MILLIGRAM(S): 400 CAPSULE ORAL at 06:13

## 2018-11-02 RX ADMIN — Medication 3 MILLILITER(S): at 15:49

## 2018-11-02 NOTE — PROGRESS NOTE ADULT - PROBLEM SELECTOR PLAN 8
start Xarelto 20 mg daily-   -patient c/o leg pain b/l, venous doppler from 10/09/18 negative for DVT.
H/o Pulmonary embolism  restart Xarelto if H/h stable
Not on CPAP at NH.  will need polysomnography as outpatient.
On chronic anticoagulation for AFIB
start Xarelto 20 mg daily-   -patient c/o leg pain b/l, venous doppler from 10/09/18 negative for DVT.

## 2018-11-02 NOTE — PROGRESS NOTE ADULT - PROBLEM SELECTOR PROBLEM 4
COPD (chronic obstructive pulmonary disease)
Hidradenitis suppurativa
UTI (urinary tract infection)
DM (diabetes mellitus)
Hidradenitis suppurativa
COPD (chronic obstructive pulmonary disease)
Hidradenitis suppurativa
COPD (chronic obstructive pulmonary disease)

## 2018-11-02 NOTE — PROGRESS NOTE ADULT - REASON FOR ADMISSION
SOB and hypoxia

## 2018-11-02 NOTE — PROGRESS NOTE ADULT - PROBLEM SELECTOR PROBLEM 3
BRBPR (bright red blood per rectum)
COPD (chronic obstructive pulmonary disease)
COPD (chronic obstructive pulmonary disease)
BRBPR (bright red blood per rectum)
COPD (chronic obstructive pulmonary disease)
BRBPR (bright red blood per rectum)
COPD (chronic obstructive pulmonary disease)
BRBPR (bright red blood per rectum)

## 2018-11-02 NOTE — PROGRESS NOTE ADULT - SUBJECTIVE AND OBJECTIVE BOX
MEDICAL ATTENDING NOTE    Patient is a 58y old  Female who presents with a chief complaint of SOB and hypoxia (02 Nov 2018 16:53)      INTERVAL HPI/OVERNIGHT EVENTS: no new complaints    MEDICATIONS  (STANDING):  ALBUTerol/ipratropium for Nebulization 3 milliLiter(s) Nebulizer every 4 hours  aspirin  chewable 81 milliGRAM(s) Oral daily  buDESOnide 160 MICROgram(s)/formoterol 4.5 MICROgram(s) Inhaler 2 Puff(s) Inhalation two times a day  dextrose 5%. 1000 milliLiter(s) (50 mL/Hr) IV Continuous <Continuous>  dextrose 50% Injectable 12.5 Gram(s) IV Push once  dextrose 50% Injectable 25 Gram(s) IV Push once  dextrose 50% Injectable 25 Gram(s) IV Push once  digoxin     Tablet 0.25 milliGRAM(s) Oral daily  docusate sodium 100 milliGRAM(s) Oral daily  furosemide    Tablet 40 milliGRAM(s) Oral daily  gabapentin 400 milliGRAM(s) Oral three times a day  insulin lispro (HumaLOG) corrective regimen sliding scale   SubCutaneous three times a day before meals  latanoprost 0.005% Ophthalmic Solution 1 Drop(s) Both EYES at bedtime  pantoprazole    Tablet 40 milliGRAM(s) Oral before breakfast  polyethylene glycol 3350 17 Gram(s) Oral daily  predniSONE   Tablet 20 milliGRAM(s) Oral daily  rivaroxaban 20 milliGRAM(s) Oral every 24 hours  senna 1 Tablet(s) Oral daily  simvastatin 10 milliGRAM(s) Oral at bedtime  tamsulosin 0.4 milliGRAM(s) Oral at bedtime    MEDICATIONS  (PRN):  acetaminophen   Tablet .. 650 milliGRAM(s) Oral every 6 hours PRN Mild Pain (1 - 3)  dextrose 40% Gel 15 Gram(s) Oral once PRN Blood Glucose LESS THAN 70 milliGRAM(s)/deciLiter  glucagon  Injectable 1 milliGRAM(s) IntraMuscular once PRN Glucose <70 milliGRAM(s)/deciLiter  ketorolac   Injectable 15 milliGRAM(s) IV Push every 8 hours PRN Moderate Pain (4 - 6)      __________________________________________________  ----------------------------------------------------------------------------------  REVIEW OF SYSTEMS: no fever, no SOB, No Chest pain; feels well      Vital Signs Last 24 Hrs  T(C): 36.7 (02 Nov 2018 08:27), Max: 37.6 (01 Nov 2018 18:43)  T(F): 98 (02 Nov 2018 08:27), Max: 99.6 (01 Nov 2018 18:43)  HR: 89 (02 Nov 2018 08:30) (88 - 96)  BP: 115/61 (02 Nov 2018 08:27) (112/69 - 144/63)  BP(mean): --  RR: 17 (02 Nov 2018 08:27) (17 - 18)  SpO2: 97% (02 Nov 2018 08:30) (97% - 97%)    _________________  PHYSICAL EXAM:  ---------------------------   NAD; Normocephalic;   LUNGS - no wheezing  HEART: S1 S2+   ABDOMEN: Soft, Nontender, non distended, BS+, Obese+  EXTREMITIES: no cyanosis; no edema  NERVOUS SYSTEM:  Awake and alert; no new deficits    _________________________________________________  LABS:                        8.8    12.3  )-----------( 512      ( 01 Nov 2018 07:30 )             32.0     11-01    143  |  103  |  31<H>  ----------------------------<  102<H>  3.6   |  36<H>  |  0.44<L>    Ca    8.9      01 Nov 2018 07:30  Phos  2.9     11-01  Mg     2.3     11-01    TPro  6.9  /  Alb  2.4<L>  /  TBili  0.1<L>  /  DBili  x   /  AST  4<L>  /  ALT  42  /  AlkPhos  85  11-01        CAPILLARY BLOOD GLUCOSE      POCT Blood Glucose.: 273 mg/dL (02 Nov 2018 11:36)  POCT Blood Glucose.: 130 mg/dL (02 Nov 2018 08:50)            Care Discussed with Consultants :     Plan of care was discussed with patient ; all questions and concerns were addressed and care was aligned with patient's wishes.  Patient has been advised to follow up with PMD upon discharge from the hospital.  Discharge plans discussed with nursing staff and .

## 2018-11-02 NOTE — PROGRESS NOTE ADULT - PROBLEM SELECTOR PROBLEM 1
Acute on chronic respiratory failure with hypoxia and hypercapnia
Afib

## 2018-11-02 NOTE — PROGRESS NOTE ADULT - SUBJECTIVE AND OBJECTIVE BOX
PRESENTING CC: Shortness of breath    SUBJ:     In summary, this is a 59 yo F bedbound from nursing home with DM, COPD on O2, PE on Xarelto, and PUSHPA who presented with dyspnea in the setting of COPD exacerbation and was noted to be with A fib with RVR. Patient underwent digoxin loading with conversion to NSR.     Overnight, there were no acute events. Patient      ----------------------  Description of patient's presenting symptoms from my prior note:    PMH: As above, also Urinary retention, CKD (chronic kidney disease), Morbid obesity, Hemorrhoids, Suprapubic catheter, History of appendectomy, S/P cholecystectomy    Allergies    Cheese (Pruritus)  oxycodone (Other (Moderate))  peanuts (Unknown)  penicillin (Rash)  seafood (Hives)  strawberry (Pruritus)  Tomatoes (Other)    MEDICATIONS  (STANDING):  ALBUTerol/ipratropium for Nebulization 3 milliLiter(s) Nebulizer every 4 hours  aspirin  chewable 81 milliGRAM(s) Oral daily  buDESOnide 160 MICROgram(s)/formoterol 4.5 MICROgram(s) Inhaler 2 Puff(s) Inhalation two times a day  dextrose 5%. 1000 milliLiter(s) (50 mL/Hr) IV Continuous <Continuous>  dextrose 50% Injectable 12.5 Gram(s) IV Push once  dextrose 50% Injectable 25 Gram(s) IV Push once  dextrose 50% Injectable 25 Gram(s) IV Push once  digoxin     Tablet 0.25 milliGRAM(s) Oral daily  docusate sodium 100 milliGRAM(s) Oral daily  furosemide    Tablet 40 milliGRAM(s) Oral daily  gabapentin 400 milliGRAM(s) Oral three times a day  insulin lispro (HumaLOG) corrective regimen sliding scale   SubCutaneous three times a day before meals  latanoprost 0.005% Ophthalmic Solution 1 Drop(s) Both EYES at bedtime  pantoprazole    Tablet 40 milliGRAM(s) Oral before breakfast  polyethylene glycol 3350 17 Gram(s) Oral daily  predniSONE   Tablet 20 milliGRAM(s) Oral daily  rivaroxaban 20 milliGRAM(s) Oral every 24 hours  senna 1 Tablet(s) Oral daily  simvastatin 10 milliGRAM(s) Oral at bedtime  tamsulosin 0.4 milliGRAM(s) Oral at bedtime    MEDICATIONS  (PRN):  acetaminophen   Tablet .. 650 milliGRAM(s) Oral every 6 hours PRN Mild Pain (1 - 3)  dextrose 40% Gel 15 Gram(s) Oral once PRN Blood Glucose LESS THAN 70 milliGRAM(s)/deciLiter  glucagon  Injectable 1 milliGRAM(s) IntraMuscular once PRN Glucose <70 milliGRAM(s)/deciLiter      FAMILY HISTORY:  No pertinent family history in first degree relatives    Reviewed; no change from my prior note    SOCIAL HISTORY:  Reviewed, no change from my prior note    REVIEW OF SYSTEMS:  Constitutional: [ ] fever, [ ]weight loss,  [ ]fatigue  Eyes: [ ] visual changes  Respiratory: [ ]shortness of breath;  [ ] cough, [ ]wheezing, [ ]chills, [ ]hemoptysis  Cardiovascular: [ ] chest pain, [ ]palpitations, [ ]dizziness,  [ ]leg swelling [ ]syncope  Gastrointestinal: [ ] abdominal pain, [ ]nausea, [ ]vomiting,  [ ]diarrhea   Genitourinary: [ ] dysuria, [ ] hematuria  Neurologic: [ ] headaches [ ] tremors [ ] weakness [ ] lightheadedness  Skin: [ ] itching, [ ]burning, [ ] rashes  Endocrine: [ ] heat or cold intolerance  Musculoskeletal: [ ] joint pain or swelling; [ ] muscle, back, or extremity pain  Psychiatric: [ ] depression, [ ]anxiety, [ ]mood swings, or [ ]difficulty sleeping  Hematologic: [ ] easy bruising, [ ] bleeding gums    [x] All remaining systems negative except as per above.   [  ] Unable to obtain    Vital Signs Last 24 Hrs  T(C): 36.7 (02 Nov 2018 08:27), Max: 37.6 (01 Nov 2018 18:43)  T(F): 98 (02 Nov 2018 08:27), Max: 99.6 (01 Nov 2018 18:43)  HR: 89 (02 Nov 2018 08:27) (88 - 102)  BP: 115/61 (02 Nov 2018 08:27) (112/69 - 144/63)  BP(mean): --  RR: 17 (02 Nov 2018 08:27) (17 - 18)  SpO2: 97% (02 Nov 2018 08:27) (95% - 97%)  I&O's Summary    01 Nov 2018 07:01  -  02 Nov 2018 07:00  --------------------------------------------------------  IN: 0 mL / OUT: 1200 mL / NET: -1200 mL        PHYSICAL EXAM:  General: No acute distress  HEENT: EOMI, PERRL  Neck: Supple, No JVD  Lungs: Clear to auscultation bilaterally; No rales or wheezing  Heart: Regular rate and rhythm; No murmurs, rubs, or gallops  Abdomen: Nontender, bowel sounds present  Extremities: No clubbing, cyanosis, or edema  Nervous system:  Alert & Oriented X3, no focal deficits  Psychiatric: Normal affect  Skin: No rashes or lesions    LABS:  11-01    143  |  103  |  31<H>  ----------------------------<  102<H>  3.6   |  36<H>  |  0.44<L>    Ca    8.9      01 Nov 2018 07:30  Phos  2.9     11-01  Mg     2.3     11-01    TPro  6.9  /  Alb  2.4<L>  /  TBili  0.1<L>  /  DBili  x   /  AST  4<L>  /  ALT  42  /  AlkPhos  85  11-01    Creatinine Trend: 0.44<--, 0.52<--, 0.55<--, 0.50<--, 0.46<--, 0.34<--                        8.8    12.3  )-----------( 512      ( 01 Nov 2018 07:30 )             32.0         TELEMETRY:    digoxin level 0.5 PRESENTING CC: Shortness of breath    SUBJ:     In summary, this is a 59 yo F bedbound from nursing home with DM, COPD on O2, PE on Xarelto, and PUSHPA who presented with dyspnea in the setting of COPD exacerbation and was noted to be with A fib with RVR. Patient underwent digoxin loading with conversion to NSR.     Overnight, there were no acute events. Patient reports a few, brief episodes of mild palpitations but has no other complaints; denied chest pain or dyspnea.       ----------------------  Description of patient's presenting symptoms from my prior note:    PMH: As above, also Urinary retention, CKD (chronic kidney disease), Morbid obesity, Hemorrhoids, Suprapubic catheter, History of appendectomy, S/P cholecystectomy    Allergies    Cheese (Pruritus)  oxycodone (Other (Moderate))  peanuts (Unknown)  penicillin (Rash)  seafood (Hives)  strawberry (Pruritus)  Tomatoes (Other)    MEDICATIONS  (STANDING):  ALBUTerol/ipratropium for Nebulization 3 milliLiter(s) Nebulizer every 4 hours  aspirin  chewable 81 milliGRAM(s) Oral daily  buDESOnide 160 MICROgram(s)/formoterol 4.5 MICROgram(s) Inhaler 2 Puff(s) Inhalation two times a day  dextrose 5%. 1000 milliLiter(s) (50 mL/Hr) IV Continuous <Continuous>  dextrose 50% Injectable 12.5 Gram(s) IV Push once  dextrose 50% Injectable 25 Gram(s) IV Push once  dextrose 50% Injectable 25 Gram(s) IV Push once  digoxin     Tablet 0.25 milliGRAM(s) Oral daily  docusate sodium 100 milliGRAM(s) Oral daily  furosemide    Tablet 40 milliGRAM(s) Oral daily  gabapentin 400 milliGRAM(s) Oral three times a day  insulin lispro (HumaLOG) corrective regimen sliding scale   SubCutaneous three times a day before meals  latanoprost 0.005% Ophthalmic Solution 1 Drop(s) Both EYES at bedtime  pantoprazole    Tablet 40 milliGRAM(s) Oral before breakfast  polyethylene glycol 3350 17 Gram(s) Oral daily  predniSONE   Tablet 20 milliGRAM(s) Oral daily  rivaroxaban 20 milliGRAM(s) Oral every 24 hours  senna 1 Tablet(s) Oral daily  simvastatin 10 milliGRAM(s) Oral at bedtime  tamsulosin 0.4 milliGRAM(s) Oral at bedtime    MEDICATIONS  (PRN):  acetaminophen   Tablet .. 650 milliGRAM(s) Oral every 6 hours PRN Mild Pain (1 - 3)  dextrose 40% Gel 15 Gram(s) Oral once PRN Blood Glucose LESS THAN 70 milliGRAM(s)/deciLiter  glucagon  Injectable 1 milliGRAM(s) IntraMuscular once PRN Glucose <70 milliGRAM(s)/deciLiter      FAMILY HISTORY:  No pertinent family history in first degree relatives    Reviewed; no change from my prior note    SOCIAL HISTORY:  Reviewed, no change from my prior note    REVIEW OF SYSTEMS:  Constitutional: [ ] fever, [ ]weight loss,  [ ]fatigue  Eyes: [ ] visual changes  Respiratory: [ ]shortness of breath;  [ ] cough, [ ]wheezing, [ ]chills, [ ]hemoptysis  Cardiovascular: [ ] chest pain, [ ]palpitations, [ ]dizziness,  [ ]leg swelling [ ]syncope  Gastrointestinal: [ ] abdominal pain, [ ]nausea, [ ]vomiting,  [ ]diarrhea   Genitourinary: [ ] dysuria, [ ] hematuria  Neurologic: [ ] headaches [ ] tremors [ ] weakness [ ] lightheadedness  Skin: [ ] itching, [ ]burning, [ ] rashes  Endocrine: [ ] heat or cold intolerance  Musculoskeletal: [ ] joint pain or swelling; [ ] muscle, back, or extremity pain  Psychiatric: [ ] depression, [ ]anxiety, [ ]mood swings, or [ ]difficulty sleeping  Hematologic: [ ] easy bruising, [ ] bleeding gums    [x] All remaining systems negative except as per above.   [  ] Unable to obtain    Vital Signs Last 24 Hrs  T(C): 36.7 (02 Nov 2018 08:27), Max: 37.6 (01 Nov 2018 18:43)  T(F): 98 (02 Nov 2018 08:27), Max: 99.6 (01 Nov 2018 18:43)  HR: 89 (02 Nov 2018 08:27) (88 - 102)  BP: 115/61 (02 Nov 2018 08:27) (112/69 - 144/63)  BP(mean): --  RR: 17 (02 Nov 2018 08:27) (17 - 18)  SpO2: 97% (02 Nov 2018 08:27) (95% - 97%)  I&O's Summary    01 Nov 2018 07:01  -  02 Nov 2018 07:00  --------------------------------------------------------  IN: 0 mL / OUT: 1200 mL / NET: -1200 mL        PHYSICAL EXAM:  General: No acute distress  HEENT: EOMI, PERRL  Neck: Supple, No JVD  Lungs: Clear to auscultation bilaterally; No rales or wheezing  Heart: Regular rate and rhythm; No murmurs, rubs, or gallops  Abdomen: Nontender, bowel sounds present  Extremities: No clubbing, cyanosis, or edema  Nervous system:  Alert & Oriented X3, no focal deficits  Psychiatric: Normal affect  Skin: No rashes or lesions    LABS:  11-01    143  |  103  |  31<H>  ----------------------------<  102<H>  3.6   |  36<H>  |  0.44<L>    Ca    8.9      01 Nov 2018 07:30  Phos  2.9     11-01  Mg     2.3     11-01    TPro  6.9  /  Alb  2.4<L>  /  TBili  0.1<L>  /  DBili  x   /  AST  4<L>  /  ALT  42  /  AlkPhos  85  11-01    Creatinine Trend: 0.44<--, 0.52<--, 0.55<--, 0.50<--, 0.46<--, 0.34<--                        8.8    12.3  )-----------( 512      ( 01 Nov 2018 07:30 )             32.0         TELEMETRY: n/a    digoxin level 0.5

## 2018-11-02 NOTE — PROGRESS NOTE ADULT - ATTENDING COMMENTS
Thank you for the courtesy of a consultation, please contact me for any additional questions
Thank you for the courtesy of a consultation, please contact me for any additional questions
Patient was seen and examined by myself. Case was discussed with house staff in details. I have reviewed and agree with the plan as outlined above with edits where appropriate.    Clinically stable.  Awaiting discharge.
see attending note
Plan discussed with patient  in details at bedside and all her questions / concerns were addressed
Patient was seen and examined by myself. Case was discussed with house staff in details. I have reviewed and agree with the plan as outlined above with edits where appropriate.    Vital Signs Last 24 Hrs  T(C): 36.7 (29 Oct 2018 20:48), Max: 37.2 (29 Oct 2018 11:25)  T(F): 98 (29 Oct 2018 20:48), Max: 99 (29 Oct 2018 11:25)  HR: 77 (29 Oct 2018 20:48) (77 - 125)  BP: 90/53 (29 Oct 2018 20:48) (90/53 - 123/65)  BP(mean): --  RR: 20 (29 Oct 2018 20:48) (18 - 24)  SpO2: 95% (29 Oct 2018 20:48) (91% - 98%)      10-29    139  |  97  |  28<H>  ----------------------------<  107<H>  3.6   |  37<H>  |  0.55    Ca    9.7      29 Oct 2018 08:38  Phos  4.0     10-29  Mg     2.2     10-29    TPro  8.2  /  Alb  2.8<L>  /  TBili  0.2  /  DBili  x   /  AST  29  /  ALT  58  /  AlkPhos  88  10-29                            8.8    13.5  )-----------( 457      ( 29 Oct 2018 08:38 )             32.0       A/P: 59 y/o F with   1. AFIB with RVR  2. acute on chronic respiratory failure- resolving  3. COPD-   4. PUSHPA  5. Morbid obesity  6. Chronic pain syndrome  7. Chronic PE  8. Neuropathy  9. Bed bound state  10. Chronic Luna catheter   11. Acute on chronic diastolic heart failure    - Plan as outlined above  - developed AFIB today- started on Cardizem.  - Continue Xarelto  - Monitor closely    -
Patient was seen and examined by myself. Case was discussed with house staff in details. I have reviewed and agree with the plan as outlined above with edits where appropriate.    Vital Signs Last 24 Hrs  T(C): 37.4 (30 Oct 2018 15:37), Max: 37.4 (30 Oct 2018 15:37)  T(F): 99.3 (30 Oct 2018 15:37), Max: 99.3 (30 Oct 2018 15:37)  HR: 90 (30 Oct 2018 15:37) (76 - 90)  BP: 122/60 (30 Oct 2018 15:37) (90/53 - 122/60)  BP(mean): --  RR: 16 (30 Oct 2018 15:37) (16 - 20)  SpO2: 100% (30 Oct 2018 15:37) (95% - 100%)      A/P: 59 y/o F with   1. AFIB with RVR  2. acute on chronic respiratory failure- resolving  3. COPD-   4. PUSHPA- nocturnal CPAP  5. Morbid obesity  6. Chronic pain syndrome  7. Chronic PE  8. Neuropathy  9. Bed bound state  10. Chronic Luna catheter - stable  11. Acute on chronic diastolic heart failure- improved  12. Hydradenitis sup.- seen by surgery but declined intervention  13. Non Compliance    Discussed with pulm attending  - discussed with patient  Continue current meds  discharge planning
Patient was seen and examined by myself. Case was discussed with house staff in details. I have reviewed and agree with the plan as outlined above with edits where appropriate.    Clinically stable and improved.  Continue current care  Await discharge to rehab.
Patient was seen and examined by myself. Case was discussed with house staff in details. I have reviewed and agree with the plan as outlined above with edits where appropriate.    Vital Signs Last 24 Hrs  T(C): 37.3 (01 Nov 2018 15:48), Max: 37.3 (01 Nov 2018 15:48)  T(F): 99.2 (01 Nov 2018 15:48), Max: 99.2 (01 Nov 2018 15:48)  HR: 98 (01 Nov 2018 15:48) (65 - 102)  BP: 119/62 (01 Nov 2018 15:48) (107/61 - 119/62)  BP(mean): --  RR: 18 (01 Nov 2018 15:48) (18 - 18)  SpO2: 96% (01 Nov 2018 15:48) (94% - 100%)    Clinically stable.  Awaiting discharge.  Continue current care
continue gabapentin for neuropathy  PT as tolerated

## 2018-11-02 NOTE — PROGRESS NOTE ADULT - PROBLEM SELECTOR PROBLEM 2
COPD (chronic obstructive pulmonary disease)
Acute diastolic heart failure
Acute diastolic heart failure
Acute on chronic respiratory failure with hypoxia and hypercapnia
BRBPR (bright red blood per rectum)
Chest congestion
Acute on chronic respiratory failure with hypoxia and hypercapnia

## 2018-11-02 NOTE — PROGRESS NOTE ADULT - SUBJECTIVE AND OBJECTIVE BOX
Time of Visit:  Patient seen and examined.     MEDICATIONS  (STANDING):  ALBUTerol/ipratropium for Nebulization 3 milliLiter(s) Nebulizer every 4 hours  aspirin  chewable 81 milliGRAM(s) Oral daily  buDESOnide 160 MICROgram(s)/formoterol 4.5 MICROgram(s) Inhaler 2 Puff(s) Inhalation two times a day  dextrose 5%. 1000 milliLiter(s) (50 mL/Hr) IV Continuous <Continuous>  dextrose 50% Injectable 12.5 Gram(s) IV Push once  dextrose 50% Injectable 25 Gram(s) IV Push once  dextrose 50% Injectable 25 Gram(s) IV Push once  digoxin     Tablet 0.25 milliGRAM(s) Oral daily  docusate sodium 100 milliGRAM(s) Oral daily  furosemide    Tablet 40 milliGRAM(s) Oral daily  gabapentin 400 milliGRAM(s) Oral three times a day  insulin lispro (HumaLOG) corrective regimen sliding scale   SubCutaneous three times a day before meals  latanoprost 0.005% Ophthalmic Solution 1 Drop(s) Both EYES at bedtime  pantoprazole    Tablet 40 milliGRAM(s) Oral before breakfast  polyethylene glycol 3350 17 Gram(s) Oral daily  predniSONE   Tablet 20 milliGRAM(s) Oral daily  rivaroxaban 20 milliGRAM(s) Oral every 24 hours  senna 1 Tablet(s) Oral daily  simvastatin 10 milliGRAM(s) Oral at bedtime  tamsulosin 0.4 milliGRAM(s) Oral at bedtime      MEDICATIONS  (PRN):  acetaminophen   Tablet .. 650 milliGRAM(s) Oral every 6 hours PRN Mild Pain (1 - 3)  dextrose 40% Gel 15 Gram(s) Oral once PRN Blood Glucose LESS THAN 70 milliGRAM(s)/deciLiter  glucagon  Injectable 1 milliGRAM(s) IntraMuscular once PRN Glucose <70 milliGRAM(s)/deciLiter  ketorolac   Injectable 15 milliGRAM(s) IV Push every 8 hours PRN Moderate Pain (4 - 6)       Medications up to date at time of exam.      PHYSICAL EXAMINATION:  Patient has no new complaints.  GENERAL: The patient is a well-developed, well-nourished, in no apparent distress.     Vital Signs Last 24 Hrs  T(C): 36.7 (2018 08:27), Max: 37.6 (2018 18:43)  T(F): 98 (2018 08:27), Max: 99.6 (2018 18:43)  HR: 89 (2018 08:30) (88 - 96)  BP: 115/61 (2018 08:27) (112/69 - 144/63)  BP(mean): --  RR: 17 (2018 08:27) (17 - 18)  SpO2: 97% (2018 08:30) (97% - 97%)   (if applicable)    Chest Tube (if applicable)    HEENT: Head is normocephalic and atraumatic. Extraocular muscles are intact. Mucous membranes are moist.     NECK: Supple, no palpable adenopathy.    LUNGS: Clear to auscultation, no wheezing, rales, or rhonchi.    HEART: Regular rate and rhythm without murmur.    ABDOMEN: Soft, nontender, and nondistended.  No hepatosplenomegaly is noted.    : No painful voiding, no pelvic pain    EXTREMITIES: Without any cyanosis, clubbing, rash, lesions or edema.    NEUROLOGIC: Awake, alert, oriented, grossly intact    SKIN: Warm, dry, good turgor.    + supra pubic cath      LABS:                        8.8    12.3  )-----------( 512      ( 2018 07:30 )             32.0     11-    143  |  103  |  31<H>  ----------------------------<  102<H>  3.6   |  36<H>  |  0.44<L>    Ca    8.9      2018 07:30  Phos  2.9     11-  Mg     2.3     11-    TPro  6.9  /  Alb  2.4<L>  /  TBili  0.1<L>  /  DBili  x   /  AST  4<L>  /  ALT  42  /  AlkPhos  85  -                        MICROBIOLOGY: (if applicable)    RADIOLOGY & ADDITIONAL STUDIES:  EKG:   CXR:  ECHO:    IMPRESSION: 58y Female PAST MEDICAL & SURGICAL HISTORY:  Urinary retention  CKD (chronic kidney disease)  DM (diabetes mellitus)  HTN (hypertension)  COPD (chronic obstructive pulmonary disease)  Morbid obesity  Diabetes  Overactive bladder  Hemorrhoids  Emphysema  Suprapubic catheter  S/P   History of appendectomy  S/P cholecystectomy   p/w         Impression; 57 Y/O Female with prior mentioned multiple chronic conditions. Presented with progressive worsening of SOB requiring NIPPV support. pat will benefit from nocturnal PAP device.  She was evaluated by ICU team and rejected. Now in Medicine Unit, on Oxygen supplementation and Bipap at night.  Discharge pending bed availability in NH.  + PE on xeralto.  AS per today's note ,pat will be discharge to [facility] rehab.        Suggestion:  -Continue oxygen supplementation NC. Bipap 16/5 FIO2 50% at night. Recommending PAP outpatient, per patient she has portable PAP at home but never used it and had it nursing home but she verbalized that she is using the nursing -home PAP on and off at night. Reinforced to comply with Oxygen supplementation daytime and PAP at night.  -Continue DuoNeb Q 6 hours. Budesonide twice daily. Tiotropium daily.  -On Xarelto Daily .  -PO steroids, taper  -DVT and GI prophylaxis.   -No significant change on 10/27 CXR when compared to 10/25  -out pat pulmo f/u

## 2018-11-02 NOTE — PROGRESS NOTE ADULT - ASSESSMENT
58 year old morbidly obese female, bed bound from Nursing Home with PMHx of  DM, COPD on home O2, PUSHPA (not on CPAP,) chronic urinary retention s/p suprapubic cath, PE on Xarelto,  sent from ED for respiratory distress and SOB.    Patient initially admitted for A/C resp failure due to COPD exacerbation.  HR controlled with Digoxin. DC tele     DC pending bed availability at the NH 58 year old morbidly obese female, bed bound from Nursing Home with PMHx of  DM, COPD on home O2, PUSHPA (not on CPAP,) chronic urinary retention s/p suprapubic cath, PE on Xarelto,  sent from ED for respiratory distress and SOB.    Patient initially admitted for Acute resp failure due to COPD exacerbation.  HR controlled with Digoxin. DC tele     DC pending bed availability at the rehab

## 2018-11-02 NOTE — PROGRESS NOTE ADULT - PROBLEM SELECTOR PLAN 6
HbA1c level of 5.1  will continue with sliding scale as patient is on insulin
HbA1c level of 5.1  will continue with sliding scale as patient is on insulin
Continue Xarelto 20 mg daily-   -patient c/o leg pain b/l, venous doppler from 10/09/18 negative for DVT.
HbA1c level of 5.1  will continue with sliding scale
continue home medications
Ortiz continued  UC testing
HbA1c level of 5.1  will continue with sliding scale as patient is on insulin
HbA1c level of 5.1  will continue with sliding scale as patient is on insulin

## 2018-11-02 NOTE — PROGRESS NOTE ADULT - ASSESSMENT
57 yo F bedbound from nursing home with DM, COPD on O2, PE on Xarelto, and PUSHPA who presented with dyspnea in the setting of COPD exacerbation; now with new-onset atrial fibrillation    1. Atrial fibrillation:  -EF preserved on echo, mild LA dilation, TSH 0.48  -CHADS2-VASc score is 2 (diabetes, female), consistent with 2.2% annual risk of stroke if off systemic anticoagulation. Patient is already on Xarelto for PE; in light of A fib would continue Xarelto indefinitely  -On digoxin, due to level of 0.5 dose was increased to 0.25mg daily, to be checked before the AM dose on 11/5  -Off diltiazem    2. HLD: On simvastatin 10mg QHS  **In the setting of diltiazem, simvastatin dosage should not exceed 10mg/day    ***Note that this is a preliminary note and any recommendations should NOT be carried out until this note is finalized. *** 59 yo F bedbound from nursing home with DM, COPD on O2, PE on Xarelto, and PUSHPA who presented with dyspnea in the setting of COPD exacerbation; now with new-onset atrial fibrillation    1. Atrial fibrillation:  -EF preserved on echo, mild LA dilation, TSH 0.48  -CHADS2-VASc score is 2 (diabetes, female), consistent with 2.2% annual risk of stroke if off systemic anticoagulation. Patient is already on Xarelto for PE; in light of A fib would continue Xarelto indefinitely  -On digoxin, due to level of 0.5 dose was increased to 0.25mg daily, to be checked before the AM dose on 11/5  -Off diltiazem    2. HLD: On simvastatin 10mg QHS  **In the setting of diltiazem, simvastatin dosage should not exceed 10mg/day

## 2018-11-02 NOTE — PROGRESS NOTE ADULT - PROBLEM SELECTOR PROBLEM 7
HTN (hypertension)
PUSHPA (obstructive sleep apnea)
Pulmonary embolism
Prophylactic measure
HTN (hypertension)
HTN (hypertension)

## 2018-11-02 NOTE — PROGRESS NOTE ADULT - PROBLEM SELECTOR PROBLEM 5
UTI (urinary tract infection)
DM (diabetes mellitus)
HTN (hypertension)
UTI (urinary tract infection)
HTN (hypertension)
UTI (urinary tract infection)

## 2018-11-02 NOTE — PROGRESS NOTE ADULT - PROBLEM SELECTOR PLAN 1
Heart rate controlled   Dig level: 0.5  Dig dose increased   Recommend Dig level on Nov 5 before fifth dose   Continue on Xarelto

## 2018-11-02 NOTE — PROGRESS NOTE ADULT - PROBLEM SELECTOR PROBLEM 6
DM (diabetes mellitus)
HTN (hypertension)
Pulmonary embolism
UTI (urinary tract infection)
DM (diabetes mellitus)
DM (diabetes mellitus)

## 2018-11-02 NOTE — PROGRESS NOTE ADULT - PROBLEM SELECTOR PLAN 3
H/H stable compared to recent discharge.   today am hb: 7.6  Would hold from any blood transfusion for now as it may cause further overload.    Repeat cbc in am and transfuse if hb<7.0-7.5 followed by a dose of lasix.
No further bleeding per rectum.  Continue to monitor  hemoglobin 8.5 today
No further bleeding per rectum.  Continue to monitor  hemoglobin 8.5 today
as mentioned above
stable.  Continue bronchodilators and oxygen supplementation
Protonix  H 7.6 (base 8)  Lasix 40 mg IVP given/congestion and repeat CBC at   8 PM for possible [facility] unit/will sign out
as mentioned above
Hb stable   restarting aspirin, Xarelto for PE
as mentioned above
Hb stable   restarting aspirin, Xarelto for PE

## 2018-11-02 NOTE — PROGRESS NOTE ADULT - PROVIDER SPECIALTY LIST ADULT
Cardiology
Cardiology
Internal Medicine
Pulmonology
Internal Medicine

## 2018-11-11 PROCEDURE — 82962 GLUCOSE BLOOD TEST: CPT

## 2018-11-11 PROCEDURE — 83735 ASSAY OF MAGNESIUM: CPT

## 2018-11-11 PROCEDURE — 96375 TX/PRO/DX INJ NEW DRUG ADDON: CPT

## 2018-11-11 PROCEDURE — 93005 ELECTROCARDIOGRAM TRACING: CPT

## 2018-11-11 PROCEDURE — 93970 EXTREMITY STUDY: CPT

## 2018-11-11 PROCEDURE — 99285 EMERGENCY DEPT VISIT HI MDM: CPT | Mod: 25

## 2018-11-11 PROCEDURE — 87798 DETECT AGENT NOS DNA AMP: CPT

## 2018-11-11 PROCEDURE — 81001 URINALYSIS AUTO W/SCOPE: CPT

## 2018-11-11 PROCEDURE — 80048 BASIC METABOLIC PNL TOTAL CA: CPT

## 2018-11-11 PROCEDURE — 83550 IRON BINDING TEST: CPT

## 2018-11-11 PROCEDURE — 80061 LIPID PANEL: CPT

## 2018-11-11 PROCEDURE — 82728 ASSAY OF FERRITIN: CPT

## 2018-11-11 PROCEDURE — 84484 ASSAY OF TROPONIN QUANT: CPT

## 2018-11-11 PROCEDURE — 82306 VITAMIN D 25 HYDROXY: CPT

## 2018-11-11 PROCEDURE — 84145 PROCALCITONIN (PCT): CPT

## 2018-11-11 PROCEDURE — 80053 COMPREHEN METABOLIC PANEL: CPT

## 2018-11-11 PROCEDURE — 71045 X-RAY EXAM CHEST 1 VIEW: CPT

## 2018-11-11 PROCEDURE — 85027 COMPLETE CBC AUTOMATED: CPT

## 2018-11-11 PROCEDURE — 83036 HEMOGLOBIN GLYCOSYLATED A1C: CPT

## 2018-11-11 PROCEDURE — 87486 CHLMYD PNEUM DNA AMP PROBE: CPT

## 2018-11-11 PROCEDURE — 82803 BLOOD GASES ANY COMBINATION: CPT

## 2018-11-11 PROCEDURE — 74177 CT ABD & PELVIS W/CONTRAST: CPT

## 2018-11-11 PROCEDURE — 86900 BLOOD TYPING SEROLOGIC ABO: CPT

## 2018-11-11 PROCEDURE — 87633 RESP VIRUS 12-25 TARGETS: CPT

## 2018-11-11 PROCEDURE — 87581 M.PNEUMON DNA AMP PROBE: CPT

## 2018-11-11 PROCEDURE — 90686 IIV4 VACC NO PRSV 0.5 ML IM: CPT

## 2018-11-11 PROCEDURE — 83605 ASSAY OF LACTIC ACID: CPT

## 2018-11-11 PROCEDURE — 82746 ASSAY OF FOLIC ACID SERUM: CPT

## 2018-11-11 PROCEDURE — 87040 BLOOD CULTURE FOR BACTERIA: CPT

## 2018-11-11 PROCEDURE — 83880 ASSAY OF NATRIURETIC PEPTIDE: CPT

## 2018-11-11 PROCEDURE — 96374 THER/PROPH/DIAG INJ IV PUSH: CPT | Mod: XU

## 2018-11-11 PROCEDURE — 86850 RBC ANTIBODY SCREEN: CPT

## 2018-11-11 PROCEDURE — 86901 BLOOD TYPING SEROLOGIC RH(D): CPT

## 2018-11-11 PROCEDURE — 84100 ASSAY OF PHOSPHORUS: CPT

## 2018-11-11 PROCEDURE — 94640 AIRWAY INHALATION TREATMENT: CPT

## 2018-11-16 ENCOUNTER — EMERGENCY (EMERGENCY)
Facility: HOSPITAL | Age: 58
LOS: 1 days | Discharge: HOME | End: 2018-11-16
Attending: EMERGENCY MEDICINE
Payer: MEDICARE

## 2018-11-16 VITALS
RESPIRATION RATE: 20 BRPM | TEMPERATURE: 98 F | DIASTOLIC BLOOD PRESSURE: 80 MMHG | HEART RATE: 109 BPM | OXYGEN SATURATION: 91 % | HEIGHT: 63 IN | WEIGHT: 250 LBS | SYSTOLIC BLOOD PRESSURE: 122 MMHG

## 2018-11-16 VITALS
TEMPERATURE: 98 F | SYSTOLIC BLOOD PRESSURE: 119 MMHG | RESPIRATION RATE: 16 BRPM | DIASTOLIC BLOOD PRESSURE: 61 MMHG | OXYGEN SATURATION: 97 % | HEART RATE: 103 BPM

## 2018-11-16 DIAGNOSIS — Z90.49 ACQUIRED ABSENCE OF OTHER SPECIFIED PARTS OF DIGESTIVE TRACT: Chronic | ICD-10-CM

## 2018-11-16 DIAGNOSIS — Z93.59 OTHER CYSTOSTOMY STATUS: Chronic | ICD-10-CM

## 2018-11-16 DIAGNOSIS — Z98.891 HISTORY OF UTERINE SCAR FROM PREVIOUS SURGERY: Chronic | ICD-10-CM

## 2018-11-16 LAB
ALBUMIN SERPL ELPH-MCNC: 2.6 G/DL — LOW (ref 3.5–5)
ALP SERPL-CCNC: 96 U/L — SIGNIFICANT CHANGE UP (ref 40–120)
ALT FLD-CCNC: 12 U/L DA — SIGNIFICANT CHANGE UP (ref 10–60)
ANION GAP SERPL CALC-SCNC: 6 MMOL/L — SIGNIFICANT CHANGE UP (ref 5–17)
APTT BLD: 35.2 SEC — SIGNIFICANT CHANGE UP (ref 27.5–36.3)
AST SERPL-CCNC: 7 U/L — LOW (ref 10–40)
BASOPHILS # BLD AUTO: 0.2 K/UL — SIGNIFICANT CHANGE UP (ref 0–0.2)
BASOPHILS NFR BLD AUTO: 1.2 % — SIGNIFICANT CHANGE UP (ref 0–2)
BILIRUB SERPL-MCNC: 0.2 MG/DL — SIGNIFICANT CHANGE UP (ref 0.2–1.2)
BUN SERPL-MCNC: 17 MG/DL — SIGNIFICANT CHANGE UP (ref 7–18)
CALCIUM SERPL-MCNC: 9.2 MG/DL — SIGNIFICANT CHANGE UP (ref 8.4–10.5)
CHLORIDE SERPL-SCNC: 96 MMOL/L — SIGNIFICANT CHANGE UP (ref 96–108)
CO2 SERPL-SCNC: 38 MMOL/L — HIGH (ref 22–31)
CREAT SERPL-MCNC: 0.47 MG/DL — LOW (ref 0.5–1.3)
EOSINOPHIL # BLD AUTO: 0.3 K/UL — SIGNIFICANT CHANGE UP (ref 0–0.5)
EOSINOPHIL NFR BLD AUTO: 2.4 % — SIGNIFICANT CHANGE UP (ref 0–6)
GLUCOSE SERPL-MCNC: 125 MG/DL — HIGH (ref 70–99)
HCT VFR BLD CALC: 34 % — LOW (ref 34.5–45)
HGB BLD-MCNC: 9.4 G/DL — LOW (ref 11.5–15.5)
INR BLD: 1.19 RATIO — HIGH (ref 0.88–1.16)
LYMPHOCYTES # BLD AUTO: 1.8 K/UL — SIGNIFICANT CHANGE UP (ref 1–3.3)
LYMPHOCYTES # BLD AUTO: 13.7 % — SIGNIFICANT CHANGE UP (ref 13–44)
MCHC RBC-ENTMCNC: 23.4 PG — LOW (ref 27–34)
MCHC RBC-ENTMCNC: 27.6 GM/DL — LOW (ref 32–36)
MCV RBC AUTO: 84.8 FL — SIGNIFICANT CHANGE UP (ref 80–100)
MONOCYTES # BLD AUTO: 0.5 K/UL — SIGNIFICANT CHANGE UP (ref 0–0.9)
MONOCYTES NFR BLD AUTO: 3.6 % — SIGNIFICANT CHANGE UP (ref 2–14)
NEUTROPHILS # BLD AUTO: 10.2 K/UL — HIGH (ref 1.8–7.4)
NEUTROPHILS NFR BLD AUTO: 79.1 % — HIGH (ref 43–77)
NT-PROBNP SERPL-SCNC: 107 PG/ML — SIGNIFICANT CHANGE UP (ref 0–125)
PLATELET # BLD AUTO: 348 K/UL — SIGNIFICANT CHANGE UP (ref 150–400)
POTASSIUM SERPL-MCNC: 3.5 MMOL/L — SIGNIFICANT CHANGE UP (ref 3.5–5.3)
POTASSIUM SERPL-SCNC: 3.5 MMOL/L — SIGNIFICANT CHANGE UP (ref 3.5–5.3)
PROT SERPL-MCNC: 7.4 G/DL — SIGNIFICANT CHANGE UP (ref 6–8.3)
PROTHROM AB SERPL-ACNC: 13.3 SEC — HIGH (ref 10–12.9)
RBC # BLD: 4.01 M/UL — SIGNIFICANT CHANGE UP (ref 3.8–5.2)
RBC # FLD: 19.6 % — HIGH (ref 10.3–14.5)
SODIUM SERPL-SCNC: 140 MMOL/L — SIGNIFICANT CHANGE UP (ref 135–145)
TROPONIN I SERPL-MCNC: <0.015 NG/ML — SIGNIFICANT CHANGE UP (ref 0–0.04)
WBC # BLD: 12.9 K/UL — HIGH (ref 3.8–10.5)
WBC # FLD AUTO: 12.9 K/UL — HIGH (ref 3.8–10.5)

## 2018-11-16 PROCEDURE — 71045 X-RAY EXAM CHEST 1 VIEW: CPT

## 2018-11-16 PROCEDURE — 99284 EMERGENCY DEPT VISIT MOD MDM: CPT | Mod: 25

## 2018-11-16 PROCEDURE — 83880 ASSAY OF NATRIURETIC PEPTIDE: CPT

## 2018-11-16 PROCEDURE — 85610 PROTHROMBIN TIME: CPT

## 2018-11-16 PROCEDURE — 96374 THER/PROPH/DIAG INJ IV PUSH: CPT

## 2018-11-16 PROCEDURE — 84484 ASSAY OF TROPONIN QUANT: CPT

## 2018-11-16 PROCEDURE — 80053 COMPREHEN METABOLIC PANEL: CPT

## 2018-11-16 PROCEDURE — 85027 COMPLETE CBC AUTOMATED: CPT

## 2018-11-16 PROCEDURE — 94640 AIRWAY INHALATION TREATMENT: CPT

## 2018-11-16 PROCEDURE — 93005 ELECTROCARDIOGRAM TRACING: CPT

## 2018-11-16 PROCEDURE — 71045 X-RAY EXAM CHEST 1 VIEW: CPT | Mod: 26

## 2018-11-16 PROCEDURE — 85730 THROMBOPLASTIN TIME PARTIAL: CPT

## 2018-11-16 RX ORDER — DEXAMETHASONE 0.5 MG/5ML
8 ELIXIR ORAL ONCE
Qty: 0 | Refills: 0 | Status: COMPLETED | OUTPATIENT
Start: 2018-11-16 | End: 2018-11-16

## 2018-11-16 RX ORDER — ACETAMINOPHEN 500 MG
975 TABLET ORAL ONCE
Qty: 0 | Refills: 0 | Status: COMPLETED | OUTPATIENT
Start: 2018-11-16 | End: 2018-11-16

## 2018-11-16 RX ORDER — ACETAMINOPHEN 500 MG
650 TABLET ORAL ONCE
Qty: 0 | Refills: 0 | Status: COMPLETED | OUTPATIENT
Start: 2018-11-16 | End: 2018-11-16

## 2018-11-16 RX ORDER — IPRATROPIUM/ALBUTEROL SULFATE 18-103MCG
3 AEROSOL WITH ADAPTER (GRAM) INHALATION ONCE
Qty: 0 | Refills: 0 | Status: COMPLETED | OUTPATIENT
Start: 2018-11-16 | End: 2018-11-16

## 2018-11-16 RX ADMIN — Medication 650 MILLIGRAM(S): at 06:22

## 2018-11-16 RX ADMIN — Medication 975 MILLIGRAM(S): at 11:51

## 2018-11-16 RX ADMIN — Medication 3 MILLILITER(S): at 04:35

## 2018-11-16 RX ADMIN — Medication 650 MILLIGRAM(S): at 06:46

## 2018-11-16 RX ADMIN — Medication 3 MILLILITER(S): at 06:22

## 2018-11-16 RX ADMIN — Medication 8 MILLIGRAM(S): at 04:35

## 2018-11-16 NOTE — ED PROVIDER NOTE - OBJECTIVE STATEMENT
57 y/o F with PMH morbid obesity, HTN, HLD, suprapubic cath for urinary retention, CKD , COPD on 4L oxygen, CHF, DM presents to ED c/o difficulty breathing since 8 pm last night/ Patient denies cough, nausea, vomiting, fever or any other acute complaints at this time. Pt was seen in ED 2 weeks ago for the same complaint.

## 2018-11-16 NOTE — ED PROVIDER NOTE - MEDICAL DECISION MAKING DETAILS
Will check blood work, EKG, CXR, nebs, steroids then reassess. Will check blood work, EKG, CXR, nebs, steroids then reassess.  labs are unremarkable. cxr is unremarkable. ecg unchanged from prior. after nebs, steroids feels well. will discharge home. return precautions given. Will check blood work, EKG, CXR, nebs, steroids then reassess.  labs are unremarkable. cxr is unremarkable. ecg unchanged from prior. slight tachycardia likely 2/2 nebs. after nebs, steroids feels well. will discharge home. return precautions given.

## 2018-11-16 NOTE — ED ADULT NURSE NOTE - NSIMPLEMENTINTERV_GEN_ALL_ED
Implemented All Universal Safety Interventions:  [place] to call system. Call bell, personal items and telephone within reach. Instruct patient to call for assistance. Room bathroom lighting operational. Non-slip footwear when patient is off stretcher. Physically safe environment: no spills, clutter or unnecessary equipment. Stretcher in lowest position, wheels locked, appropriate side rails in place.

## 2018-11-16 NOTE — ED PROVIDER NOTE - PHYSICAL EXAMINATION
pt is speaking in full sentences  abdomen is soft, non-tender and obese  no significant lower extremity swelling  heart and lung sounds are normal

## 2018-11-16 NOTE — ED ADULT NURSE REASSESSMENT NOTE - NS ED NURSE REASSESS COMMENT FT1
pt's labs have resulted, states she is feeling better. requesting humidified oxygen, Respiratory to sent. for possible discharge back to Nursing facility.
with wound to the right buttocks oozing bloody liquid. Also with suprapubic catheter stoma site is infected, dressing was changed draining cloudy urine from catheter.
Pt asleep and free from s/s of distress.

## 2018-11-16 NOTE — ED ADULT NURSE NOTE - OBJECTIVE STATEMENT
Pt came to ER from [facility] via Ambulance c/o sob. Pt was seen by Md. Pt came to ER from [facility] via Ambulance c/o sob. Pt was seen by Md. pt with pressure to sacral area 5cm by 4 cm.  requested food upon entering er-given.

## 2018-11-20 DIAGNOSIS — Z71.89 OTHER SPECIFIED COUNSELING: ICD-10-CM

## 2018-11-27 PROCEDURE — 84702 CHORIONIC GONADOTROPIN TEST: CPT

## 2018-11-27 PROCEDURE — 94660 CPAP INITIATION&MGMT: CPT

## 2018-11-27 PROCEDURE — 83880 ASSAY OF NATRIURETIC PEPTIDE: CPT

## 2018-11-27 PROCEDURE — 83735 ASSAY OF MAGNESIUM: CPT

## 2018-11-27 PROCEDURE — 84443 ASSAY THYROID STIM HORMONE: CPT

## 2018-11-27 PROCEDURE — 87040 BLOOD CULTURE FOR BACTERIA: CPT

## 2018-11-27 PROCEDURE — 80162 ASSAY OF DIGOXIN TOTAL: CPT

## 2018-11-27 PROCEDURE — 86900 BLOOD TYPING SEROLOGIC ABO: CPT

## 2018-11-27 PROCEDURE — 84484 ASSAY OF TROPONIN QUANT: CPT

## 2018-11-27 PROCEDURE — 36415 COLL VENOUS BLD VENIPUNCTURE: CPT

## 2018-11-27 PROCEDURE — 85610 PROTHROMBIN TIME: CPT

## 2018-11-27 PROCEDURE — 87486 CHLMYD PNEUM DNA AMP PROBE: CPT

## 2018-11-27 PROCEDURE — 87581 M.PNEUMON DNA AMP PROBE: CPT

## 2018-11-27 PROCEDURE — 86850 RBC ANTIBODY SCREEN: CPT

## 2018-11-27 PROCEDURE — 81001 URINALYSIS AUTO W/SCOPE: CPT

## 2018-11-27 PROCEDURE — 93306 TTE W/DOPPLER COMPLETE: CPT

## 2018-11-27 PROCEDURE — 82553 CREATINE MB FRACTION: CPT

## 2018-11-27 PROCEDURE — 80048 BASIC METABOLIC PNL TOTAL CA: CPT

## 2018-11-27 PROCEDURE — 80053 COMPREHEN METABOLIC PANEL: CPT

## 2018-11-27 PROCEDURE — 93005 ELECTROCARDIOGRAM TRACING: CPT

## 2018-11-27 PROCEDURE — 84100 ASSAY OF PHOSPHORUS: CPT

## 2018-11-27 PROCEDURE — 99291 CRITICAL CARE FIRST HOUR: CPT

## 2018-11-27 PROCEDURE — 87633 RESP VIRUS 12-25 TARGETS: CPT

## 2018-11-27 PROCEDURE — 87086 URINE CULTURE/COLONY COUNT: CPT

## 2018-11-27 PROCEDURE — 94640 AIRWAY INHALATION TREATMENT: CPT

## 2018-11-27 PROCEDURE — 82803 BLOOD GASES ANY COMBINATION: CPT

## 2018-11-27 PROCEDURE — 82962 GLUCOSE BLOOD TEST: CPT

## 2018-11-27 PROCEDURE — 86901 BLOOD TYPING SEROLOGIC RH(D): CPT

## 2018-11-27 PROCEDURE — 85730 THROMBOPLASTIN TIME PARTIAL: CPT

## 2018-11-27 PROCEDURE — 87798 DETECT AGENT NOS DNA AMP: CPT

## 2018-11-27 PROCEDURE — 82550 ASSAY OF CK (CPK): CPT

## 2018-11-27 PROCEDURE — 83605 ASSAY OF LACTIC ACID: CPT

## 2018-11-27 PROCEDURE — 71045 X-RAY EXAM CHEST 1 VIEW: CPT

## 2018-11-27 PROCEDURE — 84145 PROCALCITONIN (PCT): CPT

## 2018-11-27 PROCEDURE — 85027 COMPLETE CBC AUTOMATED: CPT

## 2018-12-24 NOTE — ED PROVIDER NOTE - ATTENDING CONTRIBUTION TO CARE
[facility]
agreed with resident note  "57 y/o F with PMH morbid obesity, HTN, HLD, suprapubic cath for urinary retntion, CKD , COPD on 4L oxygen, CHF, DM p/w SOB since today afternoon. "  Pt denies fever.  States wants to speak to son.  Labored and dyspenic in room.      PE: moderate respiratory distress; good air movement, diffuse wheezing, s1 s2 no m/r/g abd soft/Nt/ND ext: no edema    Imp: acute on chronic COPD exacerbation; nebs, steroids, BIPAP, reassess

## 2019-04-24 NOTE — ED ADULT TRIAGE NOTE - PAIN: PRESENCE, MLM
Physical Therapy Daily Treatment Note     Name: Meera Rosales  Clinic Number: [id number]    Therapy Diagnosis:   Encounter Diagnosis   Name Primary?    Muscle weakness of left upper extremity      Physician: Rex Booker MD    Visit Date: 4/24/2019    Physician Orders: PT Eval and Treat   Medical Diagnosis from Referral:   M25.522,G89.29 (ICD-10-CM) - Chronic elbow pain, left   S53.442A (ICD-10-CM) - Tear of UCL of left elbow   Evaluation Date: 4/3/2019  Authorization Period Expiration: 12-     Plan of Care Expiration: 06-  Visit # / Visits authorized: 4/20    Time In: 7:01 AM  Time Out: 7:52 AM  Total Billable Time: 48 minutes    Precautions: Standard    Subjective     Pt reports: that she hasn't had any issues with her elbow and is still able to dance without pain.    She was compliant with home exercise program.    Response to previous treatment: No adverse impact  Functional change: Improved ability to dance    Objective     Meera received therapeutic exercises to develop strength, endurance, ROM and flexibility 48 minutes including:  - Seated UBE - 3'/3' Intervals Lv. 2  - L FlexBar Ulnar Deviation - 3x10 w/ Green FlexBar  - L FlexBar Supination - 3x10 w/ Red FlexBar  - L Side-Lying Mechanical Drop Set (FLEX, Hor. ABD, ER) - 3 Sets w/ 3 lbs. DB (6/10 RPE for FLEX, 8/10 for ABD, 10/10 for ER)  - B FreeMotion Face Pulls - 3xFatigue w/ 13 lbs.  - Knee Push-Ups on Flat Side of BOSU - 4x6  - Push-Up Position on Flat Side of BOSU Controlling Tennis Ball to Middle - 3xFatigue  - Modified Hand-Stand Push-Ups (Feet on Red Box) - 3x4  - B Shoulder Overhead Press - 3xFatigue w/ Yellow CrossFit Band  - TRX Rows - 3xFatigue  BELOW NOT PERFORMED TODAY  - Lateral Push-Up Position TB Walks - 2xFatigue w/ [place] TB  - B Banded Shoulder Flexion - 3x12 w/ Blue CLX  - B Triceps Floor Presses - 3x8 w/ 15 lbs. DB's  - Hollow Body Hold - 4xFatigue  - B No Money's - 3x12 w/ Blue CLX  - L TB Ulnar Deviation -  3x10 w/ Jose Elias TB  - Bear Crawl Position Hold - 3xFatigue    Meera received the following manual therapy techniques: Soft tissue Mobilization were applied to the: L Elbow for 0 minutes, including:  NOT PERFORMED TODAY    Meera received cold pack for 10 minutes to L Elbow.    Home Exercises Provided and Patient Education Provided     Education provided:   - Proper exercise technique    Written Home Exercises Provided: yes.  Exercises were reviewed and Meera was able to demonstrate them prior to the end of the session.  Meera demonstrated good  understanding of the education provided.     See EMR under Media for exercises provided @ Initial Evaluation.    Assessment     - The patient demonstrates reduced overhead UE strength in the LUE during modified hand stand push-ups and overhead theraband presses. She compensates with excessive lumbar and thoracic extension with a contralateral trunk lean. She will benefit from continued focus on strengthening the LUE in these motions to decrease risk of reinjury.    Meera is progressing well towards her goals.     Pt prognosis is Excellent.     Pt will continue to benefit from skilled outpatient physical therapy to address the deficits listed in the problem list box on initial evaluation, provide pt/family education and to maximize pt's level of independence in the home and community environment.     Pt's spiritual, cultural and educational needs considered and pt agreeable to plan of care and goals.     Anticipated barriers to physical therapy: None    Goals:   Short Term Goals: 4 weeks   1. Patient will improve LUE strength to 4/5 in order to improve stability during dancing activities.  2. Patient will improve  strength w/ the Dynamometer @ side and @ 90 degrees shoulder flexion to 60 lbs. In order to improve stability during dancing activities.  3. Patient will be able to bear full weight into her LUE without any pain in order to improve ability to participate in daily  and recreational activities.     Long Term Goals: 8 weeks   1. Patient will improve LUE strength to 5/5 in order to improve stability during dancing activities.  2. Patient will improve  strength w/ the Dynamometer @ side and @ 90 degrees shoulder flexion to 70 lbs. In order to improve stability during dancing activities.  3. Patient will show 0% limitation according to the FOTO in order to demonstrate improved functional ability.    Plan     Continue to progress RTC and forearm strengthening as well as closed-chain tolerance and strength.    Dru David, PT, DPT   complains of pain/discomfort

## 2019-09-24 NOTE — DISCHARGE NOTE ADULT - FUNCTIONAL SCREEN CURRENT LEVEL: DRESSING, MLM
[facility]  [facility]  Dept: [phone]  Dept Fax: [phone] [facility] Sonali Milian is a 35 y. o.female    Pt presents for annual wellness physical exam.      Pt continues seeing Dr. Benita Gonzalez ([address] [facility]) for Churg-Connie. At last appt, Dr. Siomara Almaguer recommended possibly changing/ decreasing Omeprazole to Pepcid, etc.     Pt also seeing Dr. Deb riggins for Churg-Connie. Pt continues seeing Dr. 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 Eyes: Negative for visual disturbance. Respiratory: Negative for chest tightness and shortness of breath. Cardiovascular: Negative for chest pain, palpitations and leg swelling. Gastrointestinal: Negative for abdominal pain, anal bleeding, blood in stool, constipation, diarrhea, nausea and vomiting. Genitourinary: Negative for dysuria and hematuria. Musculoskeletal: Negative for neck pain. Neurological: Negative for dizziness, light-headedness and headaches. OBJECTIVE     /84   Pulse 68   Temp 97.1 °F (36.2 °C) (Temporal)   Resp 16   Ht 5' 7\" (1.702 m)   Wt 194 lb (88 kg)   BMI 30.38 kg/m²     Wt Readings from Last 3 Encounters:   09/24/19 194 lb (88 kg)   04/09/19 186 lb (84.4 kg)   04/03/19 185 lb 8 oz (84.1 kg)     Physical Exam   Constitutional: She is oriented to person, place, and time.  She appears (4) completely dependent

## 2020-01-31 NOTE — PROGRESS NOTE ADULT - PROBLEM/PLAN-5
DISPLAY PLAN FREE TEXT
Detail Level: Detailed
Include Z78.9 (Other Specified Conditions Influencing Health Status) As An Associated Diagnosis?: No
Anesthesia Volume In Cc: 0.5
Medical Necessity Information: It is in your best interest to select a reason for this procedure from the list below. All of these items fulfill various CMS LCD requirements except the new and changing color options.
Treatment Number (Will Not Render If 0): 0
Consent: The patient's (and/or parent/caregiver) consent was obtained including but not limited to risks of crusting, scabbing, blistering, scarring, darker or lighter pigmentary change, recurrence, incomplete removal and infection.
Render Post-Care Instructions In Note?: yes
Medical Necessity Clause: This procedure was medically necessary because the lesions that were treated were:
Post-Care Instructions: I reviewed with the patient in detail post-care instructions. Patient is to avoid picking at any of the treated lesions. Pt may apply Vaseline to crusted or scabbing areas.

## 2020-06-09 NOTE — ED ADULT NURSE NOTE - CADM POA URETHRAL CATHETER
Hpi Title: Evaluation of a Skin Lesion How Severe Are Your Spot(S)?: moderate Have Your Spot(S) Been Treated In The Past?: has not been treated No

## 2020-09-01 NOTE — PROGRESS NOTE ADULT - PROBLEM SELECTOR PROBLEM 6
Pending Prescriptions:                       Disp   Refills    acyclovir (ZOVIRAX) 400 MG tablet [Pharmac*60 tab*0        Sig: TAKE 1 TABLET (400 MG) BY ORAL ROUTE 2 TIMES PER DAY    Routing refill request to provider for review/approval because:  Creatinine   Date Value Ref Range Status   08/13/2019 0.65 0.52 - 1.04 mg/dL Final              DM (diabetes mellitus)

## 2021-03-23 NOTE — DISCHARGE NOTE ADULT - FUNCTIONAL SCREEN CURRENT LEVEL: DRESSING, MLM
(2) assistive person None known I have personally evaluated this patient. I have seen this patient with a medical scribe, please see progress notes for my contribution to care. I have reviewed scribe notes which are accurate.

## 2021-04-19 NOTE — ED PROVIDER NOTE - NS ED MD DISPO DISCHARGE CCDA
Taking xanax and latuda   -continue management with prescribing provider Patient/Caregiver provided printed discharge information.

## 2021-05-17 NOTE — ED ADULT NURSE NOTE - TEMPLATE LIST FOR HEAD TO TOE ASSESSMENT
I called patient spoke to him about his Crestor prescription he states he is still taking the medication and needs a new rx sent to the pharmacy. Pt does question if the prescription is still ok for his kidneys? Pt is also requesting a referral to Dr Devyn Fu for pain mgmt. Patient states Dr. Rita Aguilar office has never contacted him with an appt. Health Maintenance   Topic Date Due    Hepatitis C screen  Never done    Hepatitis B vaccine (1 of 3 - Risk 3-dose series) Never done    Shingles Vaccine (1 of 2) Never done    Colon cancer screen colonoscopy  Never done    A1C test (Diabetic or Prediabetic)  07/09/2021    Diabetic foot exam  10/09/2021    Diabetic microalbuminuria test  10/09/2021    Annual Wellness Visit (AWV)  10/28/2021    Lipid screen  04/09/2022    Potassium monitoring  04/09/2022    Creatinine monitoring  04/09/2022    Diabetic retinal exam  04/27/2022    DTaP/Tdap/Td vaccine (2 - Td) 11/19/2022    Pneumococcal 0-64 years Vaccine (2 of 2) 04/15/2036    Flu vaccine  Completed    COVID-19 Vaccine  Completed    HIV screen  Completed    Hepatitis A vaccine  Aged Out    Hib vaccine  Aged Out    Meningococcal (ACWY) vaccine  Aged Out             (applicable per patient's age: Cancer Screenings, Depression Screening, Fall Risk Screening, Immunizations)    Hemoglobin A1C (%)   Date Value   04/09/2021 9.7   12/22/2020 10.1   10/09/2020 10.6     Microalb/Crt.  Ratio (mcg/mg creat)   Date Value   10/09/2020 24 (H)     LDL Cholesterol (mg/dL)   Date Value   10/09/2020 85     LDL Calculated (mg/dL)   Date Value   04/09/2021 91     AST (U/L)   Date Value   10/09/2020 47 (H)     ALT (U/L)   Date Value   10/09/2020 74 (H)     BUN (mg/dL)   Date Value   10/09/2020 17      (goal A1C is < 7)   (goal LDL is <100) need 30-50% reduction from baseline     BP Readings from Last 3 Encounters:   04/12/21 118/76   01/07/21 124/86   11/09/20 106/62    (goal /80)      All Future Testing planned in [facility]:  Lab Frequency Next Occurrence   Comprehensive Metabolic Panel Once 02/47/3258   Hemoglobin A1C Once 07/12/2021   Lipid Panel Once 07/12/2021   Microalbumin / Creatinine Urine Ratio Once 07/12/2021       Next Visit Date:  Future Appointments   Date Time Provider Dwaine Pal   7/20/2021  1:30 PM Geoff Martinez MD [facility] [address] [facility]   10/28/2021  2:45 PM Geoff Martinez MD Supriya Martel [address] [facility]            Patient Active Problem List:     GERD (gastroesophageal reflux disease)     Carpal tunnel syndrome of right wrist     Benign essential HTN     Diabetic peripheral neuropathy (HCC)     Mixed hyperlipidemia     Vitamin D deficiency     Leg pain, left     Microalbuminuria     PVD (peripheral vascular disease) ([facility] [address]     Status post partial resection of colon     Cigarette smoker     Left carotid artery stenosis     Pain in upper limb     Situational mixed anxiety and depressive disorder     History of coronary artery bypass graft     H/O heart artery stent     History of DVT (deep vein thrombosis) General

## 2021-08-04 NOTE — DIETITIAN INITIAL EVALUATION ADULT. - NUTRITION DIAGNOSTIC TERMINOLOGY #1
Nutrient [Follow-Up: _____] : a [unfilled] follow-up visit [FreeTextEntry1] : The patient comes in with a family history of breast cancer and a personal history of a left breast upper outer quadrant 5 mm moderately differentiated invasive duct cancer diagnosed in the end of December 2011 for which she underwent a left breast partial mastectomy on December 5, 2011 and had 4 negative sentinel lymph nodes and the cancer was ER/OH positive HER-2/gary negative making this a pathologic prognostic stage IA breast cancer. She underwent intraoperative radiation followed by external beam radiation and was placed on tamoxifen. She did not require chemotherapy. She comes in for routine follow-up.

## 2021-10-04 NOTE — ED PROVIDER NOTE - CARDIAC, MLM
October 4, 2021      Lorena Sanchez  [address] [facility] [address] [id number]        To Whom It May Concern:    Lorena Sanchez  was seen in clinic today.  Please excuse her  until 11/1/21 due to injury. We will reevaluate any further restrictions at that time.      Sincerely,        Darien Quiñonez MD     Normal rate, regular rhythm.  Heart sounds S1, S2.  No murmurs, rubs or gallops. 2+ pedal edema b/l.

## 2021-11-22 NOTE — H&P ADULT - NSHPPOAPULMEMBOLUS_GEN_A_CORE
-Continue to take Protonix daily for 1 month.  -Contact your doctor or go to the ER for fever > 101.5, chills, nausea, vomiting, chest pain, shortness of breath, pain not controlled by medication or excessive bleeding. 
yes

## 2022-01-23 NOTE — DIETITIAN INITIAL EVALUATION ADULT. - DIET TYPE
Music Therapy Progress Note  [place] [address]  Patient Telephone Number: [phone]  Yarsani Affiliation: Nikkyteresa Subhash  Language: English    Meme Olp  [  ] Patient instrument playing [  ] Bronson Alex writing  [  ] Sarah Beth Oleary Michelle Lopez chose \"love\" and Leanna Serrato Stefano Garcia.   Joey Moore [place], [facility]   Music [address] Certified  Spiritual Care Services  Referral- based service DASH/TLC (sodium and cholesterol restricted diet)

## 2022-02-07 NOTE — PATIENT PROFILE ADULT. - AS SC BRADEN SENSORY
Detail Level: Generalized
Price (Do Not Change): 0.00
Instructions: This plan will send the code FBSD to the PM system.  DO NOT or CHANGE the price.
(4) no impairment

## 2022-02-24 NOTE — PATIENT PROFILE ADULT - VISION (WITH CORRECTIVE LENSES IF THE PATIENT USUALLY WEARS THEM):
Partially impaired: cannot see medication labels or newsprint, but can see obstacles in path, and the surrounding layout; can count fingers at arm's length Finasteride Counseling:  I discussed with the patient the risks of use of finasteride including but not limited to decreased libido, decreased ejaculate volume, gynecomastia, and depression. Women should not handle medication.  All of the patient's questions and concerns were addressed. Finasteride Male Counseling: Finasteride Counseling:  I discussed with the patient the risks of use of finasteride including but not limited to decreased libido, decreased ejaculate volume, gynecomastia, and depression. Women should not handle medication.  All of the patient's questions and concerns were addressed.

## 2022-05-25 NOTE — PHYSICAL THERAPY INITIAL EVALUATION ADULT - PRECAUTIONS/LIMITATIONS, REHAB EVAL
How Severe Is Your Skin Lesion?: mild
0033
Has Your Skin Lesion Been Treated?: not been treated
Is This A New Presentation, Or A Follow-Up?: Growth
fall precautions

## 2023-01-26 NOTE — CONSULT NOTE ADULT - CONSULT REQUESTED BY NAME
Female presents for sports physical   She has no significant past medical history  Exam was normal   Please see attached form  Dr Mcgraw

## 2023-08-25 NOTE — ED PROVIDER NOTE - URETHRAL CATH TYPE
Patient: Ibrahima Cortez                MRN: [id number]       SSN: [id number]  YOB: 1965        AGE: 60 y.o. SEX: female      PCP: Henry Littlejohn DO  08/25/23    Chief Complaint   Patient presents with    Knee Pain     Right         HISTORY:    Ibrahima Cortez is a 60 y.o. female presents to the office with an acute on chronic episode of right knee pain. Pain limits her ability to stand for short periods of time and walk short distances. Pain is dull aching characteristic with occasional sharp stabbing sensations primarily over the inner aspect of the right knee. She has documented radiographic evidence of tricompartmental osteoarthritis with near complete collapse of the medial joint space. She has benefited with low-dose cortisone as well as hyaluronic acid therapies to the right knee previous. She is requesting a right knee hyaluronic acid course repeat and today if possible a low-dose cortisone injection.         No results found for: HBA1C, [id number]  Weight Metrics 8/25/2023 6/7/2023 1/30/2023 1/23/2023 2/6/2022 12/2/2021 11/24/2021   Weight 216 lb 183 lb 217 lb 167 lb 212 lb 211 lb 211 lb   BMI (Calculated) 36 kg/m2 29.6 kg/m2 0 kg/m2 28.7 kg/m2 36.5 kg/m2 36.3 kg/m2 35.2 kg/m2          Problem List Items Addressed This Visit    None  Visit Diagnoses       Unilateral primary osteoarthritis, right knee    -  Primary    Acquired varus deformity knee, right        Obesity (BMI 35.0-39.9 without comorbidity)        Decreased range of motion (ROM) of right knee                PAST MEDICAL HISTORY:       Diagnosis Date    Anemia     Asthma     BMI 34.0-34.9,adult 5/11/2017    Bowel obstruction (HCC)     Gall stones     GERD (gastroesophageal reflux disease)     History of blood transfusion     Hypertension     Lupus ([address])     Sickle cell trait ([address])         PAST SURGICAL HISTORY:       Procedure Laterality Date    APPENDECTOMY      CHOLECYSTECTOMY      HAND/FINGER Indwelling

## 2023-12-14 NOTE — PATIENT PROFILE ADULT. - AS SC BRADEN FRICTION
Initiate Treatment: Aczone 7.5% Gel once daily
Samples Given: Rhofade
Detail Level: Detailed
(1) problem

## 2024-03-21 NOTE — H&P ADULT - DOES THIS PATIENT HAVE A HISTORY OF OR HAS BEEN DX WITH HEART FAILURE?
no (MOVANTIK) 25 MG TABS tablet Take 1 tablet by mouth as needed    Guanako Welsh MD   sennosides-docusate sodium (SENOKOT-S) 8.6-50 MG tablet Take 1 tablet by mouth in the morning, at noon, and at bedtime    Guanako Welsh MD   metoprolol succinate (TOPROL XL) 50 MG extended release tablet Take 1.5 tablets by mouth daily 2/2/24   Ruben Tam MD   albuterol sulfate HFA (PROVENTIL;VENTOLIN;PROAIR) 108 (90 Base) MCG/ACT inhaler Inhale 1-2 puffs into the lungs every 6 hours as needed for Wheezing or Shortness of Breath 12/26/23   Dianne Gamble DO   SPIRIVA RESPIMAT 1.25 MCG/ACT AERS inhaler INHALE 2 SPRAY(S) BY MOUTH ONCE DAILY 10/29/23   Guanako Welsh MD   colchicine (COLCRYS) 0.6 MG tablet Take 0.5 tablets by mouth every other day 7/6/23   Dianne Gamble DO   omeprazole (PRILOSEC) 40 MG delayed release capsule Take 1 capsule by mouth every morning (before breakfast) 5/15/23   Dianne Gamble DO   apixaban (ELIQUIS) 5 MG TABS tablet TAKE ONE TABLET BY MOUTH TWICE A DAY 2/20/23   Sebastián Welch DO   Cholecalciferol (VITAMIN D) 50 MCG (2000 UT) CAPS capsule Take 4,000 Units by mouth daily    ProviderGuanako MD       Allergies:  Fluticasone furoate, Seasonal, Vilanterol, Celecoxib, Flomax [tamsulosin hcl], Breo ellipta [fluticasone furoate-vilanterol], Keppra [levetiracetam], Vioxx [rofecoxib], Allopurinol, Incruse ellipta [umeclidinium bromide], and Statins    Social History:   reports that he has never smoked. He has never been exposed to tobacco smoke. He has never used smokeless tobacco. He reports that he does not drink alcohol and does not use drugs.     Family History: family history includes Cancer in his brother; Cancer (age of onset: 66) in his brother; Diabetes in his brother; Heart Disease in his mother; Hypertension in his mother; Stroke in his father. No for premature CAD. No for h/o sudden cardiac death    REVIEW OF SYSTEMS:    Constitutional: there has been no unanticipated  amplitude and contour without delay or bruit  Peripheral pulses are symmetrical and full   Abdomen:  No masses or tenderness  Bowel sounds present  Extremities:   No Cyanosis or Clubbing   Lower extremity edema: No   Skin: Warm and dry  Neurological:  Alert and oriented.  Moves all extremities well  No abnormalities of mood, affect, memory, mentation, or behavior are noted    DATA:    Diagnostics:    Stress test 2/2024  Stress Combined Conclusion: The study is positive for myocardial ischemia and is negative for myocardial infarction. Findings suggest a moderate risk of cardiac events.    Stress Function: Left ventricular function post-stress is normal. Post-stress ejection fraction is 55%.    Perfusion Comments: Prone images were not obtained. LV perfusion is probably abnormal. There is evidence of inducible ischemia.    Perfusion Defect: There is a moderate severity left ventricular stress perfusion defect that is medium in size present in the mid to distal inferolateral segment(s) that is reversible.    Perfusion Conclusion: TID ratio is 1.01.    Labs:     CBC: No results for input(s): \"WBC\", \"HGB\", \"HCT\", \"PLT\" in the last 72 hours.  BMP: No results for input(s): \"NA\", \"K\", \"CO2\", \"BUN\", \"CREATININE\", \"LABGLOM\", \"GLUCOSE\" in the last 72 hours.  BNP: No results for input(s): \"BNP\" in the last 72 hours.  PT/INR: No results for input(s): \"PROTIME\", \"INR\" in the last 72 hours.  APTT:No results for input(s): \"APTT\" in the last 72 hours.  CARDIAC ENZYMES:No results for input(s): \"CKTOTAL\", \"CKMB\", \"CKMBINDEX\", \"TROPONINI\" in the last 72 hours.  FASTING LIPID PANEL:  Lab Results   Component Value Date/Time    HDL 42 01/18/2023 09:38 AM    LDLCALC 60.4 01/18/2023 09:38 AM    TRIG 78 01/18/2023 09:38 AM     LIVER PROFILE:No results for input(s): \"AST\", \"ALT\", \"LABALBU\" in the last 72 hours.      IMPRESSION/RECOMMENDATIONS:  Stress test positive for ischemia. Referred for coronary angiography and possible PCI. Benefits,

## 2024-05-20 NOTE — CONSULT NOTE ADULT - PROBLEM SELECTOR RECOMMENDATION 8
1- Apply Mycolog cream to the affected area of neck anteriorly TID.  2- Keep neck skin dry all the time.
Never

## 2024-05-30 NOTE — DISCHARGE NOTE ADULT - CARE PROVIDERS DIRECT ADDRESSES
OT please call [phone].       For Help me Grow: please call [phone].  
,DirectAddress_Unknown,DirectAddress_Unknown,[email or web address],DirectAddress_Unknown

## 2024-10-14 NOTE — ED PROVIDER NOTE - SCRIBE NAME
[Vitamins/Supplements] : vitamins/supplements [STD (testing, results, tx)] : STD (testing, results, tx) Baljit Galindo

## 2024-10-29 NOTE — ED ADULT TRIAGE NOTE - RESPIRATORY RATE (BREATHS/MIN)
[facility] Medicine Services  Discharge Summary    Date of Service: 10/29/2024  Patient Name: Leona Garcia  : 1947  MRN: [id number]    Date of Admission: 10/21/2024  Discharge Diagnosis:     Acute kidney injury on chronic kidney disease stage III-improving  Acute diastolic CHF exacerbation EF of 56 to 60%  Hypertension  Diabetes mellitus  Hypothyroidism      Date of Discharge: 10/29/2024  Primary Care Physician: Alfred Liriano MD      Presenting Problem:   Elevated troponin [R79.89]  Other fatigue [R53.83]  Acute renal failure superimposed on chronic kidney disease, unspecified acute renal failure type, unspecified CKD stage [N17.9, N18.9]  JOSELITO (acute kidney injury) [N17.9]    Active and Resolved Hospital Problems:  Active Hospital Problems    Diagnosis POA    **JOSELITO (acute kidney injury) [N17.9] Yes      Resolved Hospital Problems   No resolved problems to display.         Hospital Course         Hospital Course:    JOSELITO on CKD stage 3:  -Improving.  Avoid nephrotoxic drugs.  Continue to monitor.  Nephrology followed.     Acute diastolic CHF exacerbation EF of 56 to 60%  -proBNP was elevated at 8,842  - Improving.  Now off of Bumex drip.  On oral Bumex every 8 hours.  Continue beta-blocker therapy, ACE inhibitor/ARB on hold due to JOSELITO which is improving, continue antiplatelet therapy, continue statin therapy, continue aspirin. Echo showed an ejection fraction of 56 to 60%.     Hypertension:  -Continue Coreg, continue hydralazine, continue Bumex, avoid ACE and ARB's for now due to JOSELITO.     Diabetes mellitus type 2:  -Blood glucose is better controlled.  Continue current insulin regimen.  Hemoglobin A1c is 9.65%     Hypothyroidism:  -Continue levothyroxine           DISCHARGE Follow Up Recommendations for labs and diagnostics: Follow-up with PCP in 1 week, follow-up with cardiology in 1 week, follow-up with nephrology.        Day of Discharge     Vital Signs:  Temp:  [97.5 °F (36.4  °C)-98.1 °F (36.7 °C)] 97.6 °F (36.4 °C)  Heart Rate:  [65-76] 66  Resp:  [8-28] 14  BP: (105-200)/(52-95) 160/64  Flow (L/min) (Oxygen Therapy):  [2] 2    Physical Exam:  Physical Exam  Constitutional:       Appearance: Normal appearance.   HENT:      Head: Normocephalic and atraumatic.      Nose: Nose normal.      Mouth/Throat:      Mouth: Mucous membranes are moist.   Eyes:      Extraocular Movements: Extraocular movements intact.      Pupils: Pupils are equal, round, and reactive to light.   Cardiovascular:      Rate and Rhythm: Normal rate and regular rhythm.   Pulmonary:      Effort: Pulmonary effort is normal.      Breath sounds: Normal breath sounds.   Abdominal:      General: Abdomen is flat. Bowel sounds are normal.      Palpations: Abdomen is soft.   Musculoskeletal:         General: Normal range of motion.      Cervical back: Normal range of motion and neck supple.   Skin:     General: Skin is warm and dry.   Neurological:      General: No focal deficit present.      Mental Status: She is alert and oriented to person, place, and time.   Psychiatric:         Mood and Affect: Mood normal.         Behavior: Behavior normal.         Thought Content: Thought content normal.         Judgment: Judgment normal.           Pertinent  and/or Most Recent Results     LAB RESULTS:      Lab 10/28/24  0245 10/27/24  0309 10/26/24  0616 10/25/24  0355 10/24/24  1423 10/24/24  0523 10/23/24  0419   WBC 9.82 8.59 7.83 7.23  --  7.01 6.63   HEMOGLOBIN 10.8* 10.1* 9.3* 8.9* 9.0* 6.9* 8.1*   HEMATOCRIT 36.2 32.7* 29.8* 28.3* 29.0* 22.2* 26.7*   PLATELETS 242 205 198 171  --  173 160   NEUTROS ABS  --   --   --  4.39  --  4.52 3.97   IMMATURE GRANS (ABS)  --   --   --  0.02  --  0.03 0.03   LYMPHS ABS  --   --   --  1.83  --  1.49 1.68   MONOS ABS  --   --   --  0.81  --  0.80 0.71   EOS ABS  --   --   --  0.15  --  0.14 0.20   MCV 90.5 89.6 88.2 88.4  --  90.2 90.8         Lab 10/29/24  0647 10/29/24  0014 10/28/24  1337  10/28/24  0930 10/28/24  0245 10/27/24  0309 10/26/24  1344 10/26/24  0616 10/25/24  1358 10/25/24  0355 10/24/24  1423 10/24/24  0523 10/23/24  0419   SODIUM  --   --  138  --  139 141  --  141  --  139  --  143 144   POTASSIUM 3.6 3.6 3.5  3.5 2.9* 3.2* 4.0   < > 3.6   < > 3.6   < > 3.6 3.9   CHLORIDE  --   --  89*  --  88* 92*  --  93*  --  94*  --  100 104   CO2  --   --  40.1*  --  41.0* 38.8*  --  36.4*  --  35.9*  --  33.3* 31.5*   ANION GAP  --   --  8.9  --  10.0 10.2  --  11.6  --  9.1  --  9.7 8.5   BUN  --   --  59*  --  61* 63*  --  67*  --  75*  --  77* 81*   CREATININE  --   --  2.97*  --  3.12* 3.03*  --  3.23*  --  3.47*  --  3.40* 3.33*   EGFR  --   --  15.6*  --  14.8* 15.3*  --  14.2*  --  13.0*  --  13.3* 13.6*   GLUCOSE  --   --  163*  --  104* 116*  --  215*  --  219*  --  160* 136*   CALCIUM  --   --  9.8  --  10.1 9.9  --  8.9  --  9.2  --  9.1 9.2   MAGNESIUM  --   --   --  1.7  --   --   --   --   --   --   --  2.2 2.4   PHOSPHORUS  --   --   --   --   --   --   --   --   --   --   --   --  4.9*    < > = values in this interval not displayed.         Lab 10/23/24  0419   TOTAL PROTEIN 5.5*   ALBUMIN 3.3*   GLOBULIN 2.2   ALT (SGPT) 82*   AST (SGOT) 43*   BILIRUBIN 0.3   ALK PHOS 161*                 Lab 10/24/24  0820 10/23/24  0419   IRON  --  48   IRON SATURATION (TSAT)  --  14*   TIBC  --  353   TRANSFERRIN  --  237   ABO TYPING A  --    RH TYPING Positive  --    ANTIBODY SCREEN Negative  --          Lab 10/27/24  1130   PH, ARTERIAL 7.458*   PCO2, ARTERIAL 61.1*   PO2 ART 83.6   O2 SATURATION ART 96.3   FIO2 32   HCO3 ART 43.2*   BASE EXCESS ART 16.9*     Brief Urine Lab Results  (Last result in the past 365 days)        Color   Clarity   Blood   Leuk Est   Nitrite   Protein   CREAT   Urine HCG        10/22/24 1029             75.9         10/22/24 1029 Yellow   Clear   Trace   Negative   Negative   100 mg/dL (2+)                 Microbiology Results (last 10 days)       Procedure  Component Value - Date/Time    COVID-19 and FLU A/B PCR, 1 HR TAT - Swab, Nasopharynx [[id number]]  (Normal) Collected: 10/21/24 1312    Lab Status: Final result Specimen: Swab from Nasopharynx Updated: 10/21/24 1334     COVID19 Not Detected     Influenza A PCR Not Detected     Influenza B PCR Not Detected    Narrative:      Fact sheet for providers: https://www.fda.gov/media/568089/download    Fact sheet for patients: https://www.fda.gov/media/672299/download    Test performed by PCR.            CT Chest Without Contrast Diagnostic    Result Date: 10/28/2024  Impression: Impression: No acute intrathoracic findings. Trace right pleural effusion. Mild subsegmental bibasilar/dependent atelectasis. Multichamber cardiac enlargement and coronary artery calcifications. No rosendo pulmonary edema. Electronically Signed: Jay Gonzalez MD  10/28/2024 9:50 PM EDT  Workstation ID: [id number]    XR Chest 1 View    Result Date: 10/28/2024  Impression: Impression: No significant interval change from prior study of 10/27/2024. Electronically Signed: Álvaro Swanson MD  10/28/2024 1:14 PM EDT  Workstation ID: [id number]    XR Chest PA & Lateral    Result Date: 10/28/2024  Impression: Impression: 1. Enlargement of the cardiopericardial silhouette similar to the prior study. A component of a pericardial effusion in addition to cardiomegaly cannot be completely excluded. 2. Mild pulmonary vascular congestion. 3. Small bilateral pleural effusions Electronically Signed: Jere Cazares MD  10/28/2024 7:06 AM EDT  Workstation ID: [id number]    XR Chest 1 View    Result Date: 10/25/2024  Impression: Impression: Stable cardiomegaly with vascular congestive features. Left basilar airspace disease obscures the diaphragmatic margin, may represent atelectasis or pneumonia. Probable mild right basilar atelectasis. Suspected trace bibasilar pleural fluid. Electronically Signed: Arely Jaramillo MD  10/25/2024 2:10 PM EDT  Workstation ID: [id number]    US  Renal Bilateral    Result Date: 10/22/2024  Impression: Impression: 1.No definite acute right renal abnormality. Left kidney is not appreciated 2.Woodall catheter in the bladder making assessment limited Electronically Signed: Jacob Eller MD  10/22/2024 2:47 PM EDT  Workstation ID: [id number]    XR Chest 2 View    Result Date: 10/21/2024  Impression: Impression: 1. Stable cardiomegaly. 2. No acute process. Electronically Signed: Dexter Cartwright MD  10/21/2024 2:56 PM EDT  Workstation ID: [id number]    CT Head Without Contrast    Result Date: 10/21/2024  Impression: Impression: 1. 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 2. Mild chronic microvascular disease features. 3. No acute intracranial findings. Electronically Signed: Arely Jaramillo MD  10/21/2024 2:38 PM EDT  Workstation ID: [id number]             Results for orders placed during the hospital encounter of 10/21/24    Adult Transthoracic Echo Complete W/ Cont if Necessary Per Protocol    Interpretation Summary    Left ventricular ejection fraction appears to be 56 - 60%.    Left ventricular wall thickness is consistent with mild concentric hypertrophy.    Left ventricular diastolic function is consistent with (grade I) impaired relaxation.    The right ventricular cavity is mildly dilated.    The left atrial cavity is moderate to severely dilated.    The right atrial cavity is moderately  dilated.    Moderate to severe tricuspid valve regurgitation is present.    Estimated right ventricular systolic pressure from tricuspid regurgitation is moderately elevated (45-55 mmHg).    Moderate pulmonary hypertension is present.      Labs Pending at Discharge:      Procedures Performed           Consults:   Consults       Date and Time Order Name Status Description    10/28/2024  9:46 AM Inpatient Psychiatrist Consult Completed     10/27/2024  9:05 PM  Inpatient Pulmonology Consult Completed     10/21/2024 10:05 PM Inpatient Nephrology Consult Completed     10/21/2024  8:19 PM Inpatient Cardiology Consult Completed               Discharge Details        Discharge Medications        New Medications        Instructions Start Date   Accu-Chek Guide test strip  Generic drug: glucose blood   1 each, Other, 3 Times Daily Before Meals, Dx: E11.65. Use as instructed      Accu-Chek Softclix Lancets lancets   1 each, 3 Times Daily Before Meals, Dx: E11.65      bumetanide 2 MG tablet  Commonly known as: BUMEX   2 mg, Oral, 2 Times Daily      sevelamer 800 MG tablet  Commonly known as: RENVELA   800 mg, Oral, 3 Times Daily With Meals             Continue These Medications        Instructions Start Date   aspirin 81 MG chewable tablet   1 tablet, Daily      carvedilol 6.25 MG tablet  Commonly known as: COREG   6.25 mg, Oral, 2 Times Daily With Meals      ezetimibe 10 MG tablet  Commonly known as: ZETIA   10 mg, Daily      fluticasone 50 MCG/ACT nasal spray  Commonly known as: FLONASE   2 sprays, Nasal, Daily      hydrALAZINE 100 MG tablet  Commonly known as: APRESOLINE   100 mg, Daily      insulin NPH-insulin regular (70-30) 100 UNIT/ML injection  Commonly known as: humuLIN 70/30,novoLIN 70/30   10 Units, Nightly      insulin NPH-insulin regular (70-30) 100 UNIT/ML injection  Commonly known as: humuLIN 70/30,novoLIN 70/30   20 Units, Every Morning      levothyroxine 50 MCG tablet  Commonly known as: SYNTHROID, LEVOTHROID   50 mcg, Daily      potassium chloride 10 MEQ CR tablet   10 mEq, Daily             Stop These Medications      furosemide 40 MG tablet  Commonly known as: LASIX     losartan 100 MG tablet  Commonly known as: COZAAR            ASK your doctor about these medications        Instructions Start Date   Accu-Chek Guide Me w/Device kit  Ask about: Should I take this medication?   1 kit, Does not apply, Once, Dx: E11.65               Allergies   Allergen Reactions     Codeine Nausea Only, Unknown - Low Severity and Other (See Comments)     Dizziness    Amlodipine Unknown - Low Severity         Discharge Disposition: SNF  Skilled Nursing Facility (DC - External)    Diet: Cardiac diabetic diet          Discharge Activity:   Activity Instructions       Activity as Tolerated                CODE STATUS:  Code Status and Medical Interventions: CPR (Attempt to Resuscitate); Full Support   Ordered at: 10/21/24 2019     Code Status (Patient has no pulse and is not breathing):    CPR (Attempt to Resuscitate)     Medical Interventions (Patient has pulse or is breathing):    Full Support         No future appointments.    Additional Instructions for the Follow-ups that You Need to Schedule       Discharge Follow-up with PCP   As directed       Currently Documented PCP:    Alfred Liriano MD    PCP Phone Number:    [phone]     Follow Up Details: 1 week        Discharge Follow-up with Specified Provider: Cardiology; 1 Week   As directed      To: Cardiology   Follow Up: 1 Week        Discharge Follow-up with Specified Provider: Nephrology; 1 Week   As directed      To: Nephrology   Follow Up: 1 Week                Time spent on Discharge including face to face service: Greater than 30 minutes    Signature: Electronically signed by Sharron Grissom MD, 10/29/24, 17:38 EDT.  [facility] Hospitalist Team    22

## 2024-11-20 NOTE — PROGRESS NOTE ADULT - PROBLEM SELECTOR PROBLEM 8
Pulmonary embolism
show
Pulmonary embolism
PUSHPA (obstructive sleep apnea)
Prophylactic measure
Pulmonary embolism

## 2025-03-28 NOTE — ED PROVIDER NOTE - TOBACCO USE
[FreeTextEntry1] : Previous doc: she has adv OA in PF and mild in the med compartment with some spurring - we will start with PT she is loosing wt and did discuss HA inj in the future - some mild Hand OA 8/7/18: Less pain since starting PT - will continue this for now. 8/27/18: Acute worsening right knee pain - asp/inj today and reeval in 1 month. Will get auth for orthovisc. 10/16/18: Inj tolerated well - asp 30cc. 10/23/18: Inj tolerated well. Asp 30cc. 10/30/18: Inj tolerated well - asp 30cc. 11/6/18: Inj tolerated well - asp 30cc. 12/4/18: No sig relief from HA or cortisone - MRI right knee eval for MMT. Has OA but mostly PF compartment and her pain is medial. 1/8/19: MRI showing adv medial and PF OA. Too much damage for arthroscopy. Discussed right TKA when she is ready. - she is planning for TKA in June when the kids are off as she takes care of her grandkids 12/5/22: Advanced OA b/l knees. She is not a arthroscope candidate at this time due to severe OA. Due to acute inflammation on the left knee, recommend CSI/ASP and with chronic OA on the right knee, will start HA injections. Discussed need for TKA in the future but at this point, she would like to hold off. If left knee continues to have pain, I recommend starting HA injections next week as she has had good results in the past. Start PT for early ROM of left knee. 12/12/22: Inj tolerated well. 12/19/22: Inj tolerated well. 1/9/23: Inj tolerated well.  Cont left knee pain, start orthovisc left knee today as well. 3/6/23: Had 1st inj 2 months ago but unable to return since due to death in the family - resume series, inj tolerated well.  Right knee is better after orthovisc. 3/20/23: Inj tolerated well. 4/24/23: Inj tolerated well. 5/6/24: Adv OA b/l knees (R>L). XR showing some progression since 2022. Still has decent motion and function- will hold off on surgery and continue to proceed conservatively. She is well informed and would like to proceed with b/l knee csi and a course of PT. Follow up 6 weeks.  1/6/25: Worsening right knee pain recently, will try cortisone inj again - tolerated well.  Had good relief last time from this.  Left knee tolerable for now, will hold off on inj. 3/3/25: Pt with adv b/l knee OA (R>L symptomatically). Sig relief with RT knee csi at last visit but only for about 3 weeks. XR show no sig progression but RT knee symptoms have gotten significantly worse. Discussed options- will try HA series as this has provided relief for her in the past. Orthovisc #1 RT knee done today- flaquita well. F/up 1 week to cont series 3/18/25:  Orthovisc #2 RT knee done today- flaquita well. F/up 1 week to cont series  3/24/25: Orthovisc #3 RT knee done today- flaquita well. F/up 1 week to cont series Unknown if ever smoked
